# Patient Record
Sex: MALE | Race: BLACK OR AFRICAN AMERICAN | NOT HISPANIC OR LATINO | Employment: OTHER | URBAN - METROPOLITAN AREA
[De-identification: names, ages, dates, MRNs, and addresses within clinical notes are randomized per-mention and may not be internally consistent; named-entity substitution may affect disease eponyms.]

---

## 2020-01-19 ENCOUNTER — APPOINTMENT (OUTPATIENT)
Dept: GENERAL RADIOLOGY | Facility: CLINIC | Age: 45
End: 2020-01-19
Attending: EMERGENCY MEDICINE
Payer: MEDICAID

## 2020-01-19 ENCOUNTER — HOSPITAL ENCOUNTER (EMERGENCY)
Facility: CLINIC | Age: 45
Discharge: HOME | End: 2020-01-19
Attending: EMERGENCY MEDICINE | Admitting: EMERGENCY MEDICINE
Payer: MEDICAID

## 2020-01-19 ENCOUNTER — APPOINTMENT (OUTPATIENT)
Dept: CT IMAGING | Facility: CLINIC | Age: 45
End: 2020-01-19
Attending: EMERGENCY MEDICINE
Payer: MEDICAID

## 2020-01-19 VITALS
SYSTOLIC BLOOD PRESSURE: 142 MMHG | TEMPERATURE: 98.3 F | WEIGHT: 185 LBS | HEART RATE: 72 BPM | RESPIRATION RATE: 22 BRPM | OXYGEN SATURATION: 99 % | DIASTOLIC BLOOD PRESSURE: 97 MMHG

## 2020-01-19 DIAGNOSIS — J45.901 EXACERBATION OF ASTHMA, UNSPECIFIED ASTHMA SEVERITY, UNSPECIFIED WHETHER PERSISTENT: ICD-10-CM

## 2020-01-19 DIAGNOSIS — E27.9 ADRENAL NODULE (H): ICD-10-CM

## 2020-01-19 DIAGNOSIS — K70.10 ALCOHOLIC HEPATITIS WITHOUT ASCITES (H): ICD-10-CM

## 2020-01-19 DIAGNOSIS — F10.929 ALCOHOLIC INTOXICATION WITH COMPLICATION (H): ICD-10-CM

## 2020-01-19 DIAGNOSIS — R10.84 ABDOMINAL PAIN, GENERALIZED: ICD-10-CM

## 2020-01-19 LAB
ALBUMIN SERPL-MCNC: 3.6 G/DL (ref 3.4–5)
ALP SERPL-CCNC: 62 U/L (ref 40–150)
ALT SERPL W P-5'-P-CCNC: 153 U/L (ref 0–70)
ANION GAP SERPL CALCULATED.3IONS-SCNC: 12 MMOL/L (ref 3–14)
AST SERPL W P-5'-P-CCNC: 260 U/L (ref 0–45)
BASOPHILS # BLD AUTO: 0.1 10E9/L (ref 0–0.2)
BASOPHILS NFR BLD AUTO: 1.4 %
BILIRUB SERPL-MCNC: 0.6 MG/DL (ref 0.2–1.3)
BUN SERPL-MCNC: 11 MG/DL (ref 7–30)
CALCIUM SERPL-MCNC: 8.1 MG/DL (ref 8.5–10.1)
CHLORIDE SERPL-SCNC: 110 MMOL/L (ref 94–109)
CO2 SERPL-SCNC: 23 MMOL/L (ref 20–32)
CREAT SERPL-MCNC: 0.5 MG/DL (ref 0.66–1.25)
DIFFERENTIAL METHOD BLD: ABNORMAL
EOSINOPHIL # BLD AUTO: 0.2 10E9/L (ref 0–0.7)
EOSINOPHIL NFR BLD AUTO: 3.3 %
ERYTHROCYTE [DISTWIDTH] IN BLOOD BY AUTOMATED COUNT: 16.1 % (ref 10–15)
ETHANOL SERPL-MCNC: 0.21 G/DL
GFR SERPL CREATININE-BSD FRML MDRD: >90 ML/MIN/{1.73_M2}
GLUCOSE SERPL-MCNC: 116 MG/DL (ref 70–99)
HCT VFR BLD AUTO: 38.5 % (ref 40–53)
HGB BLD-MCNC: 13.1 G/DL (ref 13.3–17.7)
IMM GRANULOCYTES # BLD: 0 10E9/L (ref 0–0.4)
IMM GRANULOCYTES NFR BLD: 0.2 %
LIPASE SERPL-CCNC: 107 U/L (ref 73–393)
LYMPHOCYTES # BLD AUTO: 2.7 10E9/L (ref 0.8–5.3)
LYMPHOCYTES NFR BLD AUTO: 56.1 %
MCH RBC QN AUTO: 28.9 PG (ref 26.5–33)
MCHC RBC AUTO-ENTMCNC: 34 G/DL (ref 31.5–36.5)
MCV RBC AUTO: 85 FL (ref 78–100)
MONOCYTES # BLD AUTO: 0.5 10E9/L (ref 0–1.3)
MONOCYTES NFR BLD AUTO: 11.1 %
NEUTROPHILS # BLD AUTO: 1.4 10E9/L (ref 1.6–8.3)
NEUTROPHILS NFR BLD AUTO: 27.9 %
NRBC # BLD AUTO: 0 10*3/UL
NRBC BLD AUTO-RTO: 0 /100
PLATELET # BLD AUTO: 175 10E9/L (ref 150–450)
POTASSIUM SERPL-SCNC: 3.1 MMOL/L (ref 3.4–5.3)
PROT SERPL-MCNC: 7.3 G/DL (ref 6.8–8.8)
RBC # BLD AUTO: 4.54 10E12/L (ref 4.4–5.9)
SODIUM SERPL-SCNC: 145 MMOL/L (ref 133–144)
TROPONIN I SERPL-MCNC: <0.015 UG/L (ref 0–0.04)
WBC # BLD AUTO: 4.9 10E9/L (ref 4–11)

## 2020-01-19 PROCEDURE — 96361 HYDRATE IV INFUSION ADD-ON: CPT

## 2020-01-19 PROCEDURE — 85025 COMPLETE CBC W/AUTO DIFF WBC: CPT | Performed by: EMERGENCY MEDICINE

## 2020-01-19 PROCEDURE — 99285 EMERGENCY DEPT VISIT HI MDM: CPT | Mod: 25

## 2020-01-19 PROCEDURE — 80053 COMPREHEN METABOLIC PANEL: CPT | Performed by: EMERGENCY MEDICINE

## 2020-01-19 PROCEDURE — 25800030 ZZH RX IP 258 OP 636: Performed by: EMERGENCY MEDICINE

## 2020-01-19 PROCEDURE — 74177 CT ABD & PELVIS W/CONTRAST: CPT

## 2020-01-19 PROCEDURE — 25000128 H RX IP 250 OP 636: Performed by: EMERGENCY MEDICINE

## 2020-01-19 PROCEDURE — 96375 TX/PRO/DX INJ NEW DRUG ADDON: CPT

## 2020-01-19 PROCEDURE — 96376 TX/PRO/DX INJ SAME DRUG ADON: CPT

## 2020-01-19 PROCEDURE — 96374 THER/PROPH/DIAG INJ IV PUSH: CPT | Mod: 59

## 2020-01-19 PROCEDURE — 80320 DRUG SCREEN QUANTALCOHOLS: CPT | Performed by: EMERGENCY MEDICINE

## 2020-01-19 PROCEDURE — 71046 X-RAY EXAM CHEST 2 VIEWS: CPT

## 2020-01-19 PROCEDURE — 84484 ASSAY OF TROPONIN QUANT: CPT | Performed by: EMERGENCY MEDICINE

## 2020-01-19 PROCEDURE — 93005 ELECTROCARDIOGRAM TRACING: CPT

## 2020-01-19 PROCEDURE — 94640 AIRWAY INHALATION TREATMENT: CPT

## 2020-01-19 PROCEDURE — 25000125 ZZHC RX 250: Performed by: EMERGENCY MEDICINE

## 2020-01-19 PROCEDURE — 25000132 ZZH RX MED GY IP 250 OP 250 PS 637: Performed by: EMERGENCY MEDICINE

## 2020-01-19 PROCEDURE — 83690 ASSAY OF LIPASE: CPT | Performed by: EMERGENCY MEDICINE

## 2020-01-19 RX ORDER — OXYCODONE HYDROCHLORIDE 5 MG/1
5 TABLET ORAL ONCE
Status: COMPLETED | OUTPATIENT
Start: 2020-01-19 | End: 2020-01-19

## 2020-01-19 RX ORDER — ONDANSETRON 2 MG/ML
4 INJECTION INTRAMUSCULAR; INTRAVENOUS EVERY 30 MIN PRN
Status: DISCONTINUED | OUTPATIENT
Start: 2020-01-19 | End: 2020-01-19

## 2020-01-19 RX ORDER — PREDNISONE 20 MG/1
40 TABLET ORAL DAILY
Qty: 10 TABLET | Refills: 0 | Status: ON HOLD | OUTPATIENT
Start: 2020-01-19 | End: 2020-02-26

## 2020-01-19 RX ORDER — METHYLPREDNISOLONE SODIUM SUCCINATE 125 MG/2ML
125 INJECTION, POWDER, LYOPHILIZED, FOR SOLUTION INTRAMUSCULAR; INTRAVENOUS ONCE
Status: COMPLETED | OUTPATIENT
Start: 2020-01-19 | End: 2020-01-19

## 2020-01-19 RX ORDER — HYDROMORPHONE HYDROCHLORIDE 1 MG/ML
0.5 INJECTION, SOLUTION INTRAMUSCULAR; INTRAVENOUS; SUBCUTANEOUS
Status: DISCONTINUED | OUTPATIENT
Start: 2020-01-19 | End: 2020-01-19

## 2020-01-19 RX ORDER — IOPAMIDOL 755 MG/ML
500 INJECTION, SOLUTION INTRAVASCULAR ONCE
Status: COMPLETED | OUTPATIENT
Start: 2020-01-19 | End: 2020-01-19

## 2020-01-19 RX ORDER — SODIUM CHLORIDE 9 MG/ML
1000 INJECTION, SOLUTION INTRAVENOUS CONTINUOUS
Status: DISCONTINUED | OUTPATIENT
Start: 2020-01-19 | End: 2020-01-19 | Stop reason: HOSPADM

## 2020-01-19 RX ORDER — HYDROMORPHONE HYDROCHLORIDE 1 MG/ML
0.2 INJECTION, SOLUTION INTRAMUSCULAR; INTRAVENOUS; SUBCUTANEOUS
Status: DISCONTINUED | OUTPATIENT
Start: 2020-01-19 | End: 2020-01-19

## 2020-01-19 RX ORDER — ALBUTEROL SULFATE 0.83 MG/ML
2.5 SOLUTION RESPIRATORY (INHALATION) ONCE
Status: COMPLETED | OUTPATIENT
Start: 2020-01-19 | End: 2020-01-19

## 2020-01-19 RX ORDER — ALBUTEROL SULFATE 90 UG/1
2 AEROSOL, METERED RESPIRATORY (INHALATION) EVERY 4 HOURS PRN
Qty: 1 INHALER | Refills: 0 | Status: ON HOLD | OUTPATIENT
Start: 2020-01-19 | End: 2020-02-26

## 2020-01-19 RX ORDER — ALBUTEROL SULFATE 0.83 MG/ML
2.5 SOLUTION RESPIRATORY (INHALATION) EVERY 6 HOURS PRN
Qty: 75 ML | Refills: 0 | Status: ON HOLD | OUTPATIENT
Start: 2020-01-19 | End: 2020-02-26

## 2020-01-19 RX ORDER — GUAIFENESIN 200 MG/10ML
200 LIQUID ORAL EVERY 4 HOURS PRN
Qty: 118 ML | Refills: 0 | Status: ON HOLD | OUTPATIENT
Start: 2020-01-19 | End: 2020-02-26

## 2020-01-19 RX ORDER — FAMOTIDINE 10 MG
10 TABLET ORAL 2 TIMES DAILY
Qty: 14 TABLET | Refills: 0 | Status: ON HOLD | OUTPATIENT
Start: 2020-01-19 | End: 2020-02-26

## 2020-01-19 RX ADMIN — HYDROMORPHONE HYDROCHLORIDE 0.5 MG: 1 INJECTION, SOLUTION INTRAMUSCULAR; INTRAVENOUS; SUBCUTANEOUS at 06:11

## 2020-01-19 RX ADMIN — ALBUTEROL SULFATE 2.5 MG: 2.5 SOLUTION RESPIRATORY (INHALATION) at 07:31

## 2020-01-19 RX ADMIN — IOPAMIDOL 93 ML: 755 INJECTION, SOLUTION INTRAVENOUS at 08:31

## 2020-01-19 RX ADMIN — OXYCODONE HYDROCHLORIDE 5 MG: 5 TABLET ORAL at 11:18

## 2020-01-19 RX ADMIN — OXYCODONE HYDROCHLORIDE 5 MG: 5 TABLET ORAL at 09:06

## 2020-01-19 RX ADMIN — SODIUM CHLORIDE 63 ML: 9 INJECTION, SOLUTION INTRAVENOUS at 08:31

## 2020-01-19 RX ADMIN — ONDANSETRON HYDROCHLORIDE 4 MG: 2 INJECTION, SOLUTION INTRAMUSCULAR; INTRAVENOUS at 07:15

## 2020-01-19 RX ADMIN — SODIUM CHLORIDE 1000 ML: 9 INJECTION, SOLUTION INTRAVENOUS at 06:11

## 2020-01-19 RX ADMIN — HYDROMORPHONE HYDROCHLORIDE 0.2 MG: 1 INJECTION, SOLUTION INTRAMUSCULAR; INTRAVENOUS; SUBCUTANEOUS at 07:15

## 2020-01-19 RX ADMIN — SODIUM CHLORIDE 1000 ML: 9 INJECTION, SOLUTION INTRAVENOUS at 07:13

## 2020-01-19 RX ADMIN — METHYLPREDNISOLONE SODIUM SUCCINATE 125 MG: 125 INJECTION, POWDER, FOR SOLUTION INTRAMUSCULAR; INTRAVENOUS at 06:11

## 2020-01-19 ASSESSMENT — ENCOUNTER SYMPTOMS
ABDOMINAL PAIN: 1
SHORTNESS OF BREATH: 1
VOMITING: 1

## 2020-01-19 NOTE — ED PROVIDER NOTES
History     Chief Complaint:  Shortness of Breath    HPI   Jaiden Lyons is a 44 year old male with a history of pancreatitis and asthma who presents via EMS for evaluation of shortness of breath. He notes developing epigastric abdominal pain radiating to bilateral sides and emesis around 0430 this morning. The patient also reports sudden onset shortness of breath one our prior to arrival. He denies chest pain. He administered one inhaler and 2 duonebs at home and was then given another 2 duonebs and 4 mg of Zofran by EMS en route. He notes slight relief of his symptoms with these interventions. He notes his alcohol-induced pancreatitis flared up one year ago and his asthma flared up a few years ago. He endorses drinking 0.5 pints of alcohol per day and his last drink was late last night. He notes feels shaky when going through withdrawals.    Allergies:  Morphine     Medications:    Zantac     Past Medical History:    Diabetes mellitus   Hypertension   Asthma   Pancreatitis  ETOH abuse    Past Surgical History:    The patient does not have any pertinent past surgical history.     Family History:    No past pertinent family history.     Social History:  The patient was accompanied to the ED by a female .  Smoking Status: Current Every Day Smoker  Smokeless Tobacco: Never Used  Alcohol Use: Positive, 0.5 pint per day  Marital Status:  Single [1]     Review of Systems   Respiratory: Positive for shortness of breath.    Cardiovascular: Negative for chest pain.   Gastrointestinal: Positive for abdominal pain and vomiting.   All other systems reviewed and are negative.    Physical Exam     Patient Vitals for the past 24 hrs:   BP Temp Temp src Pulse Heart Rate Resp SpO2 Weight   01/19/20 0845 (!) 142/97 -- -- 72 -- -- 99 % --   01/19/20 0815 (!) 144/93 -- -- 73 -- -- 97 % --   01/19/20 0800 (!) 149/97 -- -- -- -- -- 95 % --   01/19/20 0557 (!) 144/99 98.3  F (36.8  C) Temporal 92 92 22 100 % 83.9 kg (185 lb)         Physical Exam    General: Patient is alert and interactive when I enter the room  Head:  The scalp, face, and head appear normal  Eyes:  Conjunctivae are normal  ENT:    The nose is normal    Pinnae are normal    External acoustic canals are normal  Neck:  Trachea midline  CV:  Pulses are normal, RRR.    Resp:  No respiratory distress, CTAB, mild expiratory wheezing, tachypnea present, no retractions  Abdomen:      Soft, epigastric tenderness, non-distended  Musc:  Normal muscular tone    No major joint effusions    No asymmetric leg swelling  Skin:  No rash or lesions noted  Neuro:  Speech is normal and fluent. Face is symmetric.     Moving all extremities well.   Psych: Awake. Alert.  Normal affect.  Appropriate interactions.    Emergency Department Course     ECG:  Time: 0724  Vent. Rate 72 bpm. OH interval 158. QRS duration 102. QT/QTc 494/540. P-R-T axis 60 -8 -10.  Normal sinus rhythm   T wave abnormality, consider anterolateral ischemia  Prolonged QT  Abnormal ECG  Read time: 0724      Imaging:  Radiographic findings were communicated with the patient who voiced understanding of the findings.    XR Chest 2 Views  IMPRESSION: Small right pleural effusion. No pneumothorax. No focal consolidation. Cardiac silhouette within normal limits. Minimally tortuous aorta. No acute osseous abnormality. Reading per radiology.    CT Abdomen Pelvis w Contrast  IMPRESSION:  1. No acute pathology in the abdomen or pelvis.  2. Significant hepatic steatosis.  3. 2.2 cm left adrenal nodule, indeterminate, could represent adrenal  adenoma, can be further evaluated with CT adrenal mass protocol. Reading per radiology.    Laboratory:  CBC: WBC: 4.9, HGB 13.1 (L), PLT: 175    CMP: Glucose 116 (H), Sodium 145 (H), Potassium 3.1 (L), Chloride 110 (H), Creatinine 0.50 (L), Calcium 8.1 (L),  (H),  (H), o/w WNL     0600 Troponin I: <0.015    Lipase: 107    0559 Alcohol level blood: 0.21  (H)      Interventions:  0611 Solu-Medrol 125 mg IV  0611 Dilaudid 0.5 mg  IV  0611 NS 1000 mL IV   0713 NS 1000 mL IV  0715 Dilaudid 0.2 mg IV  0715 Zofran 4 mg IV  0731 Albuterol 2.5 mg Nebulizer  0906 Oxycodone 5 mg PO  1118 Oxycodone 5 mg PO    Emergency Department Course:  Past medical records, nursing notes, and vitals reviewed.  0600: I performed an exam of the patient and obtained history, as documented above.     EKG obtained in the ED, see results above.       The patient was sent for a XR Chest and CT Abdomen Pelvis while in the emergency department, results above.      IV was inserted and blood was drawn for laboratory testing, results above.     Medication and IV fluids administered.     1125 I rechecked and updated the patient.     Findings and plan explained to the Patient. Patient discharged home with instructions regarding supportive care, medications, and reasons to return. The importance of close follow-up was reviewed. The patient was prescribed albuterol inhaler, albuterol nebulizer, Pepcid, Robitussin, and prednisone.     I personally reviewed the laboratory and imaging results with the Patient and answered all related questions prior to discharge.    Impression & Plan        Medical Decision Making:  Jaiden Lyons is a 43 yo M who presents with sob and abdominal pain.  She has a history and pancreatitis.  He has been continued to drink for quite some time.  He believes he has a recurrence of his pancreatitis as he has been vomiting and has upper abdominal pain.  He does have some mild epigastric tenderness but otherwise benign.  His lungs only revealed mild expiratory wheezing but he is tachypneic.  He is alreddy received 4 nebs by the time I did seen him.  He reports he is using his uncles nebulizer and inhaler.  X-ray revealed no pneumonia or pneumothorax.  Patient was given Solu-Medrol and pain medication for possible pancreatitis.  Blood work returned with alcoholic hepatitis but no signs  of pancreatitis.  With his severe pain we did do a CT abdomen pelvis.  This revealed no acute abnormality.  He was intoxicated 12.2.  Patient was given oxycodone for pain at this point.  At this point I do not feel like there is a reason to admit him as he likely is having gastritis secondary to his drinking as well as an asthma exacerbation.  His asthma has greatly improved and he is breathing normally without issue.  Patient will be sent home with Pepcid for his stomach as well as prednisone for his asthma exacerbation.  We did do albuterol inhaler and a neb machine was ordered.  Patient needs a follow-up with her primary.  Patient discharged.      Diagnosis:    ICD-10-CM    1. Alcoholic hepatitis without ascites K70.10    2. Alcoholic intoxication with complication (H) F10.929    3. Exacerbation of asthma, unspecified asthma severity, unspecified whether persistent J45.901    4. Adrenal nodule (H) E27.9    5. Abdominal pain, generalized R10.84        Disposition:  discharged to home    Discharge Medications:  Discharge Medication List as of 1/19/2020 10:22 AM      START taking these medications    Details   albuterol (PROAIR HFA) 108 (90 Base) MCG/ACT inhaler Inhale 2 puffs into the lungs every 4 hours as needed for shortness of breath / dyspnea, Disp-1 Inhaler, R-0, Local Print      albuterol (PROVENTIL) (2.5 MG/3ML) 0.083% neb solution Take 1 vial (2.5 mg) by nebulization every 6 hours as needed for shortness of breath / dyspnea or wheezing, Disp-75 mL, R-0, Local Print      famotidine (PEPCID) 10 MG tablet Take 1 tablet (10 mg) by mouth 2 times daily for 7 days, Disp-14 tablet, R-0, Local Print      guaiFENesin (ROBITUSSIN) 100 MG/5ML liquid Take 10 mLs (200 mg) by mouth every 4 hours as needed for cough, Disp-118 mL, R-0, Local Print      predniSONE (DELTASONE) 20 MG tablet Take 2 tablets (40 mg) by mouth daily for 5 days, Disp-10 tablet, R-0, Local Print           Jaclyn LORA, am serving as a scribe  on 1/19/2020 at 6:22 AM to personally document services performed by Kenzie To MD based on my observations and the provider's statements to me.      Jaclyn Smith  1/19/2020   [facility] EMERGENCY DEPARTMENT       Kenzie To MD  01/20/20 [id number]

## 2020-01-19 NOTE — ED TRIAGE NOTES
SOB and belly pain since 0430.  Administered inhaler x 1 and duo neb x 2 at home.  Given duoneb x 2 and 4mg of zofran by ems.  Hx/o asthma and pancreatitis.

## 2020-01-19 NOTE — ED AVS SNAPSHOT
[facility] Emergency Department  [address]  [facility] [address]  Phone:  [phone]  Fax:  [phone]                                    Jaiden Lyons   MRN: [id number]    Department:  [facility] Emergency Department   Date of Visit:  1/19/2020           After Visit Summary Signature Page    I have received my discharge instructions, and my questions have been answered. I have discussed any challenges I see with this plan with the nurse or doctor.    ..........................................................................................................................................  Patient/Patient Representative Signature      ..........................................................................................................................................  Patient Representative Print Name and Relationship to Patient    ..................................................               ................................................  Date                                   Time    ..........................................................................................................................................  Reviewed by Signature/Title    ...................................................              ..............................................  Date                                               Time          22EPIC Rev 08/18

## 2020-01-19 NOTE — ED NOTES
Patient discharged to home. Patient received follow-up in 2 days with PCP and medication instructions for Albuterol inhaler, Nebulizers, Pepcid, Prednisone, and Guaifenesin. Patient received discharge instructions and has no other questions at this time.

## 2020-01-20 LAB — INTERPRETATION ECG - MUSE: NORMAL

## 2020-02-25 ENCOUNTER — HOSPITAL ENCOUNTER (INPATIENT)
Facility: CLINIC | Age: 45
LOS: 6 days | Discharge: HOME | End: 2020-03-02
Attending: EMERGENCY MEDICINE | Admitting: INTERNAL MEDICINE
Payer: MEDICAID

## 2020-02-25 ENCOUNTER — APPOINTMENT (OUTPATIENT)
Dept: GENERAL RADIOLOGY | Facility: CLINIC | Age: 45
End: 2020-02-25
Attending: EMERGENCY MEDICINE
Payer: MEDICAID

## 2020-02-25 DIAGNOSIS — K70.10 ALCOHOLIC HEPATITIS WITHOUT ASCITES (H): ICD-10-CM

## 2020-02-25 DIAGNOSIS — K85.20 ALCOHOL-INDUCED ACUTE PANCREATITIS, UNSPECIFIED COMPLICATION STATUS: ICD-10-CM

## 2020-02-25 LAB
ALBUMIN SERPL-MCNC: 4.2 G/DL (ref 3.4–5)
ALP SERPL-CCNC: 103 U/L (ref 40–150)
ALT SERPL W P-5'-P-CCNC: 173 U/L (ref 0–70)
ANION GAP SERPL CALCULATED.3IONS-SCNC: 18 MMOL/L (ref 3–14)
AST SERPL W P-5'-P-CCNC: 414 U/L (ref 0–45)
BASOPHILS # BLD AUTO: 0.1 10E9/L (ref 0–0.2)
BASOPHILS NFR BLD AUTO: 1.4 %
BILIRUB SERPL-MCNC: 1.2 MG/DL (ref 0.2–1.3)
BUN SERPL-MCNC: 11 MG/DL (ref 7–30)
CALCIUM SERPL-MCNC: 8.9 MG/DL (ref 8.5–10.1)
CHLORIDE SERPL-SCNC: 96 MMOL/L (ref 94–109)
CO2 SERPL-SCNC: 20 MMOL/L (ref 20–32)
CREAT SERPL-MCNC: 0.59 MG/DL (ref 0.66–1.25)
DIFFERENTIAL METHOD BLD: NORMAL
EOSINOPHIL # BLD AUTO: 0 10E9/L (ref 0–0.7)
EOSINOPHIL NFR BLD AUTO: 0.4 %
ERYTHROCYTE [DISTWIDTH] IN BLOOD BY AUTOMATED COUNT: 15 % (ref 10–15)
ETHANOL SERPL-MCNC: 0.28 G/DL
GFR SERPL CREATININE-BSD FRML MDRD: >90 ML/MIN/{1.73_M2}
GLUCOSE SERPL-MCNC: 69 MG/DL (ref 70–99)
HCT VFR BLD AUTO: 42.7 % (ref 40–53)
HGB BLD-MCNC: 14.3 G/DL (ref 13.3–17.7)
IMM GRANULOCYTES # BLD: 0 10E9/L (ref 0–0.4)
IMM GRANULOCYTES NFR BLD: 0 %
LIPASE SERPL-CCNC: 1384 U/L (ref 73–393)
LYMPHOCYTES # BLD AUTO: 2.4 10E9/L (ref 0.8–5.3)
LYMPHOCYTES NFR BLD AUTO: 47.7 %
MCH RBC QN AUTO: 28.1 PG (ref 26.5–33)
MCHC RBC AUTO-ENTMCNC: 33.5 G/DL (ref 31.5–36.5)
MCV RBC AUTO: 84 FL (ref 78–100)
MONOCYTES # BLD AUTO: 0.4 10E9/L (ref 0–1.3)
MONOCYTES NFR BLD AUTO: 8.8 %
NEUTROPHILS # BLD AUTO: 2.1 10E9/L (ref 1.6–8.3)
NEUTROPHILS NFR BLD AUTO: 41.7 %
NRBC # BLD AUTO: 0 10*3/UL
NRBC BLD AUTO-RTO: 0 /100
PLATELET # BLD AUTO: 157 10E9/L (ref 150–450)
POTASSIUM SERPL-SCNC: 4.1 MMOL/L (ref 3.4–5.3)
PROT SERPL-MCNC: 8.7 G/DL (ref 6.8–8.8)
RBC # BLD AUTO: 5.09 10E12/L (ref 4.4–5.9)
SODIUM SERPL-SCNC: 134 MMOL/L (ref 133–144)
WBC # BLD AUTO: 5 10E9/L (ref 4–11)

## 2020-02-25 PROCEDURE — 25800030 ZZH RX IP 258 OP 636: Performed by: EMERGENCY MEDICINE

## 2020-02-25 PROCEDURE — 71046 X-RAY EXAM CHEST 2 VIEWS: CPT

## 2020-02-25 PROCEDURE — 80320 DRUG SCREEN QUANTALCOHOLS: CPT | Performed by: EMERGENCY MEDICINE

## 2020-02-25 PROCEDURE — 94640 AIRWAY INHALATION TREATMENT: CPT

## 2020-02-25 PROCEDURE — 96375 TX/PRO/DX INJ NEW DRUG ADDON: CPT

## 2020-02-25 PROCEDURE — 99285 EMERGENCY DEPT VISIT HI MDM: CPT | Mod: 25

## 2020-02-25 PROCEDURE — 25000125 ZZHC RX 250: Performed by: EMERGENCY MEDICINE

## 2020-02-25 PROCEDURE — 83690 ASSAY OF LIPASE: CPT | Performed by: EMERGENCY MEDICINE

## 2020-02-25 PROCEDURE — 96374 THER/PROPH/DIAG INJ IV PUSH: CPT

## 2020-02-25 PROCEDURE — 85025 COMPLETE CBC W/AUTO DIFF WBC: CPT | Performed by: EMERGENCY MEDICINE

## 2020-02-25 PROCEDURE — 96376 TX/PRO/DX INJ SAME DRUG ADON: CPT

## 2020-02-25 PROCEDURE — 25000128 H RX IP 250 OP 636: Performed by: EMERGENCY MEDICINE

## 2020-02-25 PROCEDURE — 12000000 ZZH R&B MED SURG/OB

## 2020-02-25 PROCEDURE — 80053 COMPREHEN METABOLIC PANEL: CPT | Performed by: EMERGENCY MEDICINE

## 2020-02-25 RX ORDER — HYDROMORPHONE HYDROCHLORIDE 1 MG/ML
0.2 INJECTION, SOLUTION INTRAMUSCULAR; INTRAVENOUS; SUBCUTANEOUS
Status: DISCONTINUED | OUTPATIENT
Start: 2020-02-25 | End: 2020-02-26

## 2020-02-25 RX ORDER — LORAZEPAM 2 MG/ML
1-2 INJECTION INTRAMUSCULAR EVERY 30 MIN PRN
Status: DISCONTINUED | OUTPATIENT
Start: 2020-02-25 | End: 2020-03-02 | Stop reason: HOSPADM

## 2020-02-25 RX ORDER — ONDANSETRON 2 MG/ML
4 INJECTION INTRAMUSCULAR; INTRAVENOUS EVERY 30 MIN PRN
Status: DISCONTINUED | OUTPATIENT
Start: 2020-02-25 | End: 2020-02-26

## 2020-02-25 RX ORDER — DEXTROSE MONOHYDRATE, SODIUM CHLORIDE, AND POTASSIUM CHLORIDE 50; 1.49; 4.5 G/1000ML; G/1000ML; G/1000ML
INJECTION, SOLUTION INTRAVENOUS CONTINUOUS
Status: DISCONTINUED | OUTPATIENT
Start: 2020-02-26 | End: 2020-02-27

## 2020-02-25 RX ORDER — HYDROMORPHONE HYDROCHLORIDE 1 MG/ML
0.5 INJECTION, SOLUTION INTRAMUSCULAR; INTRAVENOUS; SUBCUTANEOUS
Status: DISCONTINUED | OUTPATIENT
Start: 2020-02-25 | End: 2020-02-25

## 2020-02-25 RX ORDER — HYDROMORPHONE HYDROCHLORIDE 1 MG/ML
.5-1 INJECTION, SOLUTION INTRAMUSCULAR; INTRAVENOUS; SUBCUTANEOUS
Status: COMPLETED | OUTPATIENT
Start: 2020-02-25 | End: 2020-02-25

## 2020-02-25 RX ORDER — LIDOCAINE 40 MG/G
CREAM TOPICAL
Status: DISCONTINUED | OUTPATIENT
Start: 2020-02-25 | End: 2020-03-02 | Stop reason: HOSPADM

## 2020-02-25 RX ORDER — NALOXONE HYDROCHLORIDE 0.4 MG/ML
.1-.4 INJECTION, SOLUTION INTRAMUSCULAR; INTRAVENOUS; SUBCUTANEOUS
Status: DISCONTINUED | OUTPATIENT
Start: 2020-02-25 | End: 2020-02-26

## 2020-02-25 RX ORDER — NITROGLYCERIN 0.4 MG/1
0.4 TABLET SUBLINGUAL EVERY 5 MIN PRN
Status: DISCONTINUED | OUTPATIENT
Start: 2020-02-25 | End: 2020-03-02 | Stop reason: HOSPADM

## 2020-02-25 RX ORDER — PROCHLORPERAZINE 25 MG
25 SUPPOSITORY, RECTAL RECTAL EVERY 12 HOURS PRN
Status: DISCONTINUED | OUTPATIENT
Start: 2020-02-25 | End: 2020-03-02 | Stop reason: HOSPADM

## 2020-02-25 RX ORDER — LORAZEPAM 0.5 MG/1
1-2 TABLET ORAL EVERY 30 MIN PRN
Status: DISCONTINUED | OUTPATIENT
Start: 2020-02-25 | End: 2020-03-02 | Stop reason: HOSPADM

## 2020-02-25 RX ORDER — PROCHLORPERAZINE MALEATE 10 MG
10 TABLET ORAL EVERY 6 HOURS PRN
Status: DISCONTINUED | OUTPATIENT
Start: 2020-02-25 | End: 2020-03-02 | Stop reason: HOSPADM

## 2020-02-25 RX ORDER — ALBUTEROL SULFATE 0.83 MG/ML
2.5 SOLUTION RESPIRATORY (INHALATION)
Status: DISCONTINUED | OUTPATIENT
Start: 2020-02-25 | End: 2020-03-02 | Stop reason: HOSPADM

## 2020-02-25 RX ORDER — IPRATROPIUM BROMIDE AND ALBUTEROL SULFATE 2.5; .5 MG/3ML; MG/3ML
3 SOLUTION RESPIRATORY (INHALATION) ONCE
Status: COMPLETED | OUTPATIENT
Start: 2020-02-25 | End: 2020-02-25

## 2020-02-25 RX ORDER — SODIUM CHLORIDE 9 MG/ML
1000 INJECTION, SOLUTION INTRAVENOUS CONTINUOUS
Status: DISCONTINUED | OUTPATIENT
Start: 2020-02-25 | End: 2020-02-26

## 2020-02-25 RX ORDER — LIDOCAINE 40 MG/G
CREAM TOPICAL
Status: DISCONTINUED | OUTPATIENT
Start: 2020-02-25 | End: 2020-02-26

## 2020-02-25 RX ADMIN — SODIUM CHLORIDE 1000 ML: 9 INJECTION, SOLUTION INTRAVENOUS at 20:49

## 2020-02-25 RX ADMIN — ONDANSETRON 4 MG: 2 INJECTION INTRAMUSCULAR; INTRAVENOUS at 22:05

## 2020-02-25 RX ADMIN — HYDROMORPHONE HYDROCHLORIDE 0.5 MG: 1 INJECTION, SOLUTION INTRAMUSCULAR; INTRAVENOUS; SUBCUTANEOUS at 21:06

## 2020-02-25 RX ADMIN — IPRATROPIUM BROMIDE AND ALBUTEROL SULFATE 3 ML: .5; 3 SOLUTION RESPIRATORY (INHALATION) at 21:04

## 2020-02-25 RX ADMIN — HYDROMORPHONE HYDROCHLORIDE 0.5 MG: 1 INJECTION, SOLUTION INTRAMUSCULAR; INTRAVENOUS; SUBCUTANEOUS at 22:05

## 2020-02-25 RX ADMIN — FOLIC ACID: 5 INJECTION, SOLUTION INTRAMUSCULAR; INTRAVENOUS; SUBCUTANEOUS at 21:34

## 2020-02-25 RX ADMIN — ONDANSETRON 4 MG: 2 INJECTION INTRAMUSCULAR; INTRAVENOUS at 20:50

## 2020-02-25 RX ADMIN — HYDROMORPHONE HYDROCHLORIDE 0.5 MG: 1 INJECTION, SOLUTION INTRAMUSCULAR; INTRAVENOUS; SUBCUTANEOUS at 20:50

## 2020-02-25 ASSESSMENT — MIFFLIN-ST. JEOR: SCORE: 1624.02

## 2020-02-25 ASSESSMENT — ENCOUNTER SYMPTOMS
VOMITING: 1
ABDOMINAL PAIN: 1
SHORTNESS OF BREATH: 1
DIARRHEA: 0

## 2020-02-26 ENCOUNTER — APPOINTMENT (OUTPATIENT)
Dept: ULTRASOUND IMAGING | Facility: CLINIC | Age: 45
End: 2020-02-26
Attending: INTERNAL MEDICINE
Payer: MEDICAID

## 2020-02-26 PROBLEM — K85.90 PANCREATITIS: Status: ACTIVE | Noted: 2020-02-26

## 2020-02-26 LAB
ALBUMIN SERPL-MCNC: 3.4 G/DL (ref 3.4–5)
ALP SERPL-CCNC: 86 U/L (ref 40–150)
ALT SERPL W P-5'-P-CCNC: 145 U/L (ref 0–70)
ANION GAP SERPL CALCULATED.3IONS-SCNC: 13 MMOL/L (ref 3–14)
AST SERPL W P-5'-P-CCNC: 343 U/L (ref 0–45)
BILIRUB SERPL-MCNC: 1.1 MG/DL (ref 0.2–1.3)
BUN SERPL-MCNC: 8 MG/DL (ref 7–30)
CALCIUM SERPL-MCNC: 7.7 MG/DL (ref 8.5–10.1)
CHLORIDE SERPL-SCNC: 101 MMOL/L (ref 94–109)
CO2 SERPL-SCNC: 20 MMOL/L (ref 20–32)
CREAT SERPL-MCNC: 0.46 MG/DL (ref 0.66–1.25)
ERYTHROCYTE [DISTWIDTH] IN BLOOD BY AUTOMATED COUNT: 15.1 % (ref 10–15)
GFR SERPL CREATININE-BSD FRML MDRD: >90 ML/MIN/{1.73_M2}
GLUCOSE SERPL-MCNC: 124 MG/DL (ref 70–99)
HCT VFR BLD AUTO: 37.9 % (ref 40–53)
HGB BLD-MCNC: 12.7 G/DL (ref 13.3–17.7)
LACTATE BLD-SCNC: 1.2 MMOL/L (ref 0.7–2)
LIPASE SERPL-CCNC: 1402 U/L (ref 73–393)
MCH RBC QN AUTO: 28.7 PG (ref 26.5–33)
MCHC RBC AUTO-ENTMCNC: 33.5 G/DL (ref 31.5–36.5)
MCV RBC AUTO: 86 FL (ref 78–100)
PLATELET # BLD AUTO: 131 10E9/L (ref 150–450)
POTASSIUM SERPL-SCNC: 4.2 MMOL/L (ref 3.4–5.3)
PROT SERPL-MCNC: 7.3 G/DL (ref 6.8–8.8)
RBC # BLD AUTO: 4.43 10E12/L (ref 4.4–5.9)
SODIUM SERPL-SCNC: 134 MMOL/L (ref 133–144)
WBC # BLD AUTO: 3.7 10E9/L (ref 4–11)

## 2020-02-26 PROCEDURE — 25800030 ZZH RX IP 258 OP 636: Performed by: INTERNAL MEDICINE

## 2020-02-26 PROCEDURE — 40000007 ZZH STATISTIC ADULT CD FACE TO FACE-NO CHRG

## 2020-02-26 PROCEDURE — 25000128 H RX IP 250 OP 636: Performed by: INTERNAL MEDICINE

## 2020-02-26 PROCEDURE — 76705 ECHO EXAM OF ABDOMEN: CPT

## 2020-02-26 PROCEDURE — 25000125 ZZHC RX 250: Performed by: INTERNAL MEDICINE

## 2020-02-26 PROCEDURE — 99223 1ST HOSP IP/OBS HIGH 75: CPT | Mod: AI | Performed by: INTERNAL MEDICINE

## 2020-02-26 PROCEDURE — 36415 COLL VENOUS BLD VENIPUNCTURE: CPT | Performed by: INTERNAL MEDICINE

## 2020-02-26 PROCEDURE — 80053 COMPREHEN METABOLIC PANEL: CPT | Performed by: INTERNAL MEDICINE

## 2020-02-26 PROCEDURE — 94640 AIRWAY INHALATION TREATMENT: CPT | Mod: 76

## 2020-02-26 PROCEDURE — 83605 ASSAY OF LACTIC ACID: CPT | Performed by: INTERNAL MEDICINE

## 2020-02-26 PROCEDURE — 40000275 ZZH STATISTIC RCP TIME EA 10 MIN

## 2020-02-26 PROCEDURE — 12000000 ZZH R&B MED SURG/OB

## 2020-02-26 PROCEDURE — 85027 COMPLETE CBC AUTOMATED: CPT | Performed by: INTERNAL MEDICINE

## 2020-02-26 PROCEDURE — 99207 ZZC APP CREDIT; MD BILLING SHARED VISIT: CPT | Performed by: INTERNAL MEDICINE

## 2020-02-26 PROCEDURE — HZ2ZZZZ DETOXIFICATION SERVICES FOR SUBSTANCE ABUSE TREATMENT: ICD-10-PCS | Performed by: INTERNAL MEDICINE

## 2020-02-26 PROCEDURE — 83690 ASSAY OF LIPASE: CPT | Performed by: INTERNAL MEDICINE

## 2020-02-26 PROCEDURE — 94640 AIRWAY INHALATION TREATMENT: CPT

## 2020-02-26 RX ORDER — HALOPERIDOL 5 MG/ML
2 INJECTION INTRAMUSCULAR EVERY 6 HOURS PRN
Status: DISCONTINUED | OUTPATIENT
Start: 2020-02-26 | End: 2020-03-02 | Stop reason: HOSPADM

## 2020-02-26 RX ORDER — THIAMINE HYDROCHLORIDE 100 MG/ML
100 INJECTION, SOLUTION INTRAMUSCULAR; INTRAVENOUS ONCE
Status: COMPLETED | OUTPATIENT
Start: 2020-02-26 | End: 2020-02-26

## 2020-02-26 RX ORDER — HYDRALAZINE HYDROCHLORIDE 20 MG/ML
10 INJECTION INTRAMUSCULAR; INTRAVENOUS EVERY 4 HOURS PRN
Status: DISCONTINUED | OUTPATIENT
Start: 2020-02-26 | End: 2020-03-02 | Stop reason: HOSPADM

## 2020-02-26 RX ORDER — HYDROMORPHONE HYDROCHLORIDE 1 MG/ML
.3-.5 INJECTION, SOLUTION INTRAMUSCULAR; INTRAVENOUS; SUBCUTANEOUS
Status: DISCONTINUED | OUTPATIENT
Start: 2020-02-26 | End: 2020-02-28

## 2020-02-26 RX ORDER — FOLIC ACID 5 MG/ML
1 INJECTION, SOLUTION INTRAMUSCULAR; INTRAVENOUS; SUBCUTANEOUS DAILY
Status: DISCONTINUED | OUTPATIENT
Start: 2020-02-26 | End: 2020-02-26 | Stop reason: RX

## 2020-02-26 RX ORDER — THIAMINE HYDROCHLORIDE 100 MG/ML
100 INJECTION, SOLUTION INTRAMUSCULAR; INTRAVENOUS DAILY
Status: DISCONTINUED | OUTPATIENT
Start: 2020-02-26 | End: 2020-02-26

## 2020-02-26 RX ORDER — THIAMINE HYDROCHLORIDE 100 MG/ML
100 INJECTION, SOLUTION INTRAMUSCULAR; INTRAVENOUS DAILY
Status: DISCONTINUED | OUTPATIENT
Start: 2020-02-27 | End: 2020-02-29

## 2020-02-26 RX ORDER — METOPROLOL TARTRATE 1 MG/ML
2.5 INJECTION, SOLUTION INTRAVENOUS EVERY 6 HOURS
Status: DISCONTINUED | OUTPATIENT
Start: 2020-02-26 | End: 2020-02-27

## 2020-02-26 RX ORDER — NALOXONE HYDROCHLORIDE 0.4 MG/ML
.1-.4 INJECTION, SOLUTION INTRAMUSCULAR; INTRAVENOUS; SUBCUTANEOUS
Status: DISCONTINUED | OUTPATIENT
Start: 2020-02-26 | End: 2020-03-02 | Stop reason: HOSPADM

## 2020-02-26 RX ORDER — HYDROMORPHONE HYDROCHLORIDE 1 MG/ML
0.5 INJECTION, SOLUTION INTRAMUSCULAR; INTRAVENOUS; SUBCUTANEOUS
Status: DISCONTINUED | OUTPATIENT
Start: 2020-02-26 | End: 2020-02-26

## 2020-02-26 RX ADMIN — PROCHLORPERAZINE EDISYLATE 10 MG: 5 INJECTION INTRAMUSCULAR; INTRAVENOUS at 04:55

## 2020-02-26 RX ADMIN — METOPROLOL TARTRATE 2.5 MG: 5 INJECTION INTRAVENOUS at 19:45

## 2020-02-26 RX ADMIN — LORAZEPAM 1 MG: 2 INJECTION INTRAMUSCULAR; INTRAVENOUS at 12:42

## 2020-02-26 RX ADMIN — POTASSIUM CHLORIDE, DEXTROSE MONOHYDRATE AND SODIUM CHLORIDE: 150; 5; 450 INJECTION, SOLUTION INTRAVENOUS at 22:48

## 2020-02-26 RX ADMIN — LORAZEPAM 1 MG: 2 INJECTION INTRAMUSCULAR; INTRAVENOUS at 16:46

## 2020-02-26 RX ADMIN — Medication: at 13:29

## 2020-02-26 RX ADMIN — HYDROMORPHONE HYDROCHLORIDE 0.2 MG: 1 INJECTION, SOLUTION INTRAMUSCULAR; INTRAVENOUS; SUBCUTANEOUS at 03:20

## 2020-02-26 RX ADMIN — LORAZEPAM 2 MG: 2 INJECTION INTRAMUSCULAR; INTRAVENOUS at 18:59

## 2020-02-26 RX ADMIN — HYDROMORPHONE HYDROCHLORIDE 0.2 MG: 1 INJECTION, SOLUTION INTRAMUSCULAR; INTRAVENOUS; SUBCUTANEOUS at 07:18

## 2020-02-26 RX ADMIN — POTASSIUM CHLORIDE, DEXTROSE MONOHYDRATE AND SODIUM CHLORIDE: 150; 5; 450 INJECTION, SOLUTION INTRAVENOUS at 00:27

## 2020-02-26 RX ADMIN — ALBUTEROL SULFATE 2.5 MG: 2.5 SOLUTION RESPIRATORY (INHALATION) at 15:21

## 2020-02-26 RX ADMIN — ALBUTEROL SULFATE 2.5 MG: 2.5 SOLUTION RESPIRATORY (INHALATION) at 04:59

## 2020-02-26 RX ADMIN — ALBUTEROL SULFATE 2.5 MG: 2.5 SOLUTION RESPIRATORY (INHALATION) at 00:17

## 2020-02-26 RX ADMIN — HYDROMORPHONE HYDROCHLORIDE 0.2 MG: 1 INJECTION, SOLUTION INTRAMUSCULAR; INTRAVENOUS; SUBCUTANEOUS at 00:26

## 2020-02-26 RX ADMIN — FOLIC ACID: 5 INJECTION, SOLUTION INTRAMUSCULAR; INTRAVENOUS; SUBCUTANEOUS at 15:03

## 2020-02-26 RX ADMIN — HYDROMORPHONE HYDROCHLORIDE 0.5 MG: 1 INJECTION, SOLUTION INTRAMUSCULAR; INTRAVENOUS; SUBCUTANEOUS at 22:45

## 2020-02-26 RX ADMIN — LORAZEPAM 2 MG: 2 INJECTION INTRAMUSCULAR; INTRAVENOUS at 23:35

## 2020-02-26 RX ADMIN — HYDROMORPHONE HYDROCHLORIDE 0.2 MG: 1 INJECTION, SOLUTION INTRAMUSCULAR; INTRAVENOUS; SUBCUTANEOUS at 05:17

## 2020-02-26 RX ADMIN — POTASSIUM CHLORIDE, DEXTROSE MONOHYDRATE AND SODIUM CHLORIDE: 150; 5; 450 INJECTION, SOLUTION INTRAVENOUS at 12:38

## 2020-02-26 RX ADMIN — HYDROMORPHONE HYDROCHLORIDE 0.3 MG: 1 INJECTION, SOLUTION INTRAMUSCULAR; INTRAVENOUS; SUBCUTANEOUS at 20:30

## 2020-02-26 RX ADMIN — LORAZEPAM 1 MG: 2 INJECTION INTRAMUSCULAR; INTRAVENOUS at 07:28

## 2020-02-26 RX ADMIN — HYDROMORPHONE HYDROCHLORIDE 0.5 MG: 1 INJECTION, SOLUTION INTRAMUSCULAR; INTRAVENOUS; SUBCUTANEOUS at 08:54

## 2020-02-26 RX ADMIN — LORAZEPAM 1 MG: 2 INJECTION INTRAMUSCULAR; INTRAVENOUS at 10:05

## 2020-02-26 RX ADMIN — HYDROMORPHONE HYDROCHLORIDE 0.5 MG: 1 INJECTION, SOLUTION INTRAMUSCULAR; INTRAVENOUS; SUBCUTANEOUS at 10:58

## 2020-02-26 RX ADMIN — LORAZEPAM 1 MG: 2 INJECTION INTRAMUSCULAR; INTRAVENOUS at 01:22

## 2020-02-26 RX ADMIN — THIAMINE HYDROCHLORIDE 100 MG: 100 INJECTION, SOLUTION INTRAMUSCULAR; INTRAVENOUS at 03:20

## 2020-02-26 ASSESSMENT — ACTIVITIES OF DAILY LIVING (ADL)
ADLS_ACUITY_SCORE: 14
ADLS_ACUITY_SCORE: 19
ADLS_ACUITY_SCORE: 13
ADLS_ACUITY_SCORE: 14

## 2020-02-26 NOTE — CONSULTS
"2/26/20 chem dep consult completed.      Met with patient, he appeared sleeping when I arrived.  He did arouse to my voice and make eye contact, but appeared drowsy.  I introduced self and role and patient abruptly stated \"you can leave your card.\"  I asked him if I could ask a couple questions and share some information with him, which he was agreeable.  He did tell he has been through treatment before, he does confirm he has insurance but did not clarify if he is aware of Rule 25 funding.  I began to explain to him the process for [facility] funding for substance use treatment but he began nodding off, so I aroused him with a loud voice and advised him to contact me with any questions or needs regarding interest in substance use services or support.      After my meeting I spoke with RN and informed her of my brief interview with patient.  I will close consult at this time, once patient is more A&O and if is interested in meeting with chem dep they can reorder consult.  Otherwise at discharge providing him with [facility] and [facility] info for outpatient services would be appropriate.    Britney Watson [facility]  [phone]  "

## 2020-02-26 NOTE — PROGRESS NOTES
[facility]    Medicine Progress Note - Hospitalist Service       Date of Admission:  2/25/2020  Length of stay: 0 days    Assessment & Plan   Jaiden Lyons is a 45-year-old male with a history of alcoholic pancreatitis, alcohol abuse and dependence, asthma, tobacco use, who is admitted to the hospitalist service with alcoholic pancreatitis and alcohol dependence with risk for alcohol withdrawal.      Today:  - Patient still having significant abdominal pain requiring IV pain medication.  Not appropriate to advance diet today, continue IV fluids and as needed Dilaudid.    ADDENDUM  Notified this afternoon of worsening pain. Will start Dilaudid PCA.    Alcoholic pancreatitis  - Evidenced by epigastric abdominal pain lipase elevated greater than 1200, and history of significant alcohol use.  Has had a history of same in the past.  Had some elevations in transaminases, likely related to alcohol.  Right upper quadrant ultrasound does not show sign of stone.  No need for CT scan at this time.  - Continuous IV fluids, bowel rest, pain control.  - Advance diet when IV pain medication requirements go down.    Alcohol dependence, alcohol abuse, alcohol intoxication, and alcohol withdrawal  - Patient was intoxicated on admission with an ethanol level of 0.28.  He admits to drinking half pint to a pint of rum per day.  - Per chart review, does not appear as if he has had complicated withdrawals before.  - Place patient on CIWA scale with as needed Lorazepam.  - Supplement thiamine and folate.  - Chemical dependency consult has been placed.    Alcoholic hepatitis  - This is very mild with transaminases in the low 100s and total bilirubin of 1.1.  - Right upper quadrant ultrasound showed fatty liver.    Tobacco abuse  - Patient smokes half pack per day.  Cessation is encouraged.  Nicotine gum provided.    Asthma  - Does not appear to be in an acute exacerbation.  Albuterol available as needed.  It was clear on  "admission.    Diet: N.p.o.  DVT Prophylaxis: Low Risk/Ambulatory with no VTE prophylaxis indicated  Malnutrition: Not present  Arango Catheter: No  Code Status: Full Code     Disposition Plan   Expected discharge:   2-3 days back to home.     Jm De La Torre MD  Hospitalist Service  [facility]  ______________________________________________________________________    Interval History   Patient complaining of upper abdominal pain today.  States that 0.2 mg Dilaudid dose was not helping.  Also admits to having felt short of breath lately with some wheezing related to his asthma.  Admits to drinking half pint to a pint of gin per day.    Data reviewed today: I reviewed all medications, new labs and imaging results over the last 24 hours.     Physical Exam   BP (!) 148/94   Pulse 85   Temp 96.8  F (36  C) (Temporal)   Resp 16   Ht 1.791 m (5' 10.5\")   Wt 73.5 kg (162 lb)   SpO2 94%   BMI 22.92 kg/m    162 lbs 0 oz       General: Somewhat somnolent, not in acute distress, lying in the bed.    HEENT: No scleral icterus. Oropharynx moist.     Neck: Supple. Normal range of motion.     Pulmonary: Normal work of breathing. Clear to auscultation bilaterally.    Cardiovascular: Regular rate and rhythm without murmur or extra heart sounds.    Abdomen: Soft.  Epigastric tenderness to palpation with voluntary guarding.    Extremities: No peripheral edema. No clubbing or cyanosis.     Neurologic: Awake, alert, appropriate.    Skin: Warm and dry.    Psychiatric: Normal affect and mood.     Data    Recent Labs   Lab 02/26/20  0613 02/25/20  2044   WBC 3.7* 5.0   HGB 12.7* 14.3   MCV 86 84   * 157    134   POTASSIUM 4.2 4.1   CHLORIDE 101 96   CO2 20 20   BUN 8 11   CR 0.46* 0.59*   ANIONGAP 13 18*   BARRY 7.7* 8.9   * 69*   ALBUMIN 3.4 4.2   PROTTOTAL 7.3 8.7   BILITOTAL 1.1 1.2   ALKPHOS 86 103   * 173*   * 414*   LIPASE 1,402* 1,384*     Recent Results (from the past 24 " hour(s))   XR Chest 2 Views    Narrative    EXAM: XR CHEST 2 VW  LOCATION: [facility]  DATE/TIME: 2/25/2020 9:36 PM    INDICATION: Dyspnea  COMPARISON: 01/19/2020      Impression    IMPRESSION: Heart size and pulmonary vascularity normal. Small right pleural effusion is unchanged. Curvilinear scarring or atelectasis right lower lung field, also unchanged. Lungs otherwise clear.   US Abdomen Limited    Narrative    ULTRASOUND ABDOMEN LIMITED February 26, 2020 9:23 AM     HISTORY: Pancreatitis.    COMPARISON: None.    FINDINGS:    Gallbladder: Normal with no cholelithiasis, wall thickening or focal  tenderness.      Bile ducts: CHD is normal diameter. No intrahepatic biliary  dilatation.    Liver: Fatty liver.    Pancreas: Limited visualization, partially obscured by overlying bowel  gas. No specific abnormality can be identified.    Right kidney: Normal.     Aorta and IVC: Not specifically assessed.       Impression    IMPRESSION:    1. Fatty liver.  2. Limited assessment of the pancreas.  3. No gallstones. Negative sonographic Wade's sign.       Medications      Current Facility-Administered Medications   Medication Dose Route Frequency     sodium chloride (PF)  3 mL Intracatheter Q8H     IV Fluid with vitamins   Intravenous Q24H

## 2020-02-26 NOTE — CONSULTS
2/26/20 CD consult acknowledged. Per chart review, pt does not have active insurance listed. Pt will need Rule 25 funding through [facility] for CD services. The writer e-mailed social workers [facility] Rule 25 application to have patient fill out prior to consult if he is interested in seeking CD services. Expect consult will be completed by 2/29/20 or earlier. If patient is appropriate for discharge prior to being seen by chem dep pt can schedule Rule 25 assessment through [facility] directly.

## 2020-02-26 NOTE — ED TRIAGE NOTES
Pt in with C/O abdominal pain, nausea and vomiting. Pt reports hx of pancreatitis in the past. Pt reports abdominal pain for the past 3 days. Pt A&Ox4 ABCD's intact

## 2020-02-26 NOTE — H&P
[facility]    History and Physical - Hospitalist Service       Date of Admission:  2/25/2020    Assessment & Plan   Jaiden Lyons is a 45 year old male admitted on 2/25/2020. He has a past medical history significant for multiple previous episodes of pancreatitis, ongoing alcohol dependence, asthma, and ongoing tobacco use disorder.  He presented to emergency room with abdominal pain.  Found to have acute pancreatitis and alcohol intoxication.    1.  Acute pancreatitis.  N.p.o.  Continuous IV fluids.  Pain medications as needed.  Check abdominal ultrasound.    2.  Alcohol intoxication.  Continuous IV fluids.    3.  Alcohol dependence.  I did advise alcohol cessation.  Also placed chemical dependency consult.    4.  Expected alcohol withdrawals.  IV vitamins.  Lorazepam withdrawal protocol as needed.  Continuous IV fluids.    5.  Hyperglycemia.  Mild.  Start IV fluids with dextrose.  Give 1 dose of stat IV thiamine.    6.  Tobacco use disorder.  I did advise smoking cessation.  Have nicotine gum available if needed.    7.  Hepatitis.  Due to alcohol use.  Needs long-term alcohol cessation.    8.  Asthma.  No acute exacerbation.  Have albuterol available if needed.         Diet: NPO for Medical/Clinical Reasons Except for: No Exceptions    DVT Prophylaxis: Pneumatic Compression Devices  Arango Catheter: not present  Code Status: Full Code      Disposition Plan   Expected discharge: 2 - 3 days, recommended to prior living arrangement     Rusty Randloph, DO  [facility]    ______________________________________________________________________    Chief Complaint   Abdominal pain.    History is obtained from the patient    History of Present Illness   Jaiden Lyons is a 45 year old male who has a past medical history significant for multiple previous episodes of pancreatitis, ongoing alcohol dependence, asthma, and ongoing tobacco use disorder.  He has been having abdominal pain for the  past few days.  Estimates it started 3 days ago.  Has also been having nausea.  Has been vomiting multiple times a day.  Has been having difficulty keeping any food or fluid other than alcohol down.  Amazingly, he has been able to keep down quite a bit of alcohol every day.  Is drinking about 1/2 pint of of hard alcohol per day.  Is smoking half a pack of cigarettes a day.  Has had similar symptoms in the past when he has been told that he has pancreatitis.  He has not tried any pain medications other than alcohol outside of the hospital.  He does not feel that alcohol has been helping his symptoms.  He is interested in stopping drinking alcohol.  He has been trying to cut down on his alcohol use recently.  No other acute complaints.  He has gone through alcohol withdrawals previously when he tries to stop alcohol.    Review of Systems    The 10 point Review of Systems is negative other than noted in the HPI    Past Medical History    I have reviewed this patient's medical history and updated it with pertinent information if needed.       Past Surgical History   I have reviewed this patient's surgical history and updated it with pertinent information if needed.  History reviewed. No pertinent surgical history.    Social History   I have reviewed this patient's social history and updated it with pertinent information if needed.  Social History     Tobacco Use     Smoking status: Current Every Day Smoker   Substance Use Topics     Alcohol use: Yes     Comment: 1/2 to 1 pint a day     Drug use: Not Currently       Family History   I have reviewed this patient's family history and updated it with pertinent information if needed.       Prior to Admission Medications   Prior to Admission Medications   Prescriptions Last Dose Informant Patient Reported? Taking?   Respiratory Therapy Supplies (NEBULIZER/ADULT MASK) KIT kit   No No   Sig: Inhale 1 kit into the lungs every 6 hours as needed (wheezing, sob)   albuterol (PROAIR  HFA) 108 (90 Base) MCG/ACT inhaler   No No   Sig: Inhale 2 puffs into the lungs every 4 hours as needed for shortness of breath / dyspnea   albuterol (PROVENTIL) (2.5 MG/3ML) 0.083% neb solution   No No   Sig: Take 1 vial (2.5 mg) by nebulization every 6 hours as needed for shortness of breath / dyspnea or wheezing   famotidine (PEPCID) 10 MG tablet   No No   Sig: Take 1 tablet (10 mg) by mouth 2 times daily for 7 days   guaiFENesin (ROBITUSSIN) 100 MG/5ML liquid   No No   Sig: Take 10 mLs (200 mg) by mouth every 4 hours as needed for cough   predniSONE (DELTASONE) 20 MG tablet   No No   Sig: Take 2 tablets (40 mg) by mouth daily for 5 days      Facility-Administered Medications: None     Allergies   Allergies   Allergen Reactions     Insulin Swelling     Morphine Itching       Physical Exam   Vital Signs: Temp: 98.2  F (36.8  C) Temp src: Temporal BP: (!) 156/86 Pulse: 75   Resp: 18 SpO2: 93 % O2 Device: None (Room air)    Weight: 162 lbs 0 oz    Gen:  NAD, A&Ox3.  Eyes:  PERRL, sclera anicteric.  OP:  MMM, no lesions.  Neck:  Supple.  CV:  Regular, no murmurs.  Lung:  CTA b/l, normal effort.  Ab:  +BS, soft.  Skin:  Warm, dry to touch.  No rash.  Ext:  No pitting edema LE b/l.      Data   Data reviewed today: I reviewed all medications, new labs and imaging results over the last 24 hours.     Recent Labs   Lab 02/25/20  2044   WBC 5.0   HGB 14.3   MCV 84         POTASSIUM 4.1   CHLORIDE 96   CO2 20   BUN 11   CR 0.59*   ANIONGAP 18*   BARRY 8.9   GLC 69*   ALBUMIN 4.2   PROTTOTAL 8.7   BILITOTAL 1.2   ALKPHOS 103   *   *   LIPASE 1,384*

## 2020-02-26 NOTE — ED PROVIDER NOTES
History     Chief Complaint:  Abdominal Pain      HPI   Jaiden Lyons is a 45 year old male with a history of diabetes mellitus, hypertension, asthma, and alcoholic pancreatitis who presents with abdominal pain. The patient reported that he has had 2-3 days of twisting epigastric pain with radiation to his sides and shortness of breath. He has also been vomiting all day without blood present. He states that his last flare up of pancreatitis 9 months ago. He also states that he has been trying to cut back on smoking and drinking and drinks a half to a full pint a day and he has gone through withdrawals before. He denied any diarrhea.     Allergies:  Morphine      Medications:    Zantac      Past Medical History:    Diabetes mellitus   Hypertension   Asthma   Pancreatitis  ETOH abuse     Past Surgical History:    The patient does not have any pertinent past surgical history.      Family History:    No past pertinent family history.      Social History:  The patient was accompanied to the ED by his wife  Smoking Status: Current Every Day Smoker  Smokeless Tobacco: Never Used  Alcohol Use: Positive, 0.5 pint per day  Marital Status:  Single [1]     Review of Systems   Respiratory: Positive for shortness of breath.    Gastrointestinal: Positive for abdominal pain and vomiting. Negative for diarrhea.   All other systems reviewed and are negative.      Physical Exam     Patient Vitals for the past 24 hrs:   BP Temp Temp src Pulse Resp SpO2   02/25/20 2130 -- -- -- -- -- 97 %   02/25/20 2120 -- -- -- -- -- 93 %   02/25/20 2110 -- -- -- -- -- 93 %   02/25/20 2100 -- -- -- -- -- 95 %   02/25/20 2028 (!) 129/100 98.1  F (36.7  C) Temporal 105 16 99 %       Physical Exam  Constitutional: Well developed, appears uncomfortable, nontox appearance  Head: Atraumatic.   Mouth/Throat: Oropharynx is clear and moist.   Neck:  no stridor  Eyes: no scleral icterus  Cardiovascular: Borderline regular tachycardia, 2+ bilat radial  pulses  Pulmonary/Chest: nml resp effort, Clear BS bilat  Abdominal: ND, +BS, soft, epigastric tenderness, no rebound or guarding   : no CVA tenderness bilat  Ext: Warm, well perfused, no edema  Neurological: A&O, symmetric facies, moves ext x4  Skin: Skin is warm and dry.   Psychiatric: Behavior is normal. Thought content normal.   Nursing note and vitals reviewed.    Emergency Department Course   Imaging:  Radiographic findings were communicated with the patient who voiced understanding of the findings.    XR Chest 2 views:   Heart size and pulmonary vascularity normal. Small right pleural effusion is unchanged. Curvilinear scarring or atelectasis right lower lung field, also unchanged. Lungs otherwise clear, As per radiology.       Laboratory:  CBC: WBC: 5.0, HGB: 14.3, PLT: 157  CMP: Glucose 69 (L), Creatinine: 0.59 (L), Anion Gap: 18 (H), ALT: 173 (H), AST: 414 (H), o/w WNL   Lipase: 1384 (H)   ETOH: 0.28 (H)      Interventions:  2049 NS 1L IV   2050 Zofran 4 mg IV    Dilaudid 0.5 mg IV   2104 Duoneb 3 ml Nebulization   2134 NS 1L, Infuvite adult 10 ml, thiamine 100 mg, folic acid 1 mg IV  2205 Zofran 4 mg IV    Dilaudid 0.5mg IV   2208 GI Cocktail 30 ml PO    Emergency Department Course:  Nursing notes and vitals reviewed. (2050) I performed an exam of the patient as documented above.     IV inserted. Medicine administered as documented above. Blood drawn. This was sent to the lab for further testing, results above.    The patient was sent for a chest XR while in the emergency department, findings above.     (2210) I rechecked the patient and discussed the results of his workup thus far.     (2207)  I consulted with Dr. Randolph of the hospitalist services. They are in agreement to accept the patient for admission.    Findings and plan explained to the Patient who consents to admission. Discussed the patient with Dr. Randolph, who will admit the patient to a med/surg bed for further monitoring, evaluation,  and treatment.      Impression & Plan    Medical Decision Makin year old male presenting w/ abdominal pain, shortness of breath     DDx includes pancreatitis, alcoholic gastritis, hepatitis, abdominal pain NOS, ulcer, hollow viscus rupture although less likely given no peritoneal signs on exam.  Doubt ACS, dissection, PE given history of similar presentations and recent alcohol use.  No evidence of acute withdrawal at this time.  Labs significant for elevated lipase and transaminases, elevated etoh.  Imaging deferred given likely alcoholic gastritis and alcoholic hepatitis.  Interventions as noted above with mild to moderate improvement in symptoms.  Doubt necrotizing pancreatitis, pancreatic abscess, intra-abdominal abscess, acute liver failure.  Given the patient's ongoing symptoms, elevated lipase, I think would be appropriate to admit him to the hospitalist service for further evaluation and management.  Patient was advised to consider strategies for long-term sobriety including an addiction counselor.    Patient and wife counseled on all results, disposition and diagnosis.  They are understanding and agreeable to plan. Patient admitted in stable condition    Diagnosis:    ICD-10-CM    1. Alcohol-induced acute pancreatitis, unspecified complication status K85.20    2. Alcoholic hepatitis without ascites K70.10        Disposition:  Admitted to Dr. Randolph    Scribe Disclosure:  I, Yoko Cordero, am serving as a scribe on 2020 at 8:50 PM to personally document services performed by Wayne Francois MD based on my observations and the provider's statements to me.     Yoko Cordero  2020   [facility] EMERGENCY DEPARTMENT       Wayne Francois MD  [phone]

## 2020-02-26 NOTE — ED NOTES
[facility]  ED Nurse Handoff Report    Jaiden Lyons is a 45 year old male   ED Chief complaint: Abdominal Pain  . ED Diagnosis:   Final diagnoses:   Alcohol-induced acute pancreatitis, unspecified complication status   Alcoholic hepatitis without ascites     Allergies:   Allergies   Allergen Reactions     Insulin Swelling     Morphine Itching       Code Status: Full Code  Activity level - Baseline/Home:  Independent. Activity Level - Current:   Independent. Lift room needed: No. Bariatric: No   Needed: No   Isolation: No. Infection: Not Applicable.     Vital Signs:   Vitals:    20 2100 200 20   BP:       Pulse:       Resp:       Temp:       TempSrc:       SpO2: 95% 93% 93% 97%       Cardiac Rhythm:  ,      Pain level: 0-10 Pain Scale: 8  Patient confused: No. Patient Falls Risk: Yes.   Elimination Status: Has voided   Patient Report - Initial Complaint: Pt in with C/O abdominal pain, nausea and vomiting. Pt reports hx of pancreatitis in the past. Pt reports abdominal pain for the past 3 days. Pt A&Ox4 ABCD's intact. Focused Assessment:    20:59 MHCD General Appearance - ADL Assessment (WDL): WDL  Speech (WDL): WDL   Chemical Abuse/Addictions - Alcohol: Daily  Last Use:: 20   CIWA-Ar - Nausea and Vomitin  Tactile Disturbances: none  Tremor: no tremor  Auditory Disturbances: not present  Paroxysmal Sweats: barely perceptible sweating, palms moist  Visual Disturbances: not present  Anxiety: mildly anxious  Headache, Fullness in Head: none present  Agitation: normal activity  Orientation and Clouding of Sensorium: oriented and can do serial additions  CIWA-Ar Total: 4  KB     20:58 Gastrointestinal Gastrointestinal - GI WDL: -WDL except; all  Abdominal Appearance: flat; nondistended  All Quadrants Abdominal Palpation: tender  GI Signs/Symptoms: abdominal pain; nausea; vomiting  Gastrointestinal Comment: epigastric pain with profuse vomiting;  hx pancreatitis; last drink this AM; denies fevers/blood in vomit or stool           Tests Performed: labs, xray, . Abnormal Results:   Labs Ordered and Resulted from Time of ED Arrival Up to the Time of Departure from the ED   COMPREHENSIVE METABOLIC PANEL - Abnormal; Notable for the following components:       Result Value    Anion Gap 18 (*)     Glucose 69 (*)     Creatinine 0.59 (*)      (*)      (*)     All other components within normal limits   LIPASE - Abnormal; Notable for the following components:    Lipase 1,384 (*)     All other components within normal limits   ALCOHOL ETHYL - Abnormal; Notable for the following components:    Ethanol g/dL 0.28 (*)     All other components within normal limits   CBC WITH PLATELETS DIFFERENTIAL     XR Chest 2 Views   Final Result   IMPRESSION: Heart size and pulmonary vascularity normal. Small right pleural effusion is unchanged. Curvilinear scarring or atelectasis right lower lung field, also unchanged. Lungs otherwise clear.        .   Treatments provided: pain mx, nausea mx, vitamin infusion- pt will likely withdraw while in hospital- currently no need for benzos  Family Comments: wife at bedside  OBS brochure/video discussed/provided to patient:  N/A  ED Medications:   Medications   0.9% sodium chloride BOLUS (1,000 mLs Intravenous New Bag 2/25/20 2049)     Followed by   sodium chloride 0.9% infusion (has no administration in time range)   ondansetron (ZOFRAN) injection 4 mg (4 mg Intravenous Given 2/25/20 2205)   lidocaine (XYLOCAINE) 2 % 15 mL, alum & mag hydroxide-simethicone (MAALOX  ES) 15 mL GI Cocktail (30 mLs Oral Not Given 2/25/20 2208)   ipratropium - albuterol 0.5 mg/2.5 mg/3 mL (DUONEB) neb solution 3 mL (3 mLs Nebulization Given 2/25/20 2104)   sodium chloride 0.9 % 1,000 mL with Infuvite Adult 10 mL, thiamine 100 mg, folic acid 1 mg infusion ( Intravenous New Bag 2/25/20 2134)   HYDROmorphone (PF) (DILAUDID) injection 0.5-1 mg (0.5 mg  Intravenous Given 2/25/20 2204)     Drips infusing:  Yes  For the majority of the shift, the patient's behavior Green. Interventions performed were none.    Sepsis treatment initiated: No       ED Nurse Name/Phone Number: Jayda Mitchell RN,   10:08 PM    RECEIVING UNIT ED HANDOFF REVIEW    Above ED Nurse Handoff Report was reviewed: Yes  Reviewed by: Mary Carmen Wellington RN on February 25, 2020 at 10:41 PM

## 2020-02-26 NOTE — PROGRESS NOTES
Updated provided to wife Donna regarding patients condition.  Answered questions and encouraged to call back with any further questions or concerns

## 2020-02-26 NOTE — PLAN OF CARE
Pt up with 1 assist and gait belt at bedside. May need to be 2 assist when ambulating. Pain located in upper abdomen with partial relief from Dilaudid IV. LS clear, BS hypo, minimal flatus. LBM 2/25/20. Pain located in upper abdomen that radiates to lateral abdomen. CMS intact except for tremors. CIWA scores were 5, 8 - 1mg Ativan given, 4, and 3. Voiding - monitoring outputs (250cc this shift). Abdominal US to be performed today. Remote tele monitoring. Chem Dep consulted. Lipase 1384, , . Lipase and other labs to be rechecked today. Girlfriend at bedside.

## 2020-02-26 NOTE — PHARMACY-ADMISSION MEDICATION HISTORY
Admission medication history interview status for this patient is complete. See [facility] admission navigator for allergy information, prior to admission medications and immunization status.     Medication history interview source(s):Patient  Medication history resources (including written lists, pill bottles, clinic record):None  Primary pharmacy: n/a    Changes made to PTA medication list:  Added: none  Deleted: all - albuterol, famotidine, guaifenesin, prednisone -- from previous ED visit  Changed: none    Actions taken by pharmacist (provider contacted, etc):None     Additional medication history information:None    Medication reconciliation/reorder completed by provider prior to medication history?  No     Do you take OTC medications (eg tylenol, ibuprofen, fish oil, eye/ear drops, etc)? No     For patients on insulin therapy: No      Prior to Admission medications    Not on File

## 2020-02-26 NOTE — PROGRESS NOTES
Patient up SBA to bed  Peripheral IV infusing drips from ED  Pain in abdomen returning  VSS, on room air  Waiting for orders per hospitalist

## 2020-02-26 NOTE — PROVIDER NOTIFICATION
"Paged Hospitalist:    Pt blood pressure has been \"ramping\" up all day, last reading taken was 156/106 at complete rest.  Please advise.  Thanks  "

## 2020-02-27 LAB
ALBUMIN SERPL-MCNC: 3.7 G/DL (ref 3.4–5)
ALP SERPL-CCNC: 87 U/L (ref 40–150)
ALT SERPL W P-5'-P-CCNC: 118 U/L (ref 0–70)
ANION GAP SERPL CALCULATED.3IONS-SCNC: 11 MMOL/L (ref 3–14)
AST SERPL W P-5'-P-CCNC: 170 U/L (ref 0–45)
BILIRUB DIRECT SERPL-MCNC: 0.6 MG/DL (ref 0–0.2)
BILIRUB SERPL-MCNC: 1.6 MG/DL (ref 0.2–1.3)
BUN SERPL-MCNC: 6 MG/DL (ref 7–30)
CALCIUM SERPL-MCNC: 9.2 MG/DL (ref 8.5–10.1)
CHLORIDE SERPL-SCNC: 96 MMOL/L (ref 94–109)
CO2 SERPL-SCNC: 24 MMOL/L (ref 20–32)
CREAT SERPL-MCNC: 0.39 MG/DL (ref 0.66–1.25)
ERYTHROCYTE [DISTWIDTH] IN BLOOD BY AUTOMATED COUNT: 14.6 % (ref 10–15)
GFR SERPL CREATININE-BSD FRML MDRD: >90 ML/MIN/{1.73_M2}
GLUCOSE SERPL-MCNC: 212 MG/DL (ref 70–99)
HCT VFR BLD AUTO: 44.5 % (ref 40–53)
HGB BLD-MCNC: 15.1 G/DL (ref 13.3–17.7)
MCH RBC QN AUTO: 28.3 PG (ref 26.5–33)
MCHC RBC AUTO-ENTMCNC: 33.9 G/DL (ref 31.5–36.5)
MCV RBC AUTO: 83 FL (ref 78–100)
PLATELET # BLD AUTO: 132 10E9/L (ref 150–450)
POTASSIUM SERPL-SCNC: 4.2 MMOL/L (ref 3.4–5.3)
PROT SERPL-MCNC: 8.1 G/DL (ref 6.8–8.8)
RBC # BLD AUTO: 5.34 10E12/L (ref 4.4–5.9)
SODIUM SERPL-SCNC: 131 MMOL/L (ref 133–144)
WBC # BLD AUTO: 5.5 10E9/L (ref 4–11)

## 2020-02-27 PROCEDURE — 80076 HEPATIC FUNCTION PANEL: CPT | Performed by: INTERNAL MEDICINE

## 2020-02-27 PROCEDURE — 25800030 ZZH RX IP 258 OP 636: Performed by: INTERNAL MEDICINE

## 2020-02-27 PROCEDURE — 40000275 ZZH STATISTIC RCP TIME EA 10 MIN

## 2020-02-27 PROCEDURE — 94640 AIRWAY INHALATION TREATMENT: CPT

## 2020-02-27 PROCEDURE — 12000000 ZZH R&B MED SURG/OB

## 2020-02-27 PROCEDURE — 25000125 ZZHC RX 250: Performed by: INTERNAL MEDICINE

## 2020-02-27 PROCEDURE — 25000128 H RX IP 250 OP 636: Performed by: INTERNAL MEDICINE

## 2020-02-27 PROCEDURE — 36415 COLL VENOUS BLD VENIPUNCTURE: CPT | Performed by: INTERNAL MEDICINE

## 2020-02-27 PROCEDURE — 99233 SBSQ HOSP IP/OBS HIGH 50: CPT | Performed by: INTERNAL MEDICINE

## 2020-02-27 PROCEDURE — 85027 COMPLETE CBC AUTOMATED: CPT | Performed by: INTERNAL MEDICINE

## 2020-02-27 PROCEDURE — 80048 BASIC METABOLIC PNL TOTAL CA: CPT | Performed by: INTERNAL MEDICINE

## 2020-02-27 RX ORDER — BISACODYL 10 MG
10 SUPPOSITORY, RECTAL RECTAL
Status: DISCONTINUED | OUTPATIENT
Start: 2020-02-27 | End: 2020-03-02 | Stop reason: HOSPADM

## 2020-02-27 RX ORDER — AMOXICILLIN 250 MG
1-2 CAPSULE ORAL
Status: COMPLETED | OUTPATIENT
Start: 2020-02-27 | End: 2020-02-28

## 2020-02-27 RX ORDER — SODIUM CHLORIDE, SODIUM LACTATE, POTASSIUM CHLORIDE, CALCIUM CHLORIDE 600; 310; 30; 20 MG/100ML; MG/100ML; MG/100ML; MG/100ML
INJECTION, SOLUTION INTRAVENOUS CONTINUOUS
Status: DISCONTINUED | OUTPATIENT
Start: 2020-02-27 | End: 2020-03-01

## 2020-02-27 RX ADMIN — HYDROMORPHONE HYDROCHLORIDE 0.5 MG: 1 INJECTION, SOLUTION INTRAMUSCULAR; INTRAVENOUS; SUBCUTANEOUS at 17:14

## 2020-02-27 RX ADMIN — HYDROMORPHONE HYDROCHLORIDE 0.5 MG: 1 INJECTION, SOLUTION INTRAMUSCULAR; INTRAVENOUS; SUBCUTANEOUS at 22:04

## 2020-02-27 RX ADMIN — HYDROMORPHONE HYDROCHLORIDE 0.5 MG: 1 INJECTION, SOLUTION INTRAMUSCULAR; INTRAVENOUS; SUBCUTANEOUS at 19:23

## 2020-02-27 RX ADMIN — METOPROLOL TARTRATE 2.5 MG: 5 INJECTION INTRAVENOUS at 09:24

## 2020-02-27 RX ADMIN — POTASSIUM CHLORIDE, DEXTROSE MONOHYDRATE AND SODIUM CHLORIDE: 150; 5; 450 INJECTION, SOLUTION INTRAVENOUS at 08:35

## 2020-02-27 RX ADMIN — HYDROMORPHONE HYDROCHLORIDE 0.5 MG: 1 INJECTION, SOLUTION INTRAMUSCULAR; INTRAVENOUS; SUBCUTANEOUS at 13:44

## 2020-02-27 RX ADMIN — HYDROMORPHONE HYDROCHLORIDE 0.5 MG: 1 INJECTION, SOLUTION INTRAMUSCULAR; INTRAVENOUS; SUBCUTANEOUS at 06:50

## 2020-02-27 RX ADMIN — HYDROMORPHONE HYDROCHLORIDE 0.5 MG: 1 INJECTION, SOLUTION INTRAMUSCULAR; INTRAVENOUS; SUBCUTANEOUS at 09:25

## 2020-02-27 RX ADMIN — ALBUTEROL SULFATE 2.5 MG: 2.5 SOLUTION RESPIRATORY (INHALATION) at 01:57

## 2020-02-27 RX ADMIN — FOLIC ACID: 5 INJECTION, SOLUTION INTRAMUSCULAR; INTRAVENOUS; SUBCUTANEOUS at 11:20

## 2020-02-27 RX ADMIN — THIAMINE HYDROCHLORIDE 100 MG: 100 INJECTION, SOLUTION INTRAMUSCULAR; INTRAVENOUS at 09:25

## 2020-02-27 RX ADMIN — SODIUM CHLORIDE, POTASSIUM CHLORIDE, SODIUM LACTATE AND CALCIUM CHLORIDE: 600; 310; 30; 20 INJECTION, SOLUTION INTRAVENOUS at 13:55

## 2020-02-27 RX ADMIN — METOPROLOL TARTRATE 2.5 MG: 5 INJECTION INTRAVENOUS at 13:45

## 2020-02-27 RX ADMIN — METOPROLOL TARTRATE 2.5 MG: 5 INJECTION INTRAVENOUS at 01:40

## 2020-02-27 RX ADMIN — HYDROMORPHONE HYDROCHLORIDE 0.5 MG: 1 INJECTION, SOLUTION INTRAMUSCULAR; INTRAVENOUS; SUBCUTANEOUS at 03:32

## 2020-02-27 ASSESSMENT — ACTIVITIES OF DAILY LIVING (ADL)
ADLS_ACUITY_SCORE: 12
ADLS_ACUITY_SCORE: 13
ADLS_ACUITY_SCORE: 13
ADLS_ACUITY_SCORE: 14
ADLS_ACUITY_SCORE: 13
ADLS_ACUITY_SCORE: 14

## 2020-02-27 NOTE — PROGRESS NOTES
Previous nursing note and addendum reviewed. I received a page at 7:20. I definitely did not receive a page prior to that. I saw patient within 5 minutes of receiving the page. He is somnolent but arouseable. He is not answering questions. No significant pain on exam. Chart reviewed. It seems that Jaiden was admitted with acute alcohol related pancreatitis and acute alcohol withdrawal. He was put on a PCA (Dilaudid) because of abdominal pain. He is now quite somnolent and is not be able to manage PCA appropriately. He has elevated blood pressure and has had some hallucinations.     I stopped PCA and ordered Dilaudid 0.3-0.5 mg every 2 hours as needed. I scheduled Metoprolol 2.5 mg IV every 6 hours with hold parameters for low BP or HR. I ordered prn Hydralazine for hypertension and prn Haldol for agitations. He has not received much ativan due to somnolence. I suspect that his level of alertness will improve off of the Dilaudid PCA. He likely needs more Ativan once more alert.

## 2020-02-27 NOTE — PLAN OF CARE
Orientated today but forgetful needing ques and reminders on safety. Mumbles words, soft voice- slurred speech. Able to answer easy questions. Resting between cares- sedated at times. Up in chair with 1 assist. Unsteady gait. Generalized weakness. Bed alarm on. Ambulated to bathroom. Voided. Pain 5-6 to abd- better today than yesterday, CIWA 0. Tachycardia. On remote tele. IV metoprolol. No edema. Iv fluids. No nausea. NPO. Good bowel sounds. Abd soft- non distended. No BM. Following plan of care. Bed alarm on for safety. Using call light at times, impulsive at times.

## 2020-02-27 NOTE — PROGRESS NOTES
[facility]    Medicine Progress Note - Hospitalist Service       Date of Admission:  2/25/2020  Length of stay: 2 days    Assessment & Plan   Jaiden Lyons is a 45-year-old male with a history of alcoholic pancreatitis, alcohol abuse and dependence, asthma, tobacco use, who is admitted to the hospitalist service with alcoholic pancreatitis and alcohol withdrawal.      Today:  - Developed some somnolence with Dilaudid PCA, switched back to PRN dosing  - 8 mg Ativan last 24h  - He is hesitant to eat/drink today, reasonable to advance diet tomorrow.   - Glucose is elevated probably from D5, switch to LR    Alcoholic pancreatitis  - Evidenced by epigastric abdominal pain lipase elevated greater than 1200, and history of significant alcohol use.  Has had a history of same in the past.  Had some elevations in transaminases, likely related to alcohol.  Right upper quadrant ultrasound does not show sign of stone.  No need for CT scan at this time.  - Continuous IV fluids, bowel rest, pain control.  - Advance diet when IV pain medication requirements go down.    Alcohol dependence, alcohol abuse, alcohol intoxication, and alcohol withdrawal  - Patient was intoxicated on admission with an ethanol level of 0.28.  He admits to drinking half pint to a pint of rum per day.  - Per chart review, does not appear as if he has had complicated withdrawals before.  - Place patient on CIWA scale with as needed Lorazepam.  - Supplement thiamine and folate.  - Chemical dependency consult has been placed.  - Continues to have ativan requirements    Alcoholic hepatitis  - This is very mild with transaminases in the low 100s and total bilirubin of 1.1.  - Right upper quadrant ultrasound showed fatty liver.    Tobacco abuse  - Patient smokes half pack per day.  Cessation is encouraged.  Nicotine gum provided.    Asthma  - Does not appear to be in an acute exacerbation.  Albuterol available as needed.  It was clear on  "admission.    Diet: N.p.o.  DVT Prophylaxis: Low Risk/Ambulatory with no VTE prophylaxis indicated  Malnutrition: Not present  Arango Catheter: No  Code Status: Full Code     Disposition Plan   Expected discharge:   2-3 days back to home.     Jm De La Torre MD  Hospitalist Service  [facility]  ______________________________________________________________________    Interval History   Overnight, patient was noted to be more somnolent and not appropriate to manage his PCA.  He was having elevated blood pressures and hallucinations.  PCA was stopped and switched to as needed Dilaudid.    This morning, the patient states his pain is improved.  He is still somewhat somnolent.    Data reviewed today: I reviewed all medications, new labs and imaging results over the last 24 hours.     Physical Exam   BP (!) 129/98   Pulse 112   Temp 97  F (36.1  C)   Resp 16   Ht 1.791 m (5' 10.5\")   Wt 73.5 kg (162 lb)   SpO2 96%   BMI 22.92 kg/m    162 lbs 0 oz       General: Somewhat somnolent, not in acute distress, sitting up in bed.     HEENT: No scleral icterus. Oropharynx moist.     Neck: Supple. Normal range of motion.     Pulmonary: Normal work of breathing. Clear to auscultation bilaterally.    Cardiovascular: Regular rate and rhythm without murmur or extra heart sounds.    Abdomen: Soft.  Epigastric tenderness to palpation is improved.     Extremities: No peripheral edema. No clubbing or cyanosis.     Neurologic: Awake, alert, appropriate.    Skin: Warm and dry.    Psychiatric: Normal affect and mood.     Data    Recent Labs   Lab 02/27/20  0627 02/26/20  0613 02/25/20  2044   WBC 5.5 3.7* 5.0   HGB 15.1 12.7* 14.3   MCV 83 86 84   * 131* 157   * 134 134   POTASSIUM 4.2 4.2 4.1   CHLORIDE 96 101 96   CO2 24 20 20   BUN 6* 8 11   CR 0.39* 0.46* 0.59*   ANIONGAP 11 13 18*   BARRY 9.2 7.7* 8.9   * 124* 69*   ALBUMIN 3.7 3.4 4.2   PROTTOTAL 8.1 7.3 8.7   BILITOTAL 1.6* 1.1 1.2 "   ALKPHOS 87 86 103   * 145* 173*   * 343* 414*   LIPASE  --  1,402* 1,384*     No results found for this or any previous visit (from the past 24 hour(s)).    Medications      Current Facility-Administered Medications   Medication Dose Route Frequency     sodium chloride (PF)  3 mL Intracatheter Q8H     IV BOLUS builder WITH LARGE additive list   Intravenous Daily     thiamine  100 mg Intravenous Daily

## 2020-02-27 NOTE — PROGRESS NOTES
Paged Hospitalist    COuld you please come assess patient, here with pancreatitis and ETOH withdrawal.  Now hallucinating, garbled speech, hypertensive, tachy, impulsive and unsteady.  Thanks    1920: No response from hospitalist, repaged again

## 2020-02-27 NOTE — CONSULTS
Care Transition Initial Assessment - RN        Met with: Patient.  DATA   Active Problems:    Pancreatitis       Cognitive Status: awake and alert.  Primary Care Clinic Name: (Needs to establish care in [place])     Contact information and PCP information verified: Yes  Lives With: alone                     Insurance concerns: Underinsured/limits on insurance and Insurance questions referred to Financial Services  ASSESSMENT  Patient currently receives the following services:  Unknown        Identified issues/concerns regarding health management: RN CTS following as no PCP or clinic listed in patient chart. Patient states had been receiving care in [place], [place]. Patient states is now living in [place].   Patient understanding of need to establish care in [place] and agreeable to scheduling appointment. Appointment scheduled and updated to AVS.   PLAN  Financial costs for the patient were not discussed .  Patient given options and choices for discharge Yes .  Patient/family is agreeable to the plan?  Yes: agreeable to scheduling appointment.   Patient anticipates discharging to Home .        Patient anticipates needs for home equipment: No  Transportation/person available to transport on day of discharge  is TBD.   Plan/Disposition: Home   Appointments: Appointment scheduled and updated to AVS.       Office Visit with Yamilet Abarca NP   Thursday Mar 5, 2020 11:00 AM    [facility]     [phone]        Care  (CTS) will continue to follow as needed.    Justine Cloud MA/RN Case Manager  Inpatient Care Coordination  [facility]   [phone]

## 2020-02-27 NOTE — PLAN OF CARE
Pt speech slurred, illogical @ times.  BP's elevated - improved w/BP meds.   Reports abdominal pain, dilaudid given.  CIWA: 6/20, ativan given.  Tele: ST w/inv T's.  LS dim, denies SOB. On O2 2 lpm.  BS: Hypo, +ve flatus.   Adequate UOP, hesitancy noted, urine orange w/odor.   Up w/1 assist, gb. Unsteady, impulsive, alarms on.  Strict NPO.  IVF infusing.   Discharge: TBD.

## 2020-02-27 NOTE — PROGRESS NOTES
Patient's demographics show no insurance. Financial Counselor's office [id number]) is following up with patient.    Justine Cloud MA/RN Case Manager  Inpatient Care Coordination  [facility]   [phone]

## 2020-02-27 NOTE — PLAN OF CARE
A/O x4, lethargic but arouses to voice easily.  VS: hypertensive (MD aware, awaiting orders), all other VSS.   Remote tele monitoring.  Upper abdominal pain managed with PCA pump. Strict NPO.  Up with SBA standing at bedside to void, unsteady on feet.  Impulsive and alarms on for safety.  CIWA scores 4-12, PRN IV ativan 1mg given x 4 this shift.  Voiding.  Will continue to monitor.

## 2020-02-28 LAB
ANION GAP SERPL CALCULATED.3IONS-SCNC: 8 MMOL/L (ref 3–14)
BUN SERPL-MCNC: 13 MG/DL (ref 7–30)
CALCIUM SERPL-MCNC: 9 MG/DL (ref 8.5–10.1)
CHLORIDE SERPL-SCNC: 100 MMOL/L (ref 94–109)
CO2 SERPL-SCNC: 25 MMOL/L (ref 20–32)
CREAT SERPL-MCNC: 0.52 MG/DL (ref 0.66–1.25)
GFR SERPL CREATININE-BSD FRML MDRD: >90 ML/MIN/{1.73_M2}
GLUCOSE SERPL-MCNC: 114 MG/DL (ref 70–99)
POTASSIUM SERPL-SCNC: 4.1 MMOL/L (ref 3.4–5.3)
SODIUM SERPL-SCNC: 133 MMOL/L (ref 133–144)

## 2020-02-28 PROCEDURE — 94640 AIRWAY INHALATION TREATMENT: CPT | Mod: 76

## 2020-02-28 PROCEDURE — 94640 AIRWAY INHALATION TREATMENT: CPT

## 2020-02-28 PROCEDURE — 25800030 ZZH RX IP 258 OP 636: Performed by: INTERNAL MEDICINE

## 2020-02-28 PROCEDURE — 25000128 H RX IP 250 OP 636: Performed by: INTERNAL MEDICINE

## 2020-02-28 PROCEDURE — 36415 COLL VENOUS BLD VENIPUNCTURE: CPT | Performed by: INTERNAL MEDICINE

## 2020-02-28 PROCEDURE — 25000125 ZZHC RX 250: Performed by: INTERNAL MEDICINE

## 2020-02-28 PROCEDURE — 25000132 ZZH RX MED GY IP 250 OP 250 PS 637: Performed by: INTERNAL MEDICINE

## 2020-02-28 PROCEDURE — 12000000 ZZH R&B MED SURG/OB

## 2020-02-28 PROCEDURE — 40000275 ZZH STATISTIC RCP TIME EA 10 MIN

## 2020-02-28 PROCEDURE — 80048 BASIC METABOLIC PNL TOTAL CA: CPT | Performed by: INTERNAL MEDICINE

## 2020-02-28 PROCEDURE — 99233 SBSQ HOSP IP/OBS HIGH 50: CPT | Performed by: INTERNAL MEDICINE

## 2020-02-28 RX ORDER — SENNOSIDES 8.6 MG
1-2 TABLET ORAL 2 TIMES DAILY PRN
Status: DISCONTINUED | OUTPATIENT
Start: 2020-02-28 | End: 2020-03-02 | Stop reason: HOSPADM

## 2020-02-28 RX ORDER — HYDROMORPHONE HYDROCHLORIDE 1 MG/ML
.3-.5 INJECTION, SOLUTION INTRAMUSCULAR; INTRAVENOUS; SUBCUTANEOUS
Status: DISCONTINUED | OUTPATIENT
Start: 2020-02-28 | End: 2020-03-01

## 2020-02-28 RX ORDER — MAGNESIUM CARB/ALUMINUM HYDROX 105-160MG
296 TABLET,CHEWABLE ORAL DAILY PRN
Status: DISCONTINUED | OUTPATIENT
Start: 2020-02-28 | End: 2020-03-02 | Stop reason: HOSPADM

## 2020-02-28 RX ORDER — ONDANSETRON 4 MG/1
4 TABLET, ORALLY DISINTEGRATING ORAL EVERY 6 HOURS PRN
Status: DISCONTINUED | OUTPATIENT
Start: 2020-02-28 | End: 2020-03-02 | Stop reason: HOSPADM

## 2020-02-28 RX ORDER — OXYCODONE HYDROCHLORIDE 5 MG/1
5 TABLET ORAL EVERY 4 HOURS PRN
Status: DISCONTINUED | OUTPATIENT
Start: 2020-02-28 | End: 2020-03-02 | Stop reason: HOSPADM

## 2020-02-28 RX ORDER — ONDANSETRON 2 MG/ML
4 INJECTION INTRAMUSCULAR; INTRAVENOUS EVERY 6 HOURS PRN
Status: DISCONTINUED | OUTPATIENT
Start: 2020-02-28 | End: 2020-03-02 | Stop reason: HOSPADM

## 2020-02-28 RX ORDER — PANTOPRAZOLE SODIUM 40 MG/1
40 TABLET, DELAYED RELEASE ORAL
Status: DISCONTINUED | OUTPATIENT
Start: 2020-02-28 | End: 2020-03-02 | Stop reason: HOSPADM

## 2020-02-28 RX ADMIN — SENNOSIDES AND DOCUSATE SODIUM 1 TABLET: 8.6; 5 TABLET ORAL at 06:51

## 2020-02-28 RX ADMIN — HYDROMORPHONE HYDROCHLORIDE 0.5 MG: 1 INJECTION, SOLUTION INTRAMUSCULAR; INTRAVENOUS; SUBCUTANEOUS at 09:09

## 2020-02-28 RX ADMIN — HYDROMORPHONE HYDROCHLORIDE 0.5 MG: 1 INJECTION, SOLUTION INTRAMUSCULAR; INTRAVENOUS; SUBCUTANEOUS at 16:34

## 2020-02-28 RX ADMIN — ALBUTEROL SULFATE 2.5 MG: 2.5 SOLUTION RESPIRATORY (INHALATION) at 09:39

## 2020-02-28 RX ADMIN — HYDROMORPHONE HYDROCHLORIDE 0.5 MG: 1 INJECTION, SOLUTION INTRAMUSCULAR; INTRAVENOUS; SUBCUTANEOUS at 04:57

## 2020-02-28 RX ADMIN — SODIUM CHLORIDE, POTASSIUM CHLORIDE, SODIUM LACTATE AND CALCIUM CHLORIDE: 600; 310; 30; 20 INJECTION, SOLUTION INTRAVENOUS at 09:09

## 2020-02-28 RX ADMIN — HYDROMORPHONE HYDROCHLORIDE 0.5 MG: 1 INJECTION, SOLUTION INTRAMUSCULAR; INTRAVENOUS; SUBCUTANEOUS at 02:52

## 2020-02-28 RX ADMIN — ALBUTEROL SULFATE 2.5 MG: 2.5 SOLUTION RESPIRATORY (INHALATION) at 23:30

## 2020-02-28 RX ADMIN — HYDROMORPHONE HYDROCHLORIDE 0.5 MG: 1 INJECTION, SOLUTION INTRAMUSCULAR; INTRAVENOUS; SUBCUTANEOUS at 06:57

## 2020-02-28 RX ADMIN — THIAMINE HYDROCHLORIDE 100 MG: 100 INJECTION, SOLUTION INTRAMUSCULAR; INTRAVENOUS at 09:09

## 2020-02-28 RX ADMIN — HYDROMORPHONE HYDROCHLORIDE 0.5 MG: 1 INJECTION, SOLUTION INTRAMUSCULAR; INTRAVENOUS; SUBCUTANEOUS at 22:31

## 2020-02-28 RX ADMIN — ONDANSETRON HYDROCHLORIDE 4 MG: 2 INJECTION, SOLUTION INTRAMUSCULAR; INTRAVENOUS at 10:29

## 2020-02-28 RX ADMIN — OXYCODONE HYDROCHLORIDE 5 MG: 5 TABLET ORAL at 13:20

## 2020-02-28 RX ADMIN — HYDROMORPHONE HYDROCHLORIDE 0.5 MG: 1 INJECTION, SOLUTION INTRAMUSCULAR; INTRAVENOUS; SUBCUTANEOUS at 00:18

## 2020-02-28 RX ADMIN — PANTOPRAZOLE SODIUM 40 MG: 40 TABLET, DELAYED RELEASE ORAL at 10:30

## 2020-02-28 RX ADMIN — ALBUTEROL SULFATE 2.5 MG: 2.5 SOLUTION RESPIRATORY (INHALATION) at 00:31

## 2020-02-28 RX ADMIN — SODIUM CHLORIDE, POTASSIUM CHLORIDE, SODIUM LACTATE AND CALCIUM CHLORIDE: 600; 310; 30; 20 INJECTION, SOLUTION INTRAVENOUS at 00:21

## 2020-02-28 RX ADMIN — OXYCODONE HYDROCHLORIDE 5 MG: 5 TABLET ORAL at 20:50

## 2020-02-28 RX ADMIN — HYDROMORPHONE HYDROCHLORIDE 0.5 MG: 1 INJECTION, SOLUTION INTRAMUSCULAR; INTRAVENOUS; SUBCUTANEOUS at 11:28

## 2020-02-28 RX ADMIN — FOLIC ACID: 5 INJECTION, SOLUTION INTRAMUSCULAR; INTRAVENOUS; SUBCUTANEOUS at 10:53

## 2020-02-28 ASSESSMENT — ACTIVITIES OF DAILY LIVING (ADL)
ADLS_ACUITY_SCORE: 12

## 2020-02-28 NOTE — PLAN OF CARE
Pt AOx4. Mumbles with soft voice. CIWA 0 and 0. Tachycardiac 104. Other VSS. On remote tele. Lungs clear. Assist 1 with gait belt. Gait unsteady. Ambulated down the bender. IV infusing. NPO. Abd non-distended. BS active. Passing flatus. Voiding in small amounts. No BM. Pain around abdomen. IV Dilaudid q2h. Wife is in the room. Following plan of care. Possible discharge on 2/29. Will continue to monitor.

## 2020-02-28 NOTE — PROVIDER NOTIFICATION
Page to admit hospitalist  patient would like stool softner ordered. He feels full and like he needs to have a bowel movement. Thank you

## 2020-02-28 NOTE — PROGRESS NOTES
[facility]    Medicine Progress Note - Hospitalist Service       Date of Admission:  2/25/2020  Length of stay: 3 days    Assessment & Plan   Jaiden Lyons is a 45-year-old male with a history of alcoholic pancreatitis, alcohol abuse and dependence, asthma, tobacco use, who is admitted to the hospitalist service with alcoholic pancreatitis and alcohol withdrawal.      Today:  - Overall doing better. Having some vomiting but abdominal pain is better. Try antiemetics and advance diet as tolerated.  - Decreasing ativan needs    Alcoholic pancreatitis  - Evidenced by epigastric abdominal pain lipase elevated greater than 1200, and history of significant alcohol use.  Has had a history of same in the past.  Had some elevations in transaminases, likely related to alcohol.  Right upper quadrant ultrasound does not show sign of stone.  No need for CT scan at this time.  - Continuous IV fluids, bowel rest, pain control.  - Advancing diet as tolerated    Alcohol dependence, alcohol abuse, alcohol intoxication, and alcohol withdrawal  - Patient was intoxicated on admission with an ethanol level of 0.28.  He admits to drinking half pint to a pint of rum per day.  - Per chart review, does not appear as if he has had complicated withdrawals before.  - Place patient on CIWA scale with as needed Lorazepam.  - Supplement thiamine and folate.  - Chemical dependency consult has been placed.  - As of 2/28 he has been 24h without any ativan needs    Alcoholic hepatitis  - This is very mild with transaminases in the low 100s and total bilirubin of 1.1.  - Right upper quadrant ultrasound showed fatty liver.    Tobacco abuse  - Patient smokes half pack per day.  Cessation is encouraged.  Nicotine gum provided.    Asthma  - Does not appear to be in an acute exacerbation.  Albuterol available as needed.  It was clear on admission.    Diet: Advance as tolerated  DVT Prophylaxis: Low Risk/Ambulatory with no VTE prophylaxis  "indicated  Malnutrition: Not present  Arango Catheter: No  Code Status: Full Code     Disposition Plan   Expected discharge:   Anticipate 1-2 more days in the hospital.     Jm De La Torre MD  Hospitalist Service  [facility]  ______________________________________________________________________    Interval History   No issues overnight.    Patient is doing better overall today. States he tried a sip of water and vomited, but that abdominal pain is improving. Not triggering for Ativan.     Data reviewed today: I reviewed all medications, new labs and imaging results over the last 24 hours.     Physical Exam   /83 (BP Location: Right arm)   Pulse 99   Temp 97.7  F (36.5  C) (Temporal)   Resp 16   Ht 1.791 m (5' 10.5\")   Wt 73.5 kg (162 lb)   SpO2 96%   BMI 22.92 kg/m    162 lbs 0 oz       General: Standing in the room, independent in activity. Looks better.     HEENT: No scleral icterus. Oropharynx moist.     Neck: Supple. Normal range of motion.     Pulmonary: Normal work of breathing. Clear to auscultation bilaterally.    Cardiovascular: Regular rate and rhythm without murmur or extra heart sounds.    Abdomen: Soft.  Epigastric tenderness to palpation is much improved.     Extremities: No peripheral edema. No clubbing or cyanosis.     Neurologic: Awake, alert, appropriate.    Skin: Warm and dry.    Psychiatric: Normal affect and mood.     Data    Recent Labs   Lab 02/28/20  0614 02/27/20  0627 02/26/20  0613 02/25/20  2044   WBC  --  5.5 3.7* 5.0   HGB  --  15.1 12.7* 14.3   MCV  --  83 86 84   PLT  --  132* 131* 157    131* 134 134   POTASSIUM 4.1 4.2 4.2 4.1   CHLORIDE 100 96 101 96   CO2 25 24 20 20   BUN 13 6* 8 11   CR 0.52* 0.39* 0.46* 0.59*   ANIONGAP 8 11 13 18*   BARRY 9.0 9.2 7.7* 8.9   * 212* 124* 69*   ALBUMIN  --  3.7 3.4 4.2   PROTTOTAL  --  8.1 7.3 8.7   BILITOTAL  --  1.6* 1.1 1.2   ALKPHOS  --  87 86 103   ALT  --  118* 145* 173*   AST  --  170* 343* 414* "   LIPASE  --   --  1,402* 1,384*     No results found for this or any previous visit (from the past 24 hour(s)).    Medications      Current Facility-Administered Medications   Medication Dose Route Frequency     pantoprazole  40 mg Oral QAM AC     sodium chloride (PF)  3 mL Intracatheter Q8H     IV BOLUS builder WITH LARGE additive list   Intravenous Daily     thiamine  100 mg Intravenous Daily

## 2020-02-29 PROCEDURE — 25000125 ZZHC RX 250: Performed by: INTERNAL MEDICINE

## 2020-02-29 PROCEDURE — 99232 SBSQ HOSP IP/OBS MODERATE 35: CPT | Performed by: INTERNAL MEDICINE

## 2020-02-29 PROCEDURE — 25000132 ZZH RX MED GY IP 250 OP 250 PS 637: Performed by: INTERNAL MEDICINE

## 2020-02-29 PROCEDURE — 40000275 ZZH STATISTIC RCP TIME EA 10 MIN

## 2020-02-29 PROCEDURE — 94640 AIRWAY INHALATION TREATMENT: CPT | Mod: 76

## 2020-02-29 PROCEDURE — 25000128 H RX IP 250 OP 636: Performed by: INTERNAL MEDICINE

## 2020-02-29 PROCEDURE — 12000000 ZZH R&B MED SURG/OB

## 2020-02-29 PROCEDURE — 25800030 ZZH RX IP 258 OP 636: Performed by: INTERNAL MEDICINE

## 2020-02-29 PROCEDURE — 94640 AIRWAY INHALATION TREATMENT: CPT

## 2020-02-29 RX ORDER — FOLIC ACID 1 MG/1
1 TABLET ORAL DAILY
Status: DISCONTINUED | OUTPATIENT
Start: 2020-03-01 | End: 2020-03-02 | Stop reason: HOSPADM

## 2020-02-29 RX ORDER — LANOLIN ALCOHOL/MO/W.PET/CERES
100 CREAM (GRAM) TOPICAL DAILY
Status: DISCONTINUED | OUTPATIENT
Start: 2020-03-01 | End: 2020-03-02 | Stop reason: HOSPADM

## 2020-02-29 RX ADMIN — OXYCODONE HYDROCHLORIDE 5 MG: 5 TABLET ORAL at 08:38

## 2020-02-29 RX ADMIN — HYDROMORPHONE HYDROCHLORIDE 0.5 MG: 1 INJECTION, SOLUTION INTRAMUSCULAR; INTRAVENOUS; SUBCUTANEOUS at 02:39

## 2020-02-29 RX ADMIN — OXYCODONE HYDROCHLORIDE 5 MG: 5 TABLET ORAL at 15:27

## 2020-02-29 RX ADMIN — HYDROMORPHONE HYDROCHLORIDE 0.5 MG: 1 INJECTION, SOLUTION INTRAMUSCULAR; INTRAVENOUS; SUBCUTANEOUS at 16:32

## 2020-02-29 RX ADMIN — HYDROMORPHONE HYDROCHLORIDE 0.5 MG: 1 INJECTION, SOLUTION INTRAMUSCULAR; INTRAVENOUS; SUBCUTANEOUS at 21:05

## 2020-02-29 RX ADMIN — FOLIC ACID: 5 INJECTION, SOLUTION INTRAMUSCULAR; INTRAVENOUS; SUBCUTANEOUS at 11:31

## 2020-02-29 RX ADMIN — PANTOPRAZOLE SODIUM 40 MG: 40 TABLET, DELAYED RELEASE ORAL at 07:00

## 2020-02-29 RX ADMIN — HYDROMORPHONE HYDROCHLORIDE 0.5 MG: 1 INJECTION, SOLUTION INTRAMUSCULAR; INTRAVENOUS; SUBCUTANEOUS at 06:59

## 2020-02-29 RX ADMIN — ALBUTEROL SULFATE 2.5 MG: 2.5 SOLUTION RESPIRATORY (INHALATION) at 23:13

## 2020-02-29 RX ADMIN — THIAMINE HYDROCHLORIDE 100 MG: 100 INJECTION, SOLUTION INTRAMUSCULAR; INTRAVENOUS at 08:38

## 2020-02-29 RX ADMIN — SODIUM CHLORIDE, POTASSIUM CHLORIDE, SODIUM LACTATE AND CALCIUM CHLORIDE: 600; 310; 30; 20 INJECTION, SOLUTION INTRAVENOUS at 09:49

## 2020-02-29 RX ADMIN — ALBUTEROL SULFATE 2.5 MG: 2.5 SOLUTION RESPIRATORY (INHALATION) at 17:48

## 2020-02-29 RX ADMIN — HYDROMORPHONE HYDROCHLORIDE 0.5 MG: 1 INJECTION, SOLUTION INTRAMUSCULAR; INTRAVENOUS; SUBCUTANEOUS at 11:38

## 2020-02-29 ASSESSMENT — ACTIVITIES OF DAILY LIVING (ADL)
ADLS_ACUITY_SCORE: 12

## 2020-02-29 NOTE — PLAN OF CARE
Alert and oriented, speech mumbled. CIWA scores of 0. Advanced to clear liquid diet. 1 episode of emesis this AM, improved after IV Zofran and Protonix. Up independently in room. Voiding in adequate amounts. Pain managed with prn IV Dilaudid and prn Oxycodone. Will continue to monitor.

## 2020-02-29 NOTE — PROGRESS NOTES
[facility]  Hospitalist Progress Note  Len Robb MD, MD 02/29/2020  (Text Page)  Reason for Stay (Diagnosis): Abdominal pain secondary to alcoholic pancreatitis         Assessment and Plan:      Summary of Stay: Jaiden Lyons is a 45-year-old male with a history of alcoholic pancreatitis, alcohol abuse and dependence, asthma, tobacco use, who is admitted to the hospitalist service with alcoholic pancreatitis and alcohol withdrawal.       Today:  -He is trying to tolerate clear liquids but earlier had some issues with abdominal discomfort    Alcoholic pancreatitis  - Evidenced by epigastric abdominal pain lipase elevated greater than 1200, and history of significant alcohol use.  Has had a history of same in the past.  Had some elevations in transaminases, likely related to alcohol.  Right upper quadrant ultrasound does not show sign of stone.  No need for CT scan at this time.  -I had a long discussion with our patient regarding importance of complete EtOH cessation in preventing and hopefully for him to recover with this recurrent bouts of EtOH pancreatitis    Alcohol dependence, alcohol abuse, alcohol intoxication, and alcohol withdrawal  - Patient was intoxicated on admission with an ethanol level of 0.28.  He admits to drinking half pint to a pint of rum per day.  - Per chart review, does not appear as if he has had complicated withdrawals before.  - Place patient on CIWA scale with as needed Lorazepam.  - Supplement thiamine and folate.  - Chemical dependency consult has been placed.  - As of 2/28 he has been 24h without any ativan needs     Alcoholic hepatitis  - This is very mild with transaminases in the low 100s and total bilirubin of 1.1.  - Right upper quadrant ultrasound showed fatty liver.     Tobacco abuse  - Patient smokes half pack per day.  Cessation is encouraged.  Nicotine gum provided.     Asthma  - Does not appear to be in an acute exacerbation.  Albuterol available as  "needed.  It was clear on admission.     Diet: Advance as tolerated  DVT Prophylaxis: Low Risk/Ambulatory with no VTE prophylaxis indicated  Malnutrition: Not present  Arango Catheter: No  Code Status: Full Code      Disposition Plan   Expected discharge:   Plans for hospital discharge once he demonstrates tolerance to advancing diet             Interval History (Subjective):      Assume care today.  Seen and examined.  Chart reviewed.  Case discussed with nursing service was gracious enough to present at bedside during exam.  On clear liquids but still having intermittent abdominal cramping and discomfort.  No actual vomiting.  Remained afebrile.  Not requiring any oxygen supplementation.  Denies any diarrhea, bleeding tendencies.  Voiding freely.                Physical Exam:      Last Vital Signs:  /77 (BP Location: Right arm)   Pulse 96   Temp 99.3  F (37.4  C) (Temporal)   Resp 18   Ht 1.791 m (5' 10.5\")   Wt 73.5 kg (162 lb)   SpO2 97%   BMI 22.92 kg/m      I/O last 3 completed shifts:  In: 1740 [P.O.:250; I.V.:1490]  Out: 50 [Emesis/NG output:50]  Wt Readings from Last 1 Encounters:   02/25/20 73.5 kg (162 lb)     Vitals:    02/25/20 2259   Weight: 73.5 kg (162 lb)       Constitutional: Awake, alert, cooperative, no apparent distress   Respiratory: Clear to auscultation bilaterally, no crackles or wheezing   Cardiovascular: Regular rate and rhythm, normal S1 and S2, and no murmur noted   Abdomen:  Soft, no guarding, positive bowel sounds   Skin: No rashes, no cyanosis, dry to touch   Neuro: Alert and oriented x3, no weakness, spontaneous and coherent speech   Extremities: No edema, normal range of motion   Other(s): Euthymic mood, not agitated    Numerous skin tattoos   All other systems: Negative          Medications:      All current medications were reviewed with changes reflected in problem list.         Data:      All new lab and imaging data was reviewed.   Labs:  No results for input(s): " CULT in the last 168 hours.  Recent Labs   Lab 02/27/20  0627 02/26/20  0613 02/25/20  2044   WBC 5.5 3.7* 5.0   HGB 15.1 12.7* 14.3   HCT 44.5 37.9* 42.7   MCV 83 86 84   * 131* 157     Recent Labs   Lab 02/28/20  0614 02/27/20  0627 02/26/20  0613 02/25/20  2044    131* 134 134   POTASSIUM 4.1 4.2 4.2 4.1   CHLORIDE 100 96 101 96   CO2 25 24 20 20   ANIONGAP 8 11 13 18*   * 212* 124* 69*   BUN 13 6* 8 11   CR 0.52* 0.39* 0.46* 0.59*   GFRESTIMATED >90 >90 >90 >90   GFRESTBLACK >90 >90 >90 >90   BARRY 9.0 9.2 7.7* 8.9   PROTTOTAL  --  8.1 7.3 8.7   ALBUMIN  --  3.7 3.4 4.2   BILITOTAL  --  1.6* 1.1 1.2   ALKPHOS  --  87 86 103   AST  --  170* 343* 414*   ALT  --  118* 145* 173*     No results for input(s): SED, CRP in the last 168 hours.  Recent Labs   Lab 02/28/20  0614 02/27/20  0627 02/26/20  0613 02/25/20  2044   * 212* 124* 69*     No results for input(s): INR in the last 168 hours.  No results for input(s): TROPONIN, TROPI, TROPR in the last 168 hours.    Invalid input(s): TROP, TROPONINIES  No results for input(s): COLOR, APPEARANCE, URINEGLC, URINEBILI, URINEKETONE, SG, UBLD, URINEPH, PROTEIN, UROBILINOGEN, NITRITE, LEUKEST, RBCU, WBCU in the last 168 hours.   Imaging:   Results for orders placed or performed during the hospital encounter of 02/25/20   XR Chest 2 Views    Narrative    EXAM: XR CHEST 2 VW  LOCATION: [facility]  DATE/TIME: 2/25/2020 9:36 PM    INDICATION: Dyspnea  COMPARISON: 01/19/2020      Impression    IMPRESSION: Heart size and pulmonary vascularity normal. Small right pleural effusion is unchanged. Curvilinear scarring or atelectasis right lower lung field, also unchanged. Lungs otherwise clear.   US Abdomen Limited    Narrative    ULTRASOUND ABDOMEN LIMITED February 26, 2020 9:23 AM     HISTORY: Pancreatitis.    COMPARISON: None.    FINDINGS:    Gallbladder: Normal with no cholelithiasis, wall thickening or focal  tenderness.      Bile ducts: CHD  is normal diameter. No intrahepatic biliary  dilatation.    Liver: Fatty liver.    Pancreas: Limited visualization, partially obscured by overlying bowel  gas. No specific abnormality can be identified.    Right kidney: Normal.     Aorta and IVC: Not specifically assessed.       Impression    IMPRESSION:    1. Fatty liver.  2. Limited assessment of the pancreas.  3. No gallstones. Negative sonographic Wade's sign.    ARELY MICHAEL MD

## 2020-02-29 NOTE — PLAN OF CARE
Alert and oriented. CIWA scores of 0. Tolerating low fat diet. Up independently in room and hallway. Voiding in adequate amounts. Pain managed with prn Oxycodone and IV Dilaudid for breakthrough pain. Plans d/c to home.

## 2020-03-01 PROCEDURE — 25000132 ZZH RX MED GY IP 250 OP 250 PS 637: Performed by: INTERNAL MEDICINE

## 2020-03-01 PROCEDURE — 94640 AIRWAY INHALATION TREATMENT: CPT | Mod: 76

## 2020-03-01 PROCEDURE — 25000125 ZZHC RX 250: Performed by: INTERNAL MEDICINE

## 2020-03-01 PROCEDURE — 25800030 ZZH RX IP 258 OP 636: Performed by: INTERNAL MEDICINE

## 2020-03-01 PROCEDURE — 25000128 H RX IP 250 OP 636: Performed by: INTERNAL MEDICINE

## 2020-03-01 PROCEDURE — 94640 AIRWAY INHALATION TREATMENT: CPT

## 2020-03-01 PROCEDURE — 99233 SBSQ HOSP IP/OBS HIGH 50: CPT | Performed by: INTERNAL MEDICINE

## 2020-03-01 PROCEDURE — 40000275 ZZH STATISTIC RCP TIME EA 10 MIN

## 2020-03-01 PROCEDURE — 12000000 ZZH R&B MED SURG/OB

## 2020-03-01 RX ORDER — NICOTINE 21 MG/24HR
1 PATCH, TRANSDERMAL 24 HOURS TRANSDERMAL DAILY
Status: DISCONTINUED | OUTPATIENT
Start: 2020-03-01 | End: 2020-03-02 | Stop reason: HOSPADM

## 2020-03-01 RX ORDER — LORAZEPAM 2 MG/ML
1 INJECTION INTRAMUSCULAR ONCE
Status: COMPLETED | OUTPATIENT
Start: 2020-03-01 | End: 2020-03-01

## 2020-03-01 RX ORDER — LANOLIN ALCOHOL/MO/W.PET/CERES
100 CREAM (GRAM) TOPICAL DAILY
Qty: 30 TABLET | Refills: 0 | Status: ON HOLD | OUTPATIENT
Start: 2020-03-02 | End: 2021-02-21

## 2020-03-01 RX ORDER — DEXTROSE MONOHYDRATE, SODIUM CHLORIDE, AND POTASSIUM CHLORIDE 50; 1.49; 4.5 G/1000ML; G/1000ML; G/1000ML
INJECTION, SOLUTION INTRAVENOUS CONTINUOUS
Status: DISCONTINUED | OUTPATIENT
Start: 2020-03-01 | End: 2020-03-02 | Stop reason: HOSPADM

## 2020-03-01 RX ORDER — LORAZEPAM 2 MG/ML
2 INJECTION INTRAMUSCULAR
Status: DISCONTINUED | OUTPATIENT
Start: 2020-03-01 | End: 2020-03-02 | Stop reason: HOSPADM

## 2020-03-01 RX ORDER — FOLIC ACID 1 MG/1
1 TABLET ORAL DAILY
Qty: 30 TABLET | Refills: 0 | Status: ON HOLD | OUTPATIENT
Start: 2020-03-02 | End: 2021-02-21

## 2020-03-01 RX ORDER — CALCIUM CARBONATE 500 MG/1
1000 TABLET, CHEWABLE ORAL EVERY 4 HOURS PRN
Status: DISCONTINUED | OUTPATIENT
Start: 2020-03-01 | End: 2020-03-02 | Stop reason: HOSPADM

## 2020-03-01 RX ORDER — OXYCODONE HYDROCHLORIDE 5 MG/1
5 TABLET ORAL EVERY 6 HOURS PRN
Qty: 6 TABLET | Refills: 0 | Status: ON HOLD | OUTPATIENT
Start: 2020-03-01 | End: 2021-02-21

## 2020-03-01 RX ADMIN — POTASSIUM CHLORIDE, DEXTROSE MONOHYDRATE AND SODIUM CHLORIDE: 150; 5; 450 INJECTION, SOLUTION INTRAVENOUS at 12:20

## 2020-03-01 RX ADMIN — NICOTINE 1 PATCH: 14 PATCH, EXTENDED RELEASE TRANSDERMAL at 12:23

## 2020-03-01 RX ADMIN — LORAZEPAM 1 MG: 2 INJECTION INTRAMUSCULAR; INTRAVENOUS at 10:55

## 2020-03-01 RX ADMIN — FOLIC ACID 1 MG: 1 TABLET ORAL at 07:35

## 2020-03-01 RX ADMIN — HYDROMORPHONE HYDROCHLORIDE 0.5 MG: 1 INJECTION, SOLUTION INTRAMUSCULAR; INTRAVENOUS; SUBCUTANEOUS at 02:33

## 2020-03-01 RX ADMIN — OXYCODONE HYDROCHLORIDE 5 MG: 5 TABLET ORAL at 00:17

## 2020-03-01 RX ADMIN — ALBUTEROL SULFATE 2.5 MG: 2.5 SOLUTION RESPIRATORY (INHALATION) at 17:51

## 2020-03-01 RX ADMIN — PANTOPRAZOLE SODIUM 40 MG: 40 TABLET, DELAYED RELEASE ORAL at 07:32

## 2020-03-01 RX ADMIN — CALCIUM CARBONATE (ANTACID) CHEW TAB 500 MG 1000 MG: 500 CHEW TAB at 19:29

## 2020-03-01 RX ADMIN — OXYCODONE HYDROCHLORIDE 5 MG: 5 TABLET ORAL at 16:32

## 2020-03-01 RX ADMIN — OXYCODONE HYDROCHLORIDE 5 MG: 5 TABLET ORAL at 07:32

## 2020-03-01 RX ADMIN — THIAMINE HCL TAB 100 MG 100 MG: 100 TAB at 07:32

## 2020-03-01 RX ADMIN — ALBUTEROL SULFATE 2.5 MG: 2.5 SOLUTION RESPIRATORY (INHALATION) at 10:51

## 2020-03-01 RX ADMIN — ALBUTEROL SULFATE 2.5 MG: 2.5 SOLUTION RESPIRATORY (INHALATION) at 07:22

## 2020-03-01 RX ADMIN — OXYCODONE HYDROCHLORIDE 5 MG: 5 TABLET ORAL at 21:55

## 2020-03-01 ASSESSMENT — ACTIVITIES OF DAILY LIVING (ADL)
ADLS_ACUITY_SCORE: 12

## 2020-03-01 NOTE — PLAN OF CARE
Alert and oriented. Mumbles. CIWA scores of 0, 6, and 5. One-time IV Ativan given per MD order. Seizure pads initiated. Initially confused and anxious after RRT, alert and oriented in evening. Tolerating low fat diet. Transfers with SBA to bathroom. Voiding in adequate amounts. Pain managed with prn Oxycodone. Plans d/c to home when medically stable.

## 2020-03-01 NOTE — PROGRESS NOTES
[facility]  Hospitalist Progress Note  Len Robb MD, MD 03/01/2020  (Text Page)  Reason for Stay (Diagnosis): Abdominal pain secondary to alcoholic pancreatitis         Assessment and Plan:      Summary of Stay: Jaiden Lyons is a 45-year-old male with a history of alcoholic pancreatitis, alcohol abuse and dependence, asthma, tobacco use, who is admitted to the hospitalist service with alcoholic pancreatitis and alcohol withdrawal.       Today:  -He is trying to tolerate clear liquids but earlier had some issues with abdominal discomfort    Alcoholic pancreatitis  - Evidenced by epigastric abdominal pain lipase elevated greater than 1200, and history of significant alcohol use.  Has had a history of same in the past.  Had some elevations in transaminases, likely related to alcohol.  Right upper quadrant ultrasound does not show sign of stone.  No need for CT scan at this time.  -I had a long discussion with our patient regarding importance of complete EtOH cessation in preventing and hopefully for him to recover with this recurrent bouts of EtOH pancreatitis  -Improving.  May still low-fat diet.  Minimize narcotics.      Alcohol dependence, alcohol abuse, alcohol intoxication, and alcohol withdrawal  - Patient was intoxicated on admission with an ethanol level of 0.28.  He admits to drinking half pint to a pint of rum per day.  - Per chart review, does not appear as if he has had complicated withdrawals before.  - Place patient on CIWA scale with as needed Lorazepam.  - Supplement thiamine and folate.  - Chemical dependency consult has been placed.  - As of 2/28 he has been 24h without any ativan needs  -He has been improving in the past 2 days but apparently had a seizure-like activity while he was in the hospital lobby witnessed by patient's family.  -Likely alcohol withdrawal seizure-like related.  Given with lorazepam.  Seizure precautions.  Continue to monitor.  -No reported traumatic  "injury or fall events.    Alcoholic hepatitis  - This is very mild with transaminases in the low 100s and total bilirubin of 1.1.  - Right upper quadrant ultrasound showed fatty liver.     Tobacco abuse  - Patient smokes half pack per day.  Cessation is encouraged.  Nicotine gum provided.     Asthma  - Does not appear to be in an acute exacerbation.  Albuterol available as needed.  It was clear on admission.     Diet: Advance as tolerated  DVT Prophylaxis: Low Risk/Ambulatory with no VTE prophylaxis indicated  Malnutrition: Not present  Arango Catheter: No  Code Status: Full Code      Disposition Plan   Expected discharge:   Plans for hospital discharge once he demonstrates tolerance to advancing diet             Interval History (Subjective):      Continuing care today.  Seen and examined.  Chart reviewed.  Case discussed with nursing service was gracious enough to present at bedside during exam.  Patient's significant other present at bedside as well.  Earlier patient has no ongoing complaints, requesting for hospital discharge as he is demonstrating tolerance to oral diet with tolerable abdominal discomfort and pain.  His hospital discharge was being contemplated and planned but unfortunately a significant event occurred earlier today after an RRT was initiated while he was in the hospital lobby.  Patient's fiancé stated that apparently had a seizure-like activity with tonic-clonic seizures and periods of confusion.  No urinary or fecal incontinence.  He was brought back to his room where he was alert, oriented but somewhat mildly confused.  He is still requesting for hospital discharge but his plan discharge was already canceled due to this apparent seizure-like activity.                  Physical Exam:      Last Vital Signs:  BP (!) 117/91 (BP Location: Left arm)   Pulse 120   Temp 98  F (36.7  C) (Temporal)   Resp 18   Ht 1.791 m (5' 10.5\")   Wt 73.5 kg (162 lb)   SpO2 96%   BMI 22.92 kg/m      I/O last " 3 completed shifts:  In: 4456 [P.O.:960; I.V.:3496]  Out: -   Wt Readings from Last 1 Encounters:   02/25/20 73.5 kg (162 lb)     Vitals:    02/25/20 2259   Weight: 73.5 kg (162 lb)       Constitutional: Awake, alert, cooperative, no apparent distress   Respiratory: Clear to auscultation bilaterally, no crackles or wheezing   Cardiovascular: Regular rate and rhythm, normal S1 and S2, and no murmur noted   Abdomen:  Soft, no guarding, positive bowel sounds   Skin: No rashes, no cyanosis, dry to touch   Neuro: Alert and oriented x3, no weakness, spontaneous and coherent speech   Extremities: No edema, normal range of motion   Other(s): Euthymic mood, not agitated    Numerous skin tattoos   All other systems: Negative          Medications:      All current medications were reviewed with changes reflected in problem list.         Data:      All new lab and imaging data was reviewed.   Labs:  No results for input(s): CULT in the last 168 hours.  Recent Labs   Lab 02/27/20  0627 02/26/20  0613 02/25/20  2044   WBC 5.5 3.7* 5.0   HGB 15.1 12.7* 14.3   HCT 44.5 37.9* 42.7   MCV 83 86 84   * 131* 157     Recent Labs   Lab 02/28/20  0614 02/27/20  0627 02/26/20  0613 02/25/20  2044    131* 134 134   POTASSIUM 4.1 4.2 4.2 4.1   CHLORIDE 100 96 101 96   CO2 25 24 20 20   ANIONGAP 8 11 13 18*   * 212* 124* 69*   BUN 13 6* 8 11   CR 0.52* 0.39* 0.46* 0.59*   GFRESTIMATED >90 >90 >90 >90   GFRESTBLACK >90 >90 >90 >90   BARRY 9.0 9.2 7.7* 8.9   PROTTOTAL  --  8.1 7.3 8.7   ALBUMIN  --  3.7 3.4 4.2   BILITOTAL  --  1.6* 1.1 1.2   ALKPHOS  --  87 86 103   AST  --  170* 343* 414*   ALT  --  118* 145* 173*     No results for input(s): SED, CRP in the last 168 hours.  Recent Labs   Lab 02/28/20  0614 02/27/20  0627 02/26/20  0613 02/25/20  2044   * 212* 124* 69*     No results for input(s): INR in the last 168 hours.  No results for input(s): TROPONIN, TROPI, TROPR in the last 168 hours.    Invalid input(s):  TROP, TROPONINIES  No results for input(s): COLOR, APPEARANCE, URINEGLC, URINEBILI, URINEKETONE, SG, UBLD, URINEPH, PROTEIN, UROBILINOGEN, NITRITE, LEUKEST, RBCU, WBCU in the last 168 hours.   Imaging:   Results for orders placed or performed during the hospital encounter of 02/25/20   XR Chest 2 Views    Narrative    EXAM: XR CHEST 2 VW  LOCATION: [facility]  DATE/TIME: 2/25/2020 9:36 PM    INDICATION: Dyspnea  COMPARISON: 01/19/2020      Impression    IMPRESSION: Heart size and pulmonary vascularity normal. Small right pleural effusion is unchanged. Curvilinear scarring or atelectasis right lower lung field, also unchanged. Lungs otherwise clear.   US Abdomen Limited    Narrative    ULTRASOUND ABDOMEN LIMITED February 26, 2020 9:23 AM     HISTORY: Pancreatitis.    COMPARISON: None.    FINDINGS:    Gallbladder: Normal with no cholelithiasis, wall thickening or focal  tenderness.      Bile ducts: CHD is normal diameter. No intrahepatic biliary  dilatation.    Liver: Fatty liver.    Pancreas: Limited visualization, partially obscured by overlying bowel  gas. No specific abnormality can be identified.    Right kidney: Normal.     Aorta and IVC: Not specifically assessed.       Impression    IMPRESSION:    1. Fatty liver.  2. Limited assessment of the pancreas.  3. No gallstones. Negative sonographic Wade's sign.    ARELY MICHAEL MD

## 2020-03-01 NOTE — PLAN OF CARE
A&Ox4, VSS: stable. CIWA score 0.Up independently in the room. Sleeping well between cares. Pain managed with PRN Oxycodone and IV Dilaudid. Voiding good amounts. Family at bedside. Nursing continue to monitor.

## 2020-03-01 NOTE — PROGRESS NOTES
Pt requested to go outside with kerri in wheelchair. RN heard page for RRT in front lobby and went to assess. Per pt gulshanance, pt was returning from going outside in wheelchair and had seizure witnessed by kerri. Pt denied taking anything outside, per fiance pt had a couple cigarettes and they had a stressful conversation. Pt confused and anxious after event. MD assessed patient in room, VS taken. Prn Albuterol given for SOB. MD ordered IV Ativan x1. CIWA scale in use. Will continue to monitor closely.

## 2020-03-02 VITALS
BODY MASS INDEX: 22.68 KG/M2 | SYSTOLIC BLOOD PRESSURE: 115 MMHG | DIASTOLIC BLOOD PRESSURE: 90 MMHG | RESPIRATION RATE: 16 BRPM | TEMPERATURE: 97 F | WEIGHT: 162 LBS | HEIGHT: 71 IN | OXYGEN SATURATION: 98 % | HEART RATE: 94 BPM

## 2020-03-02 PROCEDURE — 99239 HOSP IP/OBS DSCHRG MGMT >30: CPT | Performed by: INTERNAL MEDICINE

## 2020-03-02 PROCEDURE — 25000132 ZZH RX MED GY IP 250 OP 250 PS 637: Performed by: INTERNAL MEDICINE

## 2020-03-02 PROCEDURE — 25800030 ZZH RX IP 258 OP 636: Performed by: INTERNAL MEDICINE

## 2020-03-02 RX ADMIN — OXYCODONE HYDROCHLORIDE 5 MG: 5 TABLET ORAL at 04:21

## 2020-03-02 RX ADMIN — LORAZEPAM 2 MG: 0.5 TABLET ORAL at 06:23

## 2020-03-02 RX ADMIN — OXYCODONE HYDROCHLORIDE 5 MG: 5 TABLET ORAL at 14:29

## 2020-03-02 RX ADMIN — Medication 5 MG: at 01:53

## 2020-03-02 RX ADMIN — POTASSIUM CHLORIDE, DEXTROSE MONOHYDRATE AND SODIUM CHLORIDE: 150; 5; 450 INJECTION, SOLUTION INTRAVENOUS at 01:10

## 2020-03-02 RX ADMIN — NICOTINE 1 PATCH: 14 PATCH, EXTENDED RELEASE TRANSDERMAL at 08:07

## 2020-03-02 ASSESSMENT — ACTIVITIES OF DAILY LIVING (ADL)
ADLS_ACUITY_SCORE: 12

## 2020-03-02 NOTE — PROGRESS NOTES
Cross cover    Called this patient having trouble sleeping and requesting melatonin  Melatonin 5 mg sublingual nightly PRN ordered

## 2020-03-02 NOTE — PLAN OF CARE
A&OX4, VSS: stable. Up with SBAx1 to bathroom. C/O difficult falling sleep, PRN melatonin given. Pain managed with PRN Oxycodone. CIWA score: 3-16, Ativan given per protocol.Voiding adequately. Nursing continue to monitor.

## 2020-03-02 NOTE — PROVIDER NOTIFICATION
0126 paged hospitalist   Pt. cannot sleep, requesting to have sleeping pill, like Melatonin, please advice.

## 2020-03-02 NOTE — DISCHARGE SUMMARY
"[facility]  Discharge Summary  Name: Jaiden Lyons    MRN: [id number]  YOB: 1975    Age: 45 year old  Date of Discharge:  3/2/2020  Date of Admission: 2/25/2020  Primary Care Provider: [facility]  Discharge Physician:  Len Robb MD  Discharging Service:  Hospitalist      Discharge Diagnosis:  Etoh pancreatitis  Etoh abuse  etoh dependence  etoh withdrawals complicated with apparent one time seizure like activity  Etoh Hepatitis  Tobacco abuse       Other Diagnosis:  History reviewed. No pertinent past medical history.       Discharge Disposition:  Discharged to home     Allergies:  Allergies   Allergen Reactions     Insulin Swelling     Reaction Date: Around 4766-3038  Face swelling  Inpatient when reaction occurred, unsure which type of insulin  Required some Benadryl to help with the swelling     Morphine Itching        Discharge Medications:   Current Discharge Medication List      START taking these medications    Details   folic acid (FOLVITE) 1 MG tablet Take 1 tablet (1 mg) by mouth daily  Qty: 30 tablet, Refills: 0    Associated Diagnoses: Alcohol-induced acute pancreatitis, unspecified complication status      oxyCODONE (ROXICODONE) 5 MG tablet Take 1 tablet (5 mg) by mouth every 6 hours as needed for moderate to severe pain (first line before IV dilaudid)  Qty: 6 tablet, Refills: 0    Associated Diagnoses: Alcohol-induced acute pancreatitis, unspecified complication status      vitamin B1 (THIAMINE) 100 MG tablet Take 1 tablet (100 mg) by mouth daily  Qty: 30 tablet, Refills: 0    Associated Diagnoses: Alcohol-induced acute pancreatitis, unspecified complication status              Condition on Discharge:  Discharge condition: Stable   Discharge vitals: Blood pressure 122/85, pulse 92, temperature 98.2  F (36.8  C), temperature source Temporal, resp. rate 16, height 1.791 m (5' 10.5\"), weight 73.5 kg (162 lb), SpO2 97 %.   Code status on discharge: Full " Code     History of Present Illness:  See detailed admission note for full details.        Significant Physical Exam Findings Day of Discharge:  HEENT; Atraumatic, normocephalic, pinkish conjuctiva, pupils bilateral reactive   Skin: warm and moist, no rashes, numerous skin tattoos  Lungs: equal chest expansion, clear to auscultation, no wheezes, no stridor, no crackles,   Heart: normal rate, normal rhythm, no rubs or gallops.   Abdomen: normal bowel sounds, no tenderness, no peritoneal signs, no guarding  Extremities: no deformities, no edema   Neuro; follow commands, alert and oriented x3, spontaneous speech, coherent, moves all extremities spontaneously  Psych; no hallucination, euthymic mood, not agitated        Procedures other than Imaging:  none     Imaging:  Results for orders placed or performed during the hospital encounter of 02/25/20   XR Chest 2 Views    Narrative    EXAM: XR CHEST 2 VW  LOCATION: [facility]  DATE/TIME: 2/25/2020 9:36 PM    INDICATION: Dyspnea  COMPARISON: 01/19/2020      Impression    IMPRESSION: Heart size and pulmonary vascularity normal. Small right pleural effusion is unchanged. Curvilinear scarring or atelectasis right lower lung field, also unchanged. Lungs otherwise clear.   US Abdomen Limited    Narrative    ULTRASOUND ABDOMEN LIMITED February 26, 2020 9:23 AM     HISTORY: Pancreatitis.    COMPARISON: None.    FINDINGS:    Gallbladder: Normal with no cholelithiasis, wall thickening or focal  tenderness.      Bile ducts: CHD is normal diameter. No intrahepatic biliary  dilatation.    Liver: Fatty liver.    Pancreas: Limited visualization, partially obscured by overlying bowel  gas. No specific abnormality can be identified.    Right kidney: Normal.     Aorta and IVC: Not specifically assessed.       Impression    IMPRESSION:    1. Fatty liver.  2. Limited assessment of the pancreas.  3. No gallstones. Negative sonographic Wade's sign.    ARELY MICHAEL MD         Consultations:  No consultations were requested during this admission.     Recent Lab Results:  Recent Labs   Lab 02/27/20  0627 02/26/20  0613 02/25/20 2044   WBC 5.5 3.7* 5.0   HGB 15.1 12.7* 14.3   HCT 44.5 37.9* 42.7   MCV 83 86 84   * 131* 157     No results for input(s): CULT in the last 168 hours.  Recent Labs   Lab 02/28/20  0614 02/27/20 0627 02/26/20 0613 02/25/20 2044    131* 134 134   POTASSIUM 4.1 4.2 4.2 4.1   CHLORIDE 100 96 101 96   CO2 25 24 20 20   ANIONGAP 8 11 13 18*   * 212* 124* 69*   BUN 13 6* 8 11   CR 0.52* 0.39* 0.46* 0.59*   GFRESTIMATED >90 >90 >90 >90   GFRESTBLACK >90 >90 >90 >90   BARRY 9.0 9.2 7.7* 8.9   PROTTOTAL  --  8.1 7.3 8.7   ALBUMIN  --  3.7 3.4 4.2   BILITOTAL  --  1.6* 1.1 1.2   ALKPHOS  --  87 86 103   AST  --  170* 343* 414*   ALT  --  118* 145* 173*     Recent Labs   Lab 02/28/20  0614 02/27/20 0627 02/26/20 0613 02/25/20 2044   * 212* 124* 69*     No results for input(s): LACT in the last 168 hours.  No results for input(s): TROPONIN, TROPI, TROPR in the last 168 hours.    Invalid input(s): TROP, TROPONINIES  No results for input(s): COLOR, APPEARANCE, URINEGLC, URINEBILI, URINEKETONE, SG, UBLD, URINEPH, PROTEIN, UROBILINOGEN, NITRITE, LEUKEST, RBCU, WBCU in the last 168 hours.       Pending Results:    Unresulted Labs Ordered in the Past 30 Days of this Admission     No orders found from 1/26/2020 to 2/26/2020.           Discharge Instructions and Follow-Up:   Discharge diet: Orders Placed This Encounter      Low Fat Diet      Diet     Discharge activity: Activity as tolerated   Discharge follow-up: 1-2 weeks with PCP   Outpatient therapy: None    Other instructions: None      Hospital Course:  continuing car today. Stable hemodynamics  No recurrence of apparent seizure like activity.  Afebrile.  Tolerating oral diet.  Requesting for home discharge.  Offered chemical dependency or social work service input for etoh cessation program  but declined.  Henrietta aware and updated earlier at bedside regarding plan of care, importance of complete etoh cessation.  He was given with only 6 tablets of 5mg of oral oxycodone with extensive discussion regarding risks and side effects of narcotics. He is requesting given that he can get anxious with pain related to his pancreatitis. I explained to him that his pain should be improving in the next subsequent days.  The main treatment is complete avoidance of Etoh.  No antiepilieptic medications provided upon discharge.    See my prior progress notes for other details as listed below:  Alcoholic pancreatitis  - Evidenced by epigastric abdominal pain lipase elevated greater than 1200, and history of significant alcohol use.  Has had a history of same in the past.  Had some elevations in transaminases, likely related to alcohol.  Right upper quadrant ultrasound does not show sign of stone.  No need for CT scan at this time.  -I had a long discussion with our patient regarding importance of complete EtOH cessation in preventing and hopefully for him to recover with this recurrent bouts of EtOH pancreatitis  -Improving.  May still low-fat diet.  Minimize narcotics.       Alcohol dependence, alcohol abuse, alcohol intoxication, and alcohol withdrawal  - Patient was intoxicated on admission with an ethanol level of 0.28.  He admits to drinking half pint to a pint of rum per day.  - Per chart review, does not appear as if he has had complicated withdrawals before.  - Place patient on CIWA scale with as needed Lorazepam.  - Supplement thiamine and folate.  - Chemical dependency consult has been placed.  - As of 2/28 he has been 24h without any ativan needs  -He has been improving in the past 2 days but apparently had a seizure-like activity while he was in the hospital lobby witnessed by patient's family.  -Likely alcohol withdrawal seizure-like related.  Given with lorazepam.  Seizure precautions.  Continue to  monitor.  -No reported traumatic injury or fall events.     Alcoholic hepatitis  - This is very mild with transaminases in the low 100s and total bilirubin of 1.1.  - Right upper quadrant ultrasound showed fatty liver.     Tobacco abuse  - Patient smokes half pack per day.  Cessation is encouraged.  Nicotine gum provided.     Asthma  - Does not appear to be in an acute exacerbation.  Albuterol available as needed.  It was clear on admission.     Diet: Advance as tolerated  DVT Prophylaxis: Low Risk/Ambulatory with no VTE prophylaxis indicated  Malnutrition: Not present  Arango Catheter: No  Code Status: Full Code           Total time spent in face to face contact with the patient and coordinating discharge was:  > 30 Minutes.

## 2020-03-03 NOTE — PROGRESS NOTES
Pt discharged home with girlfriend transporting and assisting at  Home. Pt declined to wait for family for discharge instructions. belongings returned, medications provided and questions answered. Pt and family had no questions or  Concerns noted.

## 2021-02-20 ENCOUNTER — APPOINTMENT (OUTPATIENT)
Dept: CT IMAGING | Facility: CLINIC | Age: 46
End: 2021-02-20
Attending: PHYSICIAN ASSISTANT
Payer: COMMERCIAL

## 2021-02-20 ENCOUNTER — HOSPITAL ENCOUNTER (INPATIENT)
Facility: CLINIC | Age: 46
LOS: 2 days | Discharge: HOME | End: 2021-02-23
Attending: PHYSICIAN ASSISTANT | Admitting: INTERNAL MEDICINE
Payer: COMMERCIAL

## 2021-02-20 DIAGNOSIS — J45.909 UNCOMPLICATED ASTHMA, UNSPECIFIED ASTHMA SEVERITY, UNSPECIFIED WHETHER PERSISTENT: Primary | ICD-10-CM

## 2021-02-20 DIAGNOSIS — R10.13 ABDOMINAL PAIN, EPIGASTRIC: ICD-10-CM

## 2021-02-20 DIAGNOSIS — J45.909 UNCOMPLICATED ASTHMA, UNSPECIFIED ASTHMA SEVERITY, UNSPECIFIED WHETHER PERSISTENT: ICD-10-CM

## 2021-02-20 DIAGNOSIS — E87.20 LACTIC ACIDOSIS: ICD-10-CM

## 2021-02-20 DIAGNOSIS — F10.220 ALCOHOL DEPENDENCE WITH UNCOMPLICATED INTOXICATION (H): ICD-10-CM

## 2021-02-20 DIAGNOSIS — R11.2 INTRACTABLE VOMITING WITH NAUSEA, UNSPECIFIED VOMITING TYPE: ICD-10-CM

## 2021-02-20 LAB
ALBUMIN SERPL-MCNC: 4.1 G/DL (ref 3.4–5)
ALP SERPL-CCNC: 87 U/L (ref 40–150)
ALT SERPL W P-5'-P-CCNC: 111 U/L (ref 0–70)
ANION GAP SERPL CALCULATED.3IONS-SCNC: 17 MMOL/L (ref 3–14)
AST SERPL W P-5'-P-CCNC: 224 U/L (ref 0–45)
AST SERPL W P-5'-P-CCNC: ABNORMAL U/L (ref 0–45)
BASOPHILS # BLD AUTO: 0.1 10E9/L (ref 0–0.2)
BASOPHILS NFR BLD AUTO: 1.9 %
BILIRUB SERPL-MCNC: 1 MG/DL (ref 0.2–1.3)
BUN SERPL-MCNC: 16 MG/DL (ref 7–30)
CALCIUM SERPL-MCNC: 8.6 MG/DL (ref 8.5–10.1)
CHLORIDE SERPL-SCNC: 96 MMOL/L (ref 94–109)
CO2 SERPL-SCNC: 22 MMOL/L (ref 20–32)
CREAT SERPL-MCNC: 0.66 MG/DL (ref 0.66–1.25)
DIFFERENTIAL METHOD BLD: ABNORMAL
EOSINOPHIL # BLD AUTO: 0.2 10E9/L (ref 0–0.7)
EOSINOPHIL NFR BLD AUTO: 4 %
ERYTHROCYTE [DISTWIDTH] IN BLOOD BY AUTOMATED COUNT: 15.4 % (ref 10–15)
GFR SERPL CREATININE-BSD FRML MDRD: >90 ML/MIN/{1.73_M2}
GLUCOSE SERPL-MCNC: 88 MG/DL (ref 70–99)
HCT VFR BLD AUTO: 46.3 % (ref 40–53)
HGB BLD-MCNC: 15.4 G/DL (ref 13.3–17.7)
IMM GRANULOCYTES # BLD: 0 10E9/L (ref 0–0.4)
IMM GRANULOCYTES NFR BLD: 0 %
LABORATORY COMMENT REPORT: NORMAL
LACTATE BLD-SCNC: 4.1 MMOL/L (ref 0.7–2)
LACTATE BLD-SCNC: 5.3 MMOL/L (ref 0.7–2)
LIPASE SERPL-CCNC: 43 U/L (ref 73–393)
LYMPHOCYTES # BLD AUTO: 1.4 10E9/L (ref 0.8–5.3)
LYMPHOCYTES NFR BLD AUTO: 32.4 %
MCH RBC QN AUTO: 28.4 PG (ref 26.5–33)
MCHC RBC AUTO-ENTMCNC: 33.3 G/DL (ref 31.5–36.5)
MCV RBC AUTO: 85 FL (ref 78–100)
MONOCYTES # BLD AUTO: 0.4 10E9/L (ref 0–1.3)
MONOCYTES NFR BLD AUTO: 9.4 %
NEUTROPHILS # BLD AUTO: 2.2 10E9/L (ref 1.6–8.3)
NEUTROPHILS NFR BLD AUTO: 52.3 %
NRBC # BLD AUTO: 0 10*3/UL
NRBC BLD AUTO-RTO: 0 /100
PLATELET # BLD AUTO: 229 10E9/L (ref 150–450)
POTASSIUM SERPL-SCNC: 4.9 MMOL/L (ref 3.4–5.3)
PROT SERPL-MCNC: 8.8 G/DL (ref 6.8–8.8)
RBC # BLD AUTO: 5.42 10E12/L (ref 4.4–5.9)
SARS-COV-2 RNA RESP QL NAA+PROBE: NEGATIVE
SODIUM SERPL-SCNC: 135 MMOL/L (ref 133–144)
SPECIMEN SOURCE: NORMAL
TROPONIN I SERPL-MCNC: <0.015 UG/L (ref 0–0.04)
WBC # BLD AUTO: 4.3 10E9/L (ref 4–11)

## 2021-02-20 PROCEDURE — 94640 AIRWAY INHALATION TREATMENT: CPT

## 2021-02-20 PROCEDURE — 83690 ASSAY OF LIPASE: CPT | Performed by: PHYSICIAN ASSISTANT

## 2021-02-20 PROCEDURE — 93005 ELECTROCARDIOGRAM TRACING: CPT

## 2021-02-20 PROCEDURE — 96375 TX/PRO/DX INJ NEW DRUG ADDON: CPT

## 2021-02-20 PROCEDURE — 250N000009 HC RX 250: Performed by: PHYSICIAN ASSISTANT

## 2021-02-20 PROCEDURE — 85025 COMPLETE CBC W/AUTO DIFF WBC: CPT | Performed by: PHYSICIAN ASSISTANT

## 2021-02-20 PROCEDURE — 82077 ASSAY SPEC XCP UR&BREATH IA: CPT | Performed by: PHYSICIAN ASSISTANT

## 2021-02-20 PROCEDURE — 74177 CT ABD & PELVIS W/CONTRAST: CPT

## 2021-02-20 PROCEDURE — 87635 SARS-COV-2 COVID-19 AMP PRB: CPT | Performed by: PHYSICIAN ASSISTANT

## 2021-02-20 PROCEDURE — 258N000003 HC RX IP 258 OP 636: Performed by: PHYSICIAN ASSISTANT

## 2021-02-20 PROCEDURE — C9803 HOPD COVID-19 SPEC COLLECT: HCPCS

## 2021-02-20 PROCEDURE — 250N000013 HC RX MED GY IP 250 OP 250 PS 637: Performed by: PHYSICIAN ASSISTANT

## 2021-02-20 PROCEDURE — 84484 ASSAY OF TROPONIN QUANT: CPT | Performed by: PHYSICIAN ASSISTANT

## 2021-02-20 PROCEDURE — 80053 COMPREHEN METABOLIC PANEL: CPT | Performed by: PHYSICIAN ASSISTANT

## 2021-02-20 PROCEDURE — 96376 TX/PRO/DX INJ SAME DRUG ADON: CPT

## 2021-02-20 PROCEDURE — 250N000011 HC RX IP 250 OP 636: Performed by: PHYSICIAN ASSISTANT

## 2021-02-20 PROCEDURE — 99285 EMERGENCY DEPT VISIT HI MDM: CPT | Mod: 25

## 2021-02-20 PROCEDURE — 84450 TRANSFERASE (AST) (SGOT): CPT | Performed by: PHYSICIAN ASSISTANT

## 2021-02-20 PROCEDURE — 83605 ASSAY OF LACTIC ACID: CPT | Performed by: PHYSICIAN ASSISTANT

## 2021-02-20 PROCEDURE — 96374 THER/PROPH/DIAG INJ IV PUSH: CPT | Mod: 59

## 2021-02-20 RX ORDER — HYDROMORPHONE HYDROCHLORIDE 1 MG/ML
0.5 INJECTION, SOLUTION INTRAMUSCULAR; INTRAVENOUS; SUBCUTANEOUS
Status: COMPLETED | OUTPATIENT
Start: 2021-02-20 | End: 2021-02-21

## 2021-02-20 RX ORDER — ONDANSETRON 2 MG/ML
4 INJECTION INTRAMUSCULAR; INTRAVENOUS EVERY 30 MIN PRN
Status: DISCONTINUED | OUTPATIENT
Start: 2021-02-20 | End: 2021-02-21

## 2021-02-20 RX ORDER — IOPAMIDOL 755 MG/ML
500 INJECTION, SOLUTION INTRAVASCULAR ONCE
Status: COMPLETED | OUTPATIENT
Start: 2021-02-20 | End: 2021-02-20

## 2021-02-20 RX ORDER — ALBUTEROL SULFATE 90 UG/1
2 AEROSOL, METERED RESPIRATORY (INHALATION) ONCE
Status: COMPLETED | OUTPATIENT
Start: 2021-02-20 | End: 2021-02-20

## 2021-02-20 RX ORDER — SUCRALFATE 1 G/1
1 TABLET ORAL ONCE
Status: COMPLETED | OUTPATIENT
Start: 2021-02-20 | End: 2021-02-20

## 2021-02-20 RX ORDER — SODIUM CHLORIDE 9 MG/ML
INJECTION, SOLUTION INTRAVENOUS CONTINUOUS
Status: DISCONTINUED | OUTPATIENT
Start: 2021-02-20 | End: 2021-02-21

## 2021-02-20 RX ORDER — HYDROMORPHONE HYDROCHLORIDE 1 MG/ML
0.5 INJECTION, SOLUTION INTRAMUSCULAR; INTRAVENOUS; SUBCUTANEOUS
Status: COMPLETED | OUTPATIENT
Start: 2021-02-20 | End: 2021-02-20

## 2021-02-20 RX ADMIN — SODIUM CHLORIDE 1000 ML: 9 INJECTION, SOLUTION INTRAVENOUS at 22:45

## 2021-02-20 RX ADMIN — HYDROMORPHONE HYDROCHLORIDE 0.5 MG: 1 INJECTION, SOLUTION INTRAMUSCULAR; INTRAVENOUS; SUBCUTANEOUS at 22:15

## 2021-02-20 RX ADMIN — ONDANSETRON 4 MG: 2 INJECTION INTRAMUSCULAR; INTRAVENOUS at 21:45

## 2021-02-20 RX ADMIN — ONDANSETRON 4 MG: 2 INJECTION INTRAMUSCULAR; INTRAVENOUS at 23:46

## 2021-02-20 RX ADMIN — ALBUTEROL SULFATE 2 PUFF: 90 AEROSOL, METERED RESPIRATORY (INHALATION) at 22:16

## 2021-02-20 RX ADMIN — HYDROMORPHONE HYDROCHLORIDE 0.5 MG: 1 INJECTION, SOLUTION INTRAMUSCULAR; INTRAVENOUS; SUBCUTANEOUS at 21:45

## 2021-02-20 RX ADMIN — IOPAMIDOL 81 ML: 755 INJECTION, SOLUTION INTRAVENOUS at 22:25

## 2021-02-20 RX ADMIN — SUCRALFATE 1 G: 1 TABLET ORAL at 23:44

## 2021-02-20 RX ADMIN — SODIUM CHLORIDE 60 ML: 9 INJECTION, SOLUTION INTRAVENOUS at 22:26

## 2021-02-20 RX ADMIN — HYDROMORPHONE HYDROCHLORIDE 0.5 MG: 1 INJECTION, SOLUTION INTRAMUSCULAR; INTRAVENOUS; SUBCUTANEOUS at 23:02

## 2021-02-20 ASSESSMENT — ENCOUNTER SYMPTOMS
FEVER: 0
APPETITE CHANGE: 1
SHORTNESS OF BREATH: 0
VOMITING: 1
BLOOD IN STOOL: 0
DIARRHEA: 0
CHILLS: 0

## 2021-02-21 ENCOUNTER — APPOINTMENT (OUTPATIENT)
Dept: GENERAL RADIOLOGY | Facility: CLINIC | Age: 46
End: 2021-02-21
Attending: INTERNAL MEDICINE
Payer: COMMERCIAL

## 2021-02-21 PROBLEM — R11.2 INTRACTABLE VOMITING WITH NAUSEA, UNSPECIFIED VOMITING TYPE: Status: ACTIVE | Noted: 2021-02-21

## 2021-02-21 PROBLEM — F10.220 ALCOHOL DEPENDENCE WITH UNCOMPLICATED INTOXICATION (H): Status: ACTIVE | Noted: 2021-02-21

## 2021-02-21 PROBLEM — R10.13 ABDOMINAL PAIN, EPIGASTRIC: Status: ACTIVE | Noted: 2021-02-21

## 2021-02-21 PROBLEM — E87.20 LACTIC ACIDOSIS: Status: ACTIVE | Noted: 2021-02-21

## 2021-02-21 LAB
ABO + RH BLD: NORMAL
ABO + RH BLD: NORMAL
ANION GAP SERPL CALCULATED.3IONS-SCNC: 14 MMOL/L (ref 3–14)
BLD GP AB SCN SERPL QL: NORMAL
BLOOD BANK CMNT PATIENT-IMP: NORMAL
BUN SERPL-MCNC: 14 MG/DL (ref 7–30)
CALCIUM SERPL-MCNC: 7.8 MG/DL (ref 8.5–10.1)
CHLORIDE SERPL-SCNC: 102 MMOL/L (ref 94–109)
CO2 SERPL-SCNC: 22 MMOL/L (ref 20–32)
CREAT SERPL-MCNC: 0.6 MG/DL (ref 0.66–1.25)
ETHANOL SERPL-MCNC: 0.25 G/DL
GFR SERPL CREATININE-BSD FRML MDRD: >90 ML/MIN/{1.73_M2}
GLUCOSE SERPL-MCNC: 94 MG/DL (ref 70–99)
HGB BLD-MCNC: 12.9 G/DL (ref 13.3–17.7)
HGB BLD-MCNC: 13.2 G/DL (ref 13.3–17.7)
LACTATE BLD-SCNC: 3.8 MMOL/L (ref 0.7–2)
MAGNESIUM SERPL-MCNC: 1.7 MG/DL (ref 1.6–2.3)
MAGNESIUM SERPL-MCNC: 1.7 MG/DL (ref 1.6–2.3)
PHOSPHATE SERPL-MCNC: 3.3 MG/DL (ref 2.5–4.5)
PHOSPHATE SERPL-MCNC: 4 MG/DL (ref 2.5–4.5)
POTASSIUM SERPL-SCNC: 4.7 MMOL/L (ref 3.4–5.3)
SODIUM SERPL-SCNC: 138 MMOL/L (ref 133–144)
SPECIMEN EXP DATE BLD: NORMAL

## 2021-02-21 PROCEDURE — 36415 COLL VENOUS BLD VENIPUNCTURE: CPT | Performed by: INTERNAL MEDICINE

## 2021-02-21 PROCEDURE — 250N000013 HC RX MED GY IP 250 OP 250 PS 637: Performed by: INTERNAL MEDICINE

## 2021-02-21 PROCEDURE — C9113 INJ PANTOPRAZOLE SODIUM, VIA: HCPCS | Performed by: PHYSICIAN ASSISTANT

## 2021-02-21 PROCEDURE — 86850 RBC ANTIBODY SCREEN: CPT | Performed by: INTERNAL MEDICINE

## 2021-02-21 PROCEDURE — 94640 AIRWAY INHALATION TREATMENT: CPT

## 2021-02-21 PROCEDURE — 84100 ASSAY OF PHOSPHORUS: CPT | Performed by: INTERNAL MEDICINE

## 2021-02-21 PROCEDURE — 86901 BLOOD TYPING SEROLOGIC RH(D): CPT | Performed by: INTERNAL MEDICINE

## 2021-02-21 PROCEDURE — 999N000157 HC STATISTIC RCP TIME EA 10 MIN

## 2021-02-21 PROCEDURE — C9113 INJ PANTOPRAZOLE SODIUM, VIA: HCPCS | Performed by: INTERNAL MEDICINE

## 2021-02-21 PROCEDURE — 71045 X-RAY EXAM CHEST 1 VIEW: CPT

## 2021-02-21 PROCEDURE — 83605 ASSAY OF LACTIC ACID: CPT | Performed by: INTERNAL MEDICINE

## 2021-02-21 PROCEDURE — 258N000003 HC RX IP 258 OP 636: Performed by: INTERNAL MEDICINE

## 2021-02-21 PROCEDURE — 99220 PR INITIAL OBSERVATION CARE,LEVEL III: CPT | Performed by: INTERNAL MEDICINE

## 2021-02-21 PROCEDURE — 83735 ASSAY OF MAGNESIUM: CPT | Performed by: INTERNAL MEDICINE

## 2021-02-21 PROCEDURE — 120N000004 HC R&B MS OVERFLOW

## 2021-02-21 PROCEDURE — 250N000009 HC RX 250: Performed by: INTERNAL MEDICINE

## 2021-02-21 PROCEDURE — 96376 TX/PRO/DX INJ SAME DRUG ADON: CPT

## 2021-02-21 PROCEDURE — 96361 HYDRATE IV INFUSION ADD-ON: CPT

## 2021-02-21 PROCEDURE — 86900 BLOOD TYPING SEROLOGIC ABO: CPT | Performed by: INTERNAL MEDICINE

## 2021-02-21 PROCEDURE — 250N000011 HC RX IP 250 OP 636: Performed by: INTERNAL MEDICINE

## 2021-02-21 PROCEDURE — 80048 BASIC METABOLIC PNL TOTAL CA: CPT | Performed by: INTERNAL MEDICINE

## 2021-02-21 PROCEDURE — 250N000011 HC RX IP 250 OP 636: Performed by: PHYSICIAN ASSISTANT

## 2021-02-21 PROCEDURE — G0378 HOSPITAL OBSERVATION PER HR: HCPCS

## 2021-02-21 PROCEDURE — 85018 HEMOGLOBIN: CPT | Performed by: INTERNAL MEDICINE

## 2021-02-21 PROCEDURE — 94640 AIRWAY INHALATION TREATMENT: CPT | Mod: 76

## 2021-02-21 PROCEDURE — HZ2ZZZZ DETOXIFICATION SERVICES FOR SUBSTANCE ABUSE TREATMENT: ICD-10-PCS | Performed by: INTERNAL MEDICINE

## 2021-02-21 PROCEDURE — 96375 TX/PRO/DX INJ NEW DRUG ADDON: CPT

## 2021-02-21 RX ORDER — GABAPENTIN 100 MG/1
100 CAPSULE ORAL EVERY 8 HOURS
Status: DISCONTINUED | OUTPATIENT
Start: 2021-02-28 | End: 2021-02-23 | Stop reason: HOSPADM

## 2021-02-21 RX ORDER — SODIUM CHLORIDE, SODIUM LACTATE, POTASSIUM CHLORIDE, CALCIUM CHLORIDE 600; 310; 30; 20 MG/100ML; MG/100ML; MG/100ML; MG/100ML
INJECTION, SOLUTION INTRAVENOUS CONTINUOUS
Status: DISCONTINUED | OUTPATIENT
Start: 2021-02-21 | End: 2021-02-23

## 2021-02-21 RX ORDER — ALBUTEROL SULFATE 90 UG/1
2 AEROSOL, METERED RESPIRATORY (INHALATION) EVERY 6 HOURS
Status: ON HOLD | COMMUNITY
End: 2021-02-21

## 2021-02-21 RX ORDER — NALOXONE HYDROCHLORIDE 0.4 MG/ML
0.4 INJECTION, SOLUTION INTRAMUSCULAR; INTRAVENOUS; SUBCUTANEOUS
Status: DISCONTINUED | OUTPATIENT
Start: 2021-02-21 | End: 2021-02-23 | Stop reason: HOSPADM

## 2021-02-21 RX ORDER — LANOLIN ALCOHOL/MO/W.PET/CERES
100 CREAM (GRAM) TOPICAL DAILY
Status: DISCONTINUED | OUTPATIENT
Start: 2021-02-28 | End: 2021-02-21

## 2021-02-21 RX ORDER — FOLIC ACID 1 MG/1
1 TABLET ORAL ONCE
Status: DISCONTINUED | OUTPATIENT
Start: 2021-02-21 | End: 2021-02-21

## 2021-02-21 RX ORDER — HYDROXYZINE HYDROCHLORIDE 25 MG/1
25-50 TABLET, FILM COATED ORAL EVERY 6 HOURS PRN
Status: DISCONTINUED | OUTPATIENT
Start: 2021-02-21 | End: 2021-02-23 | Stop reason: HOSPADM

## 2021-02-21 RX ORDER — LISINOPRIL 10 MG/1
10 TABLET ORAL DAILY
Status: ON HOLD | COMMUNITY
End: 2021-02-21

## 2021-02-21 RX ORDER — HYDROMORPHONE HYDROCHLORIDE 1 MG/ML
0.2 INJECTION, SOLUTION INTRAMUSCULAR; INTRAVENOUS; SUBCUTANEOUS
Status: DISCONTINUED | OUTPATIENT
Start: 2021-02-21 | End: 2021-02-22

## 2021-02-21 RX ORDER — LANOLIN ALCOHOL/MO/W.PET/CERES
100 CREAM (GRAM) TOPICAL ONCE
Status: DISCONTINUED | OUTPATIENT
Start: 2021-02-21 | End: 2021-02-21

## 2021-02-21 RX ORDER — MULTIPLE VITAMINS W/ MINERALS TAB 9MG-400MCG
1 TAB ORAL ONCE
Status: DISCONTINUED | OUTPATIENT
Start: 2021-02-21 | End: 2021-02-21

## 2021-02-21 RX ORDER — LANOLIN ALCOHOL/MO/W.PET/CERES
200 CREAM (GRAM) TOPICAL 3 TIMES DAILY
Status: COMPLETED | OUTPATIENT
Start: 2021-02-21 | End: 2021-02-22

## 2021-02-21 RX ORDER — BISACODYL 10 MG
10 SUPPOSITORY, RECTAL RECTAL DAILY PRN
Status: DISCONTINUED | OUTPATIENT
Start: 2021-02-21 | End: 2021-02-23 | Stop reason: HOSPADM

## 2021-02-21 RX ORDER — ACETAMINOPHEN 325 MG/1
650 TABLET ORAL EVERY 6 HOURS PRN
Status: DISCONTINUED | OUTPATIENT
Start: 2021-02-21 | End: 2021-02-23 | Stop reason: HOSPADM

## 2021-02-21 RX ORDER — OLANZAPINE 5 MG/1
5-10 TABLET, ORALLY DISINTEGRATING ORAL EVERY 6 HOURS PRN
Status: DISCONTINUED | OUTPATIENT
Start: 2021-02-21 | End: 2021-02-21

## 2021-02-21 RX ORDER — GLIPIZIDE 5 MG/1
5 TABLET ORAL
Status: ON HOLD | COMMUNITY
End: 2022-07-07

## 2021-02-21 RX ORDER — NALOXONE HYDROCHLORIDE 0.4 MG/ML
0.2 INJECTION, SOLUTION INTRAMUSCULAR; INTRAVENOUS; SUBCUTANEOUS
Status: DISCONTINUED | OUTPATIENT
Start: 2021-02-21 | End: 2021-02-23 | Stop reason: HOSPADM

## 2021-02-21 RX ORDER — ONDANSETRON 2 MG/ML
4 INJECTION INTRAMUSCULAR; INTRAVENOUS EVERY 6 HOURS PRN
Status: DISCONTINUED | OUTPATIENT
Start: 2021-02-21 | End: 2021-02-23 | Stop reason: HOSPADM

## 2021-02-21 RX ORDER — GABAPENTIN 300 MG/1
600 CAPSULE ORAL EVERY 8 HOURS
Status: DISCONTINUED | OUTPATIENT
Start: 2021-02-24 | End: 2021-02-23 | Stop reason: HOSPADM

## 2021-02-21 RX ORDER — MULTIPLE VITAMINS W/ MINERALS TAB 9MG-400MCG
1 TAB ORAL DAILY
Status: DISCONTINUED | OUTPATIENT
Start: 2021-02-21 | End: 2021-02-23 | Stop reason: HOSPADM

## 2021-02-21 RX ORDER — DIPHENHYDRAMINE HYDROCHLORIDE 50 MG/ML
25 INJECTION INTRAMUSCULAR; INTRAVENOUS EVERY 6 HOURS PRN
Status: DISCONTINUED | OUTPATIENT
Start: 2021-02-21 | End: 2021-02-21

## 2021-02-21 RX ORDER — LORAZEPAM 2 MG/ML
1-2 INJECTION INTRAMUSCULAR EVERY 30 MIN PRN
Status: DISCONTINUED | OUTPATIENT
Start: 2021-02-21 | End: 2021-02-23 | Stop reason: HOSPADM

## 2021-02-21 RX ORDER — LORAZEPAM 2 MG/ML
1-2 INJECTION INTRAMUSCULAR EVERY 30 MIN PRN
Status: DISCONTINUED | OUTPATIENT
Start: 2021-02-21 | End: 2021-02-21

## 2021-02-21 RX ORDER — FOLIC ACID 1 MG/1
1 TABLET ORAL DAILY
Status: DISCONTINUED | OUTPATIENT
Start: 2021-02-21 | End: 2021-02-21

## 2021-02-21 RX ORDER — AMOXICILLIN 250 MG
2 CAPSULE ORAL 2 TIMES DAILY PRN
Status: DISCONTINUED | OUTPATIENT
Start: 2021-02-21 | End: 2021-02-23 | Stop reason: HOSPADM

## 2021-02-21 RX ORDER — LOSARTAN POTASSIUM 50 MG/1
50 TABLET ORAL DAILY
Status: ON HOLD | COMMUNITY
End: 2022-07-09

## 2021-02-21 RX ORDER — AMOXICILLIN 250 MG
1 CAPSULE ORAL 2 TIMES DAILY PRN
Status: DISCONTINUED | OUTPATIENT
Start: 2021-02-21 | End: 2021-02-23 | Stop reason: HOSPADM

## 2021-02-21 RX ORDER — HALOPERIDOL 5 MG/ML
2.5-5 INJECTION INTRAMUSCULAR EVERY 6 HOURS PRN
Status: DISCONTINUED | OUTPATIENT
Start: 2021-02-21 | End: 2021-02-21

## 2021-02-21 RX ORDER — LANOLIN ALCOHOL/MO/W.PET/CERES
100 CREAM (GRAM) TOPICAL DAILY
Status: DISCONTINUED | OUTPATIENT
Start: 2021-02-28 | End: 2021-02-23 | Stop reason: HOSPADM

## 2021-02-21 RX ORDER — GLIPIZIDE 5 MG/1
5 TABLET, FILM COATED, EXTENDED RELEASE ORAL DAILY
Status: ON HOLD | COMMUNITY
End: 2021-02-21

## 2021-02-21 RX ORDER — ONDANSETRON 4 MG/1
4 TABLET, ORALLY DISINTEGRATING ORAL EVERY 6 HOURS PRN
Status: DISCONTINUED | OUTPATIENT
Start: 2021-02-21 | End: 2021-02-23 | Stop reason: HOSPADM

## 2021-02-21 RX ORDER — PROCHLORPERAZINE MALEATE 5 MG
10 TABLET ORAL EVERY 6 HOURS PRN
Status: DISCONTINUED | OUTPATIENT
Start: 2021-02-21 | End: 2021-02-23 | Stop reason: HOSPADM

## 2021-02-21 RX ORDER — LANOLIN ALCOHOL/MO/W.PET/CERES
100 CREAM (GRAM) TOPICAL 3 TIMES DAILY
Status: DISCONTINUED | OUTPATIENT
Start: 2021-02-23 | End: 2021-02-21

## 2021-02-21 RX ORDER — FOLIC ACID 1 MG/1
1 TABLET ORAL DAILY
Status: DISCONTINUED | OUTPATIENT
Start: 2021-02-21 | End: 2021-02-23 | Stop reason: HOSPADM

## 2021-02-21 RX ORDER — LORAZEPAM 1 MG/1
1-2 TABLET ORAL EVERY 30 MIN PRN
Status: DISCONTINUED | OUTPATIENT
Start: 2021-02-21 | End: 2021-02-21

## 2021-02-21 RX ORDER — LORAZEPAM 1 MG/1
1-2 TABLET ORAL EVERY 30 MIN PRN
Status: DISCONTINUED | OUTPATIENT
Start: 2021-02-21 | End: 2021-02-23 | Stop reason: HOSPADM

## 2021-02-21 RX ORDER — LANOLIN ALCOHOL/MO/W.PET/CERES
100 CREAM (GRAM) TOPICAL 3 TIMES DAILY
Status: DISCONTINUED | OUTPATIENT
Start: 2021-02-23 | End: 2021-02-23 | Stop reason: HOSPADM

## 2021-02-21 RX ORDER — IBUPROFEN 800 MG/1
800 TABLET, FILM COATED ORAL EVERY 8 HOURS PRN
Status: ON HOLD | COMMUNITY
End: 2021-02-23

## 2021-02-21 RX ORDER — DIPHENHYDRAMINE HCL 25 MG
25 CAPSULE ORAL EVERY 6 HOURS PRN
Status: DISCONTINUED | OUTPATIENT
Start: 2021-02-21 | End: 2021-02-21

## 2021-02-21 RX ORDER — GABAPENTIN 600 MG/1
1200 TABLET ORAL ONCE
Status: COMPLETED | OUTPATIENT
Start: 2021-02-21 | End: 2021-02-21

## 2021-02-21 RX ORDER — PROCHLORPERAZINE 25 MG
25 SUPPOSITORY, RECTAL RECTAL EVERY 12 HOURS PRN
Status: DISCONTINUED | OUTPATIENT
Start: 2021-02-21 | End: 2021-02-23 | Stop reason: HOSPADM

## 2021-02-21 RX ORDER — OXYCODONE HYDROCHLORIDE 5 MG/1
5-10 TABLET ORAL
Status: DISCONTINUED | OUTPATIENT
Start: 2021-02-21 | End: 2021-02-21

## 2021-02-21 RX ORDER — MULTIPLE VITAMINS W/ MINERALS TAB 9MG-400MCG
1 TAB ORAL DAILY
Status: DISCONTINUED | OUTPATIENT
Start: 2021-02-21 | End: 2021-02-21

## 2021-02-21 RX ORDER — FLUMAZENIL 0.1 MG/ML
0.2 INJECTION, SOLUTION INTRAVENOUS
Status: DISCONTINUED | OUTPATIENT
Start: 2021-02-21 | End: 2021-02-23 | Stop reason: HOSPADM

## 2021-02-21 RX ORDER — OLANZAPINE 5 MG/1
5-10 TABLET, ORALLY DISINTEGRATING ORAL EVERY 6 HOURS PRN
Status: DISCONTINUED | OUTPATIENT
Start: 2021-02-21 | End: 2021-02-23 | Stop reason: HOSPADM

## 2021-02-21 RX ORDER — FLUMAZENIL 0.1 MG/ML
0.2 INJECTION, SOLUTION INTRAVENOUS
Status: DISCONTINUED | OUTPATIENT
Start: 2021-02-21 | End: 2021-02-21

## 2021-02-21 RX ORDER — HYDROMORPHONE HYDROCHLORIDE 1 MG/ML
0.3 INJECTION, SOLUTION INTRAMUSCULAR; INTRAVENOUS; SUBCUTANEOUS
Status: DISCONTINUED | OUTPATIENT
Start: 2021-02-21 | End: 2021-02-21

## 2021-02-21 RX ORDER — LANOLIN ALCOHOL/MO/W.PET/CERES
200 CREAM (GRAM) TOPICAL 3 TIMES DAILY
Status: DISCONTINUED | OUTPATIENT
Start: 2021-02-21 | End: 2021-02-21

## 2021-02-21 RX ORDER — METHYLPREDNISOLONE SODIUM SUCCINATE 40 MG/ML
40 INJECTION, POWDER, LYOPHILIZED, FOR SOLUTION INTRAMUSCULAR; INTRAVENOUS EVERY 8 HOURS
Status: DISCONTINUED | OUTPATIENT
Start: 2021-02-21 | End: 2021-02-22

## 2021-02-21 RX ORDER — GABAPENTIN 300 MG/1
300 CAPSULE ORAL EVERY 8 HOURS
Status: DISCONTINUED | OUTPATIENT
Start: 2021-02-26 | End: 2021-02-23 | Stop reason: HOSPADM

## 2021-02-21 RX ORDER — IPRATROPIUM BROMIDE AND ALBUTEROL SULFATE 2.5; .5 MG/3ML; MG/3ML
3 SOLUTION RESPIRATORY (INHALATION)
Status: DISCONTINUED | OUTPATIENT
Start: 2021-02-21 | End: 2021-02-22

## 2021-02-21 RX ORDER — CLONIDINE HYDROCHLORIDE 0.1 MG/1
0.1 TABLET ORAL EVERY 8 HOURS
Status: DISCONTINUED | OUTPATIENT
Start: 2021-02-21 | End: 2021-02-23 | Stop reason: HOSPADM

## 2021-02-21 RX ORDER — HALOPERIDOL 5 MG/ML
2.5-5 INJECTION INTRAMUSCULAR EVERY 6 HOURS PRN
Status: DISCONTINUED | OUTPATIENT
Start: 2021-02-21 | End: 2021-02-23 | Stop reason: HOSPADM

## 2021-02-21 RX ORDER — ALBUTEROL SULFATE 0.83 MG/ML
2.5 SOLUTION RESPIRATORY (INHALATION)
Status: DISCONTINUED | OUTPATIENT
Start: 2021-02-21 | End: 2021-02-23 | Stop reason: HOSPADM

## 2021-02-21 RX ORDER — GABAPENTIN 300 MG/1
900 CAPSULE ORAL EVERY 8 HOURS
Status: DISCONTINUED | OUTPATIENT
Start: 2021-02-21 | End: 2021-02-23 | Stop reason: HOSPADM

## 2021-02-21 RX ADMIN — HYDROMORPHONE HYDROCHLORIDE 0.2 MG: 1 INJECTION, SOLUTION INTRAMUSCULAR; INTRAVENOUS; SUBCUTANEOUS at 22:19

## 2021-02-21 RX ADMIN — HYDROMORPHONE HYDROCHLORIDE 0.2 MG: 1 INJECTION, SOLUTION INTRAMUSCULAR; INTRAVENOUS; SUBCUTANEOUS at 19:59

## 2021-02-21 RX ADMIN — IPRATROPIUM BROMIDE AND ALBUTEROL SULFATE 3 ML: .5; 3 SOLUTION RESPIRATORY (INHALATION) at 19:23

## 2021-02-21 RX ADMIN — SODIUM CHLORIDE, POTASSIUM CHLORIDE, SODIUM LACTATE AND CALCIUM CHLORIDE: 600; 310; 30; 20 INJECTION, SOLUTION INTRAVENOUS at 11:34

## 2021-02-21 RX ADMIN — IPRATROPIUM BROMIDE AND ALBUTEROL SULFATE 3 ML: .5; 3 SOLUTION RESPIRATORY (INHALATION) at 15:13

## 2021-02-21 RX ADMIN — GABAPENTIN 900 MG: 300 CAPSULE ORAL at 16:49

## 2021-02-21 RX ADMIN — SODIUM CHLORIDE, POTASSIUM CHLORIDE, SODIUM LACTATE AND CALCIUM CHLORIDE: 600; 310; 30; 20 INJECTION, SOLUTION INTRAVENOUS at 20:01

## 2021-02-21 RX ADMIN — HYDROMORPHONE HYDROCHLORIDE 0.3 MG: 1 INJECTION, SOLUTION INTRAMUSCULAR; INTRAVENOUS; SUBCUTANEOUS at 01:58

## 2021-02-21 RX ADMIN — THIAMINE HCL TAB 100 MG 200 MG: 100 TAB at 10:14

## 2021-02-21 RX ADMIN — HYDROMORPHONE HYDROCHLORIDE 0.2 MG: 1 INJECTION, SOLUTION INTRAMUSCULAR; INTRAVENOUS; SUBCUTANEOUS at 15:41

## 2021-02-21 RX ADMIN — METHYLPREDNISOLONE SODIUM SUCCINATE 40 MG: 40 INJECTION, POWDER, FOR SOLUTION INTRAMUSCULAR; INTRAVENOUS at 11:29

## 2021-02-21 RX ADMIN — Medication 5 MG: at 19:10

## 2021-02-21 RX ADMIN — ONDANSETRON 4 MG: 2 INJECTION INTRAMUSCULAR; INTRAVENOUS at 09:53

## 2021-02-21 RX ADMIN — MULTIPLE VITAMINS W/ MINERALS TAB 1 TABLET: TAB at 10:15

## 2021-02-21 RX ADMIN — OXYCODONE HYDROCHLORIDE 5 MG: 5 TABLET ORAL at 06:16

## 2021-02-21 RX ADMIN — HYDROMORPHONE HYDROCHLORIDE 0.2 MG: 1 INJECTION, SOLUTION INTRAMUSCULAR; INTRAVENOUS; SUBCUTANEOUS at 08:52

## 2021-02-21 RX ADMIN — SODIUM CHLORIDE 1000 ML: 9 INJECTION, SOLUTION INTRAVENOUS at 09:52

## 2021-02-21 RX ADMIN — CLONIDINE HYDROCHLORIDE 0.1 MG: 0.1 TABLET ORAL at 16:49

## 2021-02-21 RX ADMIN — LORAZEPAM 1 MG: 2 INJECTION INTRAMUSCULAR; INTRAVENOUS at 10:09

## 2021-02-21 RX ADMIN — HYDROMORPHONE HYDROCHLORIDE 0.3 MG: 1 INJECTION, SOLUTION INTRAMUSCULAR; INTRAVENOUS; SUBCUTANEOUS at 06:34

## 2021-02-21 RX ADMIN — IPRATROPIUM BROMIDE AND ALBUTEROL SULFATE 3 ML: .5; 3 SOLUTION RESPIRATORY (INHALATION) at 10:21

## 2021-02-21 RX ADMIN — PANTOPRAZOLE SODIUM 40 MG: 40 INJECTION, POWDER, FOR SOLUTION INTRAVENOUS at 09:57

## 2021-02-21 RX ADMIN — CLONIDINE HYDROCHLORIDE 0.1 MG: 0.1 TABLET ORAL at 10:14

## 2021-02-21 RX ADMIN — THIAMINE HCL TAB 100 MG 200 MG: 100 TAB at 19:09

## 2021-02-21 RX ADMIN — HYDROMORPHONE HYDROCHLORIDE 0.2 MG: 1 INJECTION, SOLUTION INTRAMUSCULAR; INTRAVENOUS; SUBCUTANEOUS at 17:48

## 2021-02-21 RX ADMIN — ONDANSETRON 4 MG: 2 INJECTION INTRAMUSCULAR; INTRAVENOUS at 15:46

## 2021-02-21 RX ADMIN — FOLIC ACID 1 MG: 1 TABLET ORAL at 10:14

## 2021-02-21 RX ADMIN — PANTOPRAZOLE SODIUM 40 MG: 40 INJECTION, POWDER, FOR SOLUTION INTRAVENOUS at 00:57

## 2021-02-21 RX ADMIN — THIAMINE HCL TAB 100 MG 200 MG: 100 TAB at 13:46

## 2021-02-21 RX ADMIN — METHYLPREDNISOLONE SODIUM SUCCINATE 40 MG: 40 INJECTION, POWDER, FOR SOLUTION INTRAMUSCULAR; INTRAVENOUS at 19:10

## 2021-02-21 RX ADMIN — SODIUM CHLORIDE, POTASSIUM CHLORIDE, SODIUM LACTATE AND CALCIUM CHLORIDE: 600; 310; 30; 20 INJECTION, SOLUTION INTRAVENOUS at 01:59

## 2021-02-21 RX ADMIN — GABAPENTIN 1200 MG: 600 TABLET, FILM COATED ORAL at 10:14

## 2021-02-21 RX ADMIN — HYDROMORPHONE HYDROCHLORIDE 0.5 MG: 1 INJECTION, SOLUTION INTRAMUSCULAR; INTRAVENOUS; SUBCUTANEOUS at 00:01

## 2021-02-21 RX ADMIN — HYDROMORPHONE HYDROCHLORIDE 0.2 MG: 1 INJECTION, SOLUTION INTRAMUSCULAR; INTRAVENOUS; SUBCUTANEOUS at 11:34

## 2021-02-21 RX ADMIN — PANTOPRAZOLE SODIUM 40 MG: 40 INJECTION, POWDER, FOR SOLUTION INTRAVENOUS at 19:10

## 2021-02-21 RX ADMIN — HYDROMORPHONE HYDROCHLORIDE 0.2 MG: 1 INJECTION, SOLUTION INTRAMUSCULAR; INTRAVENOUS; SUBCUTANEOUS at 13:44

## 2021-02-21 ASSESSMENT — MIFFLIN-ST. JEOR: SCORE: 1655.3

## 2021-02-21 NOTE — ED NOTES
[facility]  ED Nurse Handoff Report    Jaiden Lyons is a 46 year old male   ED Chief complaint: Abdominal Pain  . ED Diagnosis:   Final diagnoses:   None     Allergies:   Allergies   Allergen Reactions     Fentanyl      Insulin Swelling     Reaction Date: Around 8208-9304  Face swelling  Inpatient when reaction occurred, unsure which type of insulin  Required some Benadryl to help with the swelling     Morphine Itching       Code Status: Full Code  Activity level - Baseline/Home:  Independent. Activity Level - Current:   Independent. Lift room needed: No. Bariatric: No   Needed: No   Isolation: No. Infection: Not Applicable.     Vital Signs:   Vitals:    02/20/21 2340 02/20/21 2345 02/20/21 2350 02/21/21 0000   BP:  (!) 158/97 (!) 158/97 (!) 146/99   Pulse: 96 108 96 114   Resp: 16 11 14 24   Temp:       SpO2: 91% 94% 94% 94%       Cardiac Rhythm:  ,      Pain level:    Patient confused: No. Patient Falls Risk: Yes.   Elimination Status: Has not needed to void while in ED   Patient Report - Initial Complaint: ABD pain, vomiting. Focused Assessment: Abdominal pain with vomiting   Tests Performed: Labs, CT  . Abnormal Results:   Labs Ordered and Resulted from Time of ED Arrival Up to the Time of Departure from the ED   CBC WITH PLATELETS DIFFERENTIAL - Abnormal; Notable for the following components:       Result Value    RDW 15.4 (*)     All other components within normal limits   COMPREHENSIVE METABOLIC PANEL - Abnormal; Notable for the following components:    Anion Gap 17 (*)      (*)     All other components within normal limits   LIPASE - Abnormal; Notable for the following components:    Lipase 43 (*)     All other components within normal limits   LACTIC ACID WHOLE BLOOD - Abnormal; Notable for the following components:    Lactic Acid 5.3 (*)     All other components within normal limits   AST - Abnormal; Notable for the following components:     (*)     All other  components within normal limits   LACTIC ACID WHOLE BLOOD - Abnormal; Notable for the following components:    Lactic Acid 4.1 (*)     All other components within normal limits   SARS-COV-2 (COVID-19) VIRUS RT-PCR   TROPONIN I   CARDIAC CONTINUOUS MONITORING     CT Abdomen Pelvis w Contrast   Final Result   IMPRESSION:    1. No cause of acute pain identified in the abdomen or pelvis. Normal appendix.   2. A few pancreatic parenchymal calcifications, suggestive of chronic pancreatitis.       .   Treatments provided: See MAR  Family Comments: Not present  OBS brochure/video discussed/provided to patient:  yes  ED Medications:   Medications   0.9% sodium chloride BOLUS (1,000 mLs Intravenous New Bag 2/20/21 2245)     Followed by   sodium chloride 0.9% infusion (has no administration in time range)   ondansetron (ZOFRAN) injection 4 mg (4 mg Intravenous Given 2/20/21 2346)   HYDROmorphone (PF) (DILAUDID) injection 0.5 mg (0.5 mg Intravenous Given 2/20/21 2302)   albuterol (PROAIR HFA/PROVENTIL HFA/VENTOLIN HFA) 108 (90 Base) MCG/ACT inhaler 2 puff (2 puffs Inhalation Given 2/20/21 2216)   iopamidol (ISOVUE-370) solution 500 mL (81 mLs Intravenous Given 2/20/21 2225)   Sodium Chloride 0.9 % Bag 500mL for CT Scan Flush Use (60 mLs Intravenous Given 2/20/21 2226)   sucralfate (CARAFATE) tablet 1 g (1 g Oral Given 2/20/21 2344)   HYDROmorphone (PF) (DILAUDID) injection 0.5 mg (0.5 mg Intravenous Given 2/21/21 0001)     Drips infusing:  No  For the majority of the shift, the patient's behavior Green. Interventions performed were NA.    Sepsis treatment initiated: No     Patient tested for COVID 19 prior to admission: YES    ED Nurse Name/Phone Number: Jaclyn Rivera RN,   12:09 AM    RECEIVING UNIT ED HANDOFF REVIEW    Above ED Nurse Handoff Report was reviewed: Yes  Reviewed by: Margaux Wilhelm RN on February 21, 2021 at 1:05 AM

## 2021-02-21 NOTE — ED NOTES
[facility]  ED Nurse Handoff Report    Jaiden Lyons is a 46 year old male   ED Chief complaint: Abdominal Pain  . ED Diagnosis:   Final diagnoses:   None     Allergies:   Allergies   Allergen Reactions     Fentanyl      Insulin Swelling     Reaction Date: Around 4131-1743  Face swelling  Inpatient when reaction occurred, unsure which type of insulin  Required some Benadryl to help with the swelling     Morphine Itching       Code Status: Full Code  Activity level - Baseline/Home:  Independent. Activity Level - Current:   Stand by Assist. Lift room needed: No. Bariatric: No   Needed: No   Isolation: No. Infection: Not Applicable.     Vital Signs:   Vitals:    02/20/21 2115 02/20/21 2130 02/20/21 2145 02/20/21 2200   BP: (!) 144/102 (!) 139/100 (!) 133/100 136/85   Pulse: 111 105 101 96   Resp:    25   Temp:       SpO2: 96% 94% 94% 91%       Cardiac Rhythm:  ,      Pain level:    Patient confused: No. Patient Falls Risk: Yes.   Elimination Status: Pt has not needed to void while in ED   Patient Report - Initial Complaint: Abd pain and vomiting. Focused Assessment: Abdominal pain-diffuse and radiating to flanks. Blood in emesis this morning with history of pancreatitis.     Tests Performed: labs,CT. Abnormal Results:   Labs Ordered and Resulted from Time of ED Arrival Up to the Time of Departure from the ED   CBC WITH PLATELETS DIFFERENTIAL - Abnormal; Notable for the following components:       Result Value    RDW 15.4 (*)     All other components within normal limits   COMPREHENSIVE METABOLIC PANEL - Abnormal; Notable for the following components:    Anion Gap 17 (*)      (*)     All other components within normal limits   LIPASE - Abnormal; Notable for the following components:    Lipase 43 (*)     All other components within normal limits   LACTIC ACID WHOLE BLOOD - Abnormal; Notable for the following components:    Lactic Acid 5.3 (*)     All other components within normal  limits   AST - Abnormal; Notable for the following components:     (*)     All other components within normal limits   SARS-COV-2 (COVID-19) VIRUS RT-PCR   TROPONIN I   CARDIAC CONTINUOUS MONITORING   .   Treatments provided: See MAR  Family Comments: Henrietta present at intake  OBS brochure/video discussed/provided to patient:  N/A  ED Medications:   Medications   0.9% sodium chloride BOLUS (has no administration in time range)     Followed by   sodium chloride 0.9% infusion (has no administration in time range)   ondansetron (ZOFRAN) injection 4 mg (4 mg Intravenous Given 2/20/21 2145)   HYDROmorphone (PF) (DILAUDID) injection 0.5 mg (0.5 mg Intravenous Given 2/20/21 2215)   albuterol (PROAIR HFA/PROVENTIL HFA/VENTOLIN HFA) 108 (90 Base) MCG/ACT inhaler 2 puff (2 puffs Inhalation Given 2/20/21 2216)   iopamidol (ISOVUE-370) solution 500 mL (81 mLs Intravenous Given 2/20/21 2225)   Sodium Chloride 0.9 % Bag 500mL for CT Scan Flush Use (60 mLs Intravenous Given 2/20/21 2226)     Drips infusing:  No  For the majority of the shift, the patient's behavior Green. Interventions performed were NA.    Sepsis treatment initiated: No     Patient tested for COVID 19 prior to admission: YES    ED Nurse Name/Phone Number: Jaclyn Rivera RN,   10:27 PM

## 2021-02-21 NOTE — PLAN OF CARE
"PRIMARY DIAGNOSIS: Aloholic Gastritis    OUTPATIENT/OBSERVATION GOALS TO BE MET BEFORE DISCHARGE  1. Orthostatic performed: No    2. Tolerating PO fluid and/or antibiotics (if applicable): No    3. Nausea/Vomiting/Diarrhea symptoms improved: Yes    4. Pain status: Improved but still requiring IV narcotics.    5. Return to near baseline physical activity: No    Discharge Planner Nurse   Safe discharge environment identified: Yes  Barriers to discharge: Yes       Entered by: Gm Mabry 02/21/2021     Pt alert and oriented x4. He has pain in his abdomen. Given iv dilaudid this am. NPO. CIWA score was 8. Given 1 mg ativan. Started NS bolus. Denies nausea this am. Also given neb this am. Still on 2L O2. Sats are 95%. Will cont supportive cares.  BP (!) 142/81 (BP Location: Left arm)   Pulse 83   Temp 97.2  F (36.2  C) (Oral)   Resp 16   Ht 1.791 m (5' 10.5\")   Wt 77.1 kg (170 lb)   SpO2 97%   BMI 24.05 kg/m      Please review provider order for any additional goals.   Nurse to notify provider when observation goals have been met and patient is ready for discharge.  "

## 2021-02-21 NOTE — ED PROVIDER NOTES
History   Chief Complaint:  Abdominal Pain     The history is provided by the patient and a significant other.      Jaiden Lyons is a 46 year old male with history of pancreatitis, hypertension, asthma, type II diabetes, alcoholic hepatitis and a withdrawal seizure who presents with abdominal pain. He reports having constant upper abdominal pain for the last month that radiates to his sides and back with associated vomiting. Today, his pain became significantly worse and he started having episodes of scant hematochezia, prompting him to be evaluated. He rates his pain as a 10 on the pain scale with minimal relief from ibuprofen and has not eaten anything for two days because he is unable to keep any food down. His last alcoholic drink was this morning and reports drinking a pint a day on average. He has a history of alcohol withdrawal and experienced an associated grand mal seizure one year ago. Denies new shortness of breath, fever, chills, diarrhea, chest pain, and black or bloody stool.     Review of Systems   Constitutional: Positive for appetite change. Negative for chills and fever.   Respiratory: Negative for shortness of breath.    Cardiovascular: Negative for chest pain.   Gastrointestinal: Positive for vomiting (Hemoptysis). Negative for blood in stool and diarrhea.   All other systems reviewed and are negative.    Allergies:  Fentanyl  Insulin  Morphine    Medications:  Folvite   Thiamine   Cozaar  Glucotrol   Pro-Air; Ventolin; Proventil    Past Medical History:    Pancreatitis  Hypertension  Asthma  Type II diabetes  Alcoholic hepatitis     Past Surgical History:    Right arm tendon/muscle repair  Face reconstruction  Right hip surgery    Family History:    Colon cancer  Asthma     Social History:  Presents with fiance. History of alcohol abuse.     Physical Exam     Patient Vitals for the past 24 hrs:   BP Temp Pulse Resp SpO2   02/21/21 0030 126/88 -- 102 12 92 %   02/21/21 0020 (!) 149/101 --  102 12 92 %   02/21/21 0010 -- -- 98 12 93 %   02/21/21 0000 (!) 146/99 -- 114 24 94 %   02/20/21 2350 (!) 158/97 -- 96 14 94 %   02/20/21 2345 (!) 158/97 -- 108 11 94 %   02/20/21 2340 -- -- 96 16 91 %   02/20/21 2330 130/89 -- 99 14 98 %   02/20/21 2315 130/78 -- 102 14 92 %   02/20/21 2300 119/77 -- 105 18 93 %   02/20/21 2245 135/88 -- 99 20 95 %   02/20/21 2215 137/89 -- 98 23 92 %   02/20/21 2200 136/85 -- 96 25 91 %   02/20/21 2145 (!) 133/100 -- 101 -- 94 %   02/20/21 2130 (!) 139/100 -- 105 -- 94 %   02/20/21 2115 (!) 144/102 -- 111 -- 96 %   02/20/21 2052 (!) 129/109 98  F (36.7  C) 114 18 96 %       Physical Exam  Constitutional: Pleasant. Cooperative.   Eyes: Pupils equally round and reactive  HENT: Head is normal in appearance. Dry mucous membranes. No tongue fasciculations.  Cardiovascular: Tachycardic. Regular rhythm and without murmurs.  Respiratory: Normal respiratory effort, lungs are clear bilaterally.  GI: Diffuse TTP, worse upper abdomen. Soft, non-distended. No guarding, rebound, or rigidity.  Musculoskeletal: No asymmetry of the lower extremities, no tenderness to palpation.   Skin: Normal, without rash.  Neurologic: Cranial nerves grossly intact, normal cognition, no focal deficits. Alert and oriented x 3. No tremulousness.  Psychiatric: Normal affect.  Nursing notes and vital signs reviewed.    Emergency Department Course   ECG  ECG taken at 2155, ECG read at 2155 by physician.   Normal sinus rhythm  T wave abnormality, consider anterolateral ischemia  Prolonged QT  Abnormal ECG   Rate 100 bpm. SC interval 152 ms. QRS duration 94 ms. QT/QTc 378/487 ms. P-R-T axes 60 -24 72.     Imaging:  CT Abdomen Pelvis w Contrast  1. No cause of acute pain identified in the abdomen or pelvis. Normal appendix.  2. A few pancreatic parenchymal calcifications, suggestive of chronic pancreatitis.   Reading per radiology    Laboratory:  Labs Ordered and Resulted from Time of ED Arrival Up to the Time of  Departure from the ED   CBC WITH PLATELETS DIFFERENTIAL - Abnormal; Notable for the following components:       Result Value    RDW 15.4 (*)     All other components within normal limits   COMPREHENSIVE METABOLIC PANEL - Abnormal; Notable for the following components:    Anion Gap 17 (*)      (*)     All other components within normal limits   LIPASE - Abnormal; Notable for the following components:    Lipase 43 (*)     All other components within normal limits   LACTIC ACID WHOLE BLOOD - Abnormal; Notable for the following components:    Lactic Acid 5.3 (*)     All other components within normal limits   AST - Abnormal; Notable for the following components:     (*)     All other components within normal limits   LACTIC ACID WHOLE BLOOD - Abnormal; Notable for the following components:    Lactic Acid 4.1 (*)     All other components within normal limits   SARS-COV-2 (COVID-19) VIRUS RT-PCR   TROPONIN I   ALCOHOL ETHYL   CARDIAC CONTINUOUS MONITORING     Emergency Department Course:    Reviewed:  I reviewed nursing notes, vitals, past medical history and care everywhere    Assessments:  2109 I obtained history and examined the patient as noted above.   2306 I rechecked the patient and explained findings.    Interventions:  Medications   0.9% sodium chloride BOLUS (1,000 mLs Intravenous New Bag 2/20/21 2245)     Followed by   sodium chloride 0.9% infusion (has no administration in time range)   ondansetron (ZOFRAN) injection 4 mg (4 mg Intravenous Given 2/20/21 2346)   famotidine (PEPCID) injection 20 mg (has no administration in time range)   pantoprazole (PROTONIX) IV push injection 40 mg (has no administration in time range)   HYDROmorphone (PF) (DILAUDID) injection 0.5 mg (0.5 mg Intravenous Given 2/20/21 2302)   albuterol (PROAIR HFA/PROVENTIL HFA/VENTOLIN HFA) 108 (90 Base) MCG/ACT inhaler 2 puff (2 puffs Inhalation Given 2/20/21 2216)   iopamidol (ISOVUE-370) solution 500 mL (81 mLs Intravenous  Given 2/20/21 2225)   Sodium Chloride 0.9 % Bag 500mL for CT Scan Flush Use (60 mLs Intravenous Given 2/20/21 2226)   sucralfate (CARAFATE) tablet 1 g (1 g Oral Given 2/20/21 2344)   HYDROmorphone (PF) (DILAUDID) injection 0.5 mg (0.5 mg Intravenous Given 2/21/21 0001)       Disposition:  The patient was admitted to the hospital under the care of Dr. Ding.     Impression & Plan   CMS Diagnoses: The Lactic acid level is elevated due to volume depletion, at this time there is no sign of severe sepsis or septic shock.    Medical Decision Making:  Jaiden Lyons is a 46 year old male who presents to the ED for evaluation of abdominal pain.  This is been ongoing for some time, however became markedly worse today and patient had a few episodes of scant hematochezia, prompting his presentation to the ED. Last drink was earlier today. Denies withdrawal symptoms at this time. See HPI as above for additional details. Vitals and physical exam as above. DDx was broad and included gastritis, pancreatitis, PUD, gallbladder pathology, atypical ACS, among others. Work up as above notable for lactic acidosis at 5.3, elevated LFTs. Lipase WNL. CT without acute abnormalities. Suspect alcohol-induced gastritis vs chronic pancreatitis as etiology of patient's symptoms at this time. No evidence for complication from pancreatitis at this time, such as abscess, necrotizing pancreatitis. Pain remains despite multiple doses of narcotic pain medications here in the ED. PO challenge attempted, however patient has recurrent emesis despite antiemetic. In setting of persistent pain with associated recurrent emesis, felt admission was indicated. Attempted PO carafate, however patient was unable to keep this down. Pepcid and Protonix IV provided. Patient in agreement for admission.  Lactate rechecked after 1L, improved to 4.1. Discussed case with hospitalist, who agreed to accept care of the patient. Of note, patient asthmatic and requested  albuterol as per his normal evening dosing, this was provided. No tongue fasiculations or tremulousness to suggest for overt alcohol withdrawal presently, however there may be some early evolving withdrawal causing his nausea/emesis as well. All questions. Patient admitted in stable condition.    Covid-19  Jaiden Lyons was evaluated during a global COVID-19 pandemic, which necessitated consideration that the patient might be at risk for infection with the SARS-CoV-2 virus that causes COVID-19. Applicable protocols for evaluation were followed during the patient's care. COVID-19 was considered as part of the patient's evaluation. The plan for testing is:  a test was obtained during this visit.    Diagnosis:    ICD-10-CM    1. Abdominal pain, epigastric  R10.13    2. Lactic acidosis  E87.2      Scribe Disclosure:  I, Tahmina Taylor, am serving as a scribe at 8:58 PM on 2/20/2021 to document services personally performed by Luis Alberto Armenta PA-C based on my observations and the provider's statements to me.     This record was created at least in part using electronic voice recognition software, so please excuse any typographical errors.         Luis Alberto Armenta PA-C  02/21/21 0046

## 2021-02-21 NOTE — ED NOTES
DATE:  2/20/2021   TIME OF RECEIPT FROM LAB:  4994  LAB TEST:  Lactic  LAB VALUE:  4.1  RESULTS GIVEN WITH READ-BACK TO (PROVIDER): ANA ROSA Armenta  TIME LAB VALUE REPORTED TO PROVIDER:   2784

## 2021-02-21 NOTE — ED PROVIDER NOTES
Emergency Department Attending Supervision Note  2/20/2021  11:15 PM      I evaluated this patient in conjunction with Luis Alberto Armenta PA-C       Briefly, the patient presented with one month of upper abdominal pain radiating into his back with vomiting. Patient began having episodes of hemoptysis today, prompting presentation. Patient has a history of pancreatitis, DM type 2, HTN, and alcoholic hepatitis.    On my exam, the patient is now resting comfortably. Mild epigastric tenderness. No overt signs of alcohol withdrawal.    Results:  ECG  ECG taken at 2155, ECG read at 2155 by physician.   Normal sinus rhythm  T wave abnormality, consider anterolateral ischemia  Prolonged QT  Abnormal ECG   Rate 100 bpm. CO interval 152 ms. QRS duration 94 ms. QT/QTc 378/487 ms. P-R-T axes 60 -24 72.      Imaging:  CT Abdomen Pelvis w Contrast  1. No cause of acute pain identified in the abdomen or pelvis. Normal appendix.  2. A few pancreatic parenchymal calcifications, suggestive of chronic pancreatitis.   Reading per radiology     Laboratory:  CBC: WBC: 4.3, HGB: 15.4, PLT: 229  BMP: Anion Gap 17(H), (H) o/w WNL (Creatinine: 0.66)  Lipase: 43(L)  Lactic acid (Resulted 2131): 5.3(HH)  Troponin (Collected 2118): <0.015  AST: 224(H)     Asymptomatic SARS-CoV2 COVID-19 (Coronavirus) by PCR: Negative    Overall impression is alcoholic gastritis with vomiting, possible upper GI bleed, and alcohol intoxication.  Reassured by negative CT findings.  Chronic pancreatitis considered but lipase negative.  Considered ACS but EKG and troponin are negative after longstanding and atypical symptoms, essentially ruling out AMI.  Will admit due to failure to tolerate p.o.'s and for continued elevated lactic, which is improving.  CIWA protocol will be required.      Diagnosis    ICD-10-CM   1. Abdominal pain, epigastric  R10.13   2. Lactic acidosis  E87.2   3. Intractable vomiting with nausea, unspecified vomiting type  R11.2   4. Alcohol  dependence with uncomplicated intoxication (H)  F10.220         Fabián Moscoso  2/20/2021   EMERGENCY DEPARTMENT  Scribe Disclosure:  I, Fabián Moscoso, am serving as a scribe at 11:13 PM on 2/20/2021 to document services personally performed by Fantasma Renteria MD based on my observations and the provider's statements to me.            Fantasma Renteria MD  02/21/21 0408

## 2021-02-21 NOTE — ED TRIAGE NOTES
Patient reports hx pancreatitis, abd pain for the past month. Worse tonight, c/o vomiting, decreased appetite.

## 2021-02-21 NOTE — ED NOTES
DATE:  2/20/2021   TIME OF RECEIPT FROM LAB:  2131  LAB TEST:  Lactic  LAB VALUE:  5.3  RESULTS GIVEN WITH READ-BACK TO (PROVIDER):  ANA ROSA Armenta  TIME LAB VALUE REPORTED TO PROVIDER:   2132

## 2021-02-21 NOTE — PLAN OF CARE
ROOM # 226    Living Situation (if not independent, order SW consult): Home independently (rents a room)  Facility name: N/A  : Wife Donna    Activity level at baseline: Independent  Activity level on admit: Independent      Patient registered to observation; given Patient Bill of Rights; given the opportunity to ask questions about observation status and their plan of care.  Patient has been oriented to the observation room, bathroom and call light is in place.    Discussed discharge goals and expectations with patient/family.

## 2021-02-21 NOTE — PLAN OF CARE
"PRIMARY DIAGNOSIS: Aloholic Gastritis    OUTPATIENT/OBSERVATION GOALS TO BE MET BEFORE DISCHARGE  1. Orthostatic performed: No    2. Tolerating PO fluid and/or antibiotics (if applicable): No    3. Nausea/Vomiting/Diarrhea symptoms improved: Yes    4. Pain status: Improved but still requiring IV narcotics.    5. Return to near baseline physical activity: No    Discharge Planner Nurse   Safe discharge environment identified: Yes  Barriers to discharge: Yes       Entered by: Gm Mabry 02/21/2021     Pt alert and oriented x4. He has pain in his abdomen. Given iv dilaudid 2 hrs. NPO except ice chips. CIWA scores, last score was 3. Given 1 mg ativan today. LR @ 125. Denies nausea this am. Scheduled nebs. Still on 2L O2. Sats are 95%. Will cont supportive cares.  /83 (BP Location: Left arm)   Pulse 71   Temp 96.6  F (35.9  C) (Oral)   Resp 18   Ht 1.791 m (5' 10.5\")   Wt 77.1 kg (170 lb)   SpO2 91%   BMI 24.05 kg/m      Please review provider order for any additional goals.   Nurse to notify provider when observation goals have been met and patient is ready for discharge.  "

## 2021-02-21 NOTE — PROGRESS NOTES
Patient seen and examined.  Chart reviewed.    Please see admission history and physical by Dr. Ding from earlier today for details.    Add gastroenterology consult.    Has developed acute alcohol withdrawals.  Start scheduled gabapentin and clonidine.    Has also developed acute hypoxic respiratory failure.  Chest x-ray obtained.    Likely COPD exacerbation.  Start scheduled duo nebs.  IV methylprednisolone 40 mg every 8 hours.  Albuterol nebs more often if needed.    Supplemental oxygen as needed.

## 2021-02-21 NOTE — PLAN OF CARE
PRIMARY DIAGNOSIS: ABDOMINAL PAIN /  SUSPECTED ALCOHOLIC GASTRITIS    OUTPATIENT/OBSERVATION GOALS TO BE MET BEFORE DISCHARGE  1. Orthostatic performed: No    2. Tolerating PO fluid and/or antibiotics (if applicable): No - NPO    3. Nausea/Vomiting/Diarrhea symptoms improved: No,  nauseous. Not able to handle PO intake    4. Pain status: Improved but still requiring IV narcotics.    5. Return to near baseline physical activity: Yes     Vitals are Temp: 97.6  F (36.4  C) Temp src: Oral BP: (!) 150/98 Pulse: 90   Resp: 12 SpO2: 94 %. On 2 LPM O2 via NC    Patient is Alert and Oriented x4. They are SBA with no assistive devices .Pt is a NPO and Ice chips diet. They are complaining of 7-9/10 pain in their abdomen and back. Dilaudid and Oxycodone given for pain for a short period of time.  Medicatons decreased pain. Patient has Lactated Ringer's running at 125 mL per hour. Patient has had emesis x 2 this shift. Started shift on room air but then needed to be put on oxygen due to decrease in sats. Plan is to continue CIWAs, monitor Hgb, and consider GI consult. May also benefit from chem dep consult.      Discharge Planner Nurse   Safe discharge environment identified: Yes  Barriers to discharge: Yes       Entered by: Margaux Wilhelm 02/21/2021 6:50 AM     Please review provider order for any additional goals.   Nurse to notify provider when observation goals have been met and patient is ready for discharge.

## 2021-02-21 NOTE — PHARMACY
Admission medication history interview status for this patient is complete. See [facility] admission navigator for allergy information, prior to admission medications and immunization status.     Medication history interview done via telephone during Covid-19 pandemic, indicate source(s): Patient  Medication history resources (including written lists, pill bottles, clinic record):[facility]  Pharmacy: [facility]    Changes made to PTA medication list:  Added: albuterol, ibuprofen, lisinopril, glipizide  Deleted: thiamine, folic acid  Changed: none    Actions taken by pharmacist (provider contacted, etc):None     Additional medication history information:None    Medication reconciliation/reorder completed by provider prior to medication history?  N     Prior to Admission medications    Medication Sig Last Dose Taking? Auth Provider   ALBUTEROL SULFATE HFA IN INHALE 1 TO 2 PUFFS BY MOUTH EVERY 4 HOURS AS NEEDED 2/21/2021 at am Yes Unknown, Entered By History   glipiZIDE (GLUCOTROL XL) 5 MG 24 hr tablet Take 5 mg by mouth daily Before meals Past Week Yes Unknown, Entered By History   ibuprofen (ADVIL/MOTRIN) 800 MG tablet Take 800 mg by mouth every 8 hours as needed 2/19/2021 Yes Unknown, Entered By History   lisinopril (ZESTRIL) 10 MG tablet Take 10 mg by mouth daily Past Week Yes Unknown, Entered By History   oxyCODONE (ROXICODONE) 5 MG tablet Take 1 tablet (5 mg) by mouth every 6 hours as needed for moderate to severe pain (first line before IV dilaudid) 2/21/2021 at am Yes Len Robb MD

## 2021-02-21 NOTE — H&P
[facility]  History and Physical   Hospitalist Service    Samy Ding MD    Jaiden Lyons MRN# [id number]   YOB: 1975 Age: 46 year old      Date of Admission:  2/20/2021           Assessment and Plan:   Jaiden Lyons is a 46-year-old male with history of alcohol abuse and dependence, alcoholic hepatitis, alcohol withdrawal with seizure, and alcohol related pancreatitis.  He also has history of hypertension, asthma, and diet-controlled diabetes.  He presented to the emergency department last night for evaluation of upper abdominal pain.  He has had upper abdominal pain for the last month.  Pain radiates to his back and sides.  It got worse yesterday. Ibuprofen did not relieve the pain.  He had some episodes of vomiting with dark vomitus.  He has been able to drink alcohol but has not been able to keep food down.  His last drink was yesterday morning.  He says he drinks a pint of gin daily and maybe 2 or 3 beers.  Emergency department evaluation showed tachycardia but overall stable vital signs.  Laboratory evaluation showed lactic acid of 5.3 with improvement to 4.1 after IV fluid hydration.  Blood alcohol level was 0.25.  ALT was 111 and AST was 224. Lipase was normal at 43.  Basic metabolic panel and CBC were unremarkable.  Troponin was undetectable.  Testing for COVID-19 was negative.  CT of abdomen and pelvis was obtained and showed no acute process. Jaiden required IV Dilaudid for pain control.  He was unable to eat or drink.  He was deemed to be significant risk of alcohol withdrawal.  He was given IV Pepcid and Protonix for suspected alcohol induced gastritis.  I was asked to admit him to the hospital for further care.    Problem list:    1.  Suspected alcoholic gastritis.  Jaiden presented with upper abdominal pain and vomiting with dark vomitus. Laboratory evaluation and imaging showed no clear cause.  I suspect alcohol induced gastritis and possibly  esophagitis.  He has been taking some ibuprofen so peptic ulcer disease is also possible.  He is currently unable to eat.  Admit to observation.  Keep n.p.o. for now and hydrate with IV fluids.  Continue IV Protonix.  I have ordered Tylenol, oxycodone, and IV Dilaudid for use for pain as needed.  I have ordered Compazine and Zofran for nausea as needed.  Repeat labs in the morning including basic metabolic panel, hemoglobin, and lactic acid. Reassess in the morning and if clinically improving advance diet as tolerated.  If not improving, consider GI consult for consideration of upper endoscopy.    2.  Possible upper GI bleed.  Patient reported dark vomitus.  Consider Eunice-Simmons tear.  Consider peptic ulcer disease.  Hemoglobin was normal at 15.4.  Check hemoglobin in the morning and tomorrow evening.  Type and screen with a.m. labs.  IV Protonix as discussed above.  If signs of significant GI bleed consider GI consultation.    3.  Alcohol dependence with acute intoxication.  Jaiden is at risk of acute alcohol withdrawal.  I have ordered the alcohol withdrawal protocol.    4.  Lactic acidosis.  This is likely due to gastritis and dehydration.  Improving with IV fluid.  Repeat lactic acid in the morning.    5.  Diet-controlled type 2 diabetes.  Blood sugar was normal in the emergency department.  Of note, Jaiden reports an allergy to insulin.    6.  Elevated liver function test consistent with alcohol abuse.    7.  Hypertension.  Jaiden does not appear to be on medication for this.    8.  Asthma, without acute exacerbation.  Jaiden does not appear to be on medication for this.    Full code  Ambulate for DVT prophylaxis.  Add pneumoboots if hospitalization becomes prolonged  Disposition: Admit to observation status.  Change to inpatient if alcohol withdrawal develops or abdominal pain and vomiting persistent further GI work-up is needed.           Code Status:   Full Code         Primary Care Physician:    [facility] [phone]         Chief Complaint:   Abdominal pain    History is obtained from Jaiden, ED provider (Luis Alberto Armenta PA-C), and the medical record.         History of Present Illness:   Jaiden Lyons is a 46-year-old male with history of alcohol abuse and dependence, alcoholic hepatitis, alcohol withdrawal with seizure, and alcohol related pancreatitis.  He also has history of hypertension, asthma, and diet-controlled diabetes.  He presented to the emergency department last night for evaluation of upper abdominal pain.  He has had upper abdominal pain for the last month.  Pain radiates to his back and sides.  It got worse yesterday. Ibuprofen did not relieve the pain.  He had some episodes of vomiting with dark vomitus.  He has been able to drink alcohol but has not been able to keep food down.  His last drink was yesterday morning.  He says he drinks a pint of gin daily and maybe 2 or 3 beers.  Emergency department evaluation showed tachycardia but overall stable vital signs.  Laboratory evaluation showed lactic acid of 5.3 with improvement to 4.1 after IV fluid hydration.  Blood alcohol level was 0.25.  ALT was 111 and AST was 224. Lipase was normal at 43.  Basic metabolic panel and CBC were unremarkable.  Troponin was undetectable.  Testing for COVID-19 was negative.  CT of abdomen and pelvis was obtained and showed no acute process. Jaiden required IV Dilaudid for pain control.  He was unable to eat or drink.  He was deemed to be significant risk of alcohol withdrawal.  He was given IV Pepcid and Protonix for suspected alcohol induced gastritis.  I was asked to admit him to the hospital for further care.           Past Medical History:     Patient Active Problem List   Diagnosis     Pancreatitis     Abdominal pain, epigastric     Lactic acidosis   Alcohol abuse  Alcohol dependence  Alcohol withdrawal with seizure  Alcoholic hepatitis  Alcoholic  pancreatitis  Hypertension  Asthma  Type 2 diabetes (evidently diet controlled)          Past Surgical History:   Right arm tendon/muscle repair  Face reconstruction  Right hip surgery         Home Medications:     Prior to Admission medications    Medication Sig Last Dose Taking? Auth Provider   folic acid (FOLVITE) 1 MG tablet Take 1 tablet (1 mg) by mouth daily   Len Robb MD   oxyCODONE (ROXICODONE) 5 MG tablet Take 1 tablet (5 mg) by mouth every 6 hours as needed for moderate to severe pain (first line before IV dilaudid)   Len Robb MD   vitamin B1 (THIAMINE) 100 MG tablet Take 1 tablet (100 mg) by mouth daily   Len Robb MD            Allergies:     Allergies   Allergen Reactions     Fentanyl      Insulin Swelling     Reaction Date: Around 3705-8262  Face swelling  Inpatient when reaction occurred, unsure which type of insulin  Required some Benadryl to help with the swelling     Morphine Itching            Social History:     Smokes 1/2 pack of cigarettes per day  Abuses alcohol; 1 pint of gin and 3-4 beers daily; h/o withdrawal and seizure          Family History:   Colon cancer  Asthma           Review of Systems:   The 10 point Review of Systems is negative other than as noted in the HPI.           Physical Exam:   Blood pressure 126/88, pulse 102, temperature 98  F (36.7  C), resp. rate 16, SpO2 90 %.  0 lbs 0 oz      GENERAL: Pleasant and cooperative. Mild acute distress.  EYES: Pupils equal and round. No scleral erythema or icterus.  ENT: External ears are normal without deformity. Posterior oropharynx is without erythem, swelling, or exudate.  NECK: Supple. No masses or swelling. No tenderness. Thyroid is normal without mass or tenderness.  CHEST: Clear to auscultation. Normal breath sounds. No retractions.   CV: Regular rate and rhythm. No JVD. Pulses normal.  ABDOMEN: Bowel sounds present. Moderate mid upper abdominal tenderness. No rebound tenderness or  guarding. No masses or hernia.  EXTREMETIES: No clubbing, cyanosis, or ischemia.  SKIN: Warm and dry to touch. No wounds or rashes.  NEUROLOGIC: Strength and sensation are normal. Deep tendon reflexes are normal. Cranial nerves are normal.             Data:   All new lab and imaging data was reviewed.     Results for orders placed or performed during the hospital encounter of 02/20/21 (from the past 24 hour(s))   CBC with platelets differential   Result Value Ref Range    WBC 4.3 4.0 - 11.0 10e9/L    RBC Count 5.42 4.4 - 5.9 10e12/L    Hemoglobin 15.4 13.3 - 17.7 g/dL    Hematocrit 46.3 40.0 - 53.0 %    MCV 85 78 - 100 fl    MCH 28.4 26.5 - 33.0 pg    MCHC 33.3 31.5 - 36.5 g/dL    RDW 15.4 (H) 10.0 - 15.0 %    Platelet Count 229 150 - 450 10e9/L    Diff Method Automated Method     % Neutrophils 52.3 %    % Lymphocytes 32.4 %    % Monocytes 9.4 %    % Eosinophils 4.0 %    % Basophils 1.9 %    % Immature Granulocytes 0.0 %    Nucleated RBCs 0 0 /100    Absolute Neutrophil 2.2 1.6 - 8.3 10e9/L    Absolute Lymphocytes 1.4 0.8 - 5.3 10e9/L    Absolute Monocytes 0.4 0.0 - 1.3 10e9/L    Absolute Eosinophils 0.2 0.0 - 0.7 10e9/L    Absolute Basophils 0.1 0.0 - 0.2 10e9/L    Abs Immature Granulocytes 0.0 0 - 0.4 10e9/L    Absolute Nucleated RBC 0.0    Comprehensive metabolic panel   Result Value Ref Range    Sodium 135 133 - 144 mmol/L    Potassium 4.9 3.4 - 5.3 mmol/L    Chloride 96 94 - 109 mmol/L    Carbon Dioxide 22 20 - 32 mmol/L    Anion Gap 17 (H) 3 - 14 mmol/L    Glucose 88 70 - 99 mg/dL    Urea Nitrogen 16 7 - 30 mg/dL    Creatinine 0.66 0.66 - 1.25 mg/dL    GFR Estimate >90 >60 mL/min/[1.73_m2]    GFR Estimate If Black >90 >60 mL/min/[1.73_m2]    Calcium 8.6 8.5 - 10.1 mg/dL    Bilirubin Total 1.0 0.2 - 1.3 mg/dL    Albumin 4.1 3.4 - 5.0 g/dL    Protein Total 8.8 6.8 - 8.8 g/dL    Alkaline Phosphatase 87 40 - 150 U/L     (H) 0 - 70 U/L    AST Canceled, Test credited 0 - 45 U/L   Lipase   Result Value Ref  Range    Lipase 43 (L) 73 - 393 U/L   Lactic acid whole blood   Result Value Ref Range    Lactic Acid 5.3 (HH) 0.7 - 2.0 mmol/L   Troponin I   Result Value Ref Range    Troponin I ES <0.015 0.000 - 0.045 ug/L   Asymptomatic SARS-CoV-2 COVID-19 Virus (Coronavirus) by PCR    Specimen: Nasopharyngeal   Result Value Ref Range    SARS-CoV-2 Virus Specimen Source Nasopharyngeal     SARS-CoV-2 PCR Result NEGATIVE     SARS-CoV-2 PCR Comment (Note)    EKG 12 lead   Result Value Ref Range    Interpretation ECG Click View Image link to view waveform and result    AST   Result Value Ref Range     (H) 0 - 45 U/L   Alcohol ethyl   Result Value Ref Range    Ethanol g/dL 0.25 (H) <0.01 g/dL   CT Abdomen Pelvis w Contrast    Narrative    EXAM: CT ABDOMEN AND PELVIS WITH CONTRAST  LOCATION: [facility]  DATE/TIME: 2/20/2021 10:15 PM    INDICATION: Epigastric pain.  COMPARISON: 01/19/2020.    TECHNIQUE: CT scan of the abdomen and pelvis was performed following injection of IV contrast. Multiplanar reformats were obtained. Dose reduction techniques were used.  CONTRAST: 81 mL Isovue-370.    FINDINGS:    LOWER CHEST: Unremarkable.    HEPATOBILIARY: Moderate to marked diffuse fatty infiltration of the liver. Nonspecific 1.8 cm curvilinear calcification in the sarath hepatis region, unchanged. This could possibly relate to a densely calcified hepatic artery aneurysm.    SPLEEN: Unremarkable.    PANCREAS: A few pancreatic parenchymal calcifications are again present. No peripancreatic inflammatory changes.    ADRENAL GLANDS: Indeterminate 2.0 x 1.7 cm left adrenal nodule, unchanged.    KIDNEYS/BLADDER: No suspicious renal lesions.    BOWEL: Normal appendix.    LYMPH NODES: Unremarkable.    VASCULATURE: Atherosclerotic calcification in the abdominal aorta.    MUSCULOSKELETAL: Surgical hardware is present in the right femoral head and neck.      Impression    IMPRESSION:   1. No cause of acute pain identified in the  abdomen or pelvis. Normal appendix.  2. A few pancreatic parenchymal calcifications, suggestive of chronic pancreatitis.    Lactic acid whole blood   Result Value Ref Range    Lactic Acid 4.1 (HH) 0.7 - 2.0 mmol/L

## 2021-02-21 NOTE — PLAN OF CARE
"Oxycodone 5 mg given at 0616. Patient later had small 75 mL emesis with small white pill that appeared to be the oxycodone at 0630. Patient requesting that his \"pain be taken care of\". Vital signs WNL. Patient alert and respiratory status WNL. IV Hydromorphone 0.3 mg given.  "

## 2021-02-21 NOTE — PLAN OF CARE
Patient O2 saturation dropping to 85% on room air. Patient easily arousable. Started on 2L O2. Continuous pulse ox also initiated. Patient is now 93-94% on 2 LPM. Provider notified.

## 2021-02-21 NOTE — CONSULTS
[facility]  Gastroenterology Consultation         Gerson Mulligan MD    Patient Name: Jaiden Lyons MRN# [id number]   YOB: 1975 Age: 46 year old      Date of Admission:  2/20/2021  Today's Date: 02/21/2021         Assessment and Plan:     Impression:  1.  46-year-old gentleman with chronic pancreatitis as well as chronic alcoholism admitted with acute alcohol intoxication and episode of coffee-ground emesis.  No evidence for significant GI bleeding.  No melena, no camille hematemesis, no rectal bleeding.  Abdominal pain likely alcoholic gastritis, peptic ulcer disease or reflux with esophagitis cannot be ruled out.  Nonsteroidal gastropathy or ulceration cannot be ruled out given nonsteroidal use.  Eunice-Simmons tear seems less likely but cannot be ruled out given chronic alcoholism.  Component of pain from chronic pancreatitis may also be playing a role.  No evidence for acute pancreatitis.  2.  Chronic alcohol dependence with acute intoxication.  At high risk for alcohol withdrawal.  He is on alcohol withdrawal protocol.  3.  Stable medical problems otherwise under the care of admitting team.    Recommendations:  -Continue PPI therapy as current  -N.p.o. after midnight  -We will arrange for EGD tomorrow.  Given the patient's alcoholism and significant alcohol consumption as well as probable withdrawal will need MAC IV sedation which is available in the operating room here at [facility].  My colleague Dr. Landry Rodriguez will perform the procedure  -Diet as tolerated  -Social service consult for community alcohol cessation programs/resources.  -Further recommendations pending above and course  -At this juncture would not work him up any further for chronic pancreatitis given the minimal changes on CT scan and no evidence for acute pancreatitis.  -Case and findings discussed with Dr. Randolph.  -Thank you for allowing us to participate in this patient's health  care.  Please call any further questions.            Primary Care Physician:     Shayy Paris Savage [phone]            Requesting Physician:        Dr. Randolph         Chief Complaint:     Coffee-ground emesis         History of Present Illness:     Jaiden Lyons is a 46 year old male who is admitted with abdominal pain, alcohol withdrawal and report of coffee-ground emesis.  He has a past medical history of heavy alcohol use, 1 L of alcohol, gin, per day plus beer, alcohol withdrawal seizures, alcohol related pancreatitis now with some chronic pancreatitis changes but no acute changes.  He also has a history of hypertension, asthma and diet-controlled diabetes.    He presents to the emergency room last night for evaluation of upper GI pain.  He reports an epigastric pain over the last month that radiates to the back and sides.  It got worse yesterday.  Ibuprofen did not relieve his pain.  He had episode of vomiting with some what appears to be coffee grounds.  There is been no camille hematemesis melena or rectal bleeding.  Despite this abdominal pain he has been able to drink alcohol but states he cannot keep food down.  He drinks 1 L of gin per day and may be 2 or 3 beers as well.  Admitting lactic acid 5.3 with improvement after hydration.  Admitting COOPER was 0.25 consistent with acute intoxication.  ALT was 111 AST was 224 consistent with chronic liver disease but no evidence for cirrhosis.  Lipase was normal at 43.  CT scan shows some subtle changes consistent with chronic pancreatitis but no acute changes of the pancreatitis.  He is currently on IV pantoprazole for suspected alcoholic gastritis.           Past Medical History:     No past medical history on file.          Past Surgical History:     No past surgical history on file.         Home Medications:     Prior to Admission medications    Medication Sig Last Dose Taking? Auth Provider   ALBUTEROL SULFATE HFA IN INHALE 1 TO 2 PUFFS BY MOUTH  EVERY 4 HOURS AS NEEDED 2/21/2021 at am Yes Unknown, Entered By History   glipiZIDE (GLUCOTROL) 5 MG tablet Take 5 mg by mouth daily before breakfast Past Week at Unknown time Yes Unknown, Entered By History   ibuprofen (ADVIL/MOTRIN) 800 MG tablet Take 800 mg by mouth every 8 hours as needed 2/19/2021 Yes Unknown, Entered By History   losartan (COZAAR) 50 MG tablet Take 50 mg by mouth daily Past Week at Unknown time Yes Unknown, Entered By History            Current Medications:           cloNIDine  0.1 mg Oral Q8H     folic acid  1 mg Oral Daily     [START ON 2/28/2021] gabapentin  100 mg Oral Q8H     [START ON 2/26/2021] gabapentin  300 mg Oral Q8H     [START ON 2/24/2021] gabapentin  600 mg Oral Q8H     gabapentin  900 mg Oral Q8H     ipratropium - albuterol 0.5 mg/2.5 mg/3 mL  3 mL Nebulization 4x daily     methylPREDNISolone  40 mg Intravenous Q8H     multivitamin w/minerals  1 tablet Oral Daily     pantoprazole (PROTONIX) IV  40 mg Intravenous BID     thiamine  200 mg Oral TID    Followed by     [START ON 2/23/2021] thiamine  100 mg Oral TID    Followed by     [START ON 2/28/2021] thiamine  100 mg Oral Daily     acetaminophen, albuterol, bisacodyl, flumazenil, OLANZapine zydis **OR** haloperidol lactate, HYDROmorphone, hydrOXYzine, LORazepam **OR** LORazepam, magnesium hydroxide, melatonin, naloxone **OR** naloxone **OR** naloxone **OR** naloxone, ondansetron **OR** ondansetron, prochlorperazine **OR** prochlorperazine **OR** prochlorperazine, senna-docusate **OR** senna-docusate         Allergies:     Allergies   Allergen Reactions     Fentanyl      Insulin Swelling     Reaction Date: Around 0177-4092  Face swelling  Inpatient when reaction occurred, unsure which type of insulin  Required some Benadryl to help with the swelling     Lisinopril      Face swelling, cough     Morphine Itching            Social History:     Jaiden Lyons  reports that he has been smoking. He does not have any smokeless tobacco  history on file. He reports current alcohol use. He reports previous drug use.          Family History:     No family history on file.          Review of Systems:     Comprehensive GI review of systems is completed and is negative except as above.             Physical Exam:     Vital Signs with Ranges  Temp:  [96.6  F (35.9  C)-98.4  F (36.9  C)] 96.6  F (35.9  C)  Pulse:  [] 71  Resp:  [11-25] 18  BP: (119-158)/() 134/83  SpO2:  [85 %-98 %] 91 %  I/O's Last 24 hours  I/O last 3 completed shifts:  In: -   Out: 825 [Urine:700; Emesis/NG output:125]    Constitutional:  Alert and no distress.   HEENT: PERRL, EOMI, sclera nonicteric, conjunctiva pink and moist.  NECK: Supple, nontender. No lymphadenopathy, JVD or bruits noted.  PULMONARY: Clear to auscultation bilaterally.  CARDIOVASCULAR: S1, S2, no S3, no S4, no murmur, regular rate and rhythm  ABDOMEN: Soft, tender over the upper abdomen to minimal palpation, nondistended, no tympany, no organomegaly, no fullness, guarding or rebound are noted.   NEURO: Alert and oriented to place, time and person. Neurological exam grossly nonfocal, CN II through XII are grossly intact. Detailed neurological exam is not performed.  EXT: No cyanosis, clubbing or edema.         Data:   All new lab and imaging data was reviewed.  .  Recent Labs   Lab Test 02/21/21 0613 02/20/21 2118 02/27/20 0627 02/26/20 0613   WBC  --  4.3 5.5 3.7*   HGB 13.2* 15.4 15.1 12.7*   MCV  --  85 83 86   PLT  --  229 132* 131*     Recent Labs   Lab Test 02/21/21 0613 02/20/21 2118 02/28/20 0614    135 133   POTASSIUM 4.7 4.9 4.1   CHLORIDE 102 96 100   CO2 22 22 25   BUN 14 16 13   CR 0.60* 0.66 0.52*   ANIONGAP 14 17* 8     Recent Labs   Lab Test 02/20/21 2208 02/20/21 2118 02/27/20 0627 02/26/20  0613 02/25/20 2044   ALBUMIN  --  4.1 3.7 3.4 4.2   BILITOTAL  --  1.0 1.6* 1.1 1.2   ALT  --  111* 118* 145* 173*   * Canceled, Test credited 170* 343* 414*   ALKPHOS  --  87  87 86 103   LIPASE  --  43*  --  1,402* 1,384*            Gerson Mulligan MD, FACG  [facility]  [phone]

## 2021-02-21 NOTE — PHARMACY-ADMISSION MEDICATION HISTORY
See pharmacy note from 2/21/20 for initial medication reconciliation information.  Changes made when attested:  Changed glipizide XL to glipizide, removed old oxycodone from 3/20, changed lisinopril to losartan and added lisinopril to allergy list b/c pt reported facial swelling to lisinopril(MD note through [facility] in 10/20 had reported that lisinopril to losartan was changed d/t cough).

## 2021-02-22 LAB
ALBUMIN SERPL-MCNC: 3.3 G/DL (ref 3.4–5)
ALP SERPL-CCNC: 67 U/L (ref 40–150)
ALT SERPL W P-5'-P-CCNC: 103 U/L (ref 0–70)
ANION GAP SERPL CALCULATED.3IONS-SCNC: 12 MMOL/L (ref 3–14)
AST SERPL W P-5'-P-CCNC: 136 U/L (ref 0–45)
BILIRUB SERPL-MCNC: 1.4 MG/DL (ref 0.2–1.3)
BUN SERPL-MCNC: 11 MG/DL (ref 7–30)
CALCIUM SERPL-MCNC: 8 MG/DL (ref 8.5–10.1)
CHLORIDE SERPL-SCNC: 98 MMOL/L (ref 94–109)
CO2 SERPL-SCNC: 22 MMOL/L (ref 20–32)
CREAT SERPL-MCNC: 0.54 MG/DL (ref 0.66–1.25)
ERYTHROCYTE [DISTWIDTH] IN BLOOD BY AUTOMATED COUNT: 14.8 % (ref 10–15)
GFR SERPL CREATININE-BSD FRML MDRD: >90 ML/MIN/{1.73_M2}
GLUCOSE BLDC GLUCOMTR-MCNC: 201 MG/DL (ref 70–99)
GLUCOSE BLDC GLUCOMTR-MCNC: 302 MG/DL (ref 70–99)
GLUCOSE BLDC GLUCOMTR-MCNC: 411 MG/DL (ref 70–99)
GLUCOSE SERPL-MCNC: 191 MG/DL (ref 70–99)
HBA1C MFR BLD: 9.1 % (ref 0–5.6)
HCT VFR BLD AUTO: 38.3 % (ref 40–53)
HGB BLD-MCNC: 12.6 G/DL (ref 13.3–17.7)
INR PPP: 1.07 (ref 0.86–1.14)
INTERPRETATION ECG - MUSE: NORMAL
MAGNESIUM SERPL-MCNC: 2 MG/DL (ref 1.6–2.3)
MCH RBC QN AUTO: 28.7 PG (ref 26.5–33)
MCHC RBC AUTO-ENTMCNC: 32.9 G/DL (ref 31.5–36.5)
MCV RBC AUTO: 87 FL (ref 78–100)
PHOSPHATE SERPL-MCNC: 3.1 MG/DL (ref 2.5–4.5)
PLATELET # BLD AUTO: 150 10E9/L (ref 150–450)
POTASSIUM SERPL-SCNC: 4.7 MMOL/L (ref 3.4–5.3)
PROT SERPL-MCNC: 6.8 G/DL (ref 6.8–8.8)
RBC # BLD AUTO: 4.39 10E12/L (ref 4.4–5.9)
SODIUM SERPL-SCNC: 132 MMOL/L (ref 133–144)
UPPER GI ENDOSCOPY: NORMAL
WBC # BLD AUTO: 2.9 10E9/L (ref 4–11)

## 2021-02-22 PROCEDURE — 250N000009 HC RX 250: Performed by: INTERNAL MEDICINE

## 2021-02-22 PROCEDURE — 83735 ASSAY OF MAGNESIUM: CPT | Performed by: INTERNAL MEDICINE

## 2021-02-22 PROCEDURE — 999N001017 HC STATISTIC GLUCOSE BY METER IP

## 2021-02-22 PROCEDURE — 250N000011 HC RX IP 250 OP 636: Performed by: INTERNAL MEDICINE

## 2021-02-22 PROCEDURE — 250N000013 HC RX MED GY IP 250 OP 250 PS 637: Performed by: INTERNAL MEDICINE

## 2021-02-22 PROCEDURE — 94640 AIRWAY INHALATION TREATMENT: CPT | Mod: 76

## 2021-02-22 PROCEDURE — 85610 PROTHROMBIN TIME: CPT | Performed by: INTERNAL MEDICINE

## 2021-02-22 PROCEDURE — 83036 HEMOGLOBIN GLYCOSYLATED A1C: CPT | Performed by: INTERNAL MEDICINE

## 2021-02-22 PROCEDURE — 80053 COMPREHEN METABOLIC PANEL: CPT | Performed by: INTERNAL MEDICINE

## 2021-02-22 PROCEDURE — 0DJ08ZZ INSPECTION OF UPPER INTESTINAL TRACT, VIA NATURAL OR ARTIFICIAL OPENING ENDOSCOPIC: ICD-10-PCS | Performed by: INTERNAL MEDICINE

## 2021-02-22 PROCEDURE — 99233 SBSQ HOSP IP/OBS HIGH 50: CPT | Performed by: INTERNAL MEDICINE

## 2021-02-22 PROCEDURE — C9113 INJ PANTOPRAZOLE SODIUM, VIA: HCPCS | Performed by: INTERNAL MEDICINE

## 2021-02-22 PROCEDURE — 999N000156 HC STATISTIC RCP CONSULT EA 30 MIN

## 2021-02-22 PROCEDURE — 84100 ASSAY OF PHOSPHORUS: CPT | Performed by: INTERNAL MEDICINE

## 2021-02-22 PROCEDURE — 999N000157 HC STATISTIC RCP TIME EA 10 MIN

## 2021-02-22 PROCEDURE — 250N000013 HC RX MED GY IP 250 OP 250 PS 637: Performed by: PHYSICIAN ASSISTANT

## 2021-02-22 PROCEDURE — 36415 COLL VENOUS BLD VENIPUNCTURE: CPT | Performed by: INTERNAL MEDICINE

## 2021-02-22 PROCEDURE — 258N000003 HC RX IP 258 OP 636: Performed by: INTERNAL MEDICINE

## 2021-02-22 PROCEDURE — 85027 COMPLETE CBC AUTOMATED: CPT | Performed by: INTERNAL MEDICINE

## 2021-02-22 PROCEDURE — 94640 AIRWAY INHALATION TREATMENT: CPT

## 2021-02-22 PROCEDURE — 120N000004 HC R&B MS OVERFLOW

## 2021-02-22 PROCEDURE — 43235 EGD DIAGNOSTIC BRUSH WASH: CPT | Performed by: INTERNAL MEDICINE

## 2021-02-22 PROCEDURE — G0500 MOD SEDAT ENDO SERVICE >5YRS: HCPCS | Performed by: INTERNAL MEDICINE

## 2021-02-22 RX ORDER — PANTOPRAZOLE SODIUM 40 MG/1
40 TABLET, DELAYED RELEASE ORAL
Status: DISCONTINUED | OUTPATIENT
Start: 2021-02-22 | End: 2021-02-23 | Stop reason: HOSPADM

## 2021-02-22 RX ORDER — DEXTROSE MONOHYDRATE 25 G/50ML
25-50 INJECTION, SOLUTION INTRAVENOUS
Status: DISCONTINUED | OUTPATIENT
Start: 2021-02-22 | End: 2021-02-23 | Stop reason: HOSPADM

## 2021-02-22 RX ORDER — MEPERIDINE HYDROCHLORIDE 25 MG/ML
50 INJECTION INTRAMUSCULAR; INTRAVENOUS; SUBCUTANEOUS EVERY 5 MIN PRN
Status: DISCONTINUED | OUTPATIENT
Start: 2021-02-22 | End: 2021-02-23 | Stop reason: HOSPADM

## 2021-02-22 RX ORDER — NALOXONE HYDROCHLORIDE 0.4 MG/ML
0.2 INJECTION, SOLUTION INTRAMUSCULAR; INTRAVENOUS; SUBCUTANEOUS
Status: DISCONTINUED | OUTPATIENT
Start: 2021-02-22 | End: 2021-02-22

## 2021-02-22 RX ORDER — NALOXONE HYDROCHLORIDE 0.4 MG/ML
0.4 INJECTION, SOLUTION INTRAMUSCULAR; INTRAVENOUS; SUBCUTANEOUS
Status: ACTIVE | OUTPATIENT
Start: 2021-02-22 | End: 2021-02-23

## 2021-02-22 RX ORDER — PREDNISONE 20 MG/1
40 TABLET ORAL DAILY
Status: DISCONTINUED | OUTPATIENT
Start: 2021-02-23 | End: 2021-02-23 | Stop reason: HOSPADM

## 2021-02-22 RX ORDER — GLIPIZIDE 5 MG/1
5 TABLET ORAL
Status: DISCONTINUED | OUTPATIENT
Start: 2021-02-23 | End: 2021-02-23 | Stop reason: HOSPADM

## 2021-02-22 RX ORDER — FLUMAZENIL 0.1 MG/ML
0.2 INJECTION, SOLUTION INTRAVENOUS
Status: DISCONTINUED | OUTPATIENT
Start: 2021-02-22 | End: 2021-02-22

## 2021-02-22 RX ORDER — NALOXONE HYDROCHLORIDE 0.4 MG/ML
0.4 INJECTION, SOLUTION INTRAMUSCULAR; INTRAVENOUS; SUBCUTANEOUS
Status: DISCONTINUED | OUTPATIENT
Start: 2021-02-22 | End: 2021-02-22

## 2021-02-22 RX ORDER — DIPHENHYDRAMINE HYDROCHLORIDE 50 MG/ML
25-50 INJECTION INTRAMUSCULAR; INTRAVENOUS ONCE
Status: COMPLETED | OUTPATIENT
Start: 2021-02-22 | End: 2021-02-22

## 2021-02-22 RX ORDER — NICOTINE POLACRILEX 4 MG
15-30 LOZENGE BUCCAL
Status: DISCONTINUED | OUTPATIENT
Start: 2021-02-22 | End: 2021-02-23 | Stop reason: HOSPADM

## 2021-02-22 RX ORDER — LIDOCAINE 40 MG/G
CREAM TOPICAL
Status: DISCONTINUED | OUTPATIENT
Start: 2021-02-22 | End: 2021-02-22 | Stop reason: HOSPADM

## 2021-02-22 RX ORDER — LOSARTAN POTASSIUM 25 MG/1
25 TABLET ORAL DAILY
Status: DISCONTINUED | OUTPATIENT
Start: 2021-02-22 | End: 2021-02-23 | Stop reason: HOSPADM

## 2021-02-22 RX ORDER — FLUMAZENIL 0.1 MG/ML
0.2 INJECTION, SOLUTION INTRAVENOUS
Status: ACTIVE | OUTPATIENT
Start: 2021-02-22 | End: 2021-02-22

## 2021-02-22 RX ORDER — OXYCODONE HYDROCHLORIDE 5 MG/1
5 TABLET ORAL EVERY 4 HOURS PRN
Status: DISCONTINUED | OUTPATIENT
Start: 2021-02-22 | End: 2021-02-23 | Stop reason: HOSPADM

## 2021-02-22 RX ORDER — NALOXONE HYDROCHLORIDE 0.4 MG/ML
0.2 INJECTION, SOLUTION INTRAMUSCULAR; INTRAVENOUS; SUBCUTANEOUS
Status: ACTIVE | OUTPATIENT
Start: 2021-02-22 | End: 2021-02-23

## 2021-02-22 RX ORDER — IPRATROPIUM BROMIDE AND ALBUTEROL SULFATE 2.5; .5 MG/3ML; MG/3ML
3 SOLUTION RESPIRATORY (INHALATION) EVERY 4 HOURS PRN
Status: DISCONTINUED | OUTPATIENT
Start: 2021-02-22 | End: 2021-02-23 | Stop reason: HOSPADM

## 2021-02-22 RX ORDER — SUCRALFATE 1 G/1
1 TABLET ORAL
Status: DISCONTINUED | OUTPATIENT
Start: 2021-02-22 | End: 2021-02-23 | Stop reason: HOSPADM

## 2021-02-22 RX ADMIN — GABAPENTIN 900 MG: 300 CAPSULE ORAL at 00:50

## 2021-02-22 RX ADMIN — Medication 5 MG: at 21:05

## 2021-02-22 RX ADMIN — OXYCODONE HYDROCHLORIDE 5 MG: 5 TABLET ORAL at 12:58

## 2021-02-22 RX ADMIN — IPRATROPIUM BROMIDE AND ALBUTEROL SULFATE 3 ML: .5; 3 SOLUTION RESPIRATORY (INHALATION) at 15:08

## 2021-02-22 RX ADMIN — FOLIC ACID 1 MG: 1 TABLET ORAL at 10:22

## 2021-02-22 RX ADMIN — LOSARTAN POTASSIUM 25 MG: 25 TABLET, FILM COATED ORAL at 12:58

## 2021-02-22 RX ADMIN — HYDROMORPHONE HYDROCHLORIDE 0.2 MG: 1 INJECTION, SOLUTION INTRAMUSCULAR; INTRAVENOUS; SUBCUTANEOUS at 07:37

## 2021-02-22 RX ADMIN — THIAMINE HCL TAB 100 MG 200 MG: 100 TAB at 10:22

## 2021-02-22 RX ADMIN — MEPERIDINE HYDROCHLORIDE 50 MG: 25 INJECTION, SOLUTION INTRAMUSCULAR; INTRAVENOUS; SUBCUTANEOUS at 08:16

## 2021-02-22 RX ADMIN — METHYLPREDNISOLONE SODIUM SUCCINATE 40 MG: 40 INJECTION, POWDER, FOR SOLUTION INTRAMUSCULAR; INTRAVENOUS at 03:58

## 2021-02-22 RX ADMIN — SUCRALFATE 1 G: 1 TABLET ORAL at 21:05

## 2021-02-22 RX ADMIN — PANTOPRAZOLE SODIUM 40 MG: 40 INJECTION, POWDER, FOR SOLUTION INTRAVENOUS at 10:15

## 2021-02-22 RX ADMIN — OXYCODONE HYDROCHLORIDE 5 MG: 5 TABLET ORAL at 16:54

## 2021-02-22 RX ADMIN — MEPERIDINE HYDROCHLORIDE 50 MG: 25 INJECTION, SOLUTION INTRAMUSCULAR; INTRAVENOUS; SUBCUTANEOUS at 08:14

## 2021-02-22 RX ADMIN — SUCRALFATE 1 G: 1 TABLET ORAL at 18:53

## 2021-02-22 RX ADMIN — OXYCODONE HYDROCHLORIDE 5 MG: 5 TABLET ORAL at 21:05

## 2021-02-22 RX ADMIN — HYDROMORPHONE HYDROCHLORIDE 0.2 MG: 1 INJECTION, SOLUTION INTRAMUSCULAR; INTRAVENOUS; SUBCUTANEOUS at 05:34

## 2021-02-22 RX ADMIN — METHYLPREDNISOLONE SODIUM SUCCINATE 40 MG: 40 INJECTION, POWDER, FOR SOLUTION INTRAMUSCULAR; INTRAVENOUS at 10:25

## 2021-02-22 RX ADMIN — Medication 2.5 MG: at 18:54

## 2021-02-22 RX ADMIN — GABAPENTIN 900 MG: 300 CAPSULE ORAL at 10:40

## 2021-02-22 RX ADMIN — SODIUM CHLORIDE, POTASSIUM CHLORIDE, SODIUM LACTATE AND CALCIUM CHLORIDE: 600; 310; 30; 20 INJECTION, SOLUTION INTRAVENOUS at 21:05

## 2021-02-22 RX ADMIN — CLONIDINE HYDROCHLORIDE 0.1 MG: 0.1 TABLET ORAL at 16:29

## 2021-02-22 RX ADMIN — GABAPENTIN 900 MG: 300 CAPSULE ORAL at 16:29

## 2021-02-22 RX ADMIN — THIAMINE HCL TAB 100 MG 200 MG: 100 TAB at 21:05

## 2021-02-22 RX ADMIN — DOCUSATE SODIUM 50 MG AND SENNOSIDES 8.6 MG 2 TABLET: 8.6; 5 TABLET, FILM COATED ORAL at 14:13

## 2021-02-22 RX ADMIN — CLONIDINE HYDROCHLORIDE 0.1 MG: 0.1 TABLET ORAL at 00:50

## 2021-02-22 RX ADMIN — IPRATROPIUM BROMIDE AND ALBUTEROL SULFATE 3 ML: .5; 3 SOLUTION RESPIRATORY (INHALATION) at 10:46

## 2021-02-22 RX ADMIN — THIAMINE HCL TAB 100 MG 200 MG: 100 TAB at 14:10

## 2021-02-22 RX ADMIN — HYDROMORPHONE HYDROCHLORIDE 0.2 MG: 1 INJECTION, SOLUTION INTRAMUSCULAR; INTRAVENOUS; SUBCUTANEOUS at 00:50

## 2021-02-22 RX ADMIN — HYDROXYZINE HYDROCHLORIDE 50 MG: 25 TABLET, FILM COATED ORAL at 16:33

## 2021-02-22 RX ADMIN — PANTOPRAZOLE SODIUM 40 MG: 40 TABLET, DELAYED RELEASE ORAL at 18:54

## 2021-02-22 RX ADMIN — MULTIPLE VITAMINS W/ MINERALS TAB 1 TABLET: TAB at 10:22

## 2021-02-22 ASSESSMENT — ACTIVITIES OF DAILY LIVING (ADL): DEPENDENT_IADLS:: INDEPENDENT;TRANSPORTATION

## 2021-02-22 ASSESSMENT — MIFFLIN-ST. JEOR: SCORE: 1647.36
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
## 2021-02-22 NOTE — PLAN OF CARE
"Patient alert and oriented, up independently in room. C/o upper abdominal/epigastric pain, rating pain 7-8/10. Patient offered PRN tylenol and oxycodone before IV dilaudid pain medication, and patient stated \"I don't like that stuff, it doesn't work fast enough.\" Patient educated on PRN pain medication plan of care. Has been NPO since midnight, no nausea/emesis overnight. CIWA scores have been 1 and 1. Plan for EDG today. Patient currently agreeable to plan of care.    "

## 2021-02-22 NOTE — PROGRESS NOTES
Notified by nursing that glucose ws 411. Glucose was taken after he started eating and he received steroids for COPD exacerbation. He has a listed allergy to insulin. DM managed at home on 5 mg glipizide daily, 2.5 mg order here and he did not receive a dose this morning. Will increase glipizide to 2.5 mg BID and morning rounder to reassess.     -glucose still >302, will increase glipizide to 5 mg BID starting tomorrow AM. Pt is still on clear liquid diet.    Zenia Ennis PA-C    PRE-OP EVALUATION    Patient Name: Mariah Mott    Pre-op Diagnosis: Acute appendicitis [K35.80]    Procedure(s):  LAPAROSCOPIC APPENDECTOMY POSS. OPEN    Surgeon(s) and Role:     * Heather Mustafa MD - Primary    Pre-op vitals reviewed.   Temp: 100.4 MCH 29.9 06/21/2020    MCHC 33.9 06/21/2020    RDW 13.4 06/21/2020     06/21/2020     Lab Results   Component Value Date     (L) 06/21/2020    K 3.71 06/21/2020    CL 97 (L) 06/21/2020    CO2 22.9 06/21/2020    BUN 5.0 (L) 06/21/2020    CRE

## 2021-02-22 NOTE — PROGRESS NOTES
[facility]    Medicine Progress Note - Hospitalist Service       Date of Admission:  2/20/2021  Assessment & Plan       Jaiden Lyons is a 46-year-old male with history of alcohol abuse and dependence, alcoholic hepatitis, alcohol withdrawal with seizure, alcohol related pancreatitis, hypertension, asthma, and diabetes mellitus type 2.  He presented to emergency room with abdominal pain.  Found to have alcohol intoxication.  Suspected alcoholic gastritis.    1.  Likely alcoholic gastritis.  Appreciate gastroenterology input.  Upper GI endoscopy performed 2/22.  No obvious lesions noted.   -Clear liquid diet.  -Change pantoprazole from IV to oral 40 mg twice a day.  -Needs long-term alcohol cessation.    2.  Alcohol intoxication.  Has been on continuous IV fluids.  Now resolved.    3.  Acute alcohol withdrawals.    -Continue scheduled clonidine and gabapentin.    -Lorazepam if needed.  -Oral vitamins.    4.  Alcohol dependence.  With ongoing abuse.  Needs long-term alcohol cessation.    -Social work consult to provide community resources.    5.  Mild acute Asthma/COPD exacerbation.  -Change IV methylprednisolone to oral prednisone 40 mg a day.  -Continue duo nebs.  -Albuterol nebs more often if needed.    6.  Tobacco use disorder.  Needs long-term cessation.  -Nicotine gum if needed.    7.  Diabetes mellitus type 2.  Restart glipizide 2.5 mg a day.    8.  Hypertension.  Restart losartan at 25 mg a day.       Diet: Clear Liquid Diet    DVT Prophylaxis: Pneumatic Compression Devices  Arango Catheter: not present  Code Status: Full Code           Disposition Plan   Expected discharge: Tomorrow, recommended to prior living arrangement     Rusty Randolph,   Hospitalist Service  [facility]  Contact information available via [facility] Paging/Directory    ______________________________________________________________________    Interval History   Fairly lethargic this  morning after procedure.  Still having some abdominal pain and nausea.  Short of breath at times.  Denies chest pain, fevers, chills, or diarrhea.    Data reviewed today: I reviewed all medications, new labs and imaging results over the last 24 hours.    Physical Exam   Vital Signs: Temp: 97.5  F (36.4  C) Temp src: Oral BP: (!) 132/91 Pulse: 73   Resp: 16 SpO2: 96 % O2 Device: None (Room air) Oxygen Delivery: 2 LPM  Weight: 170 lbs 0 oz  Gen: Lethargic, will awaken easily, seems to answer questions appropriately.  Eyes:  PERRL, sclera anicteric.  OP:  MMM, no lesions.  Neck:  Supple.  CV:  Regular, no murmurs.  Lung:  Mild wheeze b/l, normal effort.  Ab:  +BS, soft.  Skin:  Warm, dry to touch.  No rash.  Ext:  No pitting edema LE b/l.      Data   Recent Labs   Lab 02/22/21  0531 02/21/21  1825 02/21/21  0613 02/20/21  2208 02/20/21  2118   WBC 2.9*  --   --   --  4.3   HGB 12.6* 12.9* 13.2*  --  15.4   MCV 87  --   --   --  85     --   --   --  229   INR 1.07  --   --   --   --    *  --  138  --  135   POTASSIUM 4.7  --  4.7  --  4.9   CHLORIDE 98  --  102  --  96   CO2 22  --  22  --  22   BUN 11  --  14  --  16   CR 0.54*  --  0.60*  --  0.66   ANIONGAP 12  --  14  --  17*   BARRY 8.0*  --  7.8*  --  8.6   *  --  94  --  88   ALBUMIN 3.3*  --   --   --  4.1   PROTTOTAL 6.8  --   --   --  8.8   BILITOTAL 1.4*  --   --   --  1.0   ALKPHOS 67  --   --   --  87   *  --   --   --  111*   *  --   --  224* Canceled, Test credited   LIPASE  --   --   --   --  43*   TROPI  --   --   --   --  <0.015

## 2021-02-22 NOTE — PROGRESS NOTES
"[facility] RCAT     Date: 2/22/2021  Admission Dx: abdominal pain  Pulmonary History asthma  Home Nebulizer/MDI Use: albuterol MDI Q4 prn  Home Oxygen: none  Acuity Level (RCAT flow sheet): 4  Aerosol Therapy initiated: duoneb Q4 prn, albuterol prn      Pulmonary Hygiene initiated: deep breath and coughing technique      Volume Expansion initiated: IS      Current Oxygen Requirements: RA   Current SpO2: 97%    Re-evaluation date: indications of bronchodilators     Patient Education:      See \"RT Assessments\" flow sheet for patient assessment scoring and Acuity Level Details.             "

## 2021-02-22 NOTE — UTILIZATION REVIEW
Admission Status; Secondary Review Determination    Under the authority of the Utilization Management Committee, the utilization review process indicated a secondary review on the above patient. The review outcome is based on review of the medical records, discussions with staff, and applying clinical experience noted on the date of the review.    (x) Inpatient Status Appropriate - This patient's medical care is consistent with medical management for inpatient care and reasonable inpatient medical practice.    RATIONALE FOR DETERMINATION: 46-year-old male with history of pancreatitis, hypertension, asthma, type 2 diabetes, alcoholic hepatitis as well as withdrawal seizures presented to the hospital with significant abdominal pain that radiated to both sides and back associated with nausea and vomiting.  His last alcohol beverage was the morning of admission as he drinks a pint a day on average.  With the severity of patient's alcoholism and marked lactic acidosis patient admitted to the hospital on 2/20/2021.  Concern for alcoholic gastritis with possible GI bleed due to hematemesis.  Patient progressed to alcohol withdrawal complicated by a COPD exacerbation requiring high-dose IV steroids, nebulizers and oxygen as needed.  The severity of patient's acute alcohol intoxication/gastritis with hypoxemic COPD exacerbation required a more prolonged hospital evaluation and management appropriate for inpatient care.    At the time of admission with the information available to the attending physician more than 2 nights Hospital complex care was anticipated, based on patient risk of adverse outcome if treated as outpatient and complex care required. Inpatient admission is appropriate based on the Medicare guidelines.    This document was produced using voice recognition software    The information on this document is developed by the utilization review team in order for the business office to ensure compliance. This  only denotes the appropriateness of proper admission status and does not reflect the quality of care rendered.    The definitions of Inpatient Status and Observation Status used in making the determination above are those provided in the CMS Coverage Manual, Chapter 1 and Chapter 6, section 70.4.    Sincerely,    Merlin Boswell MD  Utilization Review  Physician Advisor  [facility].

## 2021-02-22 NOTE — OR NURSING
Patient to endoscopy for EGD.  Received 8mg Versed, 100mg Demerol and 50mg Benadryl for procedure.  VSS, BP HTN.  Tolerated procedure well.  No biopsies taken.  Report given to JORDON Franklin on Obs.

## 2021-02-22 NOTE — PROGRESS NOTES
"Vitals: /82 (BP Location: Right arm)   Pulse 75   Temp 98.9  F (37.2  C) (Oral)   Resp 28   Ht 1.791 m (5' 10.5\")   Wt 77.1 kg (170 lb)   SpO2 94%   BMI 24.05 kg/m       [id number]     Neuro: alert and oriented x4. Scored 1 for CIWA scale on this shift. Seizure precaution maintained.     Cardiac: wdl. Tachycardic at times.   Lungs: diminished LS. On scheduled Neb. Wean off from O2. Sats 94% in RA. On continues pulse-ox. Infrequent nonproductive good cough.     GI: constant epigastric pain. Pain exacerbated after drinking apple juice earlier after advanced to clear liquid diet. IV Dilaudid in use. Tolerated popsicle and po meds with water. Denies emesis.      : voiding without difficulty. Uses urinal.  Pain: IV Dilaudid 0.2mg has been given almost Q2 hrs for pain.    IV: LR @125mL/hr  Labs/Imaging: negative chest XR. Hgb 12.9. Ca 7.8. Lactic acid 3.8.   Diet: clear liquid. NPO after midnight.   Activity: stand by assist.  Consult: GI   Plan: continue CIWA assessment. IVF. NPO after midnight for possible EGD tomorrow. Pain and nausea management. GI following.      Will continue to monitor.     "

## 2021-02-23 VITALS
WEIGHT: 170 LBS | HEIGHT: 70 IN | HEART RATE: 83 BPM | BODY MASS INDEX: 24.34 KG/M2 | DIASTOLIC BLOOD PRESSURE: 90 MMHG | TEMPERATURE: 98 F | SYSTOLIC BLOOD PRESSURE: 124 MMHG | RESPIRATION RATE: 16 BRPM | OXYGEN SATURATION: 98 %

## 2021-02-23 LAB
GLUCOSE BLDC GLUCOMTR-MCNC: 212 MG/DL (ref 70–99)
MAGNESIUM SERPL-MCNC: 1.9 MG/DL (ref 1.6–2.3)
PHOSPHATE SERPL-MCNC: 2.4 MG/DL (ref 2.5–4.5)
POTASSIUM SERPL-SCNC: 3.3 MMOL/L (ref 3.4–5.3)

## 2021-02-23 PROCEDURE — 84100 ASSAY OF PHOSPHORUS: CPT | Performed by: INTERNAL MEDICINE

## 2021-02-23 PROCEDURE — 250N000012 HC RX MED GY IP 250 OP 636 PS 637: Performed by: INTERNAL MEDICINE

## 2021-02-23 PROCEDURE — 250N000013 HC RX MED GY IP 250 OP 250 PS 637: Performed by: INTERNAL MEDICINE

## 2021-02-23 PROCEDURE — 999N001017 HC STATISTIC GLUCOSE BY METER IP

## 2021-02-23 PROCEDURE — 250N000013 HC RX MED GY IP 250 OP 250 PS 637: Performed by: HOSPITALIST

## 2021-02-23 PROCEDURE — 84132 ASSAY OF SERUM POTASSIUM: CPT | Performed by: INTERNAL MEDICINE

## 2021-02-23 PROCEDURE — 999N000157 HC STATISTIC RCP TIME EA 10 MIN

## 2021-02-23 PROCEDURE — 99239 HOSP IP/OBS DSCHRG MGMT >30: CPT | Performed by: HOSPITALIST

## 2021-02-23 PROCEDURE — 250N000013 HC RX MED GY IP 250 OP 250 PS 637: Performed by: PHYSICIAN ASSISTANT

## 2021-02-23 PROCEDURE — 94640 AIRWAY INHALATION TREATMENT: CPT

## 2021-02-23 PROCEDURE — 250N000009 HC RX 250: Performed by: INTERNAL MEDICINE

## 2021-02-23 PROCEDURE — 36415 COLL VENOUS BLD VENIPUNCTURE: CPT | Performed by: INTERNAL MEDICINE

## 2021-02-23 PROCEDURE — 83735 ASSAY OF MAGNESIUM: CPT | Performed by: INTERNAL MEDICINE

## 2021-02-23 RX ORDER — PANTOPRAZOLE SODIUM 40 MG/1
40 TABLET, DELAYED RELEASE ORAL
Qty: 60 TABLET | Refills: 3 | Status: SHIPPED | OUTPATIENT
Start: 2021-02-23 | End: 2021-12-28

## 2021-02-23 RX ORDER — ALBUTEROL SULFATE 90 UG/1
2 AEROSOL, METERED RESPIRATORY (INHALATION) EVERY 4 HOURS PRN
Qty: 1 INHALER | Refills: 1 | Status: SHIPPED | OUTPATIENT
Start: 2021-02-23 | End: 2021-10-19

## 2021-02-23 RX ORDER — PREDNISONE 20 MG/1
20 TABLET ORAL DAILY
Qty: 3 TABLET | Refills: 0 | Status: SHIPPED | OUTPATIENT
Start: 2021-02-24 | End: 2021-02-27

## 2021-02-23 RX ORDER — ACETAMINOPHEN 325 MG/1
650 TABLET ORAL EVERY 6 HOURS PRN
COMMUNITY
Start: 2021-02-23 | End: 2021-07-02

## 2021-02-23 RX ORDER — OXYCODONE HYDROCHLORIDE 5 MG/1
5 TABLET ORAL EVERY 6 HOURS PRN
Qty: 20 TABLET | Refills: 0 | Status: SHIPPED | OUTPATIENT
Start: 2021-02-23 | End: 2021-07-02

## 2021-02-23 RX ORDER — POTASSIUM CHLORIDE 1500 MG/1
40 TABLET, EXTENDED RELEASE ORAL ONCE
Status: COMPLETED | OUTPATIENT
Start: 2021-02-23 | End: 2021-02-23

## 2021-02-23 RX ORDER — ALBUTEROL SULFATE 90 UG/1
2 AEROSOL, METERED RESPIRATORY (INHALATION) EVERY 4 HOURS PRN
Status: DISCONTINUED | OUTPATIENT
Start: 2021-02-23 | End: 2021-02-23 | Stop reason: HOSPADM

## 2021-02-23 RX ADMIN — PANTOPRAZOLE SODIUM 40 MG: 40 TABLET, DELAYED RELEASE ORAL at 07:53

## 2021-02-23 RX ADMIN — FOLIC ACID 1 MG: 1 TABLET ORAL at 07:52

## 2021-02-23 RX ADMIN — SUCRALFATE 1 G: 1 TABLET ORAL at 07:52

## 2021-02-23 RX ADMIN — GLIPIZIDE 5 MG: 5 TABLET ORAL at 07:52

## 2021-02-23 RX ADMIN — GABAPENTIN 900 MG: 300 CAPSULE ORAL at 10:24

## 2021-02-23 RX ADMIN — IPRATROPIUM BROMIDE AND ALBUTEROL SULFATE 3 ML: .5; 3 SOLUTION RESPIRATORY (INHALATION) at 08:18

## 2021-02-23 RX ADMIN — OXYCODONE HYDROCHLORIDE 5 MG: 5 TABLET ORAL at 01:11

## 2021-02-23 RX ADMIN — CLONIDINE HYDROCHLORIDE 0.1 MG: 0.1 TABLET ORAL at 10:23

## 2021-02-23 RX ADMIN — OXYCODONE HYDROCHLORIDE 5 MG: 5 TABLET ORAL at 06:04

## 2021-02-23 RX ADMIN — MULTIPLE VITAMINS W/ MINERALS TAB 1 TABLET: TAB at 07:53

## 2021-02-23 RX ADMIN — GABAPENTIN 900 MG: 300 CAPSULE ORAL at 00:11

## 2021-02-23 RX ADMIN — POTASSIUM & SODIUM PHOSPHATES POWDER PACK 280-160-250 MG 1 PACKET: 280-160-250 PACK at 08:02

## 2021-02-23 RX ADMIN — LOSARTAN POTASSIUM 25 MG: 25 TABLET, FILM COATED ORAL at 07:52

## 2021-02-23 RX ADMIN — PREDNISONE 40 MG: 20 TABLET ORAL at 07:53

## 2021-02-23 RX ADMIN — POTASSIUM CHLORIDE 40 MEQ: 1500 TABLET, EXTENDED RELEASE ORAL at 07:56

## 2021-02-23 RX ADMIN — HYDROXYZINE HYDROCHLORIDE 50 MG: 25 TABLET, FILM COATED ORAL at 07:56

## 2021-02-23 RX ADMIN — SUCRALFATE 1 G: 1 TABLET ORAL at 10:38

## 2021-02-23 RX ADMIN — THIAMINE HCL TAB 100 MG 100 MG: 100 TAB at 07:53

## 2021-02-23 RX ADMIN — CLONIDINE HYDROCHLORIDE 0.1 MG: 0.1 TABLET ORAL at 00:11

## 2021-02-23 RX ADMIN — OXYCODONE HYDROCHLORIDE 5 MG: 5 TABLET ORAL at 10:38

## 2021-02-23 NOTE — PROGRESS NOTES
Patient's After Visit Summary was reviewed with patient  Patient verbalized understanding of After Visit Summary, recommended follow up and was given an opportunity to ask questions.   Discharge medications sent home with patient/family: YES   Discharged with significant other

## 2021-02-23 NOTE — PLAN OF CARE
"Vitals:/76 (BP Location: Right arm)   Pulse 76   Temp 97.3  F (36.3  C) (Oral)   Resp 18   Ht 1.778 m (5' 10\")   Wt 77.1 kg (170 lb)   SpO2 99%   BMI 24.39 kg/m    Labs:Creat 0.54, Na 132, glucose 411 - see ANA ROSA Knutson note. Pt instructed to avoid clear liquids containing sugar. Water, diet pop, and broth provided.   Cardiac:WDL  Resp:Lungs diminished, PRN duonebs by resp  Neuro:WDL, CIWAs \"0\"  GI:2 Senna tabs given, pt reports no BM in 3 days  :WDL  Skin:WDL  Activity:Up Ind  Diet:Clear liquid  Pain:Pt continues to rate pain 5-8/10. Atarax, oxycodone, protoix, and carafate given.   Plan:Pain control, advance diet as tolerated, diet not advanced tonight as pt still rating pain high and asking for more narcotic medication.     "

## 2021-02-23 NOTE — DISCHARGE SUMMARY
[facility]  Hospitalist Discharge Summary      Date of Admission:  2/20/2021  Date of Discharge:  2/23/2021  Discharging Provider: Santy Oliva MD      Discharge Diagnoses   Abdominal pain, alcoholic gastritis, asthma exacerbation, alcohol withdrawal    Follow-ups Needed After Discharge   Follow-up Appointments     Follow-up and recommended labs and tests       Follow up with primary care provider, Basim Salinas, within 7 days for   hospital follow- up.  No follow up labs or test are needed.             Unresulted Labs Ordered in the Past 30 Days of this Admission     No orders found from 1/21/2021 to 2/21/2021.      These results will be followed up by NA    Discharge Disposition   Discharged to home  Condition at discharge: Stable      Hospital Course   Jaiden Lyons is a 46-year-old male with history of alcohol abuse and dependence, alcoholic hepatitis, alcohol withdrawal with seizure, and alcohol related pancreatitis.  He also has history of hypertension, asthma, and diet-controlled diabetes.  He presented to the emergency department last night for evaluation of upper abdominal pain.  He has had upper abdominal pain for the last month.  Pain radiates to his back and sides.  It got worse yesterday. Ibuprofen did not relieve the pain.  He had some episodes of vomiting with dark vomitus.  He has been able to drink alcohol but has not been able to keep food down.  His last drink was yesterday morning.  He says he drinks a pint of gin daily and maybe 2 or 3 beers.  Emergency department evaluation showed tachycardia but overall stable vital signs.  Laboratory evaluation showed lactic acid of 5.3 with improvement to 4.1 after IV fluid hydration.  Blood alcohol level was 0.25.  ALT was 111 and AST was 224. Lipase was normal at 43.  Basic metabolic panel and CBC were unremarkable.  Troponin was undetectable.  Testing for COVID-19 was negative.  CT of abdomen and pelvis was obtained and  showed no acute process. Jaiden required IV Dilaudid for pain control.  He was unable to eat or drink.  He was deemed to be significant risk of alcohol withdrawal.  He was given IV Pepcid and Protonix for suspected alcohol induced gastritis.  I was asked to admit him to the hospital for further care.    Patient was admitted to the hospital.  It was felt that he was going through some alcohol withdrawal and he was started on the CIWA protocol and appropriate medications.  It was also felt that he had an asthma exacerbation and he was started on steroids.  EGD was done which was unrevealing.  Patient's pain has improved.  Today he is doing well.  He has minimal abdominal pain.  He is not short of breath.  He has tolerated clears and is ready for some regular food.  Vitals are stable.  His exam is benign.  He will discharge home.  I will send him home with 3 more days of prednisone and some oral oxy for his pain.  He has been started on a PPI.       Consultations This Hospital Stay   GASTROENTEROLOGY IP CONSULT  SOCIAL WORK IP CONSULT  CARE MANAGEMENT / SOCIAL WORK IP CONSULT    Code Status   Full Code    Time Spent on this Encounter   I, Santy Oliva MD, personally saw the patient today and spent greater than 30 minutes discharging this patient.       Santy Oliva MD  [facility] OBSERVATION DEPT  [address] [place] [address]  Phone: [phone]  ______________________________________________________________________    Physical Exam   Vital Signs: Temp: 98.1  F (36.7  C) Temp src: Oral BP: 112/74 Pulse: 65   Resp: 16 SpO2: 97 % O2 Device: None (Room air) Oxygen Delivery: 2 LPM  Weight: 170 lbs 0 oz  Constitutional: awake, alert, cooperative, no apparent distress, and appears stated age  Eyes: Lids and lashes normal, pupils equal, round and reactive to light, extra ocular muscles intact, sclera clear, conjunctiva normal  ENT: Normocephalic, without obvious abnormality,  atraumatic, sinuses nontender on palpation, external ears without lesions, oral pharynx with moist mucous membranes, tonsils without erythema or exudates, gums normal and good dentition.  Respiratory: No increased work of breathing, good air exchange, clear to auscultation bilaterally, no crackles or wheezing  Cardiovascular: Normal apical impulse, regular rate and rhythm, normal S1 and S2, no S3 or S4, and no murmur noted  GI: No scars, normal bowel sounds, soft, non-distended, non-tender, no masses palpated, no hepatosplenomegally  Skin: no bruising or bleeding  Musculoskeletal: no lower extremity pitting edema present       Primary Care Physician   Basim Salinas    Discharge Orders      Reason for your hospital stay    Alcoholic gastritis. Alcohol withdrawal. Asthma exacerbation     Follow-up and recommended labs and tests     Follow up with primary care provider, Basim Salinas, within 7 days for hospital follow- up.  No follow up labs or test are needed.     Activity    Your activity upon discharge: activity as tolerated     Diet    Follow this diet upon discharge: Orders Placed This Encounter      Diabetic diet       Significant Results and Procedures   Most Recent 3 CBC's:  Recent Labs   Lab Test 02/22/21  0531 02/21/21  1825 02/21/21 0613 02/20/21 2118 02/27/20 0627   WBC 2.9*  --   --  4.3 5.5   HGB 12.6* 12.9* 13.2* 15.4 15.1   MCV 87  --   --  85 83     --   --  229 132*     Most Recent 3 BMP's:  Recent Labs   Lab Test 02/23/21  0617 02/22/21  0531 02/21/21 0613 02/20/21 2118   NA  --  132* 138 135   POTASSIUM 3.3* 4.7 4.7 4.9   CHLORIDE  --  98 102 96   CO2  --  22 22 22   BUN  --  11 14 16   CR  --  0.54* 0.60* 0.66   ANIONGAP  --  12 14 17*   BARRY  --  8.0* 7.8* 8.6   GLC  --  191* 94 88     Most Recent 2 LFT's:  Recent Labs   Lab Test 02/22/21  0531 02/20/21 2208 02/20/21 2118   * 224* Canceled, Test credited   *  --  111*   ALKPHOS 67  --  87   BILITOTAL 1.4*  --  1.0   ,    Results for orders placed or performed during the hospital encounter of 02/20/21   CT Abdomen Pelvis w Contrast    Narrative    EXAM: CT ABDOMEN AND PELVIS WITH CONTRAST  LOCATION: [facility]  DATE/TIME: 2/20/2021 10:15 PM    INDICATION: Epigastric pain.  COMPARISON: 01/19/2020.    TECHNIQUE: CT scan of the abdomen and pelvis was performed following injection of IV contrast. Multiplanar reformats were obtained. Dose reduction techniques were used.  CONTRAST: 81 mL Isovue-370.    FINDINGS:    LOWER CHEST: Unremarkable.    HEPATOBILIARY: Moderate to marked diffuse fatty infiltration of the liver. Nonspecific 1.8 cm curvilinear calcification in the sarath hepatis region, unchanged. This could possibly relate to a densely calcified hepatic artery aneurysm.    SPLEEN: Unremarkable.    PANCREAS: A few pancreatic parenchymal calcifications are again present. No peripancreatic inflammatory changes.    ADRENAL GLANDS: Indeterminate 2.0 x 1.7 cm left adrenal nodule, unchanged.    KIDNEYS/BLADDER: No suspicious renal lesions.    BOWEL: Normal appendix.    LYMPH NODES: Unremarkable.    VASCULATURE: Atherosclerotic calcification in the abdominal aorta.    MUSCULOSKELETAL: Surgical hardware is present in the right femoral head and neck.      Impression    IMPRESSION:   1. No cause of acute pain identified in the abdomen or pelvis. Normal appendix.  2. A few pancreatic parenchymal calcifications, suggestive of chronic pancreatitis.    XR Chest Port 1 View    Narrative    XR CHEST PORT 1 VW 2/21/2021 9:01 AM    HISTORY: hypoxia    COMPARISON: 2/25/2020      Impression    IMPRESSION: Stable linear scarring in the right lung base. No focal  infiltrate, pleural effusion or pneumothorax. Normal heart size.    ZAHRA RETANA MD       Discharge Medications   Current Discharge Medication List      START taking these medications    Details   acetaminophen (TYLENOL) 325 MG tablet Take 2 tablets (650 mg) by mouth every 6 hours  as needed for mild pain  Qty:      Associated Diagnoses: Abdominal pain, epigastric      oxyCODONE (ROXICODONE) 5 MG tablet Take 1 tablet (5 mg) by mouth every 6 hours as needed for moderate to severe pain  Qty: 20 tablet, Refills: 0    Associated Diagnoses: Abdominal pain, epigastric      pantoprazole (PROTONIX) 40 MG EC tablet Take 1 tablet (40 mg) by mouth 2 times daily (before meals)  Qty: 60 tablet, Refills: 3    Associated Diagnoses: Abdominal pain, epigastric      predniSONE (DELTASONE) 20 MG tablet Take 1 tablet (20 mg) by mouth daily for 3 days  Qty: 3 tablet, Refills: 0    Associated Diagnoses: Uncomplicated asthma, unspecified asthma severity, unspecified whether persistent         CONTINUE these medications which have NOT CHANGED    Details   ALBUTEROL SULFATE HFA IN INHALE 1 TO 2 PUFFS BY MOUTH EVERY 4 HOURS AS NEEDED      glipiZIDE (GLUCOTROL) 5 MG tablet Take 5 mg by mouth daily before breakfast      losartan (COZAAR) 50 MG tablet Take 50 mg by mouth daily         STOP taking these medications       ibuprofen (ADVIL/MOTRIN) 800 MG tablet Comments:   Reason for Stopping:             Allergies   Allergies   Allergen Reactions     Fentanyl      Insulin Swelling     Reaction Date: Around 5790-3727  Face swelling  Inpatient when reaction occurred, unsure which type of insulin  Required some Benadryl to help with the swelling     Lisinopril      Face swelling, cough     Morphine Itching

## 2021-02-23 NOTE — PLAN OF CARE
"Vitals: /78 (BP Location: Right arm)   Pulse 67   Temp 98  F (36.7  C) (Oral)   Resp 18   Ht 1.778 m (5' 10\")   Wt 77.1 kg (170 lb)   SpO2 99%   BMI 24.39 kg/m      Neuro: WNL, CIWA 0  Cardiac: WNL  Lungs: Lungs diminished   GI: BM 2/22 x2  : WNL  Skin: WNL  Activity: Independent  Diet: Clears  Pain: PO oxycodone, has requested ped medication regularly throughout the night  IV: Peripheral IV LR 75 mL/hr  Labs/tests: , PA increased glipizide dose to 5 mg for the morning  Plan: Pain control, CIWA assessments, and advance diet as tolerated. Will provide supportive cares      "

## 2021-02-23 NOTE — PLAN OF CARE
"Vitals:/74 (BP Location: Right arm)   Pulse 65   Temp 98.1  F (36.7  C) (Oral)   Resp 16   Ht 1.778 m (5' 10\")   Wt 77.1 kg (170 lb)   SpO2 97%   BMI 24.39 kg/m    Labs:Glucose 212  Cardiac:WDL  Resp:Lungs diminished, requested neb due to \"a little\" SOB, breathing overall improved  Neuro:WDL  GI:WDL, LBM 2/22/2021  :WDL  Skin:WDL  Activity:WDL, up Ind  Diet:Clear, advanced to Mod Carb by provider  Pain:Tolerable with current pain regimen   Plan:Discharge, follow up with PCP    "

## 2021-02-24 ENCOUNTER — HOSPITAL ENCOUNTER (EMERGENCY)
Facility: CLINIC | Age: 46
Discharge: HOME | End: 2021-02-24
Attending: EMERGENCY MEDICINE | Admitting: EMERGENCY MEDICINE
Payer: COMMERCIAL

## 2021-02-24 ENCOUNTER — APPOINTMENT (OUTPATIENT)
Dept: CT IMAGING | Facility: CLINIC | Age: 46
End: 2021-02-24
Attending: EMERGENCY MEDICINE
Payer: COMMERCIAL

## 2021-02-24 VITALS
DIASTOLIC BLOOD PRESSURE: 93 MMHG | SYSTOLIC BLOOD PRESSURE: 134 MMHG | OXYGEN SATURATION: 100 % | TEMPERATURE: 98.7 F | RESPIRATION RATE: 11 BRPM | HEART RATE: 80 BPM

## 2021-02-24 DIAGNOSIS — R93.89 ABNORMAL CT OF THE CHEST: ICD-10-CM

## 2021-02-24 DIAGNOSIS — R73.9 HYPERGLYCEMIA: ICD-10-CM

## 2021-02-24 DIAGNOSIS — R07.89 CHEST WALL PAIN: ICD-10-CM

## 2021-02-24 LAB
ALBUMIN SERPL-MCNC: 3.8 G/DL (ref 3.4–5)
ALP SERPL-CCNC: 84 U/L (ref 40–150)
ALT SERPL W P-5'-P-CCNC: 241 U/L (ref 0–70)
ANION GAP SERPL CALCULATED.3IONS-SCNC: 7 MMOL/L (ref 3–14)
AST SERPL W P-5'-P-CCNC: 188 U/L (ref 0–45)
BASOPHILS # BLD AUTO: 0 10E9/L (ref 0–0.2)
BASOPHILS NFR BLD AUTO: 0 %
BILIRUB SERPL-MCNC: 0.9 MG/DL (ref 0.2–1.3)
BUN SERPL-MCNC: 12 MG/DL (ref 7–30)
CALCIUM SERPL-MCNC: 9.5 MG/DL (ref 8.5–10.1)
CHLORIDE SERPL-SCNC: 97 MMOL/L (ref 94–109)
CO2 SERPL-SCNC: 30 MMOL/L (ref 20–32)
CREAT SERPL-MCNC: 0.64 MG/DL (ref 0.66–1.25)
D DIMER PPP FEU-MCNC: 1.2 UG/ML FEU (ref 0–0.5)
DIFFERENTIAL METHOD BLD: NORMAL
EOSINOPHIL # BLD AUTO: 0 10E9/L (ref 0–0.7)
EOSINOPHIL NFR BLD AUTO: 0 %
ERYTHROCYTE [DISTWIDTH] IN BLOOD BY AUTOMATED COUNT: 14.3 % (ref 10–15)
ETHANOL SERPL-MCNC: <0.01 G/DL
FLUAV RNA RESP QL NAA+PROBE: NEGATIVE
FLUBV RNA RESP QL NAA+PROBE: NEGATIVE
GFR SERPL CREATININE-BSD FRML MDRD: >90 ML/MIN/{1.73_M2}
GLUCOSE BLDC GLUCOMTR-MCNC: 263 MG/DL (ref 70–99)
GLUCOSE SERPL-MCNC: 330 MG/DL (ref 70–99)
HCT VFR BLD AUTO: 44.8 % (ref 40–53)
HGB BLD-MCNC: 14.6 G/DL (ref 13.3–17.7)
LABORATORY COMMENT REPORT: NORMAL
LIPASE SERPL-CCNC: 174 U/L (ref 73–393)
LYMPHOCYTES # BLD AUTO: 2 10E9/L (ref 0.8–5.3)
LYMPHOCYTES NFR BLD AUTO: 32 %
MCH RBC QN AUTO: 28.6 PG (ref 26.5–33)
MCHC RBC AUTO-ENTMCNC: 32.6 G/DL (ref 31.5–36.5)
MCV RBC AUTO: 88 FL (ref 78–100)
MONOCYTES # BLD AUTO: 0.4 10E9/L (ref 0–1.3)
MONOCYTES NFR BLD AUTO: 7 %
NEUTROPHILS # BLD AUTO: 3.7 10E9/L (ref 1.6–8.3)
NEUTROPHILS NFR BLD AUTO: 61 %
PLATELET # BLD AUTO: 157 10E9/L (ref 150–450)
PLATELET # BLD EST: NORMAL 10*3/UL
POTASSIUM SERPL-SCNC: 4 MMOL/L (ref 3.4–5.3)
PROT SERPL-MCNC: 8.2 G/DL (ref 6.8–8.8)
RBC # BLD AUTO: 5.1 10E12/L (ref 4.4–5.9)
RBC MORPH BLD: NORMAL
RSV RNA SPEC QL NAA+PROBE: NORMAL
SARS-COV-2 RNA RESP QL NAA+PROBE: NEGATIVE
SODIUM SERPL-SCNC: 134 MMOL/L (ref 133–144)
SPECIMEN SOURCE: NORMAL
TROPONIN I SERPL-MCNC: <0.015 UG/L (ref 0–0.04)
WBC # BLD AUTO: 6.1 10E9/L (ref 4–11)

## 2021-02-24 PROCEDURE — 999N001017 HC STATISTIC GLUCOSE BY METER IP

## 2021-02-24 PROCEDURE — 99285 EMERGENCY DEPT VISIT HI MDM: CPT | Mod: 25

## 2021-02-24 PROCEDURE — 83690 ASSAY OF LIPASE: CPT | Performed by: EMERGENCY MEDICINE

## 2021-02-24 PROCEDURE — 250N000011 HC RX IP 250 OP 636: Performed by: EMERGENCY MEDICINE

## 2021-02-24 PROCEDURE — 82077 ASSAY SPEC XCP UR&BREATH IA: CPT | Performed by: EMERGENCY MEDICINE

## 2021-02-24 PROCEDURE — C9803 HOPD COVID-19 SPEC COLLECT: HCPCS

## 2021-02-24 PROCEDURE — 87636 SARSCOV2 & INF A&B AMP PRB: CPT | Performed by: EMERGENCY MEDICINE

## 2021-02-24 PROCEDURE — 71275 CT ANGIOGRAPHY CHEST: CPT

## 2021-02-24 PROCEDURE — 96361 HYDRATE IV INFUSION ADD-ON: CPT

## 2021-02-24 PROCEDURE — 85025 COMPLETE CBC W/AUTO DIFF WBC: CPT | Performed by: EMERGENCY MEDICINE

## 2021-02-24 PROCEDURE — 80053 COMPREHEN METABOLIC PANEL: CPT | Performed by: EMERGENCY MEDICINE

## 2021-02-24 PROCEDURE — 96374 THER/PROPH/DIAG INJ IV PUSH: CPT | Mod: 59

## 2021-02-24 PROCEDURE — 250N000009 HC RX 250: Performed by: EMERGENCY MEDICINE

## 2021-02-24 PROCEDURE — 84484 ASSAY OF TROPONIN QUANT: CPT | Performed by: EMERGENCY MEDICINE

## 2021-02-24 PROCEDURE — 250N000013 HC RX MED GY IP 250 OP 250 PS 637: Performed by: EMERGENCY MEDICINE

## 2021-02-24 PROCEDURE — 258N000003 HC RX IP 258 OP 636: Performed by: EMERGENCY MEDICINE

## 2021-02-24 PROCEDURE — 85379 FIBRIN DEGRADATION QUANT: CPT | Performed by: EMERGENCY MEDICINE

## 2021-02-24 PROCEDURE — 93005 ELECTROCARDIOGRAM TRACING: CPT

## 2021-02-24 RX ORDER — IOPAMIDOL 755 MG/ML
500 INJECTION, SOLUTION INTRAVASCULAR ONCE
Status: COMPLETED | OUTPATIENT
Start: 2021-02-24 | End: 2021-02-24

## 2021-02-24 RX ORDER — ALBUTEROL SULFATE 90 UG/1
2 AEROSOL, METERED RESPIRATORY (INHALATION) ONCE
Status: COMPLETED | OUTPATIENT
Start: 2021-02-24 | End: 2021-02-24

## 2021-02-24 RX ORDER — OXYCODONE HYDROCHLORIDE 5 MG/1
5 TABLET ORAL ONCE
Status: COMPLETED | OUTPATIENT
Start: 2021-02-24 | End: 2021-02-24

## 2021-02-24 RX ADMIN — ALBUTEROL SULFATE 2 PUFF: 90 AEROSOL, METERED RESPIRATORY (INHALATION) at 16:23

## 2021-02-24 RX ADMIN — OXYCODONE HYDROCHLORIDE 5 MG: 5 TABLET ORAL at 16:34

## 2021-02-24 RX ADMIN — FAMOTIDINE 20 MG: 10 INJECTION, SOLUTION INTRAVENOUS at 16:34

## 2021-02-24 RX ADMIN — SODIUM CHLORIDE 1000 ML: 9 INJECTION, SOLUTION INTRAVENOUS at 17:30

## 2021-02-24 RX ADMIN — IOPAMIDOL 60 ML: 755 INJECTION, SOLUTION INTRAVENOUS at 17:29

## 2021-02-24 ASSESSMENT — ENCOUNTER SYMPTOMS
WEAKNESS: 1
BACK PAIN: 1
NAUSEA: 0
FATIGUE: 1
BLOOD IN STOOL: 0
DIARRHEA: 0
COUGH: 1
VOMITING: 0
FEVER: 0

## 2021-02-24 NOTE — ED PROVIDER NOTES
History   Chief Complaint:  Back Pain     The history is provided by the patient.      Jaiden Lyons is a 46 year old male with a history of alcohol abuse and dependence, hypertension, asthma and diabetes who presents for cough, chest pain and back pain. The patient was admitted on 02/20/21 for alcohol withdrawal, alcoholic gastritis and an asthma exacerbation. He was discharged yesterday with oxycodone, protonix and predisone. He presents today for global weakness/fatigue since being discharged and increasing left back pain that is worse with a deep breath. He has a dry, non-productive cough with associated chest pain. He denies fever, nausea, vomiting, diarrhea or black/bloody stools. He states that he regularly smokes tobacco. He denies alcohol or marijuana use since being discharged from the hospital. He denies other drug use. He denies history of COVID-19.     ECG 02/20/21 @ 2155  Sinus rhythm  T wave abnormality, consider anterolateral ischemia   Prolonged QT  Abnormal ECG  Rate 100 bpm. AZ interval 152 ms. QRS duration 94 ms. QT/QTc 378/487 ms. P-R-T axes 60 -24 72.      Upper GI Endoscopy 2/202021  Impression:            - Normal esophagus.                             - Z-line regular, 42 cm from the incisors.                             - Large J-shaped stomach.                             - Normal examined duodenum.                             - No specimens collected.   Recommendation:        - [facility]                             management of suspected EtOH gastritis vs                             exacerbation of chronic pancreatitis.     Review of Systems   Constitutional: Positive for fatigue. Negative for fever.   Respiratory: Positive for cough.    Cardiovascular: Positive for chest pain.   Gastrointestinal: Negative for blood in stool, diarrhea, nausea and vomiting.   Musculoskeletal: Positive for back pain.   Neurological: Positive for weakness.   All other  systems reviewed and are negative.      Allergies:  Fentanyl  Insulin  Lisinopril  Morphine    Medications:  Albuterol inhaler   Glipizide  Cozaar  Oxycodone  Protonix  Prednisone    Past Medical History:    Lactic acidosis 02/21/2021  Alcohol dependence   Pancreatitis   Asthma   Hypertension  Diabetes mellitus, type 2   Gastritis     Past Surgical History:    EGD   Hip surgery, right  Facial reconstruction   Right arm tendon/muscle repair    Family History:    Colon cancer - father   Asthma - mother     Social History:  The patient arrived to the ED Accompanied by family via private car.  PCP: Basim Salinas  Tobacco Use: Current Every Day Smoker   Alcohol Use: Yes, none since discharge  Drug Use: Marijuana occasionally, no other drug use.    Physical Exam     Patient Vitals for the past 24 hrs:   BP Temp Temp src Pulse Resp SpO2   02/24/21 1830 (!) 134/93 -- -- 80 11 100 %   02/24/21 1815 (!) 129/97 -- -- 74 16 100 %   02/24/21 1800 (!) 139/99 -- -- 71 15 99 %   02/24/21 1715 (!) 132/97 -- -- 75 15 --   02/24/21 1630 (!) 127/96 -- -- 84 -- --   02/24/21 1615 -- -- -- 98 -- 100 %   02/24/21 1604 (!) 129/99 98.7  F (37.1  C) Oral 106 18 100 %       Physical Exam  Nursing note and vitals reviewed.  Constitutional: Well nourished.   Eyes: Conjunctiva normal.  Pupils are equal, round, and reactive to light.   ENT: Nose normal. Mucous membranes pink and moist.    Neck: Normal range of motion.  CVS: Sinus tachycardia.  Normal heart sounds.  No murmur.  Pulmonary: Lungs with scant expiratory wheezing  GI: Abdomen soft. Nontender, nondistended. No rigidity or guarding.  No CVA tenderness  MSK: No calf tenderness or swelling.  No c/t/l bony tenderness. No tremor  Neuro: Alert. Follows simple commands.  Skin: Skin is warm and dry. No rash noted.   Psychiatric: Normal affect.     Emergency Department Course     ECG:  ECG taken at 1628, ECG read at 1628  Normal sinus rhythm  Nonspecific T wave abnormality   Abnormal ECG  When  compared to prior ECG from 02/20/21: no significant change was found.  Rate 83 bpm. MD interval 144 ms. QRS duration 98 ms. QT/QTc 376/441 ms. P-R-T axes 56 -7 -54.    Imaging:  CT Chest Pulmonary Embolism with Contrast  1.  No pulmonary embolism or other acute abnormality in the chest.   2.  Left ventricle appears mildly enlarged. Consider follow-up echocardiogram if not recently obtained.   3.  Mild emphysema.    Reading per radiology.    Echo Stress Echocardiogram   Ordered for outpatient.     Laboratory:  CBC: WBC: 6.1, HGB: 14.6, PLT: 157    CMP: Glucose 330 (H), ALT: 241 (H), AST: 188 (H), Creatinine: 0.64 (L) o/w WNL  Glucose by meter: 263 (H)    Troponin (1617): <0.015    D-dimer: 1.2 (H)    Lipase: 174    Alcohol ethyl: <0.01    Symptomatic Influenza A/B antigen & COVID-19 PCR: negative    Emergency Department Course:    Reviewed:  I reviewed the patient's nursing notes, vitals and past medical history.     Assessments:  1613 I performed an exam of the patient in room ED as documented above.  1825 I updated the patient on results and discussed plan of care. Patient is feeling improved and is comfortable with discharge.    Interventions:  1623 Albuterol inhaler, 2 puff, inhalation   1634 Oxycodone, 5 mg, Oral   1634 Pepcid, 20 mg, IV   1730 NS, 1 L, IV    Disposition:  The patient was discharged to home.     Impression & Plan   HEART Score:    Predicts Major Adverse Cardiac Events within 6 weeks:    History:   Highly suspicious:  2   Moderately suspicious: 1   Slightly/Non-suspicious: 0  ECG:   Significant ST depression 2   Nonspecific repolarization 1   Normal    0  Age:    >=65    2   >45-<65   1   <= 45    0  Risk Factors [DM, Current or recent smoker, HTN, HLP, FMH CAD, Obesity]   >=3 or Hx. CAD  2   1-2    1   None    0  Troponin:   >= 3x normal   2   >1 to <3x normal  1   Normal    0    This Patient's Score:   2    Interpretation:    0-3:    2.5% risk of MACE (may consider D/C)   4-6:    20.3%  (Observation)   7-10:    72.7% (Admit, consider early invasive strategy)        Medical Decision Making:  Jaiden Lyons is a 46 year old male who presents to the emergency department today for evaluation of myriad of complaints predominately back pain, chest pain, cough and fatigue.  Patient nontoxic on arrival, in no significant distress. EKG without STEMI though nonspecific T wave inversions similar to prior EKGs, delta troponin negative, clinically lower suspicion for ACS.  D-dimer elevated though fortunately no PE, pneumonia, fluid overload, PTX on CT today. Incidental possible LV dilation noted. He tested negative for COVID 19.  Labs reviewed.  Mild hyperglycemia likely secondary to outpatient steroids.  This improved after gentle IVF.  He had wheezing on arrival and reported symptom improvement after albuterol. He is not septic appearing and ambulates without hypoxia. No evidence of acute alcohol withdrawal on exam.  Patient requesting discharge home at this time.  Possible presentation 2/2 to asthma exacerbation, recommended continuation of albuterol as well as prednisone at home.  Counseled on smoking and marijuana cessation.  Additionally will order outpatient stress echo for patient give concern for possible LVH on CT. Plan for close PCP f/u; return precautions given.     Covid-19  Jaiden Lyons was evaluated during a global COVID-19 pandemic, which necessitated consideration that the patient might be at risk for infection with the SARS-CoV-2 virus that causes COVID-19.   Applicable protocols for evaluation were followed during the patient's care.   COVID-19 was considered as part of the patient's evaluation. The plan for testing is:  a test was obtained during this visit.    Diagnosis:    ICD-10-CM    1. Abnormal CT of the chest  R93.89 Echo Stress Echocardiogram     Glucose by meter     Glucose by meter    concern for possible LVH   2. Hyperglycemia  R73.9    3. Chest wall pain  R07.89      Scribe  Disclosure:  I, Marisol Adams, am serving as a scribe at 4:13 PM on 2/24/2021 to document services personally performed by Irene Marquez DO based on my observations and the provider's statements to me     This note was completed in part using Dragon voice recognition software. Although reviewed after completion, some word and grammatical errors may occur.      Irene Marquez DO  02/24/21 1956

## 2021-02-24 NOTE — ED TRIAGE NOTES
A&O x4.  ABC's intact.      Pt arrives with c/o pain in upper back with breathing was discharged from hospital wants to make sure he does not have pneumonia.  Patient has loose cough and had chest pain yesterday.

## 2021-02-25 LAB — INTERPRETATION ECG - MUSE: NORMAL

## 2021-02-25 NOTE — DISCHARGE INSTRUCTIONS
CONTINUE TAKING YOUR PREDNISONE AND ALBUTEROL; I DO SUSPECT YOU STILL ARE EXPERIENCING A MILD ASTHMA EXACERBATION.  KNOW THIS MAY ELEVATE YOUR BLOOD SUGARS.  I HAVE ORDERED AN OUTPATIENT ECHO TO LOOK AT THE FUNCTIONING OF YOUR HEART.  RETURN FOR WORSENING SHORTNESS OF BREATH, CHEST PAIN OR SHOULD SYMPTOMS WORSEN.    Discharge Instructions  Chest Pain    You have been seen today for chest pain or discomfort.  At this time, your provider has found no signs that your chest pain is due to a serious or life-threatening condition, (or you have declined more testing and/or admission to the hospital). However, sometimes there is a serious problem that does not show up right away. Your evaluation today may not be complete and you may need further testing and evaluation.     Generally, every Emergency Department visit should have a follow-up clinic visit with either a primary or a specialty clinic/provider. Please follow-up as instructed by your emergency provider today.  Return to the Emergency Department if:  Your chest pain changes, gets worse, starts to happen more often, or comes with less activity.  You are newly short of breath.  You get very weak or tired.  You pass out or faint.  You have any new symptoms, like fever, cough, numb legs, or you cough up blood.  You have anything else that worries you.    Until you follow-up with your regular provider, please do the following:  Take one aspirin daily unless you have an allergy or are told not to by your provider.  If a stress test appointment has been made, go to the appointment.  If you have questions, contact your regular provider.  Follow-up with your regular provider/clinic as directed; this is very important.    If you were given a prescription for medicine here today, be sure to read all of the information (including the package insert) that comes with your prescription.  This will include important information about the medicine, its side effects, and any  warnings that you need to know about.  The pharmacist who fills the prescription can provide more information and answer questions you may have about the medicine.  If you have questions or concerns that the pharmacist cannot address, please call or return to the Emergency Department.       Remember that you can always come back to the Emergency Department if you are not able to see your regular provider in the amount of time listed above, if you get any new symptoms, or if there is anything that worries you.

## 2021-06-01 ENCOUNTER — HOSPITAL ENCOUNTER (EMERGENCY)
Facility: CLINIC | Age: 46
Discharge: HOME | End: 2021-06-01
Attending: EMERGENCY MEDICINE | Admitting: EMERGENCY MEDICINE
Payer: COMMERCIAL

## 2021-06-01 ENCOUNTER — APPOINTMENT (OUTPATIENT)
Dept: CT IMAGING | Facility: CLINIC | Age: 46
End: 2021-06-01
Attending: EMERGENCY MEDICINE
Payer: COMMERCIAL

## 2021-06-01 VITALS
TEMPERATURE: 98.3 F | HEART RATE: 76 BPM | RESPIRATION RATE: 20 BRPM | DIASTOLIC BLOOD PRESSURE: 82 MMHG | SYSTOLIC BLOOD PRESSURE: 131 MMHG | OXYGEN SATURATION: 98 %

## 2021-06-01 DIAGNOSIS — K86.0 ALCOHOL-INDUCED CHRONIC PANCREATITIS (H): ICD-10-CM

## 2021-06-01 DIAGNOSIS — K29.20 ALCOHOLIC GASTRITIS, PRESENCE OF BLEEDING UNSPECIFIED, UNSPECIFIED CHRONICITY: ICD-10-CM

## 2021-06-01 DIAGNOSIS — E27.9 ADRENAL NODULE (H): ICD-10-CM

## 2021-06-01 LAB
ALBUMIN SERPL-MCNC: 3.7 G/DL (ref 3.4–5)
ALP SERPL-CCNC: 91 U/L (ref 40–150)
ALT SERPL W P-5'-P-CCNC: 76 U/L (ref 0–70)
ANION GAP SERPL CALCULATED.3IONS-SCNC: 7 MMOL/L (ref 3–14)
AST SERPL W P-5'-P-CCNC: 123 U/L (ref 0–45)
BASOPHILS # BLD AUTO: 0.1 10E9/L (ref 0–0.2)
BASOPHILS NFR BLD AUTO: 1.7 %
BILIRUB SERPL-MCNC: 0.4 MG/DL (ref 0.2–1.3)
BUN SERPL-MCNC: 10 MG/DL (ref 7–30)
CALCIUM SERPL-MCNC: 9.4 MG/DL (ref 8.5–10.1)
CHLORIDE SERPL-SCNC: 106 MMOL/L (ref 94–109)
CO2 SERPL-SCNC: 27 MMOL/L (ref 20–32)
CREAT SERPL-MCNC: 0.57 MG/DL (ref 0.66–1.25)
DIFFERENTIAL METHOD BLD: ABNORMAL
EOSINOPHIL # BLD AUTO: 0.1 10E9/L (ref 0–0.7)
EOSINOPHIL NFR BLD AUTO: 3.5 %
ERYTHROCYTE [DISTWIDTH] IN BLOOD BY AUTOMATED COUNT: 15.9 % (ref 10–15)
ETHANOL SERPL-MCNC: 0.27 G/DL
GFR SERPL CREATININE-BSD FRML MDRD: >90 ML/MIN/{1.73_M2}
GLUCOSE SERPL-MCNC: 154 MG/DL (ref 70–99)
HCT VFR BLD AUTO: 40.6 % (ref 40–53)
HGB BLD-MCNC: 13.5 G/DL (ref 13.3–17.7)
IMM GRANULOCYTES # BLD: 0 10E9/L (ref 0–0.4)
IMM GRANULOCYTES NFR BLD: 0.2 %
LABORATORY COMMENT REPORT: NORMAL
LIPASE SERPL-CCNC: 91 U/L (ref 73–393)
LYMPHOCYTES # BLD AUTO: 1.8 10E9/L (ref 0.8–5.3)
LYMPHOCYTES NFR BLD AUTO: 45.5 %
MCH RBC QN AUTO: 28 PG (ref 26.5–33)
MCHC RBC AUTO-ENTMCNC: 33.3 G/DL (ref 31.5–36.5)
MCV RBC AUTO: 84 FL (ref 78–100)
MONOCYTES # BLD AUTO: 0.4 10E9/L (ref 0–1.3)
MONOCYTES NFR BLD AUTO: 9.5 %
NEUTROPHILS # BLD AUTO: 1.6 10E9/L (ref 1.6–8.3)
NEUTROPHILS NFR BLD AUTO: 39.6 %
NRBC # BLD AUTO: 0 10*3/UL
NRBC BLD AUTO-RTO: 0 /100
PLATELET # BLD AUTO: 193 10E9/L (ref 150–450)
POTASSIUM SERPL-SCNC: 3.9 MMOL/L (ref 3.4–5.3)
PROT SERPL-MCNC: 8 G/DL (ref 6.8–8.8)
RBC # BLD AUTO: 4.82 10E12/L (ref 4.4–5.9)
SARS-COV-2 RNA RESP QL NAA+PROBE: NEGATIVE
SODIUM SERPL-SCNC: 140 MMOL/L (ref 133–144)
SPECIMEN SOURCE: NORMAL
WBC # BLD AUTO: 4 10E9/L (ref 4–11)

## 2021-06-01 PROCEDURE — 87635 SARS-COV-2 COVID-19 AMP PRB: CPT | Performed by: EMERGENCY MEDICINE

## 2021-06-01 PROCEDURE — 83690 ASSAY OF LIPASE: CPT | Performed by: EMERGENCY MEDICINE

## 2021-06-01 PROCEDURE — 258N000003 HC RX IP 258 OP 636: Performed by: EMERGENCY MEDICINE

## 2021-06-01 PROCEDURE — 250N000009 HC RX 250: Performed by: EMERGENCY MEDICINE

## 2021-06-01 PROCEDURE — 99285 EMERGENCY DEPT VISIT HI MDM: CPT | Mod: 25

## 2021-06-01 PROCEDURE — 250N000011 HC RX IP 250 OP 636: Performed by: EMERGENCY MEDICINE

## 2021-06-01 PROCEDURE — 96375 TX/PRO/DX INJ NEW DRUG ADDON: CPT

## 2021-06-01 PROCEDURE — 74177 CT ABD & PELVIS W/CONTRAST: CPT

## 2021-06-01 PROCEDURE — 96376 TX/PRO/DX INJ SAME DRUG ADON: CPT

## 2021-06-01 PROCEDURE — 96361 HYDRATE IV INFUSION ADD-ON: CPT

## 2021-06-01 PROCEDURE — 82077 ASSAY SPEC XCP UR&BREATH IA: CPT | Performed by: EMERGENCY MEDICINE

## 2021-06-01 PROCEDURE — 80053 COMPREHEN METABOLIC PANEL: CPT | Performed by: EMERGENCY MEDICINE

## 2021-06-01 PROCEDURE — 96374 THER/PROPH/DIAG INJ IV PUSH: CPT | Mod: 59

## 2021-06-01 PROCEDURE — 85025 COMPLETE CBC W/AUTO DIFF WBC: CPT | Performed by: EMERGENCY MEDICINE

## 2021-06-01 PROCEDURE — C9803 HOPD COVID-19 SPEC COLLECT: HCPCS

## 2021-06-01 RX ORDER — SODIUM CHLORIDE 9 MG/ML
INJECTION, SOLUTION INTRAVENOUS CONTINUOUS
Status: DISCONTINUED | OUTPATIENT
Start: 2021-06-01 | End: 2021-06-01 | Stop reason: HOSPADM

## 2021-06-01 RX ORDER — IOPAMIDOL 755 MG/ML
500 INJECTION, SOLUTION INTRAVASCULAR ONCE
Status: COMPLETED | OUTPATIENT
Start: 2021-06-01 | End: 2021-06-01

## 2021-06-01 RX ORDER — ONDANSETRON 4 MG/1
4 TABLET, ORALLY DISINTEGRATING ORAL EVERY 8 HOURS PRN
Qty: 10 TABLET | Refills: 0 | Status: SHIPPED | OUTPATIENT
Start: 2021-06-01 | End: 2021-06-04

## 2021-06-01 RX ORDER — OXYCODONE AND ACETAMINOPHEN 5; 325 MG/1; MG/1
1-2 TABLET ORAL EVERY 6 HOURS PRN
Qty: 12 TABLET | Refills: 0 | Status: SHIPPED | OUTPATIENT
Start: 2021-06-01 | End: 2021-07-02

## 2021-06-01 RX ORDER — SUCRALFATE 1 G/1
1 TABLET ORAL 4 TIMES DAILY
Qty: 56 TABLET | Refills: 0 | Status: SHIPPED | OUTPATIENT
Start: 2021-06-01 | End: 2021-06-15

## 2021-06-01 RX ORDER — FAMOTIDINE 20 MG/1
20 TABLET, FILM COATED ORAL 2 TIMES DAILY
Qty: 28 TABLET | Refills: 0 | Status: SHIPPED | OUTPATIENT
Start: 2021-06-01 | End: 2021-06-15

## 2021-06-01 RX ORDER — HYDROMORPHONE HYDROCHLORIDE 1 MG/ML
0.5 INJECTION, SOLUTION INTRAMUSCULAR; INTRAVENOUS; SUBCUTANEOUS
Status: COMPLETED | OUTPATIENT
Start: 2021-06-01 | End: 2021-06-01

## 2021-06-01 RX ORDER — ONDANSETRON 2 MG/ML
4 INJECTION INTRAMUSCULAR; INTRAVENOUS EVERY 30 MIN PRN
Status: DISCONTINUED | OUTPATIENT
Start: 2021-06-01 | End: 2021-06-01 | Stop reason: HOSPADM

## 2021-06-01 RX ADMIN — SODIUM CHLORIDE 61 ML: 9 INJECTION, SOLUTION INTRAVENOUS at 02:14

## 2021-06-01 RX ADMIN — ONDANSETRON 4 MG: 2 INJECTION INTRAMUSCULAR; INTRAVENOUS at 02:00

## 2021-06-01 RX ADMIN — HYDROMORPHONE HYDROCHLORIDE 0.5 MG: 1 INJECTION, SOLUTION INTRAMUSCULAR; INTRAVENOUS; SUBCUTANEOUS at 01:22

## 2021-06-01 RX ADMIN — HYDROMORPHONE HYDROCHLORIDE 0.5 MG: 1 INJECTION, SOLUTION INTRAMUSCULAR; INTRAVENOUS; SUBCUTANEOUS at 02:00

## 2021-06-01 RX ADMIN — FAMOTIDINE 20 MG: 10 INJECTION, SOLUTION INTRAVENOUS at 02:00

## 2021-06-01 RX ADMIN — SODIUM CHLORIDE 1000 ML: 9 INJECTION, SOLUTION INTRAVENOUS at 02:00

## 2021-06-01 RX ADMIN — ONDANSETRON 4 MG: 2 INJECTION INTRAMUSCULAR; INTRAVENOUS at 01:08

## 2021-06-01 RX ADMIN — HYDROMORPHONE HYDROCHLORIDE 0.5 MG: 1 INJECTION, SOLUTION INTRAMUSCULAR; INTRAVENOUS; SUBCUTANEOUS at 02:50

## 2021-06-01 RX ADMIN — IOPAMIDOL 85 ML: 755 INJECTION, SOLUTION INTRAVENOUS at 02:14

## 2021-06-01 ASSESSMENT — ENCOUNTER SYMPTOMS
VOMITING: 1
NAUSEA: 1
DYSURIA: 0
ABDOMINAL PAIN: 1
FEVER: 0

## 2021-06-01 NOTE — ED PROVIDER NOTES
History   Chief Complaint:  Abdominal Pain     HPI   Jaiden Lyons is a 46 year old male with history of alcohol induced pancreatitis, hypertension, and Type II Diabetes who presents with abdominal pain. Patient reports of upper abdominal pain which began more than a week ago and continues here. He states his symptoms are similar to his past pancreatitis flare-ups. He complains of nausea and vomiting. He denies fever or dysuria. He has been drinking chronically for years and his last drink was yesterday morning at 0500.     Review of Systems   Constitutional: Negative for fever.   Gastrointestinal: Positive for abdominal pain, nausea and vomiting.   Genitourinary: Negative for dysuria.   All other systems reviewed and are negative.    Allergies:  Fentanyl  Insulin  Lisinopril  Morphine    Medications:  Albuterol   Glipizide   Losartan   Protonix   Jardiance    Past Medical History:    Alcohol Dependence   Pancreatitis   Hypertension   Type II Diabetes    Past Surgical History:    EGD  Hip Surgery     Social History:  Arrives with his spouse.     Physical Exam     Patient Vitals for the past 24 hrs:   BP Temp Temp src Pulse Resp SpO2   06/01/21 0310 -- -- -- -- -- 98 %   06/01/21 0300 131/82 -- -- 76 -- 91 %   06/01/21 0250 -- -- -- -- -- 91 %   06/01/21 0230 -- -- -- 78 -- 93 %   06/01/21 0200 (!) 124/109 -- -- 90 -- 96 %   06/01/21 0145 -- -- -- -- -- 95 %   06/01/21 0130 (!) 137/94 -- -- 80 -- 93 %   06/01/21 0115 -- -- -- -- -- 99 %   06/01/21 0100 123/87 -- -- 84 -- 96 %   06/01/21 0016 (!) 146/105 98.3  F (36.8  C) Oral 96 20 96 %       Physical Exam  VS: Reviewed per above  HENT: Mucous membranes moist  EYES: sclera anicteric  CV: Rate as noted,  regular rhythm.   RESP: Effort normal. Breath sounds are normal bilaterally.  GI: epigastric tenderness without rebound/guarding, not distended.  NEURO: Alert, moving all extremities  MSK: No deformity of the extremities  SKIN: Warm and dry    Emergency  Department Course     Imaging:  CT Abdomen Pelvis w Contrast  1.  No bowel obstruction, colitis, diverticulitis, or appendicitis.  2.  Mild atherosclerotic vascular disease. 1.7 cm indeterminate left adrenal nodule, if not already performed, recommend further characterization abdominal MRI on a nonemergent basis.  3.  Coarse pancreatic calcifications, correlate for chronic pancreatitis.  Reading per radiology    Laboratory:   CBC: WBC 4.0, HGB 13.5,   CMP: Glucose 154 (H), Creatinine 0.57 (L), ALT 76 (H),  (H), o/w WNL    Lipase: 91  Alcohol Ethyl: 0.27 (H)    Asymptomatic COVID19 Virus PCR by nasopharyngeal swab: Negative    Emergency Department Course:    Reviewed:  I reviewed nursing notes, vitals, past medical history and care everywhere    Assessments:  0130 I obtained history and examined the patient as noted above.   0304 I rechecked the patient and explained findings.     Interventions:  0108 Zofran 4 mg IV  0122 Dilaudid 0.5 mg IV   0200 0.9% NaCl bolus 1,000 mL IV  0200 Pepcid 20 mg IV   0200 Dilaudid 0.5 mg IV  0200 Zofran 4 mg IV     Disposition:  The patient was discharged to home.     Impression & Plan     Medical Decision Making:  Patient presents to the ER for evaluation of progressive upper abdominal pain and nausea and vomiting.  On arrival vital signs are reassuring.  On exam he has moderate epigastric tenderness without peritoneal signs.  Labs are unremarkable including no lipase elevation or hepatitis/obstructive LFT pattern.  He does have evidence of alcohol intoxication of 0.27 but is clinically coherent.  CT of the abdomen shows coarse pancreatic calcifications that could suggest chronic pancreatitis but no other acute process aside from incidental adrenal nodule.  After antiemetics and pain medication and IV fluids, patient tolerated p.o.  He is agreeable to trial of outpatient management of his symptoms, which I suspect is underlying alcoholic gastritis versus chronic  pancreatitis.  He was given antacids, sucralfate, Zofran, Percocet.  Discussed slowly advancing his diet as tolerated.  Return precautions discussed prior to discharge.    Covid-19  Jaiden Lyons was evaluated during a global COVID-19 pandemic, which necessitated consideration that the patient might be at risk for infection with the SARS-CoV-2 virus that causes COVID-19.   Applicable protocols for evaluation were followed during the patient's care. COVID-19 was considered as part of the patient's evaluation. The plan for testing is: a test was obtained during this visit which returned negative.    Diagnosis:    ICD-10-CM    1. Alcoholic gastritis, presence of bleeding unspecified, unspecified chronicity  K29.20    2. Alcohol-induced chronic pancreatitis (H)  K86.0    3. Adrenal nodule (H)  E27.8        Discharge Medications:  Discharge Medication List as of 6/1/2021  3:18 AM      START taking these medications    Details   famotidine (PEPCID) 20 MG tablet Take 1 tablet (20 mg) by mouth 2 times daily for 14 days, Disp-28 tablet, R-0, Local Print      ondansetron (ZOFRAN ODT) 4 MG ODT tab Take 1 tablet (4 mg) by mouth every 8 hours as needed for nausea or vomiting, Disp-10 tablet, R-0, Local Print      oxyCODONE-acetaminophen (PERCOCET) 5-325 MG tablet Take 1-2 tablets by mouth every 6 hours as needed for severe pain, Disp-12 tablet, R-0, Local Print      sucralfate (CARAFATE) 1 GM tablet Take 1 tablet (1 g) by mouth 4 times daily for 14 days, Disp-56 tablet, R-0, Local Print             Scribe Disclosure:  I, Keenan Morgan, am serving as a scribe at 12:46 AM on 6/1/2021 to document services personally performed by Fernie Colbert MD based on my observations and the provider's statements to me.              Fernie Colbert MD  06/01/21 0614

## 2021-06-01 NOTE — DISCHARGE INSTRUCTIONS
Discharge Instructions  Abdominal Pain    Abdominal pain (belly pain) can be caused by many things. Your evaluation today does not show the exact cause for your pain. Your provider today has decided that it is unlikely your pain is due to a life threatening problem, or a problem requiring surgery or hospital admission. Sometimes those problems cannot be found right away, so it is very important that you follow up as directed.  Sometimes only the changes which occur over time allow the cause of your pain to be found.    Generally, every Emergency Department visit should have a follow-up clinic visit with either a primary or a specialty clinic/provider. Please follow-up as instructed by your emergency provider today. With abdominal pain, we often recommend very close follow-up, such as the following day.    ADULTS:  Return to the Emergency Department right away if:    You get an oral temperature above 102oF or as directed by your provider.  You have blood in your stools. This may be bright red or appear as black, tarry stools.    You keep vomiting (throwing up) or cannot drink liquids.  You see blood when you vomit.   You cannot have a bowel movement or you cannot pass gas.  Your stomach gets bloated or bigger.  Your skin or the whites of your eyes look yellow.  You faint.  You have bloody, frequent or painful urination (peeing).  You have new symptoms or anything that worries you.    CHILDREN:  Return to the Emergency Department right away if your child has any of the above-listed symptoms or the following:    Pushes your hand away or screams/cries when his/her belly is touched.  You notice your child is very fussy or weak.  Your child is very tired and is too tired to eat or drink.  Your child is dehydrated.  Signs of dehydration can be:  Significant change in the amount of wet diapers/urine.  Your infant or child starts to have dry mouth and lips, or no saliva (spit) or tears.    PREGNANT WOMEN:  Return to the  Emergency Department right away if you have any of the above-listed symptoms or the following:    You have bleeding, leaking fluid or passing tissue from the vagina.  You have worse pain or cramping, or pain in your shoulder or back.  You have vomiting that will not stop.  You have a temperature of 100oF or more.  Your baby is not moving as much as usual.  You faint.  You get a bad headache with or without eye problems and abdominal pain.  You have a seizure.  You have unusual discharge from your vagina and abdominal pain.    Abdominal pain is pretty common during pregnancy.  Your pain may or may not be related to your pregnancy. You should follow-up closely with your OB provider so they can evaluate you and your baby.  Until you follow-up with your regular provider, do the following:     Avoid sex and do not put anything in your vagina.  Drink clear fluids.  Only take medications approved by your provider.    MORE INFORMATION:    Appendicitis:  A possible cause of abdominal pain in any person who still has their appendix is acute appendicitis. Appendicitis is often hard to diagnose.  Testing does not always rule out early appendicitis or other causes of abdominal pain. Close follow-up with your provider and re-evaluations may be needed to figure out the reason for your abdominal pain.    Follow-up:  It is very important that you make an appointment with your clinic and go to the appointment.  If you do not follow-up with your primary provider, it may result in missing an important development which could result in permanent injury or disability and/or lasting pain.  If there is any problem keeping your appointment, call your provider or return to the Emergency Department.    Medications:  Take your medications as directed by your provider today.  Before using over-the-counter medications, ask your provider and make sure to take the medications as directed.  If you have any questions about medications, ask your  "provider.    Diet:  Resume your normal diet as much as possible, but do not eat fried, fatty or spicy foods while you have pain.  Do not drink alcohol or have caffeine.  Do not smoke tobacco.    Probiotics: If you have been given an antibiotic, you may want to also take a probiotic pill or eat yogurt with live cultures. Probiotics have \"good bacteria\" to help your intestines stay healthy. Studies have shown that probiotics help prevent diarrhea (loose stools) and other intestine problems (including C. diff infection) when you take antibiotics. You can buy these without a prescription in the pharmacy section of the store.     If you were given a prescription for medicine here today, be sure to read all of the information (including the package insert) that comes with your prescription.  This will include important information about the medicine, its side effects, and any warnings that you need to know about.  The pharmacist who fills the prescription can provide more information and answer questions you may have about the medicine.  If you have questions or concerns that the pharmacist cannot address, please call or return to the Emergency Department.       Remember that you can always come back to the Emergency Department if you are not able to see your regular provider in the amount of time listed above, if you get any new symptoms, or if there is anything that worries you.    Discharge Instructions  Vomiting    You have been seen today for vomiting (throwing up). This is usually caused by a virus, but some bacteria, parasites, medicines or other medical conditions can cause similar symptoms. At this time your provider does not find that your vomiting is a sign of anything dangerous or life-threatening. However, sometimes the signs of serious illness do not show up right away. If you have new or worse symptoms, you may need to be seen again in the Emergency Department or by your primary provider. Remember that " serious problems like appendicitis can start as vomiting.    Generally, every Emergency Department visit should have a follow-up clinic visit with either a primary or a specialty clinic/provider. Please follow-up as instructed by your emergency provider today.    Return to the Emergency Department if:  You keep vomiting and you are not able to keep liquids down.   You feel you are getting dehydrated, such as being very thirsty, not urinating (peeing) at least every 8-12 hours, or feeling faint or lightheaded.   You develop a new fever, or your fever continues for more than 2 days.   You have abdominal (belly pain) that seems worse than cramps, is in one spot, or is getting worse over time. Appendicitis usually causes pain in the right lower abdomen (to the right and below your belly button) so watch for pain in this location.  You have blood in your vomit or stools.   You feel very weak.  You are not starting to improve within 24 hours of your visit here.     What can I do to help myself?  The most important thing to do is to drink clear liquids. If you have been vomiting a lot, it is best to have only small, frequent sips of liquids. Drinking too much at once may cause more vomiting. If you are vomiting often, you must replace minerals, sodium and potassium lost with your illness. Pedialyte  is the best available rehydration liquid but some find that it doesn t taste good so sports drinks are an alterative. You can also drink clear liquids such as water, weak tea, apple juice, and 7-Up . Avoid acid liquids (orange), caffeine (coffee) or alcohol. Do not drink milk until you no longer have diarrhea (loose stools).   After liquids are staying down, you may start eating mild foods. Soda crackers, toast, plain noodles, gelatin, applesauce and bananas are good first choices. Avoid foods that have acid, are spicy, fatty or have a lot of fiber (such as meats, coarse grains, vegetables). You may start eating these foods  again in about 3 days when you are better.   Sometimes treatment includes prescription medicine to prevent nausea (sick to your stomach) and vomiting. If your provider prescribes these for you, take them as directed.   Do not take ibuprofen, naproxen, or other nonsteroidal anti-inflammatory (NSAID) medicines without checking with your healthcare provider.     If you were given a prescription for medicine here today, be sure to read all of the information (including the package insert) that comes with your prescription.  This will include important information about the medicine, its side effects, and any warnings that you need to know about.  The pharmacist who fills the prescription can provide more information and answer questions you may have about the medicine.  If you have questions or concerns that the pharmacist cannot address, please call or return to the Emergency Department.     Remember that you can always come back to the Emergency Department if you are not able to see your regular provider in the amount of time listed above, if you get any new symptoms, or if there is anything that worries you.

## 2021-06-01 NOTE — ED TRIAGE NOTES
Pt states abdominal pain with vomiting for over 3 days. Pt states feels similar to previous episodes of pancreatitis. ABCs intact GCS 15

## 2021-07-02 ENCOUNTER — APPOINTMENT (OUTPATIENT)
Dept: ULTRASOUND IMAGING | Facility: CLINIC | Age: 46
End: 2021-07-02
Attending: EMERGENCY MEDICINE
Payer: COMMERCIAL

## 2021-07-02 ENCOUNTER — HOSPITAL ENCOUNTER (EMERGENCY)
Facility: CLINIC | Age: 46
Discharge: HOME | End: 2021-07-02
Attending: EMERGENCY MEDICINE | Admitting: EMERGENCY MEDICINE
Payer: COMMERCIAL

## 2021-07-02 ENCOUNTER — APPOINTMENT (OUTPATIENT)
Dept: CT IMAGING | Facility: CLINIC | Age: 46
End: 2021-07-02
Attending: EMERGENCY MEDICINE
Payer: COMMERCIAL

## 2021-07-02 VITALS
WEIGHT: 165.34 LBS | TEMPERATURE: 98 F | OXYGEN SATURATION: 95 % | SYSTOLIC BLOOD PRESSURE: 138 MMHG | HEART RATE: 74 BPM | RESPIRATION RATE: 20 BRPM | BODY MASS INDEX: 23.72 KG/M2 | DIASTOLIC BLOOD PRESSURE: 102 MMHG

## 2021-07-02 DIAGNOSIS — F10.929 ALCOHOLIC INTOXICATION WITH COMPLICATION (H): ICD-10-CM

## 2021-07-02 DIAGNOSIS — K29.20 ACUTE ALCOHOLIC GASTRITIS WITHOUT HEMORRHAGE: ICD-10-CM

## 2021-07-02 DIAGNOSIS — R10.13 ABDOMINAL PAIN, EPIGASTRIC: ICD-10-CM

## 2021-07-02 LAB
ALBUMIN SERPL-MCNC: 3.9 G/DL (ref 3.4–5)
ALP SERPL-CCNC: 92 U/L (ref 40–150)
ALT SERPL W P-5'-P-CCNC: 386 U/L (ref 0–70)
ANION GAP SERPL CALCULATED.3IONS-SCNC: 7 MMOL/L (ref 3–14)
AST SERPL W P-5'-P-CCNC: 337 U/L (ref 0–45)
BASOPHILS # BLD AUTO: 0.1 10E9/L (ref 0–0.2)
BASOPHILS NFR BLD AUTO: 1.5 %
BILIRUB SERPL-MCNC: 0.4 MG/DL (ref 0.2–1.3)
BUN SERPL-MCNC: 12 MG/DL (ref 7–30)
CALCIUM SERPL-MCNC: 8.8 MG/DL (ref 8.5–10.1)
CHLORIDE SERPL-SCNC: 106 MMOL/L (ref 94–109)
CO2 SERPL-SCNC: 27 MMOL/L (ref 20–32)
CREAT SERPL-MCNC: 0.55 MG/DL (ref 0.66–1.25)
DIFFERENTIAL METHOD BLD: ABNORMAL
EOSINOPHIL # BLD AUTO: 0.2 10E9/L (ref 0–0.7)
EOSINOPHIL NFR BLD AUTO: 5.3 %
ERYTHROCYTE [DISTWIDTH] IN BLOOD BY AUTOMATED COUNT: 16.9 % (ref 10–15)
ETHANOL SERPL-MCNC: 0.23 G/DL
GFR SERPL CREATININE-BSD FRML MDRD: >90 ML/MIN/{1.73_M2}
GLUCOSE SERPL-MCNC: 149 MG/DL (ref 70–99)
HCT VFR BLD AUTO: 38 % (ref 40–53)
HGB BLD-MCNC: 13.1 G/DL (ref 13.3–17.7)
IMM GRANULOCYTES # BLD: 0 10E9/L (ref 0–0.4)
IMM GRANULOCYTES NFR BLD: 0.3 %
LIPASE SERPL-CCNC: 171 U/L (ref 73–393)
LYMPHOCYTES # BLD AUTO: 1.8 10E9/L (ref 0.8–5.3)
LYMPHOCYTES NFR BLD AUTO: 54.6 %
MCH RBC QN AUTO: 28.7 PG (ref 26.5–33)
MCHC RBC AUTO-ENTMCNC: 34.5 G/DL (ref 31.5–36.5)
MCV RBC AUTO: 83 FL (ref 78–100)
MONOCYTES # BLD AUTO: 0.4 10E9/L (ref 0–1.3)
MONOCYTES NFR BLD AUTO: 13.1 %
NEUTROPHILS # BLD AUTO: 0.9 10E9/L (ref 1.6–8.3)
NEUTROPHILS NFR BLD AUTO: 25.2 %
NRBC # BLD AUTO: 0 10*3/UL
NRBC BLD AUTO-RTO: 0 /100
PLATELET # BLD AUTO: 170 10E9/L (ref 150–450)
POTASSIUM SERPL-SCNC: 3.4 MMOL/L (ref 3.4–5.3)
PROT SERPL-MCNC: 7.9 G/DL (ref 6.8–8.8)
RBC # BLD AUTO: 4.57 10E12/L (ref 4.4–5.9)
SODIUM SERPL-SCNC: 140 MMOL/L (ref 133–144)
WBC # BLD AUTO: 3.4 10E9/L (ref 4–11)

## 2021-07-02 PROCEDURE — 96376 TX/PRO/DX INJ SAME DRUG ADON: CPT

## 2021-07-02 PROCEDURE — 250N000011 HC RX IP 250 OP 636: Performed by: EMERGENCY MEDICINE

## 2021-07-02 PROCEDURE — 82077 ASSAY SPEC XCP UR&BREATH IA: CPT | Performed by: EMERGENCY MEDICINE

## 2021-07-02 PROCEDURE — 258N000003 HC RX IP 258 OP 636: Performed by: EMERGENCY MEDICINE

## 2021-07-02 PROCEDURE — 80053 COMPREHEN METABOLIC PANEL: CPT | Performed by: EMERGENCY MEDICINE

## 2021-07-02 PROCEDURE — 96375 TX/PRO/DX INJ NEW DRUG ADDON: CPT

## 2021-07-02 PROCEDURE — 83690 ASSAY OF LIPASE: CPT | Performed by: EMERGENCY MEDICINE

## 2021-07-02 PROCEDURE — 96374 THER/PROPH/DIAG INJ IV PUSH: CPT | Mod: 59

## 2021-07-02 PROCEDURE — 250N000009 HC RX 250: Performed by: EMERGENCY MEDICINE

## 2021-07-02 PROCEDURE — 85025 COMPLETE CBC W/AUTO DIFF WBC: CPT | Performed by: EMERGENCY MEDICINE

## 2021-07-02 PROCEDURE — 74177 CT ABD & PELVIS W/CONTRAST: CPT

## 2021-07-02 PROCEDURE — 99285 EMERGENCY DEPT VISIT HI MDM: CPT | Mod: 25

## 2021-07-02 PROCEDURE — 96361 HYDRATE IV INFUSION ADD-ON: CPT

## 2021-07-02 PROCEDURE — 76705 ECHO EXAM OF ABDOMEN: CPT

## 2021-07-02 RX ORDER — ALBUTEROL SULFATE 90 UG/1
2 AEROSOL, METERED RESPIRATORY (INHALATION) EVERY 6 HOURS PRN
Qty: 18 G | Refills: 0 | Status: SHIPPED | OUTPATIENT
Start: 2021-07-02 | End: 2022-07-07

## 2021-07-02 RX ORDER — HYDROCODONE BITARTRATE AND ACETAMINOPHEN 5; 325 MG/1; MG/1
1 TABLET ORAL EVERY 4 HOURS PRN
Qty: 10 TABLET | Refills: 0 | Status: SHIPPED | OUTPATIENT
Start: 2021-07-02 | End: 2021-07-05

## 2021-07-02 RX ORDER — ONDANSETRON 2 MG/ML
4 INJECTION INTRAMUSCULAR; INTRAVENOUS
Status: COMPLETED | OUTPATIENT
Start: 2021-07-02 | End: 2021-07-02

## 2021-07-02 RX ORDER — SUCRALFATE ORAL 1 G/10ML
1 SUSPENSION ORAL 4 TIMES DAILY PRN
Qty: 420 ML | Refills: 0 | Status: SHIPPED | OUTPATIENT
Start: 2021-07-02 | End: 2021-11-21

## 2021-07-02 RX ORDER — IOPAMIDOL 755 MG/ML
500 INJECTION, SOLUTION INTRAVASCULAR ONCE
Status: COMPLETED | OUTPATIENT
Start: 2021-07-02 | End: 2021-07-02

## 2021-07-02 RX ORDER — ONDANSETRON 4 MG/1
4 TABLET, ORALLY DISINTEGRATING ORAL EVERY 6 HOURS PRN
Qty: 10 TABLET | Refills: 0 | Status: SHIPPED | OUTPATIENT
Start: 2021-07-02 | End: 2021-07-05

## 2021-07-02 RX ORDER — ONDANSETRON 2 MG/ML
4 INJECTION INTRAMUSCULAR; INTRAVENOUS EVERY 30 MIN PRN
Status: DISCONTINUED | OUTPATIENT
Start: 2021-07-02 | End: 2021-07-02 | Stop reason: HOSPADM

## 2021-07-02 RX ORDER — PANTOPRAZOLE SODIUM 40 MG/1
40 TABLET, DELAYED RELEASE ORAL DAILY
Qty: 30 TABLET | Refills: 0 | Status: SHIPPED | OUTPATIENT
Start: 2021-07-02 | End: 2021-08-01

## 2021-07-02 RX ORDER — OXYCODONE HYDROCHLORIDE 5 MG/1
10 TABLET ORAL ONCE
Status: DISCONTINUED | OUTPATIENT
Start: 2021-07-02 | End: 2021-07-02 | Stop reason: HOSPADM

## 2021-07-02 RX ORDER — HYDROMORPHONE HYDROCHLORIDE 1 MG/ML
0.5 INJECTION, SOLUTION INTRAMUSCULAR; INTRAVENOUS; SUBCUTANEOUS
Status: DISCONTINUED | OUTPATIENT
Start: 2021-07-02 | End: 2021-07-02 | Stop reason: HOSPADM

## 2021-07-02 RX ADMIN — ONDANSETRON 4 MG: 2 INJECTION INTRAMUSCULAR; INTRAVENOUS at 04:04

## 2021-07-02 RX ADMIN — HYDROMORPHONE HYDROCHLORIDE 0.5 MG: 1 INJECTION, SOLUTION INTRAMUSCULAR; INTRAVENOUS; SUBCUTANEOUS at 05:57

## 2021-07-02 RX ADMIN — ONDANSETRON 4 MG: 2 INJECTION INTRAMUSCULAR; INTRAVENOUS at 05:57

## 2021-07-02 RX ADMIN — SODIUM CHLORIDE 1000 ML: 9 INJECTION, SOLUTION INTRAVENOUS at 04:02

## 2021-07-02 RX ADMIN — SODIUM CHLORIDE 65 ML: 9 INJECTION, SOLUTION INTRAVENOUS at 05:28

## 2021-07-02 RX ADMIN — HYDROMORPHONE HYDROCHLORIDE 0.5 MG: 1 INJECTION, SOLUTION INTRAMUSCULAR; INTRAVENOUS; SUBCUTANEOUS at 04:04

## 2021-07-02 RX ADMIN — IOPAMIDOL 83 ML: 755 INJECTION, SOLUTION INTRAVENOUS at 05:28
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
## 2021-07-02 NOTE — ED NOTES
Pt here for chronic abdominal pain , pancreatitis . Pt has been vomiting . Describes pain as all over stomach and threw to back

## 2021-10-19 ENCOUNTER — APPOINTMENT (OUTPATIENT)
Dept: CT IMAGING | Facility: CLINIC | Age: 46
End: 2021-10-19
Attending: EMERGENCY MEDICINE
Payer: COMMERCIAL

## 2021-10-19 ENCOUNTER — APPOINTMENT (OUTPATIENT)
Dept: ULTRASOUND IMAGING | Facility: CLINIC | Age: 46
End: 2021-10-19
Attending: INTERNAL MEDICINE
Payer: COMMERCIAL

## 2021-10-19 ENCOUNTER — HOSPITAL ENCOUNTER (INPATIENT)
Facility: CLINIC | Age: 46
LOS: 4 days | Discharge: HOME | End: 2021-10-23
Attending: EMERGENCY MEDICINE | Admitting: INTERNAL MEDICINE
Payer: COMMERCIAL

## 2021-10-19 DIAGNOSIS — F10.939 ALCOHOL WITHDRAWAL SYNDROME WITH COMPLICATION (H): ICD-10-CM

## 2021-10-19 DIAGNOSIS — F10.139 ALCOHOL ABUSE WITH WITHDRAWAL (H): Primary | ICD-10-CM

## 2021-10-19 DIAGNOSIS — K86.1 CHRONIC PANCREATITIS, UNSPECIFIED PANCREATITIS TYPE (H): ICD-10-CM

## 2021-10-19 DIAGNOSIS — F41.9 ANXIETY: ICD-10-CM

## 2021-10-19 LAB
ALBUMIN SERPL-MCNC: 4.3 G/DL (ref 3.4–5)
ALP SERPL-CCNC: 103 U/L (ref 40–150)
ALT SERPL W P-5'-P-CCNC: 575 U/L (ref 0–70)
ANION GAP SERPL CALCULATED.3IONS-SCNC: 13 MMOL/L (ref 3–14)
APAP SERPL-MCNC: <2 MG/L (ref 10–30)
APTT PPP: 25 SECONDS (ref 22–38)
AST SERPL W P-5'-P-CCNC: 1413 U/L (ref 0–45)
ATRIAL RATE - MUSE: 108 BPM
BASOPHILS # BLD AUTO: 0.1 10E3/UL (ref 0–0.2)
BASOPHILS NFR BLD AUTO: 1 %
BILIRUB SERPL-MCNC: 1 MG/DL (ref 0.2–1.3)
BUN SERPL-MCNC: 11 MG/DL (ref 7–30)
CALCIUM SERPL-MCNC: 9 MG/DL (ref 8.5–10.1)
CHLORIDE BLD-SCNC: 99 MMOL/L (ref 94–109)
CO2 SERPL-SCNC: 28 MMOL/L (ref 20–32)
CREAT SERPL-MCNC: 0.57 MG/DL (ref 0.66–1.25)
DIASTOLIC BLOOD PRESSURE - MUSE: NORMAL MMHG
EOSINOPHIL # BLD AUTO: 0.3 10E3/UL (ref 0–0.7)
EOSINOPHIL NFR BLD AUTO: 6 %
ERYTHROCYTE [DISTWIDTH] IN BLOOD BY AUTOMATED COUNT: 15.1 % (ref 10–15)
ETHANOL SERPL-MCNC: 0.02 G/DL
GFR SERPL CREATININE-BSD FRML MDRD: >90 ML/MIN/1.73M2
GGT SERPL-CCNC: 729 U/L (ref 0–75)
GLUCOSE BLD-MCNC: 147 MG/DL (ref 70–99)
GLUCOSE BLDC GLUCOMTR-MCNC: 183 MG/DL (ref 70–99)
HBA1C MFR BLD: 6.6 % (ref 0–5.6)
HCT VFR BLD AUTO: 43.7 % (ref 40–53)
HGB BLD-MCNC: 15.1 G/DL (ref 13.3–17.7)
HOLD SPECIMEN: NORMAL
IMM GRANULOCYTES # BLD: 0 10E3/UL
IMM GRANULOCYTES NFR BLD: 0 %
INR PPP: 1 (ref 0.85–1.15)
INTERPRETATION ECG - MUSE: NORMAL
LACTATE SERPL-SCNC: 3.4 MMOL/L (ref 0.7–2)
LIPASE SERPL-CCNC: 248 U/L (ref 73–393)
LYMPHOCYTES # BLD AUTO: 1.1 10E3/UL (ref 0.8–5.3)
LYMPHOCYTES NFR BLD AUTO: 25 %
MAGNESIUM SERPL-MCNC: 1.5 MG/DL (ref 1.6–2.3)
MAGNESIUM SERPL-MCNC: 1.6 MG/DL (ref 1.6–2.3)
MCH RBC QN AUTO: 29.6 PG (ref 26.5–33)
MCHC RBC AUTO-ENTMCNC: 34.6 G/DL (ref 31.5–36.5)
MCV RBC AUTO: 86 FL (ref 78–100)
MONOCYTES # BLD AUTO: 0.5 10E3/UL (ref 0–1.3)
MONOCYTES NFR BLD AUTO: 11 %
NEUTROPHILS # BLD AUTO: 2.4 10E3/UL (ref 1.6–8.3)
NEUTROPHILS NFR BLD AUTO: 57 %
NRBC # BLD AUTO: 0 10E3/UL
NRBC BLD AUTO-RTO: 0 /100
P AXIS - MUSE: 76 DEGREES
PHOSPHATE SERPL-MCNC: 3.4 MG/DL (ref 2.5–4.5)
PLATELET # BLD AUTO: 146 10E3/UL (ref 150–450)
POTASSIUM BLD-SCNC: 3.9 MMOL/L (ref 3.4–5.3)
PR INTERVAL - MUSE: 154 MS
PROT SERPL-MCNC: 9.2 G/DL (ref 6.8–8.8)
QRS DURATION - MUSE: 92 MS
QT - MUSE: 352 MS
QTC - MUSE: 471 MS
R AXIS - MUSE: -32 DEGREES
RBC # BLD AUTO: 5.1 10E6/UL (ref 4.4–5.9)
SARS-COV-2 RNA RESP QL NAA+PROBE: NEGATIVE
SODIUM SERPL-SCNC: 140 MMOL/L (ref 133–144)
SYSTOLIC BLOOD PRESSURE - MUSE: NORMAL MMHG
T AXIS - MUSE: 76 DEGREES
TROPONIN I SERPL-MCNC: <0.015 UG/L (ref 0–0.04)
VENTRICULAR RATE- MUSE: 108 BPM
WBC # BLD AUTO: 4.2 10E3/UL (ref 4–11)

## 2021-10-19 PROCEDURE — 96365 THER/PROPH/DIAG IV INF INIT: CPT | Mod: 59

## 2021-10-19 PROCEDURE — 258N000003 HC RX IP 258 OP 636: Performed by: INTERNAL MEDICINE

## 2021-10-19 PROCEDURE — 82040 ASSAY OF SERUM ALBUMIN: CPT | Performed by: EMERGENCY MEDICINE

## 2021-10-19 PROCEDURE — 250N000013 HC RX MED GY IP 250 OP 250 PS 637: Performed by: PHYSICIAN ASSISTANT

## 2021-10-19 PROCEDURE — 96361 HYDRATE IV INFUSION ADD-ON: CPT

## 2021-10-19 PROCEDURE — 84484 ASSAY OF TROPONIN QUANT: CPT | Performed by: EMERGENCY MEDICINE

## 2021-10-19 PROCEDURE — 36415 COLL VENOUS BLD VENIPUNCTURE: CPT | Performed by: INTERNAL MEDICINE

## 2021-10-19 PROCEDURE — 99285 EMERGENCY DEPT VISIT HI MDM: CPT | Mod: 25

## 2021-10-19 PROCEDURE — 74177 CT ABD & PELVIS W/CONTRAST: CPT

## 2021-10-19 PROCEDURE — 93005 ELECTROCARDIOGRAM TRACING: CPT

## 2021-10-19 PROCEDURE — 82977 ASSAY OF GGT: CPT | Performed by: INTERNAL MEDICINE

## 2021-10-19 PROCEDURE — 86709 HEPATITIS A IGM ANTIBODY: CPT | Performed by: INTERNAL MEDICINE

## 2021-10-19 PROCEDURE — 83735 ASSAY OF MAGNESIUM: CPT | Performed by: PHYSICIAN ASSISTANT

## 2021-10-19 PROCEDURE — 96375 TX/PRO/DX INJ NEW DRUG ADDON: CPT

## 2021-10-19 PROCEDURE — C9803 HOPD COVID-19 SPEC COLLECT: HCPCS

## 2021-10-19 PROCEDURE — 96376 TX/PRO/DX INJ SAME DRUG ADON: CPT

## 2021-10-19 PROCEDURE — 86705 HEP B CORE ANTIBODY IGM: CPT | Performed by: INTERNAL MEDICINE

## 2021-10-19 PROCEDURE — 258N000003 HC RX IP 258 OP 636: Performed by: EMERGENCY MEDICINE

## 2021-10-19 PROCEDURE — 250N000011 HC RX IP 250 OP 636: Performed by: PHYSICIAN ASSISTANT

## 2021-10-19 PROCEDURE — 250N000011 HC RX IP 250 OP 636: Performed by: INTERNAL MEDICINE

## 2021-10-19 PROCEDURE — 250N000011 HC RX IP 250 OP 636: Performed by: EMERGENCY MEDICINE

## 2021-10-19 PROCEDURE — 80143 DRUG ASSAY ACETAMINOPHEN: CPT | Performed by: INTERNAL MEDICINE

## 2021-10-19 PROCEDURE — 84100 ASSAY OF PHOSPHORUS: CPT | Performed by: PHYSICIAN ASSISTANT

## 2021-10-19 PROCEDURE — 83036 HEMOGLOBIN GLYCOSYLATED A1C: CPT | Performed by: PHYSICIAN ASSISTANT

## 2021-10-19 PROCEDURE — 86706 HEP B SURFACE ANTIBODY: CPT | Performed by: INTERNAL MEDICINE

## 2021-10-19 PROCEDURE — 83735 ASSAY OF MAGNESIUM: CPT | Performed by: EMERGENCY MEDICINE

## 2021-10-19 PROCEDURE — 85025 COMPLETE CBC W/AUTO DIFF WBC: CPT | Performed by: EMERGENCY MEDICINE

## 2021-10-19 PROCEDURE — 99223 1ST HOSP IP/OBS HIGH 75: CPT | Mod: AI | Performed by: INTERNAL MEDICINE

## 2021-10-19 PROCEDURE — 83690 ASSAY OF LIPASE: CPT | Performed by: EMERGENCY MEDICINE

## 2021-10-19 PROCEDURE — 93976 VASCULAR STUDY: CPT

## 2021-10-19 PROCEDURE — HZ2ZZZZ DETOXIFICATION SERVICES FOR SUBSTANCE ABUSE TREATMENT: ICD-10-PCS | Performed by: INTERNAL MEDICINE

## 2021-10-19 PROCEDURE — 87635 SARS-COV-2 COVID-19 AMP PRB: CPT | Performed by: EMERGENCY MEDICINE

## 2021-10-19 PROCEDURE — 250N000009 HC RX 250: Performed by: INTERNAL MEDICINE

## 2021-10-19 PROCEDURE — 85730 THROMBOPLASTIN TIME PARTIAL: CPT | Performed by: INTERNAL MEDICINE

## 2021-10-19 PROCEDURE — 99207 PR APP CREDIT; MD BILLING SHARED VISIT: CPT | Performed by: PHYSICIAN ASSISTANT

## 2021-10-19 PROCEDURE — 85610 PROTHROMBIN TIME: CPT | Performed by: INTERNAL MEDICINE

## 2021-10-19 PROCEDURE — 82077 ASSAY SPEC XCP UR&BREATH IA: CPT | Performed by: PHYSICIAN ASSISTANT

## 2021-10-19 PROCEDURE — 250N000009 HC RX 250: Performed by: EMERGENCY MEDICINE

## 2021-10-19 PROCEDURE — 36415 COLL VENOUS BLD VENIPUNCTURE: CPT | Performed by: EMERGENCY MEDICINE

## 2021-10-19 PROCEDURE — 87902 NFCT AGT GNTYP ALYS HEP C: CPT | Performed by: INTERNAL MEDICINE

## 2021-10-19 PROCEDURE — 120N000001 HC R&B MED SURG/OB

## 2021-10-19 PROCEDURE — C9113 INJ PANTOPRAZOLE SODIUM, VIA: HCPCS | Performed by: INTERNAL MEDICINE

## 2021-10-19 PROCEDURE — 83605 ASSAY OF LACTIC ACID: CPT | Performed by: EMERGENCY MEDICINE

## 2021-10-19 RX ORDER — ONDANSETRON 2 MG/ML
4 INJECTION INTRAMUSCULAR; INTRAVENOUS ONCE
Status: COMPLETED | OUTPATIENT
Start: 2021-10-19 | End: 2021-10-19

## 2021-10-19 RX ORDER — NICOTINE POLACRILEX 4 MG
15-30 LOZENGE BUCCAL
Status: DISCONTINUED | OUTPATIENT
Start: 2021-10-19 | End: 2021-10-23 | Stop reason: HOSPADM

## 2021-10-19 RX ORDER — FLUMAZENIL 0.1 MG/ML
0.2 INJECTION, SOLUTION INTRAVENOUS
Status: DISCONTINUED | OUTPATIENT
Start: 2021-10-19 | End: 2021-10-23 | Stop reason: HOSPADM

## 2021-10-19 RX ORDER — LIDOCAINE 40 MG/G
CREAM TOPICAL
Status: DISCONTINUED | OUTPATIENT
Start: 2021-10-19 | End: 2021-10-23 | Stop reason: HOSPADM

## 2021-10-19 RX ORDER — CLONIDINE HYDROCHLORIDE 0.1 MG/1
0.1 TABLET ORAL EVERY 8 HOURS
Status: DISCONTINUED | OUTPATIENT
Start: 2021-10-19 | End: 2021-10-23 | Stop reason: HOSPADM

## 2021-10-19 RX ORDER — SUCRALFATE 1 G/1
1 TABLET ORAL
Status: DISCONTINUED | OUTPATIENT
Start: 2021-10-19 | End: 2021-10-20

## 2021-10-19 RX ORDER — HALOPERIDOL 5 MG/ML
2.5-5 INJECTION INTRAMUSCULAR EVERY 6 HOURS PRN
Status: DISCONTINUED | OUTPATIENT
Start: 2021-10-19 | End: 2021-10-22

## 2021-10-19 RX ORDER — GABAPENTIN 100 MG/1
100 CAPSULE ORAL EVERY 8 HOURS
Status: DISCONTINUED | OUTPATIENT
Start: 2021-10-19 | End: 2021-10-23 | Stop reason: HOSPADM

## 2021-10-19 RX ORDER — NALOXONE HYDROCHLORIDE 0.4 MG/ML
0.2 INJECTION, SOLUTION INTRAMUSCULAR; INTRAVENOUS; SUBCUTANEOUS
Status: DISCONTINUED | OUTPATIENT
Start: 2021-10-19 | End: 2021-10-23 | Stop reason: HOSPADM

## 2021-10-19 RX ORDER — AMOXICILLIN 250 MG
2 CAPSULE ORAL 2 TIMES DAILY
Status: DISCONTINUED | OUTPATIENT
Start: 2021-10-19 | End: 2021-10-23 | Stop reason: HOSPADM

## 2021-10-19 RX ORDER — FOLIC ACID 1 MG/1
1 TABLET ORAL DAILY
Status: DISCONTINUED | OUTPATIENT
Start: 2021-10-20 | End: 2021-10-23 | Stop reason: HOSPADM

## 2021-10-19 RX ORDER — NALOXONE HYDROCHLORIDE 0.4 MG/ML
0.4 INJECTION, SOLUTION INTRAMUSCULAR; INTRAVENOUS; SUBCUTANEOUS
Status: DISCONTINUED | OUTPATIENT
Start: 2021-10-19 | End: 2021-10-23 | Stop reason: HOSPADM

## 2021-10-19 RX ORDER — ONDANSETRON 2 MG/ML
4 INJECTION INTRAMUSCULAR; INTRAVENOUS EVERY 6 HOURS PRN
Status: DISCONTINUED | OUTPATIENT
Start: 2021-10-19 | End: 2021-10-23 | Stop reason: HOSPADM

## 2021-10-19 RX ORDER — OLANZAPINE 5 MG/1
5-10 TABLET, ORALLY DISINTEGRATING ORAL EVERY 6 HOURS PRN
Status: DISCONTINUED | OUTPATIENT
Start: 2021-10-19 | End: 2021-10-23 | Stop reason: HOSPADM

## 2021-10-19 RX ORDER — HYDROXYZINE HYDROCHLORIDE 25 MG/1
25 TABLET, FILM COATED ORAL EVERY 6 HOURS PRN
Status: DISCONTINUED | OUTPATIENT
Start: 2021-10-19 | End: 2021-10-23 | Stop reason: HOSPADM

## 2021-10-19 RX ORDER — HYDROMORPHONE HYDROCHLORIDE 1 MG/ML
0.5 INJECTION, SOLUTION INTRAMUSCULAR; INTRAVENOUS; SUBCUTANEOUS ONCE
Status: COMPLETED | OUTPATIENT
Start: 2021-10-19 | End: 2021-10-19

## 2021-10-19 RX ORDER — SODIUM CHLORIDE 9 MG/ML
INJECTION, SOLUTION INTRAVENOUS CONTINUOUS
Status: DISCONTINUED | OUTPATIENT
Start: 2021-10-19 | End: 2021-10-23 | Stop reason: HOSPADM

## 2021-10-19 RX ORDER — LORAZEPAM 2 MG/ML
1-2 INJECTION INTRAMUSCULAR EVERY 30 MIN PRN
Status: DISCONTINUED | OUTPATIENT
Start: 2021-10-19 | End: 2021-10-22

## 2021-10-19 RX ORDER — MULTIPLE VITAMINS W/ MINERALS TAB 9MG-400MCG
1 TAB ORAL DAILY
Status: DISCONTINUED | OUTPATIENT
Start: 2021-10-20 | End: 2021-10-23 | Stop reason: HOSPADM

## 2021-10-19 RX ORDER — OXYCODONE HYDROCHLORIDE 5 MG/1
5 TABLET ORAL EVERY 4 HOURS PRN
Status: DISCONTINUED | OUTPATIENT
Start: 2021-10-19 | End: 2021-10-23 | Stop reason: HOSPADM

## 2021-10-19 RX ORDER — DEXTROSE MONOHYDRATE 25 G/50ML
25-50 INJECTION, SOLUTION INTRAVENOUS
Status: DISCONTINUED | OUTPATIENT
Start: 2021-10-19 | End: 2021-10-23 | Stop reason: HOSPADM

## 2021-10-19 RX ORDER — HYDROMORPHONE HYDROCHLORIDE 1 MG/ML
0.5 INJECTION, SOLUTION INTRAMUSCULAR; INTRAVENOUS; SUBCUTANEOUS
Status: DISCONTINUED | OUTPATIENT
Start: 2021-10-19 | End: 2021-10-19

## 2021-10-19 RX ORDER — AMOXICILLIN 250 MG
1 CAPSULE ORAL 2 TIMES DAILY
Status: DISCONTINUED | OUTPATIENT
Start: 2021-10-19 | End: 2021-10-23 | Stop reason: HOSPADM

## 2021-10-19 RX ORDER — NICOTINE 21 MG/24HR
1 PATCH, TRANSDERMAL 24 HOURS TRANSDERMAL DAILY
Status: DISCONTINUED | OUTPATIENT
Start: 2021-10-19 | End: 2021-10-23 | Stop reason: HOSPADM

## 2021-10-19 RX ORDER — IOPAMIDOL 755 MG/ML
83 INJECTION, SOLUTION INTRAVASCULAR ONCE
Status: COMPLETED | OUTPATIENT
Start: 2021-10-19 | End: 2021-10-19

## 2021-10-19 RX ORDER — LANOLIN ALCOHOL/MO/W.PET/CERES
100 CREAM (GRAM) TOPICAL DAILY
Status: DISCONTINUED | OUTPATIENT
Start: 2021-10-20 | End: 2021-10-23 | Stop reason: HOSPADM

## 2021-10-19 RX ORDER — ONDANSETRON 4 MG/1
4 TABLET, ORALLY DISINTEGRATING ORAL EVERY 6 HOURS PRN
Status: DISCONTINUED | OUTPATIENT
Start: 2021-10-19 | End: 2021-10-23 | Stop reason: HOSPADM

## 2021-10-19 RX ORDER — HYDROXYZINE HYDROCHLORIDE 50 MG/1
50 TABLET, FILM COATED ORAL EVERY 6 HOURS PRN
Status: DISCONTINUED | OUTPATIENT
Start: 2021-10-19 | End: 2021-10-23 | Stop reason: HOSPADM

## 2021-10-19 RX ORDER — POLYETHYLENE GLYCOL 3350 17 G/17G
17 POWDER, FOR SOLUTION ORAL DAILY
Status: DISCONTINUED | OUTPATIENT
Start: 2021-10-19 | End: 2021-10-23 | Stop reason: HOSPADM

## 2021-10-19 RX ORDER — LORAZEPAM 2 MG/ML
1 INJECTION INTRAMUSCULAR ONCE
Status: COMPLETED | OUTPATIENT
Start: 2021-10-19 | End: 2021-10-19

## 2021-10-19 RX ORDER — LORAZEPAM 1 MG/1
1-2 TABLET ORAL EVERY 30 MIN PRN
Status: DISCONTINUED | OUTPATIENT
Start: 2021-10-19 | End: 2021-10-22

## 2021-10-19 RX ORDER — HYDROMORPHONE HYDROCHLORIDE 1 MG/ML
.3-.5 INJECTION, SOLUTION INTRAMUSCULAR; INTRAVENOUS; SUBCUTANEOUS EVERY 4 HOURS PRN
Status: DISCONTINUED | OUTPATIENT
Start: 2021-10-19 | End: 2021-10-20

## 2021-10-19 RX ORDER — LORAZEPAM 2 MG/ML
0.5 INJECTION INTRAMUSCULAR ONCE
Status: COMPLETED | OUTPATIENT
Start: 2021-10-19 | End: 2021-10-19

## 2021-10-19 RX ORDER — LORAZEPAM 2 MG/ML
2 INJECTION INTRAMUSCULAR ONCE
Status: COMPLETED | OUTPATIENT
Start: 2021-10-19 | End: 2021-10-19

## 2021-10-19 RX ORDER — MAGNESIUM SULFATE HEPTAHYDRATE 40 MG/ML
2 INJECTION, SOLUTION INTRAVENOUS ONCE
Status: COMPLETED | OUTPATIENT
Start: 2021-10-19 | End: 2021-10-19

## 2021-10-19 RX ADMIN — LORAZEPAM 2 MG: 2 INJECTION INTRAMUSCULAR; INTRAVENOUS at 20:43

## 2021-10-19 RX ADMIN — IOPAMIDOL 83 ML: 755 INJECTION, SOLUTION INTRAVENOUS at 15:01

## 2021-10-19 RX ADMIN — HYDROMORPHONE HYDROCHLORIDE 0.5 MG: 1 INJECTION, SOLUTION INTRAMUSCULAR; INTRAVENOUS; SUBCUTANEOUS at 16:38

## 2021-10-19 RX ADMIN — LORAZEPAM 1 MG: 2 INJECTION INTRAMUSCULAR; INTRAVENOUS at 13:02

## 2021-10-19 RX ADMIN — PANTOPRAZOLE SODIUM 40 MG: 40 INJECTION, POWDER, FOR SOLUTION INTRAVENOUS at 16:59

## 2021-10-19 RX ADMIN — SODIUM CHLORIDE 1000 ML: 9 INJECTION, SOLUTION INTRAVENOUS at 12:55

## 2021-10-19 RX ADMIN — HYDROMORPHONE HYDROCHLORIDE 0.5 MG: 1 INJECTION, SOLUTION INTRAMUSCULAR; INTRAVENOUS; SUBCUTANEOUS at 20:36

## 2021-10-19 RX ADMIN — HYDROMORPHONE HYDROCHLORIDE 0.5 MG: 1 INJECTION, SOLUTION INTRAMUSCULAR; INTRAVENOUS; SUBCUTANEOUS at 14:49

## 2021-10-19 RX ADMIN — SODIUM CHLORIDE: 9 INJECTION, SOLUTION INTRAVENOUS at 23:51

## 2021-10-19 RX ADMIN — MAGNESIUM SULFATE HEPTAHYDRATE 2 G: 40 INJECTION, SOLUTION INTRAVENOUS at 13:39

## 2021-10-19 RX ADMIN — HALOPERIDOL LACTATE 5 MG: 5 INJECTION, SOLUTION INTRAMUSCULAR at 23:47

## 2021-10-19 RX ADMIN — LORAZEPAM 2 MG: 2 INJECTION INTRAMUSCULAR; INTRAVENOUS at 21:35

## 2021-10-19 RX ADMIN — LORAZEPAM 2 MG: 2 INJECTION INTRAMUSCULAR; INTRAVENOUS at 23:47

## 2021-10-19 RX ADMIN — CLONIDINE HYDROCHLORIDE 0.1 MG: 0.1 TABLET ORAL at 21:23

## 2021-10-19 RX ADMIN — LORAZEPAM 2 MG: 2 INJECTION INTRAMUSCULAR; INTRAVENOUS at 16:41

## 2021-10-19 RX ADMIN — HYDROMORPHONE HYDROCHLORIDE 0.5 MG: 1 INJECTION, SOLUTION INTRAMUSCULAR; INTRAVENOUS; SUBCUTANEOUS at 17:56

## 2021-10-19 RX ADMIN — HYDROMORPHONE HYDROCHLORIDE 0.5 MG: 1 INJECTION, SOLUTION INTRAMUSCULAR; INTRAVENOUS; SUBCUTANEOUS at 13:00

## 2021-10-19 RX ADMIN — GABAPENTIN 100 MG: 100 CAPSULE ORAL at 21:24

## 2021-10-19 RX ADMIN — SODIUM CHLORIDE: 9 INJECTION, SOLUTION INTRAVENOUS at 21:22

## 2021-10-19 RX ADMIN — FOLIC ACID: 5 INJECTION, SOLUTION INTRAMUSCULAR; INTRAVENOUS; SUBCUTANEOUS at 17:05

## 2021-10-19 RX ADMIN — LORAZEPAM 0.5 MG: 2 INJECTION INTRAMUSCULAR; INTRAVENOUS at 14:52

## 2021-10-19 RX ADMIN — ONDANSETRON 4 MG: 2 INJECTION INTRAMUSCULAR; INTRAVENOUS at 12:57

## 2021-10-19 RX ADMIN — LORAZEPAM 2 MG: 2 INJECTION INTRAMUSCULAR; INTRAVENOUS at 22:15

## 2021-10-19 RX ADMIN — SODIUM CHLORIDE 61 ML: 9 INJECTION, SOLUTION INTRAVENOUS at 15:00

## 2021-10-19 RX ADMIN — NICOTINE 1 PATCH: 14 PATCH, EXTENDED RELEASE TRANSDERMAL at 21:41

## 2021-10-19 RX ADMIN — SUCRALFATE 1 G: 1 TABLET ORAL at 21:38

## 2021-10-19 RX ADMIN — HYDROMORPHONE HYDROCHLORIDE 0.5 MG: 1 INJECTION, SOLUTION INTRAMUSCULAR; INTRAVENOUS; SUBCUTANEOUS at 19:30

## 2021-10-19 ASSESSMENT — ACTIVITIES OF DAILY LIVING (ADL)
PATIENT_/_FAMILY_COMMUNICATION_STYLE: SPOKEN LANGUAGE (ENGLISH OR BILINGUAL)
DIFFICULTY_EATING/SWALLOWING: NO
WALKING_OR_CLIMBING_STAIRS_DIFFICULTY: NO
CONCENTRATING,_REMEMBERING_OR_MAKING_DECISIONS_DIFFICULTY: NO
WEAR_GLASSES_OR_BLIND: NO
DOING_ERRANDS_INDEPENDENTLY_DIFFICULTY: NO
FALL_HISTORY_WITHIN_LAST_SIX_MONTHS: NO
TOILETING_ISSUES: NO
DIFFICULTY_COMMUNICATING: NO
HEARING_DIFFICULTY_OR_DEAF: NO
DRESSING/BATHING_DIFFICULTY: NO
ADLS_ACUITY_SCORE: 8

## 2021-10-19 ASSESSMENT — ENCOUNTER SYMPTOMS
NAUSEA: 1
DIAPHORESIS: 1
TREMORS: 1
FLANK PAIN: 1
NERVOUS/ANXIOUS: 1
VOMITING: 1
ABDOMINAL PAIN: 1

## 2021-10-19 ASSESSMENT — MIFFLIN-ST. JEOR
SCORE: 1656.67
SCORE: 1670.04

## 2021-10-19 NOTE — PHARMACY-ADMISSION MEDICATION HISTORY
Admission medication history interview status for this patient is complete. See [facility] admission navigator for allergy information, prior to admission medications and immunization status.     Medication history interview done, indicate source(s): Patient and Family  Medication history resources (including written lists, pill bottles, clinic record):None  Pharmacy: -    Changes made to PTA medication list:  Added: jardiance  Changed: -  Reported as Not Taking: -  Removed: -    Actions taken by pharmacist (provider contacted, etc):None     Additional medication history information:None    Medication reconciliation/reorder completed by provider prior to medication history?  no   (Y/N)     For patients on insulin therapy:   Do you use sliding scale insulin based on blood sugars?   What is your pre-meal insulin coverage?    Do you typically eat three meals a day?   How many times do you check your blood glucose per day?   How many episodes of hypoglycemia do you typically have per month?   Do you have a Continuous Glucose Monitor (CGM)?      Prior to Admission medications    Medication Sig Last Dose Taking? Auth Provider   empagliflozin (JARDIANCE) 10 MG TABS tablet Take 10 mg by mouth daily Past Month at Unknown time Yes Unknown, Entered By History   glipiZIDE (GLUCOTROL) 5 MG tablet Take 5 mg by mouth daily before breakfast Past Month at Unknown time Yes Unknown, Entered By History   losartan (COZAAR) 50 MG tablet Take 50 mg by mouth daily Past Month at Unknown time Yes Unknown, Entered By History   pantoprazole (PROTONIX) 40 MG EC tablet Take 1 tablet (40 mg) by mouth 2 times daily (before meals)  Patient taking differently: Take 40 mg by mouth 2 times daily as needed   Yes Santy Oliva MD   sucralfate (CARAFATE) 1 GM/10ML suspension Take 10 mLs (1 g) by mouth 4 times daily as needed (prn upper abd pain)  Yes Fantasma Ochoa MD   albuterol (PROAIR HFA/PROVENTIL HFA/VENTOLIN HFA) 108 (90 Base) MCG/ACT inhaler Inhale 2  puffs into the lungs every 6 hours as needed for shortness of breath / dyspnea or wheezing   Fantasma Ochoa MD

## 2021-10-19 NOTE — H&P
[facility] Hospitalist Admission Note  Name: Jaiden Lyons    MRN: [id number]  YOB: 1975    Age: 46 year old  Date of admission: 10/19/2021  Primary care provider: No Ref-Primary, Physician        Assessment & Plan   Jaiden Lyons is a 46 year old male with PMHx significant for alcohol abuse and dependence, alcoholic hepatitis, alcohol withdrawal with seizure, alcohol induced pancreatitis, hypertension, asthma, previously diet-controlled diabetes, who was admitted on 10/19/2021 with abdominal pain and symptoms of alcohol withdrawal.    #Alcohol Induced Hepatitis: Presented with acute upper abdominal pain radiating to bilateral flanks with associated nonbloody emesis and nausea.  Pain improved after Dilaudid.  Per patient reminiscent of prior pancreatitis.  No symptoms of GI bleeding. Lab work shows acute hepatitis with AST greater than ALT, stable hemoglobin and lipase within normal limits.  Suspect abdominal pain is secondary to acute hepatitis versus gastritis secondary to binge alcohol use.  CT abdomen pelvis is pending.  If this shows shows inflammation in the pancreas will make n.p.o., increase fluids..  -IV fluids  -IV PPI  -restart carafate and GI cocktail PRN  -Monitor stool output and symptoms  -analgesia as needed, hold APAP for now.  Consider limited use of NSAIDs if no evidence of complications secondary to PUD on CT scan     #Alcohol Withdrawal: Last drink around 1200 day prior to presentation. Reports 1/2-1 pint hard alcohol use daily.  Unclear when the last period of sobriety was prior, estimates around February after hospitalization in 2021. Has history of alcohol abuse and dependence.  Prior withdrawals requiring hospitalization and complications of chronic alcohol use.  -Check EtOH level  -[facility] protocol  -Replace vitamins, thiamine  -Symptom triggered dosing with Ativan  -Start gabapentin in attempt to reduce severity of alcohol withdrawal, will order 100 mg every 8 hours  and titrate if renal function remains stable  -clonidine prn for HTN, diastolic BP over 100  -IV fluids    #Alcohol dependence, abuse: Daily use.  Interested in cessation.    -Manage withdrawal as noted above.    -Consult with DEC and social work to assist in outpatient resources once clinically appropriate.    #Lactic Acidosis: Lactic acid 3.4 on admission.  Secondary to binge alcohol use and alcohol withdrawal.  No evidence of sepsis or current bacterial infection.  Will not recheck lactic acid level at this time in the absence of other symptoms as would not change current plan of care.     #Tobacco dependence: Smokes a half to full pack per day.  Encourage cessation.  Nicotine replacement ordered.    #Right Hip Pain: Complains of chronic right hip pain and instability.  Has shortened right lower extremity from prior hip surgery as a teenager.  Apparently was supposed to have this surgically repaired.  Continue pain control and outpatient follow-up.  Ambulates with cane at baseline.    #Type II DM: Uncontrolled, A1c looks like it was 12.4, 6 months ago.  The patient denies taking any insulin and it is documented form 2018-19 somewhere that he had some facial swelling from unknown type of insulin treated with Benadryl with this.    It might be that he is on Glucotrol PTA alone?. Glucose on arrival is 147.  -ideally we would start him on basal insulin based on weight [ 0.4 U/75 kg] for about ~10 U nightly while not tolerating PO but given his prior reaction to insulin I would hold off on doing this for noq  -Hold Glucotrol while not tolerating p.o. and given his acute liver injury  -Monitor blood sugars and will readdress tomorrow    Awaiting formal pharmacy reconciliation to resume home medications.     DVT Prophylaxis: Pneumatic Compression Devices  Code Status: Full Code  Expected discharge: 2-3 days as withdrawal symptoms improved, tolerating p.o.  Back to prior living arrangement with outpatient resources for  "alcohol cessation..    COVID PCR STATUS: Pending, asymptomatic. No precautions.   Family Updated with Plan of Care: Sofía is at bedside.      Felicita Saravia PA-C    Primary Care Physician   Physician No Ref-Primary    Chief Complaint   \"abdominal pain\"    History is obtained from the patient   Services Used: No    History of Present Illness   Jaiden Lyons is a 46 year old male with PMHx significant for alcohol abuse and dependence, alcoholic hepatitis, alcohol withdrawal with seizure, alcohol induced pancreatitis, hypertension, asthma, and diet-controlled diabetes, who presents with abdominal pain.   Mr. Lyons developed abdominal pain in his upper abdomen that was first noted this morning.  He had radiation into both sides of his flanks.  This is similar pain to what he has had with prior episodes of alcohol induced pancreatitis.  His fiancée brought him into the ER today given his severe symptoms.  Additionally he developed nausea and vomiting that was intractable at home.  His abdominal pain is reported as constant.  He decided that he would stop drinking alcohol yesterday around noon after consuming about half a pint.     He has been drinking a pint of alcohol daily. He cannot recall when he was sober last, but estimates that it has been several months.  Possibly since when he discharged from the hospital in February of this year.  He has had no blood in his vomit.  He had a stool yesterday that was slightly loose.  He denies any known blood in his stool or dark tarry stools.  He denies fever.  He denies any toxic ingestions.  No NSAID or Tylenol use reported.  His pain is improved after Dilaudid in the emergency department.      In the ED blood pressure 128/106, pulse 121, temp 98, oxygen saturation 100% on room air, respiratory rate 18.  Labs with acute hepatitis, AST of 1413, ALT of 575, total bilirubin and alk phos within normal limits.  Lipase negative.  Renal function preserved.  Mild " hypomagnesemia.  CBC with thrombocytopenia. No etoh level checked. EKG unable to review but reportedly with sinus tachycardia.     Patient received 0.5 mg Dilaudid, 0.5 mg IV Ativan followed by 1 mg IV Ativan, 1 L fluids, 2 g mag sulfate, 4 mg Zofran, in the ED. Discussed with Dr. Colunga in the ED, full chart review including lab work, imaging, and vital signs were reviewed.  ED provider ordered a CT abdomen pelvis with contrast which is pending.  Admission was requested from the hospitalist service for further cares and monitoring    Past Medical History    I have reviewed this patient's medical history and updated it with pertinent information if needed.   Past Medical History:   Diagnosis Date     Alcohol abuse      Alcohol-induced acute pancreatitis      Asthma      Benign essential hypertension      Type 2 diabetes mellitus        Past Surgical History   I have reviewed this patient's surgical history and updated it with pertinent information if needed.  Past Surgical History:   Procedure Laterality Date     ESOPHAGOSCOPY, GASTROSCOPY, DUODENOSCOPY (EGD), COMBINED Left 02/22/2021    Procedure: ESOPHAGOGASTRODUODENOSCOPY (EGD);  Surgeon: Landry Rodriguez MD;  Location:  GI     Forearm surgery to repair [facility] from [facility]       HIP SURGERY         Prior to Admission Medications   Prior to Admission Medications   Prescriptions Last Dose Informant Patient Reported? Taking?   albuterol (PROAIR HFA/PROVENTIL HFA/VENTOLIN HFA) 108 (90 Base) MCG/ACT inhaler   No No   Sig: Inhale 2 puffs into the lungs every 4 hours as needed for shortness of breath / dyspnea INHALE 1 TO 2 PUFFS BY MOUTH EVERY 4 HOURS AS NEEDED   albuterol (PROAIR HFA/PROVENTIL HFA/VENTOLIN HFA) 108 (90 Base) MCG/ACT inhaler   No No   Sig: Inhale 2 puffs into the lungs every 6 hours as needed for shortness of breath / dyspnea or wheezing   glipiZIDE (GLUCOTROL) 5 MG tablet   Yes No   Sig: Take 5 mg by mouth daily before breakfast   losartan  (COZAAR) 50 MG tablet   Yes No   Sig: Take 50 mg by mouth daily   pantoprazole (PROTONIX) 40 MG EC tablet   No No   Sig: Take 1 tablet (40 mg) by mouth 2 times daily (before meals)   sucralfate (CARAFATE) 1 GM/10ML suspension   No No   Sig: Take 10 mLs (1 g) by mouth 4 times daily as needed (prn upper abd pain)      Facility-Administered Medications: None     Allergies   Allergies   Allergen Reactions     Fentanyl      Insulin Swelling     Reaction Date: Around 1930-1650  Face swelling  Inpatient when reaction occurred, unsure which type of insulin  Required some Benadryl to help with the swelling     Lisinopril      Face swelling, cough     Morphine Itching       Social History   I have reviewed this patient's social history and updated it with pertinent information if needed. Jaiden Lyons  reports that he has been smoking cigarettes. He has never used smokeless tobacco. He reports current alcohol use. He reports current drug use. Drug: Marijuana.    Family History   Family history reviewed, notable for colon cancer in father.    Review of Systems   The 10 point Review of Systems is negative other than noted in the HPI or here.  He reports pain in his right hip.  He ambulates with a cane.  He states that he was supposed to have his right hip replaced but this has not been done. Has chronically shortened right leg due to prior hip surgery.     Physical Exam   Temp: 98  F (36.7  C) Temp src: Oral BP: (!) 135/102 Pulse: (!) 121   Resp: 18 SpO2: 100 % O2 Device: None (Room air)    Vital Signs with Ranges  Temp:  [98  F (36.7  C)] 98  F (36.7  C)  Pulse:  [121] 121  Resp:  [18] 18  BP: (128)/(106) 128/106  SpO2:  [100 %] 100 %  0 lbs 0 oz    Constitutional: Awake, alert, fidgeting with blankets on stretcher.  Eyes: Conjunctiva and pupils examined and normal.  HEENT: Non traumatic. Dry mucous membranes, normal dentition.  Respiratory: Clear to auscultation bilaterally, no crackles or wheezing.  Cardiovascular:  tachycardic rate and rhythm, normal S1 and S2, and no murmur noted.  GI: Soft, non-distended, non-tender, bowel sounds present. No rebound tenderness or guarding.  Lymph/Hematologic: No anterior cervical or supraclavicular adenopathy.  Skin: Warm, dry. No edema.  Musculoskeletal: No gross deformities noted.  No erythema or tenderness. Moving all extremities. Has chronically shortened right leg due to prior hip surgery.   Neurologic: Bilateral upper extremity tremor with intention. Speech is clear. Moving all extremities with symmetrical strength. CN 2-12 grossly intact.  Coordination and sensation intact.   Psychiatric: flat affect.  Denies visual or audio hallucinations.  No SI, SA.    Data   Data reviewed today:        Recent Labs   Lab 10/19/21  1227   WBC 4.2   HGB 15.1   MCV 86   *      POTASSIUM 3.9   CHLORIDE 99   CO2 28   BUN 11   CR 0.57*   ANIONGAP 13   BARRY 9.0   *   ALBUMIN 4.3   PROTTOTAL 9.2*   BILITOTAL 1.0   ALKPHOS 103   *   AST 1,413*   LIPASE 248   TROPONIN <0.015       Felicita Saravia PA-C on 10/19/2021 at 1:55 PM

## 2021-10-19 NOTE — ED NOTES
"[facility]  ED Nurse Handoff Report    Jaiden Lyons is a 46 year old male   ED Chief complaint: Withdrawal and Abdominal Pain  . ED Diagnosis:   Final diagnoses:   Alcohol withdrawal syndrome with complication (H)   Chronic pancreatitis, unspecified pancreatitis type (H)     Allergies:   Allergies   Allergen Reactions     Fentanyl      Insulin Swelling     Reaction Date: Around 4002-9409  Face swelling  Inpatient when reaction occurred, unsure which type of insulin  Required some Benadryl to help with the swelling     Lisinopril      Face swelling, cough     Morphine Itching       Code Status: Full Code  Activity level - Baseline/Home:  Independent. Activity Level - Current:   Independent. Lift room needed: No. Bariatric: No   Needed: No   Isolation: No. Infection: Not Applicable  COVID r/o and special precautions.     Vital Signs:   Vitals:    10/19/21 1400 10/19/21 1430 10/19/21 1445 10/19/21 1544   BP: (!) 135/102 (!) 140/100 (!) 144/102    Pulse: 81 82 92    Resp: 13 17 15    Temp:       TempSrc:       SpO2: 95% 100% 95%    Weight:    79.4 kg (175 lb)   Height:    1.778 m (5' 10\")       Cardiac Rhythm:  ,      Pain level:    Patient confused: No. Patient Falls Risk: Yes.   Elimination Status: pt. has not voided   Patient Report - Initial Complaint: Withdrawal. Focused Assessment:  46 year old male with history of hypertension, type two diabetes mellitus, and alcoholic hepatitis who presents with withdrawal and abdominal pain. Patient stopped drinking alcohol yesterday and is going through withdrawal. He typically drinks a pint of alcohol per day. Yesterday he had half of a pint then decided to quit. His last drink was yesterday afternoon. He is complaining of diaphoresis, shakes, and anxiety. He has been vomiting this morning. He also complains of abdominal and flank pain.      Tests Performed: CT, IV. Abnormal Results:   Labs Ordered and Resulted from Time of ED Arrival Up to the " Time of Departure from the ED   COMPREHENSIVE METABOLIC PANEL - Abnormal; Notable for the following components:       Result Value    Creatinine 0.57 (*)     Glucose 147 (*)     AST 1,413 (*)      (*)     Protein Total 9.2 (*)     All other components within normal limits   LACTIC ACID WHOLE BLOOD - Abnormal; Notable for the following components:    Lactic Acid 3.4 (*)     All other components within normal limits   MAGNESIUM - Abnormal; Notable for the following components:    Magnesium 1.5 (*)     All other components within normal limits   CBC WITH PLATELETS AND DIFFERENTIAL - Abnormal; Notable for the following components:    RDW 15.1 (*)     Platelet Count 146 (*)     All other components within normal limits   LIPASE - Normal   TROPONIN I - Normal   INR - Normal   PARTIAL THROMBOPLASTIN TIME - Normal   EXTRA BLUE TOP TUBE   EXTRA RED TOP TUBE   EXTRA GREEN TOP (LITHIUM HEPARIN) TUBE   EXTRA GREEN TOP (LITHIUM HEPARIN) TUBE   EXTRA PURPLE TOP TUBE   COVID-19 VIRUS (CORONAVIRUS) BY PCR   PERIPHERAL IV CATHETER   EXTRA TUBE    Narrative:     The following orders were created for panel order Extra Tube ([place] Draw).  Procedure                               Abnormality         Status                     ---------                               -----------         ------                     Extra Blue Top Tube[[id number]]                              Final result               Extra Red Top Tube[[id number]]                               Final result               Extra Green Top (Lithium...[[id number]]                      Final result               Extra Green Top (Lithium...[[id number]]                      Final result               Extra Purple Top Tube[[id number]]                            Final result                 Please view results for these tests on the individual orders.   CBC WITH PLATELETS & DIFFERENTIAL    Narrative:     The following orders were created for panel order CBC with platelets  differential.  Procedure                               Abnormality         Status                     ---------                               -----------         ------                     CBC with platelets and d...[[id number]]  Abnormal            Final result                 Please view results for these tests on the individual orders.     CT Abdomen Pelvis w Contrast   Final Result   IMPRESSION:    1.  Mild gastric antral wall thickening without surrounding edema.   This may reflect mild gastritis.   2.  Mild gastric distention. This is favored to reflect bolus effect   assuming no symptoms concerning for gastric outlet obstruction or   gastroparesis.   3.  Chronic pancreatitis. No CT evidence of acute pancreatitis.   4.  Hepatic steatosis.      GIGI BUSH MD            SYSTEM ID:  [id number]            Treatments provided: IV ativan, IV diluadid  Family Comments: family at bedside  OBS brochure/video discussed/provided to patient:  No  ED Medications:   Medications   sodium chloride 0.9 % 1,000 mL with Infuvite Adult 10 mL, thiamine 100 mg, folic acid 1 mg infusion (has no administration in time range)   LORazepam (ATIVAN) injection 2 mg (has no administration in time range)   ondansetron (ZOFRAN) injection 4 mg (4 mg Intravenous Given 10/19/21 1257)   0.9% sodium chloride BOLUS (0 mLs Intravenous Stopped 10/19/21 1448)   LORazepam (ATIVAN) injection 1 mg (1 mg Intravenous Given 10/19/21 1302)   HYDROmorphone (PF) (DILAUDID) injection 0.5 mg (0.5 mg Intravenous Given 10/19/21 1300)   magnesium sulfate 2 g in water intermittent infusion (0 g Intravenous Stopped 10/19/21 1448)   HYDROmorphone (PF) (DILAUDID) injection 0.5 mg (0.5 mg Intravenous Given 10/19/21 1449)   LORazepam (ATIVAN) injection 0.5 mg (0.5 mg Intravenous Given 10/19/21 1452)   iopamidol (ISOVUE-370) solution 83 mL (83 mLs Intravenous Given 10/19/21 1501)   sodium chloride 0.9 % bag 500mL for CT scan flush use (61 mLs Intravenous Given  10/19/21 1500)     Drips infusing:  No  For the majority of the shift, the patient's behavior Green. Interventions performed were N/A.    Sepsis treatment initiated: No     Patient tested for COVID 19 prior to admission: YES    ED Nurse Name/Phone Number: Tanisha Baumann RN,   4:15 PM    RECEIVING UNIT ED HANDOFF REVIEW    Above ED Nurse Handoff Report was reviewed: Yes  Reviewed by: Bhanu Avelar RN on October 19, 2021 at 8:42 PM

## 2021-10-19 NOTE — ED PROVIDER NOTES
"  History   Chief Complaint:  Withdrawal and Abdominal Pain       The history is provided by the patient.      Jaiden Lyons is a 46 year old male with history of hypertension, type two diabetes mellitus, and alcoholic hepatitis who presents with withdrawal and abdominal pain. Patient stopped drinking alcohol yesterday and is going through withdrawal. He typically drinks a pint of alcohol per day. Yesterday he had half of a pint then decided to quit. His last drink was yesterday afternoon. He is complaining of diaphoresis, shakes, and anxiety. He has been vomiting this morning. He also complains of abdominal and flank pain.     Review of Systems   Constitutional: Positive for diaphoresis.   Gastrointestinal: Positive for abdominal pain, nausea and vomiting.   Genitourinary: Positive for flank pain.   Neurological: Positive for tremors.   Psychiatric/Behavioral: The patient is nervous/anxious.    All other systems reviewed and are negative.    Allergies:  Fentanyl  Insulin  Lisinopril  Morphine     Medications:  Proair hfa  Glucotrol  Cozaar  Protonix  Carafate    Past Medical History:     Alcohol abuse  Alcohol-induced acute pancreatitis  Asthma  Benign essential hypertension  Type two diabetes mellitus   Lactic acidosis  Alcoholic hepatitis  Alcoholic gastritis      Past Surgical History:    EGD, combined  Forearm surgery   Hip surgery      Family History:    Father: colon cancer  Mother: asthma    Social History:  Presents to ED alone    Physical Exam     Patient Vitals for the past 24 hrs:   BP Temp Temp src Pulse Resp SpO2 Height Weight   10/19/21 1638 -- -- -- -- 18 -- -- --   10/19/21 1630 (!) 143/101 -- -- 96 30 97 % -- --   10/19/21 1615 (!) 159/101 -- -- 92 14 94 % -- --   10/19/21 1600 (!) 144/100 -- -- 97 16 94 % -- --   10/19/21 1545 129/86 -- -- 90 19 93 % -- --   10/19/21 1544 -- -- -- -- -- -- 1.778 m (5' 10\") 79.4 kg (175 lb)   10/19/21 1445 (!) 144/102 -- -- 92 15 95 % -- --   10/19/21 1430 (!) " 140/100 -- -- 82 17 100 % -- --   10/19/21 1400 (!) 135/102 -- -- 81 13 95 % -- --   10/19/21 1345 (!) 142/102 -- -- 80 12 97 % -- --   10/19/21 1330 (!) 137/99 -- -- 88 15 94 % -- --   10/19/21 1300 (!) 136/92 -- -- 109 23 97 % -- --   10/19/21 1212 (!) 128/106 98  F (36.7  C) Oral (!) 121 18 100 % -- --       Physical Exam  Vitals and nursing note reviewed.   Constitutional:       Comments: Thin appearing vomiting   HENT:      Head: Normocephalic.   Eyes:      Extraocular Movements: Extraocular movements intact.   Cardiovascular:      Rate and Rhythm: Regular rhythm. Tachycardia present.   Pulmonary:      Effort: Pulmonary effort is normal.      Breath sounds: Normal breath sounds.   Abdominal:      Tenderness: There is abdominal tenderness in the epigastric area.      Hernia: No hernia is present.   Skin:     General: Skin is warm.      Capillary Refill: Capillary refill takes 2 to 3 seconds.   Neurological:      General: No focal deficit present.      Mental Status: He is alert and oriented to person, place, and time.   Psychiatric:         Mood and Affect: Mood is anxious.           Emergency Department Course   ECG  ECG obtained at 1258, ECG read at 1315  Sinus tachycardia  Left axis deviation  T wave abnormality, consider anterolateral ischemia  Abnormal ECG   Rate 108 bpm. IN interval 154 ms. QRS duration 92 ms. QT/QTc 352/471 ms. P-R-T axes 76 -32 76.     Imaging:  CT Abdomen Pelvis w Contrast   Final Result   IMPRESSION:    1.  Mild gastric antral wall thickening without surrounding edema.   This may reflect mild gastritis.   2.  Mild gastric distention. This is favored to reflect bolus effect   assuming no symptoms concerning for gastric outlet obstruction or   gastroparesis.   3.  Chronic pancreatitis. No CT evidence of acute pancreatitis.   4.  Hepatic steatosis.      GIGI BUSH MD            SYSTEM ID:  [id number]      [facility]    (Results Pending)     Report per  radiology    Laboratory:  Labs Ordered and Resulted from Time of ED Arrival Up to the Time of Departure from the ED   COMPREHENSIVE METABOLIC PANEL - Abnormal; Notable for the following components:       Result Value    Creatinine 0.57 (*)     Glucose 147 (*)     AST 1,413 (*)      (*)     Protein Total 9.2 (*)     All other components within normal limits   LACTIC ACID WHOLE BLOOD - Abnormal; Notable for the following components:    Lactic Acid 3.4 (*)     All other components within normal limits   MAGNESIUM - Abnormal; Notable for the following components:    Magnesium 1.5 (*)     All other components within normal limits   CBC WITH PLATELETS AND DIFFERENTIAL - Abnormal; Notable for the following components:    RDW 15.1 (*)     Platelet Count 146 (*)     All other components within normal limits   LIPASE - Normal   TROPONIN I - Normal   INR - Normal   PARTIAL THROMBOPLASTIN TIME - Normal   COVID-19 VIRUS (CORONAVIRUS) BY PCR - Normal    Narrative:     Testing was performed using the franky  SARS-CoV-2 & Influenza A/B Assay on the franky  Luda  System.  This test should be ordered for the detection of SARS-COV-2 in individuals who meet SARS-CoV-2 clinical and/or epidemiological criteria. Test performance is unknown in asymptomatic patients.  This test is for in vitro diagnostic use under the FDA EUA for laboratories certified under CLIA to perform moderate and/or high complexity testing. This test has not been FDA cleared or approved.  A negative test does not rule out the presence of PCR inhibitors in the specimen or target RNA in concentration below the limit of detection for the assay. The possibility of a false negative should be considered if the patient's recent exposure or clinical presentation suggests COVID-19.  [facility] are certified under the Clinical Laboratory Improvement Amendments of 1988 (CLIA-88) as qualified to perform moderate and/or high complexity laboratory testing.    EXTRA BLUE TOP TUBE   EXTRA RED TOP TUBE   EXTRA GREEN TOP (LITHIUM HEPARIN) TUBE   EXTRA GREEN TOP (LITHIUM HEPARIN) TUBE   EXTRA PURPLE TOP TUBE   GGT   ACETAMINOPHEN LEVEL   HEPATITIS A ANTIBODY IGM   HEPATITIS B SURFACE ANTIBODY   HEPATITIS B CORE ANTIBODY IGM   HEPATITIS C RNA, QUANTITATIVE BY PCR WITH CONFIRMATORY REFLEX TO GENOTYPING   PERIPHERAL IV CATHETER   EXTRA TUBE    Narrative:     The following orders were created for panel order Extra Tube ([place] Draw).  Procedure                               Abnormality         Status                     ---------                               -----------         ------                     Extra Blue Top Tube[[id number]]                              Final result               Extra Red Top Tube[[id number]]                               Final result               Extra Green Top (Lithium...[[id number]]                      Final result               Extra Green Top (Lithium...[[id number]]                      Final result               Extra Purple Top Tube[[id number]]                            Final result                 Please view results for these tests on the individual orders.   CBC WITH PLATELETS & DIFFERENTIAL    Narrative:     The following orders were created for panel order CBC with platelets differential.  Procedure                               Abnormality         Status                     ---------                               -----------         ------                     CBC with platelets and d...[[id number]]  Abnormal            Final result                 Please view results for these tests on the individual orders.        Emergency Department Course:  Reviewed:  I reviewed nursing notes, vitals, past medical history and Care Everywhere    Assessments:  1226 I obtained history and examined the patient as noted above.   1436 I rechecked the patient and explained findings.     Consults:  1400 I spoke with Felicita Saravia PA-C for Dr. Jauregui from  the Hospitalist service regarding patient's presentation, findings, and plan of care.     Interventions:  1255 NS, 1L, IV  1257 Zofran, 4 mg, IV  1300 Dilaudid, 0.5 mg, IV  1302 Ativan, 1 mg, IV  1339 Magnesium sulfate, 2 g, IV  1449 Dilaudid, 0.5 mg, IV  1452 Ativan, 0.5 mg, IV  1638 Dilaudid, 0.5 mg, IV  1641 Ativan, 2 mg, IV  1659 Protonix, 40 mg, IV  1705 NS with Infuvite, thiamine, folic acid, 1L, IV    Disposition:  The patient was admitted to the hospital under the care of Dr. Jauregui.     Impression & Plan     Medical Decision Making:  Patient presents with vomiting and abdominal pain.  Has a history of alcoholism last drink yesterday.  Clinical examination is consistent with withdrawal Ativan was titrated to withdrawal symptoms as well as nausea medication patient has a history of pancreatitis her lipase is negative for acute pancreatitis suspect chronic.  Patient was given IV hydrations there is elevation in his liver function tests AST the Altace approximately 2-1 suspect alcohol-related liver disease.  Regardless due to severity of illness recommend admission for pain control and withdrawal treatment.  Care was discussed with the hospitalist and will admit as an inpatient.    Diagnosis:    ICD-10-CM    1. Alcohol withdrawal syndrome with complication (H)  F10.239    2. Chronic pancreatitis, unspecified pancreatitis type (H)  K86.1      Covid-19  Jaiden Lyons was evaluated during a global COVID-19 pandemic, which necessitated consideration that the patient might be at risk for infection with the SARS-CoV-2 virus that causes COVID-19.   Applicable protocols for evaluation were followed during the patient's care.   COVID-19 was considered as part of the patient's evaluation. The plan for testing is:  a test was obtained during this visit.    Scribe Disclosure:  Gary LORA, am serving as a scribe at 12:16 PM on 10/19/2021 to document services personally performed by Bret Colunga MD  based on my observations and the provider's statements to me.          Bret Colunga MD  10/20/21 [id number]

## 2021-10-19 NOTE — ED TRIAGE NOTES
Patient comes in for evaluation of abdominal pain with a history of pancreatitis as well as ETOH withdrawal. Patient is very shaky, diaphoretic, has mild anxiety, nausea and vomiting. ABCs intact.

## 2021-10-20 LAB
ALBUMIN SERPL-MCNC: 3.2 G/DL (ref 3.4–5)
ALP SERPL-CCNC: 73 U/L (ref 40–150)
ALT SERPL W P-5'-P-CCNC: 345 U/L (ref 0–70)
ANION GAP SERPL CALCULATED.3IONS-SCNC: 8 MMOL/L (ref 3–14)
AST SERPL W P-5'-P-CCNC: 452 U/L (ref 0–45)
BILIRUB SERPL-MCNC: 1.2 MG/DL (ref 0.2–1.3)
BUN SERPL-MCNC: 13 MG/DL (ref 7–30)
CALCIUM SERPL-MCNC: 8 MG/DL (ref 8.5–10.1)
CHLORIDE BLD-SCNC: 103 MMOL/L (ref 94–109)
CK SERPL-CCNC: 712 U/L (ref 30–300)
CO2 SERPL-SCNC: 26 MMOL/L (ref 20–32)
CREAT SERPL-MCNC: 0.53 MG/DL (ref 0.66–1.25)
ERYTHROCYTE [DISTWIDTH] IN BLOOD BY AUTOMATED COUNT: 15.4 % (ref 10–15)
GFR SERPL CREATININE-BSD FRML MDRD: >90 ML/MIN/1.73M2
GLUCOSE BLD-MCNC: 124 MG/DL (ref 70–99)
GLUCOSE BLDC GLUCOMTR-MCNC: 113 MG/DL (ref 70–99)
GLUCOSE BLDC GLUCOMTR-MCNC: 147 MG/DL (ref 70–99)
HAV IGM SERPL QL IA: NONREACTIVE
HBV CORE IGM SERPL QL IA: NONREACTIVE
HBV SURFACE AB SERPL IA-ACNC: 0 M[IU]/ML
HCT VFR BLD AUTO: 36.7 % (ref 40–53)
HCV RNA SERPL NAA+PROBE-ACNC: NOT DETECTED IU/ML
HGB BLD-MCNC: 12.3 G/DL (ref 13.3–17.7)
LACTATE SERPL-SCNC: 0.9 MMOL/L (ref 0.7–2)
MCH RBC QN AUTO: 29.3 PG (ref 26.5–33)
MCHC RBC AUTO-ENTMCNC: 33.5 G/DL (ref 31.5–36.5)
MCV RBC AUTO: 87 FL (ref 78–100)
PHOSPHATE SERPL-MCNC: 3.1 MG/DL (ref 2.5–4.5)
PLAT MORPH BLD: NORMAL
PLATELET # BLD AUTO: 99 10E3/UL (ref 150–450)
POTASSIUM BLD-SCNC: 3.5 MMOL/L (ref 3.4–5.3)
PROT SERPL-MCNC: 7.2 G/DL (ref 6.8–8.8)
RBC # BLD AUTO: 4.2 10E6/UL (ref 4.4–5.9)
RBC MORPH BLD: NORMAL
SODIUM SERPL-SCNC: 137 MMOL/L (ref 133–144)
WBC # BLD AUTO: 3.4 10E3/UL (ref 4–11)

## 2021-10-20 PROCEDURE — 36415 COLL VENOUS BLD VENIPUNCTURE: CPT | Performed by: INTERNAL MEDICINE

## 2021-10-20 PROCEDURE — 120N000001 HC R&B MED SURG/OB

## 2021-10-20 PROCEDURE — 83605 ASSAY OF LACTIC ACID: CPT | Performed by: INTERNAL MEDICINE

## 2021-10-20 PROCEDURE — 82550 ASSAY OF CK (CPK): CPT | Performed by: INTERNAL MEDICINE

## 2021-10-20 PROCEDURE — 87340 HEPATITIS B SURFACE AG IA: CPT | Performed by: PHYSICIAN ASSISTANT

## 2021-10-20 PROCEDURE — 85027 COMPLETE CBC AUTOMATED: CPT | Performed by: PHYSICIAN ASSISTANT

## 2021-10-20 PROCEDURE — 250N000013 HC RX MED GY IP 250 OP 250 PS 637: Performed by: INTERNAL MEDICINE

## 2021-10-20 PROCEDURE — 258N000003 HC RX IP 258 OP 636: Performed by: INTERNAL MEDICINE

## 2021-10-20 PROCEDURE — C9113 INJ PANTOPRAZOLE SODIUM, VIA: HCPCS | Performed by: PHYSICIAN ASSISTANT

## 2021-10-20 PROCEDURE — 250N000011 HC RX IP 250 OP 636: Performed by: INTERNAL MEDICINE

## 2021-10-20 PROCEDURE — 80053 COMPREHEN METABOLIC PANEL: CPT | Performed by: PHYSICIAN ASSISTANT

## 2021-10-20 PROCEDURE — 36415 COLL VENOUS BLD VENIPUNCTURE: CPT | Performed by: PHYSICIAN ASSISTANT

## 2021-10-20 PROCEDURE — 86803 HEPATITIS C AB TEST: CPT | Performed by: PHYSICIAN ASSISTANT

## 2021-10-20 PROCEDURE — 99233 SBSQ HOSP IP/OBS HIGH 50: CPT | Performed by: INTERNAL MEDICINE

## 2021-10-20 PROCEDURE — 84100 ASSAY OF PHOSPHORUS: CPT | Performed by: INTERNAL MEDICINE

## 2021-10-20 PROCEDURE — 250N000013 HC RX MED GY IP 250 OP 250 PS 637: Performed by: PHYSICIAN ASSISTANT

## 2021-10-20 PROCEDURE — 250N000011 HC RX IP 250 OP 636: Performed by: PHYSICIAN ASSISTANT

## 2021-10-20 RX ORDER — LOSARTAN POTASSIUM 50 MG/1
50 TABLET ORAL DAILY
Status: DISCONTINUED | OUTPATIENT
Start: 2021-10-20 | End: 2021-10-23 | Stop reason: HOSPADM

## 2021-10-20 RX ORDER — ALBUTEROL SULFATE 90 UG/1
2 AEROSOL, METERED RESPIRATORY (INHALATION) EVERY 6 HOURS PRN
Status: DISCONTINUED | OUTPATIENT
Start: 2021-10-20 | End: 2021-10-23 | Stop reason: HOSPADM

## 2021-10-20 RX ORDER — DEXTROSE MONOHYDRATE, SODIUM CHLORIDE, AND POTASSIUM CHLORIDE 50; 1.49; 4.5 G/1000ML; G/1000ML; G/1000ML
INJECTION, SOLUTION INTRAVENOUS CONTINUOUS
Status: DISCONTINUED | OUTPATIENT
Start: 2021-10-20 | End: 2021-10-22

## 2021-10-20 RX ORDER — GLIPIZIDE 5 MG/1
5 TABLET ORAL
Status: DISCONTINUED | OUTPATIENT
Start: 2021-10-21 | End: 2021-10-23 | Stop reason: HOSPADM

## 2021-10-20 RX ORDER — HYDROMORPHONE HCL IN WATER/PF 6 MG/30 ML
.2-.3 PATIENT CONTROLLED ANALGESIA SYRINGE INTRAVENOUS
Status: DISCONTINUED | OUTPATIENT
Start: 2021-10-20 | End: 2021-10-23 | Stop reason: HOSPADM

## 2021-10-20 RX ADMIN — DOCUSATE SODIUM 50 MG AND SENNOSIDES 8.6 MG 2 TABLET: 8.6; 5 TABLET, FILM COATED ORAL at 21:47

## 2021-10-20 RX ADMIN — Medication: at 06:18

## 2021-10-20 RX ADMIN — POTASSIUM CHLORIDE, DEXTROSE MONOHYDRATE AND SODIUM CHLORIDE: 150; 5; 450 INJECTION, SOLUTION INTRAVENOUS at 14:44

## 2021-10-20 RX ADMIN — Medication: at 00:08

## 2021-10-20 RX ADMIN — HYDROMORPHONE HYDROCHLORIDE 0.2 MG: 0.2 INJECTION, SOLUTION INTRAMUSCULAR; INTRAVENOUS; SUBCUTANEOUS at 11:37

## 2021-10-20 RX ADMIN — PANTOPRAZOLE SODIUM 40 MG: 40 INJECTION, POWDER, FOR SOLUTION INTRAVENOUS at 11:15

## 2021-10-20 RX ADMIN — CLONIDINE HYDROCHLORIDE 0.1 MG: 0.1 TABLET ORAL at 21:47

## 2021-10-20 RX ADMIN — HYDROMORPHONE HYDROCHLORIDE 0.3 MG: 0.2 INJECTION, SOLUTION INTRAMUSCULAR; INTRAVENOUS; SUBCUTANEOUS at 14:45

## 2021-10-20 RX ADMIN — GABAPENTIN 100 MG: 100 CAPSULE ORAL at 21:47

## 2021-10-20 RX ADMIN — THIAMINE HCL TAB 100 MG 100 MG: 100 TAB at 11:16

## 2021-10-20 RX ADMIN — LOSARTAN POTASSIUM 50 MG: 50 TABLET, FILM COATED ORAL at 18:10

## 2021-10-20 RX ADMIN — MULTIPLE VITAMINS W/ MINERALS TAB 1 TABLET: TAB at 11:16

## 2021-10-20 RX ADMIN — HYDROMORPHONE HYDROCHLORIDE 0.2 MG: 0.2 INJECTION, SOLUTION INTRAMUSCULAR; INTRAVENOUS; SUBCUTANEOUS at 18:11

## 2021-10-20 RX ADMIN — CLONIDINE HYDROCHLORIDE 0.1 MG: 0.1 TABLET ORAL at 06:12

## 2021-10-20 RX ADMIN — LORAZEPAM 1 MG: 2 INJECTION INTRAMUSCULAR; INTRAVENOUS at 13:37

## 2021-10-20 RX ADMIN — GABAPENTIN 100 MG: 100 CAPSULE ORAL at 06:12

## 2021-10-20 RX ADMIN — HYDROMORPHONE HYDROCHLORIDE 0.2 MG: 0.2 INJECTION, SOLUTION INTRAMUSCULAR; INTRAVENOUS; SUBCUTANEOUS at 21:56

## 2021-10-20 RX ADMIN — LORAZEPAM 2 MG: 2 INJECTION INTRAMUSCULAR; INTRAVENOUS at 06:12

## 2021-10-20 RX ADMIN — FOLIC ACID 1 MG: 1 TABLET ORAL at 11:16

## 2021-10-20 ASSESSMENT — ACTIVITIES OF DAILY LIVING (ADL)
ADLS_ACUITY_SCORE: 9

## 2021-10-20 NOTE — PLAN OF CARE
Assessments: diastolic BP slightly elevated, scheduled clonidine administered. A&O x2-3, confused, incoherent, somewhat slurred speech, CIWA 13,13 and 2, unsteady gait. Denies SoB and nausea. Complains of pain/abdominal discomfort radiating to flank. HS     Major Shift Events: ED admit, arrived to [facility] around 2100H, triggered bed alarm multiple times    Treatment Plan: CIWA protocol, tele, PCA, monitor blood sugar, GI Consult    Bedside Nurse: Bhanu Avelar RN

## 2021-10-20 NOTE — PROGRESS NOTES
Assessments: diastolic BP slightly elevated, scheduled clonidine administered. A&O x2-3, confused, incoherent, somewhat slurred speech, CIWA 13,13 and 2, unsteady gait.  Denies SoB and nausea. Complains of pain/abdominal discomfort radiating to flank.     Major Shift Events: ED admit, arrived to [facility] around 2100H    Treatment Plan: CIWA protocol, tele, PCA, monitor blood sugar, GI Consult    Bedside Nurse: Bhanu Avelar RN

## 2021-10-20 NOTE — PROGRESS NOTES
"[facility]  Hospitalist Progress Note  Antony Jauregui MD 10/20/2021    Reason for Stay (Diagnosis): etoh withdrawal and abd pain         Assessment and Plan:      Summary of Stay: Jaiden Lyons is a 46 year old male who came to attention on 10/19/2021 with abd pain, vomiting and tremors while trying to stop drinking.     In the emergency department, patient was notably significantly tremulous and hypertensive. Labs indicate AST/ALT 1400/500, INR and PTT 1.0/25, T bili 1.5 Alk Phos 103.  And although the lipase is \"normal\", he claims that his upper abd pain is what he experienced when he had pancreatitis. CT abd/pelvis indicates findings consistent with chronic pancreatitis, though the radiologist clearly indicated \"no evidence of acute pancreatitis\".      I had admitted the patient on a PCA which I think he probably used pretty reasonably.  However this morning, he was very groggy possibly due to larger doses of benzodiazepines.  He reiterates his interest in quitting drinking.  However, I am not certain as to when he will be suitable for interview with chemical dependency.    Problem List:   1. Abdominal pain. Suspect due to gastritis, but also with significant liver inflammation and chronic pancreatitis, multifactorial may be at play.   2. Etoh withdrawal. Tremors, HTN, tachycardia.  Appears ataxic.  3. Hepatitis, probably due to alcohol, but presentation was not \"classic\" with regard to the profile of abnormal LFTs.  4. DM.      PLAN:  1.  Discontinue PCA.  I am inclined to think his abdominal pain has significantly abated.  Will continue with Dilaudid IV 0.2 to 0.3 mg every 3 hours as needed.  2.  Continues on CIWA protocol.  We should try not to sedate him excessively.  3.  He will need physical and Occupational Therapy evaluations when he is more alert.  He is having difficulty with ataxia.  4.  Continue supportive cares including IV fluids and n.p.o. status.  Continue monitoring liver " "function tests as well as clinical recovery.    DVT Prophylaxis: Pneumatic Compression Devices  Code Status: Full Code  Discharge Dispo: To be determined  Estimated Disch Date / # of Days until Disch: To be determined, probably more than 2 or 3 days        Interval History (Subjective):      Mr. Lyons appears to have become very sedated overnight.  He also though appears coherent but perhaps unwilling to interact as fully as he could.  He talks in a very soft voice but seems to have good word choice and thought processes.                  Physical Exam:      Last Vital Signs:  BP (!) 134/91 (BP Location: Right arm)   Pulse 94   Temp 97.1  F (36.2  C) (Axillary)   Resp 20   Ht 1.791 m (5' 10.5\")   Wt 77.2 kg (170 lb 4.8 oz)   SpO2 92%   BMI 24.09 kg/m      I/O last 3 completed shifts:  In: 1 [I.V.:1]  Out: -     Constitutional: Awake but somnolent-appearing, cooperative, no apparent distress   Respiratory:  Coarse breath sounds but no wheeze.   Cardiovascular: Regular rate and rhythm, normal S1 and S2, and no murmur noted   Abdomen: Normal bowel sounds, soft, non-distended.  Not particularly tender today.   Skin: No rashes, no cyanosis, dry to touch   Neuro: Alert and appears to be appropriate to situation.  Stands with difficulty.  Balance appears off.  He is mild tremor in the upper extremities.  I am not sure whether that is all due to withdrawal or is partly his baseline.   Extremities: No edema.     Other(s):        All other systems: Negative          Medications:      All current medications were reviewed with changes reflected in problem list.         Data:      All new lab and imaging data was reviewed.   Labs/Imaging:  Results for orders placed or performed during the hospital encounter of 10/19/21 (from the past 24 hour(s))   US Abdomen or Pelvis Doppler Limited    Narrative    [facility]  LOCATION: [facility]  DATE: 10/19/2021    PORTAL AND HEPATIC VENOUS " DUPLEX    INDICATION: Hepatitis. Right upper quadrant pain.    TECHNIQUE: Duplex imaging was performed utilizing gray-scale, two-dimensional images, and color-flow imaging. Doppler waveform analysis and spectral Doppler imaging are also performed.    COMPARISON: Ultrasound abdomen limited 07/02/2021    FINDINGS:  ASCITES: None    Portal and hepatic veins are patent with normal directional flow. Patent IVC. Complex fluid collection left upper quadrant adjacent to the spleen, indeterminant. Suspect this may reflect stomach.    VELOCITIES (cm/s):  Main portal vein: 31  Left portal vein: 18  Right portal vein: 40  Hepatic artery: 108      Impression    IMPRESSION:   1.  Patent hepatic and portal veins with normal directional flow.  2.  Complex fluid collection noted adjacent to the spleen, etiology indeterminate based on ultrasound images. Suspect this may reflect fluid-filled stomach.   Care Management / Social Work IP Consult    Narrative    Mary Carmen Acosta RN     10/20/2021  2:37 PM  Care Management Initial Consult    General Information  Assessment completed with: Patient, Spouse or significant other,      Type of CM/SW Visit: Initial Assessment    Primary Care Provider verified and updated as needed: Yes   Readmission within the last 30 days: no previous admission in   last 30 days      Reason for Consult: care coordination/care conference,   discharge planning, substance use concerns      Communication Assessment  Patient's communication style: spoken language (English or   Bilingual)    Hearing Difficulty or Deaf: no   Wear Glasses or Blind: no    Cognitive  Cognitive/Neuro/Behavioral: .WDL except  Level of Consciousness:   somnolent  Arousal Level: arouses to touch/gentle shaking    Orientation: disoriented to  Mood/Behavior: anxious  Best   Language: 0 - No aphasia  Speech: slurred    Living Environment:   People in home: other (see comments) (fiance')     Current living Arrangements: apartment      Able to  return to prior arrangements: yes       Family/Social Support:  Care provided by: self, other (see comments)  Provides care for: no one     Other (specify) (fiance)          Description of Support System: Supportive         Current Resources:   Patient receiving home care services: No     Community Resources: None  Equipment currently used at home: cane, straight  Supplies currently used at home: None    Employment/Financial:  Employment Status: disabled     Employment/ Comments: on social security   Financial Concerns: No concerns identified           Lifestyle & Psychosocial Needs:  Social Determinants of Health     Tobacco Use: High Risk     Smoking Tobacco Use: Current Every Day Smoker     Smokeless Tobacco Use: Never Used   Alcohol Use:      Frequency of Alcohol Consumption:      Average Number of Drinks:      Frequency of Binge Drinking:    Financial Resource Strain:      Difficulty of Paying Living Expenses:    Food Insecurity:      Worried About Running Out of Food in the Last Year:      Ran Out of Food in the Last Year:    Transportation Needs:      Lack of Transportation (Medical):      Lack of Transportation (Non-Medical):    Physical Activity:      Days of Exercise per Week:      Minutes of Exercise per Session:    Stress:      Feeling of Stress :    Social Connections:      Frequency of Communication with Friends and Family:      Frequency of Social Gatherings with Friends and Family:      Attends Taoist Services:      Active Member of Clubs or Organizations:      Attends Club or Organization Meetings:      Marital Status:    Intimate Partner Violence:      Fear of Current or Ex-Partner:      Emotionally Abused:      Physically Abused:      Sexually Abused:    Depression:      PHQ-2 Score:    Housing Stability:      Unable to Pay for Housing in the Last Year:      Number of Places Lived in the Last Year:      Unstable Housing in the Last Year:        Functional Status:  Prior to admission  patient needed assistance:    no  Mental Health Status:  Mental Health Status: No Current Concerns       Chemical Dependency Status:  Chemical Dependency Status: Current Concern       Additional Information:  Pt is admitted with Alcohol withdrawal . Pt stopped drinking 2   days ago.  Pt kept falling asleep during interview . Henrietta' by   bedside and supportive . She said he is interested in CD   resources . MD paged for CD assessment . She said his relative is   currently in an inpatient CD place in [place] . She is aware   that Pt's usually don't get placed directly into CD facilities as   usually there is a wait list . Pt does manage his own medications   . He doesn't check his blood sugar . His a1c is down to 6.6 as of   10/19 . Pt does use a cane at times . He is on disability . Pt's   [facility] can provide transport at discharge.  Pt follows with Dr Donis . He hasn't seen him in person lately . She would like   me to schedule a f/u appt.      Dr Donis-Friday, October 29th at 8:30AM  [facility]   Address: [address] [place] [address]  Phone: [phone]    Barbara Acosta RN BSN   Inpatient Care Coordination  [facility]  [phone]    Mary Carmen Acosta RN         Gastroenterology IP Consult: alcoholic with very elevated AST/ALT, GGT, normal INR, normal Bili, normal protein; Consultant may enter orders: Yes; Patient to be seen: Routine - within 24 hours; Requested Clinic/Group: [facility]; Req...    Narrative    Phyllis Pacheco PA-C     10/20/2021 11:49 AM  GASTROENTEROLOGY CONSULTATION      Jaiden Lyons  [id number]  [facility] [address]  46 year old male     Admission Date/Time: 10/19/2021  Primary Care Provider: No Ref-Primary, Physician     We were asked to see the patient in consultation by Dr. Jauregui   for evaluation of elevated liver enzymes.    CC: epigastric pain     HPI:  Jaiden Lyons is  a 46 year old male with past medical   history significant for alcohol abuse/dependence, alcohol related   hepatitis, alcohol induced pancreatitis, alcohol withdrawal   seizures, HTN, previously diet controlled DM, admitted 10/19 with   epigastric abdominal pain, nausea, vomiting with labs notable for   significantly elevated transaminases, chronic pancreatitis and   possible gastritis on CT imaging, with normal lipase.     Patient is very lethargic on my exam. He is currently on PCA and   received Ativan a few times overnight per nursing staff. History   obtained from EMR.     Patient developed upper abdominal pain yesterday morning with   radiation to both flanks reminiscent of previous episodes of   pancreatitis with associated nausea and intractable nonbloody   emesis. He had been consuming ~1 pint of alcohol daily and   stopped 2 days ago. Loose stool yesterday but no melena. No   history of NSAIDs or Tylenol use. Since admission, vomiting has   resolved, and pain controlled, no stools. No reports of fever.     He has a history of alcohol induced pancreatitis and has been   hospitalized on numerous occasions with associated alcohol   induced hepatitis/pancreatitis. This admission, transaminases are   higher than previous admissions, AST > ALT with normal lipase.    Labs showed WBC and HGB normal, low platelets 146, total   bilirubin/alk phos normal, AST 1413, , INR 1, lipase   normal 248. GGT elevated 779, elevated lactic acid 3.4.   Creatinine normal.  AST/ALT trending down today with AST now 452,   . Hepatitis B surface antibody/antigen, and core IgM   antibody are negative. Hepatitis A IgM negative. Hepatitis C   pending. COVID negative. ETOH level 0.02. Tylenol level   undetectable.    CT abd/pelvis showed stable ectasia of the main pancreatic duct   with coarsely scattered calcifications in the pancreas consistent   with chronic pancreatitis, no evidence of acute pancreatitis,   mild gastric  wall thickening with surrounding edema felt possibly   related to gastritis, hepatic steatosis. 1.7cm calcified   structure in the sarath hepatis noted felt possibly related to   calcified aneurysm.     US abd with Doppler showed patent portal venous system, fluid   collection near spleen of indeterminate etiology but suspected to   be related to fluid filled stomach.      PAST MEDICAL HISTORY:  Patient Active Problem List    Diagnosis Date Noted     Alcohol withdrawal syndrome with complication (H) 10/19/2021     Priority: Medium     Alcohol abuse with withdrawal (H) 10/19/2021     Priority: Medium     Abdominal pain, epigastric 02/21/2021     Priority: Medium     Lactic acidosis 02/21/2021     Priority: Medium     Alcohol dependence with uncomplicated intoxication (H)   02/21/2021     Priority: Medium     Intractable vomiting with nausea, unspecified vomiting type   02/21/2021     Priority: Medium     Pancreatitis 02/26/2020     Priority: Medium          ROS: A comprehensive ten point review of systems was negative   aside from those in mentioned in the HPI.       MEDICATIONS:   Prior to Admission medications    Medication Sig Start Date End Date Taking? Authorizing Provider   empagliflozin (JARDIANCE) 10 MG TABS tablet Take 10 mg by mouth   daily   Yes Unknown, Entered By History   glipiZIDE (GLUCOTROL) 5 MG tablet Take 5 mg by mouth daily before   breakfast   Yes Unknown, Entered By History   losartan (COZAAR) 50 MG tablet Take 50 mg by mouth daily   Yes   Unknown, Entered By History   pantoprazole (PROTONIX) 40 MG EC tablet Take 1 tablet (40 mg) by   mouth 2 times daily (before meals)  Patient taking differently: Take 40 mg by mouth 2 times daily as   needed  2/23/21  Yes Santy Oliva MD   sucralfate (CARAFATE) 1 GM/10ML suspension Take 10 mLs (1 g) by   mouth 4 times daily as needed (prn upper abd pain) 7/2/21  Yes   Fantasma Ochoa MD   albuterol (PROAIR HFA/PROVENTIL HFA/VENTOLIN HFA) 108 (90 Base)  "  MCG/ACT inhaler Inhale 2 puffs into the lungs every 6 hours as   needed for shortness of breath / dyspnea or wheezing 7/2/21     Fantasma Ochoa MD        ALLERGIES:   Allergies   Allergen Reactions     Fentanyl      Insulin Swelling     Reaction Date: Around 2359-5430  Face swelling  Inpatient when reaction occurred, unsure which type of insulin  Required some Benadryl to help with the swelling     Lisinopril      Face swelling, cough     Morphine Itching        SOCIAL HISTORY:  Social History     Tobacco Use     Smoking status: Current Every Day Smoker     Types: Cigarettes     Smokeless tobacco: Never Used   Substance Use Topics     Alcohol use: Yes     Comment: 1/2 to 1 pint a day     Drug use: Yes     Types: Marijuana     Comment: approximately once per month        FAMILY HISTORY:  No pertinent family history      PHYSICAL EXAM:   BP (!) 134/91 (BP Location: Right arm)   Pulse 94   Temp 97.1    F (36.2  C) (Axillary)   Resp 20   Ht 1.791 m (5' 10.5\")   Wt   77.2 kg (170 lb 4.8 oz)   SpO2 92%   BMI 24.09 kg/m       PHYSICAL EXAM:  General: lethargic, opens eyes when addressed, not answering   questions due to lethargy, NAD  SKIN: no suspicious lesions, rashes, jaundice, or spider angiomas  HEAD: Normocephalic. No masses, lesions, tenderness or   abnormalities  NECK: Neck supple. No adenopathy. Thyroid symmetric, normal size.  EYES: No scleral icterus  ENT: ENT exam normal, no neck nodes or sinus tenderness  RESPIRATORY: negative, Good diaphragmatic excursion. Lungs clear  CARDIOVASCULAR: negative, PMI normal. No lifts, heaves, or   thrills. RRR. No murmurs, clicks gallops or rub  GASTROINTESTINAL: +BS, soft, mild epigastric tenderness, ND, no   HSM, no masses/guarding/rebound  JOINT/EXTREMITIES: extremities normal- no gross deformities   noted, gait normal and normal muscle tone  NEURO: Reflexes grossly normal and symmetric. Sensation grossly   WNL.  PSYCH: no abnormal anxiety/depression  LYMPH: No " anterior cervical, posterior cervical, or   supraclavicular adenopathy     LABS:  I reviewed the patient's new clinical lab test results.   Recent Labs   Lab Test 10/19/21  1227 07/02/21 0318 06/01/21  0050 02/24/21  1617 02/22/21  0531   WBC 4.2 3.4* 4.0   < > 2.9*   HGB 15.1 13.1* 13.5   < > 12.6*   MCV 86 83 84   < > 87   * 170 193   < > 150   INR 1.00  --   --   --  1.07    < > = values in this interval not displayed.     Recent Labs   Lab Test 10/19/21  1227 07/02/21  0318 06/01/21  0050    140 140   POTASSIUM 3.9 3.4 3.9   CHLORIDE 99 106 106   CO2 28 27 27   BUN 11 12 10   ANIONGAP 13 7 7   BARRY 9.0 8.8 9.4     Recent Labs   Lab Test 10/19/21  1227 07/02/21  0318 06/01/21  0050   ALBUMIN 4.3 3.9 3.7   BILITOTAL 1.0 0.4 0.4   * 386* 76*   AST 1,413* 337* 123*   ALKPHOS 103 92 91   LIPASE 248 171 91        IMAGING  I personally reviewed the patient's new imaging results.    CT ABDOMEN PELVIS W CONTRAST 10/19/2021 3:09 PM     CLINICAL HISTORY: Epigastric pain     TECHNIQUE: CT scan of the abdomen and pelvis was performed   following  injection of IV contrast. Multiplanar reformats were obtained.   Dose  reduction techniques were used.  CONTRAST: 83 mL Isovue-[id number]     COMPARISON: CT exams 07/02/2021 and 6/1/2021     FINDINGS:   LOWER CHEST: Stable right basilar scarring.     HEPATOBILIARY: Hepatic steatosis. Stable 1.7 cm the calcified  structure in the sarath hepatis region, perhaps a peripherally  calcified aneurysm. Normal gallbladder and bile ducts     PANCREAS: Stable ectasia of the main duct and stable scattered   coarse  calcifications consistent with chronic pancreatitis.     SPLEEN: Stable atrophy.     ADRENAL GLANDS: Stable 2.5 cm left adrenal gland nodule, likely   an  adenoma. Recommend attention on follow-up. Stable right adrenal   gland  thickening.     KIDNEYS/BLADDER: Stable small bilateral renal cortical cysts. No  follow-up is needed. No hydronephrosis or hydroureter. Stable    diffuse  bladder wall thickening which may reflect lack of distention   and/or  chronic hypertrophy.     BOWEL: Mild gastric distention likely related to bolus effect.   Mild  gastric antral wall thickening without surrounding edema. Normal  caliber small bowel without inflammatory changes. Normal   appendix.  Distal colonic diverticulosis without evidence of acute  diverticulitis. No free air or free fluid.     PELVIC ORGANS: Borderline enlarged prostate gland. No free fluid.     ADDITIONAL FINDINGS: Mild atherosclerosis.     MUSCULOSKELETAL: Right proximal femur fixation pins. Severe right   hip  osteoarthritis. Spinal degenerative changes.                                                                      IMPRESSION:   1.  Mild gastric antral wall thickening without surrounding   edema.  This may reflect mild gastritis.  2.  Mild gastric distention. This is favored to reflect bolus   effect  assuming no symptoms concerning for gastric outlet obstruction or  gastroparesis.  3.  Chronic pancreatitis. No CT evidence of acute pancreatitis.  4.  Hepatic steatosis.    [facility]  LOCATION: [facility]  DATE: 10/19/2021     PORTAL AND HEPATIC VENOUS DUPLEX     INDICATION: Hepatitis. Right upper quadrant pain.     TECHNIQUE: Duplex imaging was performed utilizing gray-scale,   two-dimensional images, and color-flow imaging. Doppler waveform   analysis and spectral Doppler imaging are also performed.     COMPARISON: Ultrasound abdomen limited 07/02/2021     FINDINGS:  ASCITES: None     Portal and hepatic veins are patent with normal directional flow.   Patent IVC. Complex fluid collection left upper quadrant adjacent   to the spleen, indeterminant. Suspect this may reflect stomach.     VELOCITIES (cm/s):  Main portal vein: 31  Left portal vein: 18  Right portal vein: 40  Hepatic artery: 108                                                                  IMPRESSION:   1.  Patent hepatic  and portal veins with normal directional flow.  2.  Complex fluid collection noted adjacent to the spleen,   etiology indeterminate based on ultrasound images. Suspect this   may reflect fluid-filled stomach.     CONSULTATION ASSESSMENT AND PLAN:    46 year old male with past medical history significant for   alcohol abuse/dependence, alcohol related hepatitis, alcohol   induced pancreatitis, alcohol withdrawal seizures, HTN,   previously diet controlled DM, admitted 10/19 with epigastric   abdominal pain, nausea, vomiting with labs notable for   significantly elevated transaminases, chronic pancreatitis and   possible gastritis on CT imaging, with normal lipase. US doppler   portal veins negative for obstruction.    1. Elevated liver enzymes. Suspect component of alcohol induced   hepatitis but typically levels not this high therefore consider   infectious/viral causes. Abd US Doppler not showing thrombosis.   Ischemia in the differential. Do not suspect choledocholithiasis   with normal bilirubin and alk phos. Tylenol level noted to be   undetectable. Liver synthetic function appears preserved with   normal INR. Fortunately, labs are improving today. Danyel DF 1.   MELD 6.   --Trend LFTs.   --Await hepatitis C sonali.   --DF not high enough for steroids.     2. Epigastric pain. Viral illness in the setting of more acute   LFT elevation considered with nausea, vomiting. Differential also   included chronic pancreatitis, gastritis/PUD with noted gastric   wall thickening on CT in the setting of heavy alcohol use. Lipase   is normal and no acute pancreatic changes on imaging. No evidence   of biliary obstruction. No signs of GI bleeding, HGB stable.   --NPO.  --Pain management per primary team.   --IV PPI BID.    3. ETOH withdrawal. CIWA protocol. Appears over-medicated on my   exam.     Reviewed with Dr. Rodriguez.     Thank you for asking us to participate in the care of this   patient.    Phyllis Pacheco,  PAC  [facility] ([facility])        Glucose by meter   Result Value Ref Range    GLUCOSE BY METER POCT 183 (H) 70 - 99 mg/dL   Glucose by meter   Result Value Ref Range    GLUCOSE BY METER POCT 113 (H) 70 - 99 mg/dL   Comprehensive metabolic panel   Result Value Ref Range    Sodium 137 133 - 144 mmol/L    Potassium 3.5 3.4 - 5.3 mmol/L    Chloride 103 94 - 109 mmol/L    Carbon Dioxide (CO2) 26 20 - 32 mmol/L    Anion Gap 8 3 - 14 mmol/L    Urea Nitrogen 13 7 - 30 mg/dL    Creatinine 0.53 (L) 0.66 - 1.25 mg/dL    Calcium 8.0 (L) 8.5 - 10.1 mg/dL    Glucose 124 (H) 70 - 99 mg/dL    Alkaline Phosphatase 73 40 - 150 U/L     (H) 0 - 45 U/L     (H) 0 - 70 U/L    Protein Total 7.2 6.8 - 8.8 g/dL    Albumin 3.2 (L) 3.4 - 5.0 g/dL    Bilirubin Total 1.2 0.2 - 1.3 mg/dL    GFR Estimate >90 >60 mL/min/1.73m2   CBC with platelets   Result Value Ref Range    WBC Count 3.4 (L) 4.0 - 11.0 10e3/uL    RBC Count 4.20 (L) 4.40 - 5.90 10e6/uL    Hemoglobin 12.3 (L) 13.3 - 17.7 g/dL    Hematocrit 36.7 (L) 40.0 - 53.0 %    MCV 87 78 - 100 fL    MCH 29.3 26.5 - 33.0 pg    MCHC 33.5 31.5 - 36.5 g/dL    RDW 15.4 (H) 10.0 - 15.0 %    Platelet Count 99 (L) 150 - 450 10e3/uL   CK total   Result Value Ref Range     (H) 30 - 300 U/L   RBC and Platelet Morphology   Result Value Ref Range    Platelet Assessment  Automated Count Confirmed. Platelet morphology is normal.     Automated Count Confirmed. Platelet morphology is normal.    RBC Morphology Confirmed RBC Indices    Phosphorus   Result Value Ref Range    Phosphorus 3.1 2.5 - 4.5 mg/dL   Lactic Acid STAT   Result Value Ref Range    Lactic Acid 0.9 0.7 - 2.0 mmol/L

## 2021-10-20 NOTE — CONSULTS
GASTROENTEROLOGY CONSULTATION      Jaiden Lyons  [address]  [facility] [address]  46 year old male     Admission Date/Time: 10/19/2021  Primary Care Provider: No Ref-Primary, Physician     We were asked to see the patient in consultation by Dr. Jauregui for evaluation of elevated liver enzymes.    CC: epigastric pain     HPI:  Jaiden Lyons is a 46 year old male with past medical history significant for alcohol abuse/dependence, alcohol related hepatitis, alcohol induced pancreatitis, alcohol withdrawal seizures, HTN, previously diet controlled DM, admitted 10/19 with epigastric abdominal pain, nausea, vomiting with labs notable for significantly elevated transaminases, chronic pancreatitis and possible gastritis on CT imaging, with normal lipase.     Patient is very lethargic on my exam. He is currently on PCA and received Ativan a few times overnight per nursing staff. History obtained from EMR.     Patient developed upper abdominal pain yesterday morning with radiation to both flanks reminiscent of previous episodes of pancreatitis with associated nausea and intractable nonbloody emesis. He had been consuming ~1 pint of alcohol daily and stopped 2 days ago. Loose stool yesterday but no melena. No history of NSAIDs or Tylenol use. Since admission, vomiting has resolved, and pain controlled, no stools. No reports of fever.     He has a history of alcohol induced pancreatitis and has been hospitalized on numerous occasions with associated alcohol induced hepatitis/pancreatitis. This admission, transaminases are higher than previous admissions, AST > ALT with normal lipase.    Labs showed WBC and HGB normal, low platelets 146, total bilirubin/alk phos normal, AST 1413, , INR 1, lipase normal 248. GGT elevated 779, elevated lactic acid 3.4. Creatinine normal.  AST/ALT trending down today with AST now 452, . Hepatitis B surface antibody/antigen, and core IgM antibody are negative.  Hepatitis A IgM negative. Hepatitis C pending. COVID negative. ETOH level 0.02. Tylenol level undetectable.    CT abd/pelvis showed stable ectasia of the main pancreatic duct with coarsely scattered calcifications in the pancreas consistent with chronic pancreatitis, no evidence of acute pancreatitis, mild gastric wall thickening with surrounding edema felt possibly related to gastritis, hepatic steatosis. 1.7cm calcified structure in the sarath hepatis noted felt possibly related to calcified aneurysm.     US abd with Doppler showed patent portal venous system, fluid collection near spleen of indeterminate etiology but suspected to be related to fluid filled stomach.      PAST MEDICAL HISTORY:  Patient Active Problem List    Diagnosis Date Noted     Alcohol withdrawal syndrome with complication (H) 10/19/2021     Priority: Medium     Alcohol abuse with withdrawal (H) 10/19/2021     Priority: Medium     Abdominal pain, epigastric 02/21/2021     Priority: Medium     Lactic acidosis 02/21/2021     Priority: Medium     Alcohol dependence with uncomplicated intoxication (H) 02/21/2021     Priority: Medium     Intractable vomiting with nausea, unspecified vomiting type 02/21/2021     Priority: Medium     Pancreatitis 02/26/2020     Priority: Medium          ROS: A comprehensive ten point review of systems was negative aside from those in mentioned in the HPI.       MEDICATIONS:   Prior to Admission medications    Medication Sig Start Date End Date Taking? Authorizing Provider   empagliflozin (JARDIANCE) 10 MG TABS tablet Take 10 mg by mouth daily   Yes Unknown, Entered By History   glipiZIDE (GLUCOTROL) 5 MG tablet Take 5 mg by mouth daily before breakfast   Yes Unknown, Entered By History   losartan (COZAAR) 50 MG tablet Take 50 mg by mouth daily   Yes Unknown, Entered By History   pantoprazole (PROTONIX) 40 MG EC tablet Take 1 tablet (40 mg) by mouth 2 times daily (before meals)  Patient taking differently: Take 40 mg  "by mouth 2 times daily as needed  2/23/21  Yes Santy Oliva MD   sucralfate (CARAFATE) 1 GM/10ML suspension Take 10 mLs (1 g) by mouth 4 times daily as needed (prn upper abd pain) 7/2/21  Yes Fantasma Ochoa MD   albuterol (PROAIR HFA/PROVENTIL HFA/VENTOLIN HFA) 108 (90 Base) MCG/ACT inhaler Inhale 2 puffs into the lungs every 6 hours as needed for shortness of breath / dyspnea or wheezing 7/2/21   Fantasma Ochoa MD        ALLERGIES:   Allergies   Allergen Reactions     Fentanyl      Insulin Swelling     Reaction Date: Around 4335-4948  Face swelling  Inpatient when reaction occurred, unsure which type of insulin  Required some Benadryl to help with the swelling     Lisinopril      Face swelling, cough     Morphine Itching        SOCIAL HISTORY:  Social History     Tobacco Use     Smoking status: Current Every Day Smoker     Types: Cigarettes     Smokeless tobacco: Never Used   Substance Use Topics     Alcohol use: Yes     Comment: 1/2 to 1 pint a day     Drug use: Yes     Types: Marijuana     Comment: approximately once per month        FAMILY HISTORY:  No pertinent family history      PHYSICAL EXAM:   BP (!) 134/91 (BP Location: Right arm)   Pulse 94   Temp 97.1  F (36.2  C) (Axillary)   Resp 20   Ht 1.791 m (5' 10.5\")   Wt 77.2 kg (170 lb 4.8 oz)   SpO2 92%   BMI 24.09 kg/m       PHYSICAL EXAM:  General: lethargic, opens eyes when addressed, not answering questions due to lethargy, NAD  SKIN: no suspicious lesions, rashes, jaundice, or spider angiomas  HEAD: Normocephalic. No masses, lesions, tenderness or abnormalities  NECK: Neck supple. No adenopathy. Thyroid symmetric, normal size.  EYES: No scleral icterus  ENT: ENT exam normal, no neck nodes or sinus tenderness  RESPIRATORY: negative, Good diaphragmatic excursion. Lungs clear  CARDIOVASCULAR: negative, PMI normal. No lifts, heaves, or thrills. RRR. No murmurs, clicks gallops or rub  GASTROINTESTINAL: +BS, soft, mild epigastric tenderness, ND, no " HSM, no masses/guarding/rebound  JOINT/EXTREMITIES: extremities normal- no gross deformities noted, gait normal and normal muscle tone  NEURO: Reflexes grossly normal and symmetric. Sensation grossly WNL.  PSYCH: no abnormal anxiety/depression  LYMPH: No anterior cervical, posterior cervical, or supraclavicular adenopathy     LABS:  I reviewed the patient's new clinical lab test results.   Recent Labs   Lab Test 10/19/21  1227 07/02/21 0318 06/01/21  0050 02/24/21  1617 02/22/21  0531   WBC 4.2 3.4* 4.0   < > 2.9*   HGB 15.1 13.1* 13.5   < > 12.6*   MCV 86 83 84   < > 87   * 170 193   < > 150   INR 1.00  --   --   --  1.07    < > = values in this interval not displayed.     Recent Labs   Lab Test 10/19/21  1227 07/02/21  0318 06/01/21  0050    140 140   POTASSIUM 3.9 3.4 3.9   CHLORIDE 99 106 106   CO2 28 27 27   BUN 11 12 10   ANIONGAP 13 7 7   BARRY 9.0 8.8 9.4     Recent Labs   Lab Test 10/19/21  1227 07/02/21  0318 06/01/21  0050   ALBUMIN 4.3 3.9 3.7   BILITOTAL 1.0 0.4 0.4   * 386* 76*   AST 1,413* 337* 123*   ALKPHOS 103 92 91   LIPASE 248 171 91        IMAGING  I personally reviewed the patient's new imaging results.    CT ABDOMEN PELVIS W CONTRAST 10/19/2021 3:09 PM     CLINICAL HISTORY: Epigastric pain     TECHNIQUE: CT scan of the abdomen and pelvis was performed following  injection of IV contrast. Multiplanar reformats were obtained. Dose  reduction techniques were used.  CONTRAST: 83 mL Isovue-[id number]     COMPARISON: CT exams 07/02/2021 and 6/1/2021     FINDINGS:   LOWER CHEST: Stable right basilar scarring.     HEPATOBILIARY: Hepatic steatosis. Stable 1.7 cm the calcified  structure in the sarath hepatis region, perhaps a peripherally  calcified aneurysm. Normal gallbladder and bile ducts     PANCREAS: Stable ectasia of the main duct and stable scattered coarse  calcifications consistent with chronic pancreatitis.     SPLEEN: Stable atrophy.     ADRENAL GLANDS: Stable 2.5 cm left adrenal  gland nodule, likely an  adenoma. Recommend attention on follow-up. Stable right adrenal gland  thickening.     KIDNEYS/BLADDER: Stable small bilateral renal cortical cysts. No  follow-up is needed. No hydronephrosis or hydroureter. Stable diffuse  bladder wall thickening which may reflect lack of distention and/or  chronic hypertrophy.     BOWEL: Mild gastric distention likely related to bolus effect. Mild  gastric antral wall thickening without surrounding edema. Normal  caliber small bowel without inflammatory changes. Normal appendix.  Distal colonic diverticulosis without evidence of acute  diverticulitis. No free air or free fluid.     PELVIC ORGANS: Borderline enlarged prostate gland. No free fluid.     ADDITIONAL FINDINGS: Mild atherosclerosis.     MUSCULOSKELETAL: Right proximal femur fixation pins. Severe right hip  osteoarthritis. Spinal degenerative changes.                                                                      IMPRESSION:   1.  Mild gastric antral wall thickening without surrounding edema.  This may reflect mild gastritis.  2.  Mild gastric distention. This is favored to reflect bolus effect  assuming no symptoms concerning for gastric outlet obstruction or  gastroparesis.  3.  Chronic pancreatitis. No CT evidence of acute pancreatitis.  4.  Hepatic steatosis.    [facility]  LOCATION: [facility]  DATE: 10/19/2021     PORTAL AND HEPATIC VENOUS DUPLEX     INDICATION: Hepatitis. Right upper quadrant pain.     TECHNIQUE: Duplex imaging was performed utilizing gray-scale, two-dimensional images, and color-flow imaging. Doppler waveform analysis and spectral Doppler imaging are also performed.     COMPARISON: Ultrasound abdomen limited 07/02/2021     FINDINGS:  ASCITES: None     Portal and hepatic veins are patent with normal directional flow. Patent IVC. Complex fluid collection left upper quadrant adjacent to the spleen, indeterminant. Suspect this may reflect  stomach.     VELOCITIES (cm/s):  Main portal vein: 31  Left portal vein: 18  Right portal vein: 40  Hepatic artery: 108                                                                  IMPRESSION:   1.  Patent hepatic and portal veins with normal directional flow.  2.  Complex fluid collection noted adjacent to the spleen, etiology indeterminate based on ultrasound images. Suspect this may reflect fluid-filled stomach.     CONSULTATION ASSESSMENT AND PLAN:    46 year old male with past medical history significant for alcohol abuse/dependence, alcohol related hepatitis, alcohol induced pancreatitis, alcohol withdrawal seizures, HTN, previously diet controlled DM, admitted 10/19 with epigastric abdominal pain, nausea, vomiting with labs notable for significantly elevated transaminases, chronic pancreatitis and possible gastritis on CT imaging, with normal lipase. US doppler portal veins negative for obstruction.    1. Elevated liver enzymes. Suspect component of alcohol induced hepatitis but typically levels not this high therefore consider infectious/viral causes. Abd US Doppler not showing thrombosis. Ischemia in the differential. Do not suspect choledocholithiasis with normal bilirubin and alk phos. Tylenol level noted to be undetectable. Liver synthetic function appears preserved with normal INR. Fortunately, labs are improving today. Danyel DF 1. MELD 6.   --Trend LFTs.   --Await hepatitis C sonali.   --DF not high enough for steroids.     2. Epigastric pain. Viral illness in the setting of more acute LFT elevation considered with nausea, vomiting. Differential also included chronic pancreatitis, gastritis/PUD with noted gastric wall thickening on CT in the setting of heavy alcohol use. Lipase is normal and no acute pancreatic changes on imaging. No evidence of biliary obstruction. No signs of GI bleeding, HGB stable.   --NPO.  --Pain management per primary team.   --IV PPI BID.    3. ETOH withdrawal. ZORAIDA  protocol. Appears over-medicated on my exam.     Reviewed with Dr. Rodriguez.     Thank you for asking us to participate in the care of this patient.    DESIRE Hoff  [facility] ([facility])

## 2021-10-20 NOTE — PLAN OF CARE
To Do:  End of Shift Summary  For vital signs and complete assessments, please see documentation flowsheets.     Pertinent assessments: Slightly hypertensive. Bedside attendant in beginning of shift. Tremors in arms bilaterally, anxious, disoriented to time. CIWA score of 6 at 2300, ativan and haldol given. Ativan given again at 0600.  Seizure pads placed. PCA initiated; hydromorphone patient controlled. Lung sounds diminished throughout. Last BM on the 18th, bowel sounds normoactive and audible. B, 113    Major Shift Events CIWAs    Treatment Plan: IV fluids, GI/, pain control     Bedside Nurse: Joya Neely RN

## 2021-10-20 NOTE — CONSULTS
Care Management Initial Consult    General Information  Assessment completed with: Patient, Spouse or significant other,    Type of CM/SW Visit: Initial Assessment    Primary Care Provider verified and updated as needed: Yes   Readmission within the last 30 days: no previous admission in last 30 days      Reason for Consult: care coordination/care conference, discharge planning, substance use concerns      Communication Assessment  Patient's communication style: spoken language (English or Bilingual)    Hearing Difficulty or Deaf: no   Wear Glasses or Blind: no    Cognitive  Cognitive/Neuro/Behavioral: .WDL except  Level of Consciousness: somnolent  Arousal Level: arouses to touch/gentle shaking  Orientation: disoriented to  Mood/Behavior: anxious  Best Language: 0 - No aphasia  Speech: slurred    Living Environment:   People in home: other (see comments) (fiance')     Current living Arrangements: apartment      Able to return to prior arrangements: yes       Family/Social Support:  Care provided by: self, other (see comments)  Provides care for: no one     Other (specify) (fiance)          Description of Support System: Supportive         Current Resources:   Patient receiving home care services: No     Community Resources: None  Equipment currently used at home: cane, straight  Supplies currently used at home: None    Employment/Financial:  Employment Status: disabled     Employment/ Comments: on social security   Financial Concerns: No concerns identified           Lifestyle & Psychosocial Needs:  Social Determinants of Health     Tobacco Use: High Risk     Smoking Tobacco Use: Current Every Day Smoker     Smokeless Tobacco Use: Never Used   Alcohol Use:      Frequency of Alcohol Consumption:      Average Number of Drinks:      Frequency of Binge Drinking:    Financial Resource Strain:      Difficulty of Paying Living Expenses:    Food Insecurity:      Worried About Running Out of Food in the Last Year:       Ran Out of Food in the Last Year:    Transportation Needs:      Lack of Transportation (Medical):      Lack of Transportation (Non-Medical):    Physical Activity:      Days of Exercise per Week:      Minutes of Exercise per Session:    Stress:      Feeling of Stress :    Social Connections:      Frequency of Communication with Friends and Family:      Frequency of Social Gatherings with Friends and Family:      Attends Sabianism Services:      Active Member of Clubs or Organizations:      Attends Club or Organization Meetings:      Marital Status:    Intimate Partner Violence:      Fear of Current or Ex-Partner:      Emotionally Abused:      Physically Abused:      Sexually Abused:    Depression:      PHQ-2 Score:    Housing Stability:      Unable to Pay for Housing in the Last Year:      Number of Places Lived in the Last Year:      Unstable Housing in the Last Year:        Functional Status:  Prior to admission patient needed assistance:    no  Mental Health Status:  Mental Health Status: No Current Concerns       Chemical Dependency Status:  Chemical Dependency Status: Current Concern       Additional Information:  Pt is admitted with Alcohol withdrawal . Pt stopped drinking 2 days ago.  Pt kept falling asleep during interview . Henrietta' by bedside and supportive . She said he is interested in CD resources . MD paged for CD assessment . She said his relative is currently in an inpatient CD place in [place] . She is aware that Pt's usually don't get placed directly into CD facilities as usually there is a wait list . Pt does manage his own medications . He doesn't check his blood sugar . His a1c is down to 6.6 as of 10/19 . Pt does use a cane at times . He is on disability . Pt's Fiance' can provide transport at discharge.  Pt follows with Dr Donis . He hasn't seen him in person lately . She would like me to schedule a f/u appt.      Dr Donis-Friday, October 29th at 8:30AM  [facility]    Address: [address] [place] [address]  Phone: [phone]    Barbara Acosta RN BSN   Inpatient Care Coordination  [facility]  [phone]    Mary Carmen Acosta, RN

## 2021-10-20 NOTE — PLAN OF CARE
Pt up Ax1 with belt, unsteady. Somnolent in am, seemed to be overly medicated, hypoxic, had to apply O2 3L, able to wean off. Disoriented. Speech garbled. Was on PCA, d/c'd, switched to PRN IV Dilaudid, gave x2. Upper abd pain. CIWAs, gave IV Ativan 1mg x1. IVF infusing. Nauseated after taking po meds. On clear liquids, had broth x1. Urine alexandria.

## 2021-10-21 LAB
ALBUMIN SERPL-MCNC: 2.9 G/DL (ref 3.4–5)
ALP SERPL-CCNC: 74 U/L (ref 40–150)
ALT SERPL W P-5'-P-CCNC: 220 U/L (ref 0–70)
ANION GAP SERPL CALCULATED.3IONS-SCNC: 7 MMOL/L (ref 3–14)
AST SERPL W P-5'-P-CCNC: 202 U/L (ref 0–45)
BILIRUB DIRECT SERPL-MCNC: 0.3 MG/DL (ref 0–0.2)
BILIRUB SERPL-MCNC: 1.2 MG/DL (ref 0.2–1.3)
BUN SERPL-MCNC: 8 MG/DL (ref 7–30)
CALCIUM SERPL-MCNC: 7.8 MG/DL (ref 8.5–10.1)
CHLORIDE BLD-SCNC: 102 MMOL/L (ref 94–109)
CO2 SERPL-SCNC: 24 MMOL/L (ref 20–32)
CREAT SERPL-MCNC: 0.52 MG/DL (ref 0.66–1.25)
GFR SERPL CREATININE-BSD FRML MDRD: >90 ML/MIN/1.73M2
GLUCOSE BLD-MCNC: 161 MG/DL (ref 70–99)
GLUCOSE BLDC GLUCOMTR-MCNC: 125 MG/DL (ref 70–99)
GLUCOSE BLDC GLUCOMTR-MCNC: 145 MG/DL (ref 70–99)
GLUCOSE BLDC GLUCOMTR-MCNC: 169 MG/DL (ref 70–99)
GLUCOSE BLDC GLUCOMTR-MCNC: 204 MG/DL (ref 70–99)
HBV SURFACE AG SERPL QL IA: NONREACTIVE
HCV AB SERPL QL IA: NONREACTIVE
MAGNESIUM SERPL-MCNC: 1.7 MG/DL (ref 1.6–2.3)
PHOSPHATE SERPL-MCNC: 2.9 MG/DL (ref 2.5–4.5)
POTASSIUM BLD-SCNC: 3.7 MMOL/L (ref 3.4–5.3)
PROT SERPL-MCNC: 6.9 G/DL (ref 6.8–8.8)
SODIUM SERPL-SCNC: 133 MMOL/L (ref 133–144)

## 2021-10-21 PROCEDURE — C9113 INJ PANTOPRAZOLE SODIUM, VIA: HCPCS | Performed by: PHYSICIAN ASSISTANT

## 2021-10-21 PROCEDURE — 80048 BASIC METABOLIC PNL TOTAL CA: CPT | Performed by: INTERNAL MEDICINE

## 2021-10-21 PROCEDURE — 36415 COLL VENOUS BLD VENIPUNCTURE: CPT | Performed by: INTERNAL MEDICINE

## 2021-10-21 PROCEDURE — 99232 SBSQ HOSP IP/OBS MODERATE 35: CPT | Performed by: INTERNAL MEDICINE

## 2021-10-21 PROCEDURE — 250N000013 HC RX MED GY IP 250 OP 250 PS 637: Performed by: PHYSICIAN ASSISTANT

## 2021-10-21 PROCEDURE — 83735 ASSAY OF MAGNESIUM: CPT | Performed by: INTERNAL MEDICINE

## 2021-10-21 PROCEDURE — 258N000003 HC RX IP 258 OP 636: Performed by: INTERNAL MEDICINE

## 2021-10-21 PROCEDURE — 999N000127 HC STATISTIC PERIPHERAL IV START W US GUIDANCE

## 2021-10-21 PROCEDURE — 82248 BILIRUBIN DIRECT: CPT | Performed by: PHYSICIAN ASSISTANT

## 2021-10-21 PROCEDURE — 250N000011 HC RX IP 250 OP 636: Performed by: INTERNAL MEDICINE

## 2021-10-21 PROCEDURE — 250N000011 HC RX IP 250 OP 636: Performed by: PHYSICIAN ASSISTANT

## 2021-10-21 PROCEDURE — 250N000013 HC RX MED GY IP 250 OP 250 PS 637: Performed by: INTERNAL MEDICINE

## 2021-10-21 PROCEDURE — 84100 ASSAY OF PHOSPHORUS: CPT | Performed by: INTERNAL MEDICINE

## 2021-10-21 PROCEDURE — 999N000216 HC STATISTIC ADULT CD FACE TO FACE-NO CHRG

## 2021-10-21 PROCEDURE — 120N000001 HC R&B MED SURG/OB

## 2021-10-21 RX ORDER — HYDROMORPHONE HYDROCHLORIDE 2 MG/1
2 TABLET ORAL 4 TIMES DAILY
Status: DISCONTINUED | OUTPATIENT
Start: 2021-10-21 | End: 2021-10-23 | Stop reason: HOSPADM

## 2021-10-21 RX ORDER — SUCRALFATE ORAL 1 G/10ML
1 SUSPENSION ORAL
Status: DISCONTINUED | OUTPATIENT
Start: 2021-10-21 | End: 2021-10-23 | Stop reason: HOSPADM

## 2021-10-21 RX ADMIN — CLONIDINE HYDROCHLORIDE 0.1 MG: 0.1 TABLET ORAL at 13:51

## 2021-10-21 RX ADMIN — POTASSIUM CHLORIDE, DEXTROSE MONOHYDRATE AND SODIUM CHLORIDE: 150; 5; 450 INJECTION, SOLUTION INTRAVENOUS at 21:44

## 2021-10-21 RX ADMIN — CLONIDINE HYDROCHLORIDE 0.1 MG: 0.1 TABLET ORAL at 21:28

## 2021-10-21 RX ADMIN — POLYETHYLENE GLYCOL 3350 17 G: 17 POWDER, FOR SOLUTION ORAL at 08:40

## 2021-10-21 RX ADMIN — FOLIC ACID 1 MG: 1 TABLET ORAL at 08:40

## 2021-10-21 RX ADMIN — HYDROMORPHONE HYDROCHLORIDE 0.2 MG: 0.2 INJECTION, SOLUTION INTRAMUSCULAR; INTRAVENOUS; SUBCUTANEOUS at 00:35

## 2021-10-21 RX ADMIN — HYDROMORPHONE HYDROCHLORIDE 2 MG: 2 TABLET ORAL at 21:29

## 2021-10-21 RX ADMIN — SUCRALFATE ORAL 1 G: 1 SUSPENSION ORAL at 17:26

## 2021-10-21 RX ADMIN — OXYCODONE HYDROCHLORIDE 5 MG: 5 TABLET ORAL at 15:33

## 2021-10-21 RX ADMIN — DOCUSATE SODIUM 50 MG AND SENNOSIDES 8.6 MG 1 TABLET: 8.6; 5 TABLET, FILM COATED ORAL at 21:28

## 2021-10-21 RX ADMIN — HYDROMORPHONE HYDROCHLORIDE 0.2 MG: 0.2 INJECTION, SOLUTION INTRAMUSCULAR; INTRAVENOUS; SUBCUTANEOUS at 11:05

## 2021-10-21 RX ADMIN — Medication 5 MG: at 02:37

## 2021-10-21 RX ADMIN — THIAMINE HCL TAB 100 MG 100 MG: 100 TAB at 08:40

## 2021-10-21 RX ADMIN — DOCUSATE SODIUM 50 MG AND SENNOSIDES 8.6 MG 1 TABLET: 8.6; 5 TABLET, FILM COATED ORAL at 08:39

## 2021-10-21 RX ADMIN — OXYCODONE HYDROCHLORIDE 5 MG: 5 TABLET ORAL at 19:33

## 2021-10-21 RX ADMIN — POTASSIUM CHLORIDE, DEXTROSE MONOHYDRATE AND SODIUM CHLORIDE: 150; 5; 450 INJECTION, SOLUTION INTRAVENOUS at 00:35

## 2021-10-21 RX ADMIN — SUCRALFATE ORAL 1 G: 1 SUSPENSION ORAL at 21:29

## 2021-10-21 RX ADMIN — HYDROMORPHONE HYDROCHLORIDE 0.2 MG: 0.2 INJECTION, SOLUTION INTRAMUSCULAR; INTRAVENOUS; SUBCUTANEOUS at 16:57

## 2021-10-21 RX ADMIN — GABAPENTIN 100 MG: 100 CAPSULE ORAL at 05:34

## 2021-10-21 RX ADMIN — MULTIPLE VITAMINS W/ MINERALS TAB 1 TABLET: TAB at 08:39

## 2021-10-21 RX ADMIN — HYDROMORPHONE HYDROCHLORIDE 0.2 MG: 0.2 INJECTION, SOLUTION INTRAMUSCULAR; INTRAVENOUS; SUBCUTANEOUS at 05:36

## 2021-10-21 RX ADMIN — PANTOPRAZOLE SODIUM 40 MG: 40 INJECTION, POWDER, FOR SOLUTION INTRAVENOUS at 08:46

## 2021-10-21 RX ADMIN — GABAPENTIN 100 MG: 100 CAPSULE ORAL at 21:29

## 2021-10-21 RX ADMIN — LOSARTAN POTASSIUM 50 MG: 50 TABLET, FILM COATED ORAL at 08:40

## 2021-10-21 RX ADMIN — OXYCODONE HYDROCHLORIDE 5 MG: 5 TABLET ORAL at 08:52

## 2021-10-21 RX ADMIN — GLIPIZIDE 5 MG: 5 TABLET ORAL at 08:40

## 2021-10-21 RX ADMIN — CLONIDINE HYDROCHLORIDE 0.1 MG: 0.1 TABLET ORAL at 05:34

## 2021-10-21 RX ADMIN — NICOTINE 1 PATCH: 14 PATCH, EXTENDED RELEASE TRANSDERMAL at 08:53

## 2021-10-21 RX ADMIN — GABAPENTIN 100 MG: 100 CAPSULE ORAL at 13:51

## 2021-10-21 RX ADMIN — POTASSIUM CHLORIDE, DEXTROSE MONOHYDRATE AND SODIUM CHLORIDE: 150; 5; 450 INJECTION, SOLUTION INTRAVENOUS at 10:58

## 2021-10-21 ASSESSMENT — ACTIVITIES OF DAILY LIVING (ADL)
ADLS_ACUITY_SCORE: 9
ADLS_ACUITY_SCORE: 7
ADLS_ACUITY_SCORE: 9
ADLS_ACUITY_SCORE: 7
ADLS_ACUITY_SCORE: 9
ADLS_ACUITY_SCORE: 7

## 2021-10-21 NOTE — CONSULTS
10/21/2021        Spoke with pt via telephone to screen for CD services. Pt reports he would not be interested in an evaluation or inpatient CD treatment at this time. Pt reports he would like to receive CD resources. Pt reports he does not have email and requested resources be sent to unit SW. Email has been sent to SW. Pt is able to call Mental Health and Addiction Services Line: [phone] and make an appt through [employer] for an evaluation after he is discharged.     Home  [employer]  https://www.Think2    Supportive Services   SMART Recovery  Https://www.smartrecovery.org    Alcoholics Anonymous  http://www.aa.org  Community Drug & Alcohol Support Resources   Alcoholics Anonymous   24/7 Phone Line: 851.964.9880   https://[place].org/   https://a[place].org/   For additional list of online meetings: http://aa-intergroup.org       SOFÍA Das  Phone: [phone]  Email: [email or web address]

## 2021-10-21 NOTE — PLAN OF CARE
Pertinent assessments: Pt A&Ox3 w/ confusion, VSS, Denied N/V or SOB. IV dilaudid given x1, PO oxy x1 for pain management. SBA w/ IV pole and G-Belt. CIWA 3 & 3, Tolerating clear liquid diet. D5 w/ 0.45% NS and Kcl @100 mL/hr.  &125.    Major Shift Events: none    Treatment Plan: [facility] protocol, tele, monitor blood sugar, GI Consult, IV fluids, pain management.    Bedside Nurse: Chelita Huntley RN

## 2021-10-21 NOTE — PROGRESS NOTES
"GASTROENTEROLOGY PROGRESS NOTE        SUBJECTIVE:  Reports overall improvement in general wellbeing since yesterday.  He does continue to have abdominal discomfort.  No nausea or vomiting.  No fevers and no chills.     OBJECTIVE:    /82   Pulse 85   Temp 97.7  F (36.5  C) (Oral)   Resp 20   Ht 1.791 m (5' 10.5\")   Wt 77.2 kg (170 lb 4.8 oz)   SpO2 97%   BMI 24.09 kg/m    Temp (24hrs), Av.9  F (36.6  C), Min:97.3  F (36.3  C), Max:98.3  F (36.8  C)    Patient Vitals for the past 72 hrs:   Weight   10/19/21 2106 77.2 kg (170 lb 4.8 oz)   10/19/21 1544 79.4 kg (175 lb)       Intake/Output Summary (Last 24 hours) at 10/21/2021 0944  Last data filed at 10/21/2021 0653  Gross per 24 hour   Intake 2584 ml   Output 100 ml   Net 2484 ml        PHYSICAL EXAM     Constitutional: Resting comfortably, no distress.    Abdomen: Soft, tender across upper abdomen, no rebound or guarding.         Additional Comments:  ROS, FH, SH: See initial GI consult for details.     I have reviewed the patient's new clinical lab results:     Recent Labs   Lab Test 10/20/21  0726 10/19/21  1227 07/02/21  0318 21  1617 21  0531   WBC 3.4* 4.2 3.4*   < > 2.9*   HGB 12.3* 15.1 13.1*   < > 12.6*   MCV 87 86 83   < > 87   PLT 99* 146* 170   < > 150   INR  --  1.00  --   --  1.07    < > = values in this interval not displayed.     Recent Labs   Lab Test 10/21/21  0747 10/20/21  0726 10/19/21  1227   POTASSIUM 3.7 3.5 3.9   CHLORIDE 102 103 99   CO2 24 26 28   BUN 8 13 11   ANIONGAP 7 8 13     Recent Labs   Lab Test 10/20/21  0726 10/19/21  1227 21  0318 21  0050 21  0050   ALBUMIN 3.2* 4.3 3.9   < > 3.7   BILITOTAL 1.2 1.0 0.4   < > 0.4   * 575* 386*   < > 76*   * 1,413* 337*   < > 123*   LIPASE  --  248 171  --  91    < > = values in this interval not displayed.        ASSESSMENT/ PLAN  Jaiden V White 46-year-old male with medical history of ongoing alcohol abuse, history of acute/chronic " pancreatitis, alcoholic hepatitis with history of withdrawal seizures, and poorly controlled diabetes who presented to the hospital with abdominal pain, nausea, and vomiting found to have elevated LFTs.  Ultrasound, CT and lab work otherwise unrevealing, except for chronic pancreatic changes.      1.  Chronic pancreatitis: Suspect exacerbation of chronic pancreatitis due to ongoing alcohol use.  Abnormal LFTs of unclear etiology.  Most likely viral illness versus alcohol.  His liver synthetic function is preserved.  No evidence of hepatic ischemia, thrombosis, drug toxicity, or hepatitis B/C negative.  -- Awaiting recheck of LFTs today.  -- Continue pain management per medicine team.  Currently on a PCA.  -- Ongoing management for alcohol withdrawal protocol.  Encouraged complete cessation from alcohol  -- N.p.o., continue IV fluids and supportive care.      Discussed with Dr. Rodriguez.    Nubia Sharma PA-C  [facility] ( [facility])

## 2021-10-21 NOTE — PROGRESS NOTES
Hospitalist Medicine Progress Note   [facility]       Jaiden Lyons is a 46 year old gentleman with history of alcohol abuse and dependence, alcoholic hepatitis, alcohol withdrawal with seizures and alcohol induced pancreatitis, hypertension, asthma, diet-controlled diabetes mellitus type 2 was admitted to the [facility] 10/19/2021 with upper abdominal pain radiating to both the flanks associated with nausea vomiting after he stopped drinking a days ago.  CT scan abdomen at the time of admission showed mild gastritis antral wall thickening without edema, mild gastric distention, chronic pancreatitis without any evidence of acute pancreatitis with hepatic steatosis with a normal lipase of 248.  Magnesium was low at 1.5 his Covid test was negative       Date of Admission:  10/19/2021  Assessment & Plan     Alcoholic gastritis  On treatment with pantoprazole IV which will be changed to oral    Alcohol withdrawal  Patient is on CIWA scale  I do not think he has DT    Alcohol liver disease  Patient's AST and ALT are decreasing    Type 2 diabetes mellitus  On glipizide and sliding scale insulin    Alcohol abuse  On multivitamin thiamine and folic acid    Chronic Pancreatitis   Will give Dilaudid 2 mg 4 times per day scheduled    Plan:   CD consult   Change pantoprazole to oral from IV dosing  Start Sucralfate PTA dose   Start Dilaudid 2 mg 4 times per day       Diet: Advance Diet as Tolerated: Clear Liquid Diet    DVT Prophylaxis: Pneumatic Compression Devices  Arango Catheter: Not present  Code Status: Full Code               The patient's care was discussed with the Patient and SO significant Other .    Héctor De La Cruz MD  Hospitalist Service  [facility]    ______________________________________________________________________    Interval History     Symptoms   Has 6/10 Abdominal pain which is present before eating     Review of Systems:   No nausea or  Vomiting.   No Hallucinations     Data reviewed today: I reviewed all medications, new labs and imaging results over the last 24 hours.     Physical Exam   Vital Signs: Temp: 97.3  F (36.3  C) Temp src: Oral BP: 114/79 Pulse: 80   Resp: 20 SpO2: 99 % O2 Device: None (Room air) Oxygen Delivery: 3 LPM  Weight: 170 lbs 4.8 oz      GENERAL: Patient is not in acute distress  HEENT: EOM+,Conjunctiva is clear   NECK:  no Jugular Venous distention  HEART: S1 S2 regular Rate and Rhythm, there is no murmur,   LUNGS: Respirations are not laboured, Lungs are clear to auscultation without Crepitations or Wheezing   ABDOMEN: Soft , there is epigastric tenderness ,Bowel Sounds are  Positive   LOWER LIMBS: no  Pedal Edema  Bilaterally   CNS:  Alert,  Oriented x 3, Moving all the Four Limbs     Data   Recent Labs   Lab 10/21/21  1230 10/21/21  0747 10/21/21  0218 10/20/21  2154 10/20/21  0726 10/19/21  2315 10/19/21  1227   WBC  --   --   --   --  3.4*  --  4.2   HGB  --   --   --   --  12.3*  --  15.1   MCV  --   --   --   --  87  --  86   PLT  --   --   --   --  99*  --  146*   INR  --   --   --   --   --   --  1.00   NA  --  133  --   --  137  --  140   POTASSIUM  --  3.7  --   --  3.5  --  3.9   CHLORIDE  --  102  --   --  103  --  99   CO2  --  24  --   --  26  --  28   BUN  --  8  --   --  13  --  11   CR  --  0.52*  --   --  0.53*  --  0.57*   ANIONGAP  --  7  --   --  8  --  13   BARRY  --  7.8*  --   --  8.0*  --  9.0   * 161* 169*   < > 124*   < > 147*   ALBUMIN  --  2.9*  --   --  3.2*  --  4.3   PROTTOTAL  --  6.9  --   --  7.2  --  9.2*   BILITOTAL  --  1.2  --   --  1.2  --  1.0   ALKPHOS  --  74  --   --  73  --  103   ALT  --  220*  --   --  345*  --  575*   AST  --  202*  --   --  452*  --  1,413*   LIPASE  --   --   --   --   --   --  248   TROPONIN  --   --   --   --   --   --  <0.015    < > = values in this interval not displayed.         No results found for this or any previous visit (from the past 24  hour(s)).

## 2021-10-21 NOTE — PLAN OF CARE
To Do:  End of Shift Summary  For vital signs and complete assessments, please see documentation flowsheets.     Pertinent assessments: Pt A&Ox3 w/ confusion, VSS, Denied N/V or SOB. IV dilauded given for pain management. SBA w/ IV pole and G-Belt. CIWA 2&2, Tolerating clear liquid diet. PO melatonin given for sleep. Pt also reported having a med. BM.     Major Shift Events Uneventful.     Treatment Plan: [facility] protocol, tele, PCA, monitor blood sugar, GI Consult    Bedside Nurse: Sangita Fierro RN

## 2021-10-21 NOTE — PLAN OF CARE
End of Shift Summary  For vital signs and complete assessments, please see documentation flowsheets.     Pertinent assessments: Pt scores 5 on CIWA scale. He has tremors and is confused partially to date and place. He is unable to do serial additions. Pt unsteady when up so staff assisting with transfers and using gait belt. Bed and chair alarms in place and active. Pain in bilateral abdomen. Oxycodone and Dilaudid for comfort. Pt states pain gets a little worse when eating clears. Pt encouraged to go slowly with eating and Carafate started. Will wait to advance diet at this time. VSS. Afebrile.  Treatment Plan: Pain control, ADAT, CIWA protocol and Ativan prn.

## 2021-10-21 NOTE — PLAN OF CARE
Assessments: A&O x3 with some confusion. BP slightly elevated otherwise stable. Losartan started this evening. Transfers A1 with belt/IV pole. Calm and pleasant during shift. CIWA 2 and 2. O2 3L/nc, dips with room air to high 80s. PRN IV 0.2mg Dilaudid given 2x with relief. Denies nausea, headache. Tolerated clear liquid diet.     Treatment Plan: IVF,  pain control, CIWA protocol, PT/OT eval    Bedside Nurse: Bhanu Avelar RN

## 2021-10-22 LAB
ALBUMIN SERPL-MCNC: 2.9 G/DL (ref 3.4–5)
ALP SERPL-CCNC: 71 U/L (ref 40–150)
ALT SERPL W P-5'-P-CCNC: 167 U/L (ref 0–70)
ANION GAP SERPL CALCULATED.3IONS-SCNC: 6 MMOL/L (ref 3–14)
AST SERPL W P-5'-P-CCNC: 107 U/L (ref 0–45)
BILIRUB SERPL-MCNC: 1.1 MG/DL (ref 0.2–1.3)
BUN SERPL-MCNC: 4 MG/DL (ref 7–30)
CALCIUM SERPL-MCNC: 8.1 MG/DL (ref 8.5–10.1)
CHLORIDE BLD-SCNC: 103 MMOL/L (ref 94–109)
CO2 SERPL-SCNC: 25 MMOL/L (ref 20–32)
CREAT SERPL-MCNC: 0.49 MG/DL (ref 0.66–1.25)
ERYTHROCYTE [DISTWIDTH] IN BLOOD BY AUTOMATED COUNT: 14.6 % (ref 10–15)
GFR SERPL CREATININE-BSD FRML MDRD: >90 ML/MIN/1.73M2
GLUCOSE BLD-MCNC: 184 MG/DL (ref 70–99)
GLUCOSE BLDC GLUCOMTR-MCNC: 198 MG/DL (ref 70–99)
GLUCOSE BLDC GLUCOMTR-MCNC: 203 MG/DL (ref 70–99)
GLUCOSE BLDC GLUCOMTR-MCNC: 218 MG/DL (ref 70–99)
GLUCOSE BLDC GLUCOMTR-MCNC: 230 MG/DL (ref 70–99)
HCT VFR BLD AUTO: 36.1 % (ref 40–53)
HGB BLD-MCNC: 12.1 G/DL (ref 13.3–17.7)
MAGNESIUM SERPL-MCNC: 1.5 MG/DL (ref 1.6–2.3)
MCH RBC QN AUTO: 29.6 PG (ref 26.5–33)
MCHC RBC AUTO-ENTMCNC: 33.5 G/DL (ref 31.5–36.5)
MCV RBC AUTO: 88 FL (ref 78–100)
PHOSPHATE SERPL-MCNC: 2.5 MG/DL (ref 2.5–4.5)
PLATELET # BLD AUTO: 103 10E3/UL (ref 150–450)
POTASSIUM BLD-SCNC: 3.9 MMOL/L (ref 3.4–5.3)
PROT SERPL-MCNC: 6.8 G/DL (ref 6.8–8.8)
RBC # BLD AUTO: 4.09 10E6/UL (ref 4.4–5.9)
SODIUM SERPL-SCNC: 134 MMOL/L (ref 133–144)
WBC # BLD AUTO: 3.8 10E3/UL (ref 4–11)

## 2021-10-22 PROCEDURE — 999N000216 HC STATISTIC ADULT CD FACE TO FACE-NO CHRG

## 2021-10-22 PROCEDURE — 250N000011 HC RX IP 250 OP 636: Performed by: INTERNAL MEDICINE

## 2021-10-22 PROCEDURE — 250N000013 HC RX MED GY IP 250 OP 250 PS 637: Performed by: INTERNAL MEDICINE

## 2021-10-22 PROCEDURE — 120N000001 HC R&B MED SURG/OB

## 2021-10-22 PROCEDURE — 250N000013 HC RX MED GY IP 250 OP 250 PS 637: Performed by: PHYSICIAN ASSISTANT

## 2021-10-22 PROCEDURE — C9113 INJ PANTOPRAZOLE SODIUM, VIA: HCPCS | Performed by: PHYSICIAN ASSISTANT

## 2021-10-22 PROCEDURE — 250N000011 HC RX IP 250 OP 636: Performed by: PHYSICIAN ASSISTANT

## 2021-10-22 PROCEDURE — 99232 SBSQ HOSP IP/OBS MODERATE 35: CPT | Performed by: INTERNAL MEDICINE

## 2021-10-22 PROCEDURE — 85027 COMPLETE CBC AUTOMATED: CPT | Performed by: INTERNAL MEDICINE

## 2021-10-22 PROCEDURE — 83735 ASSAY OF MAGNESIUM: CPT | Performed by: INTERNAL MEDICINE

## 2021-10-22 PROCEDURE — 84100 ASSAY OF PHOSPHORUS: CPT | Performed by: INTERNAL MEDICINE

## 2021-10-22 PROCEDURE — 82374 ASSAY BLOOD CARBON DIOXIDE: CPT | Performed by: INTERNAL MEDICINE

## 2021-10-22 PROCEDURE — 82947 ASSAY GLUCOSE BLOOD QUANT: CPT | Performed by: INTERNAL MEDICINE

## 2021-10-22 PROCEDURE — 36415 COLL VENOUS BLD VENIPUNCTURE: CPT | Performed by: INTERNAL MEDICINE

## 2021-10-22 RX ORDER — MAGNESIUM OXIDE 400 MG/1
400 TABLET ORAL 3 TIMES DAILY
Status: DISCONTINUED | OUTPATIENT
Start: 2021-10-22 | End: 2021-10-23 | Stop reason: HOSPADM

## 2021-10-22 RX ORDER — PANTOPRAZOLE SODIUM 40 MG/1
40 TABLET, DELAYED RELEASE ORAL
Status: DISCONTINUED | OUTPATIENT
Start: 2021-10-23 | End: 2021-10-23 | Stop reason: HOSPADM

## 2021-10-22 RX ADMIN — HYDROMORPHONE HYDROCHLORIDE 2 MG: 2 TABLET ORAL at 08:45

## 2021-10-22 RX ADMIN — LORAZEPAM 2 MG: 2 INJECTION INTRAMUSCULAR; INTRAVENOUS at 09:34

## 2021-10-22 RX ADMIN — Medication 400 MG: at 16:17

## 2021-10-22 RX ADMIN — GLIPIZIDE 5 MG: 5 TABLET ORAL at 08:45

## 2021-10-22 RX ADMIN — GABAPENTIN 100 MG: 100 CAPSULE ORAL at 21:51

## 2021-10-22 RX ADMIN — SUCRALFATE ORAL 1 G: 1 SUSPENSION ORAL at 16:18

## 2021-10-22 RX ADMIN — HYDROMORPHONE HYDROCHLORIDE 0.2 MG: 0.2 INJECTION, SOLUTION INTRAMUSCULAR; INTRAVENOUS; SUBCUTANEOUS at 23:58

## 2021-10-22 RX ADMIN — HYDROMORPHONE HYDROCHLORIDE 0.2 MG: 0.2 INJECTION, SOLUTION INTRAMUSCULAR; INTRAVENOUS; SUBCUTANEOUS at 16:11

## 2021-10-22 RX ADMIN — FOLIC ACID 1 MG: 1 TABLET ORAL at 08:45

## 2021-10-22 RX ADMIN — THIAMINE HCL TAB 100 MG 100 MG: 100 TAB at 08:45

## 2021-10-22 RX ADMIN — HYDROMORPHONE HYDROCHLORIDE 0.2 MG: 0.2 INJECTION, SOLUTION INTRAMUSCULAR; INTRAVENOUS; SUBCUTANEOUS at 05:52

## 2021-10-22 RX ADMIN — GABAPENTIN 100 MG: 100 CAPSULE ORAL at 05:50

## 2021-10-22 RX ADMIN — PANTOPRAZOLE SODIUM 40 MG: 40 INJECTION, POWDER, FOR SOLUTION INTRAVENOUS at 08:51

## 2021-10-22 RX ADMIN — SUCRALFATE ORAL 1 G: 1 SUSPENSION ORAL at 13:47

## 2021-10-22 RX ADMIN — Medication 400 MG: at 21:52

## 2021-10-22 RX ADMIN — HYDROMORPHONE HYDROCHLORIDE 2 MG: 2 TABLET ORAL at 21:52

## 2021-10-22 RX ADMIN — HYDROMORPHONE HYDROCHLORIDE 0.2 MG: 0.2 INJECTION, SOLUTION INTRAMUSCULAR; INTRAVENOUS; SUBCUTANEOUS at 01:22

## 2021-10-22 RX ADMIN — NICOTINE 1 PATCH: 14 PATCH, EXTENDED RELEASE TRANSDERMAL at 13:02

## 2021-10-22 RX ADMIN — Medication 400 MG: at 08:45

## 2021-10-22 RX ADMIN — CLONIDINE HYDROCHLORIDE 0.1 MG: 0.1 TABLET ORAL at 21:52

## 2021-10-22 RX ADMIN — MULTIPLE VITAMINS W/ MINERALS TAB 1 TABLET: TAB at 08:45

## 2021-10-22 RX ADMIN — HYDROMORPHONE HYDROCHLORIDE 2 MG: 2 TABLET ORAL at 18:31

## 2021-10-22 RX ADMIN — CLONIDINE HYDROCHLORIDE 0.1 MG: 0.1 TABLET ORAL at 05:50

## 2021-10-22 RX ADMIN — LORAZEPAM 1 MG: 1 TABLET ORAL at 08:45

## 2021-10-22 RX ADMIN — LOSARTAN POTASSIUM 50 MG: 50 TABLET, FILM COATED ORAL at 08:45

## 2021-10-22 RX ADMIN — ONDANSETRON 4 MG: 2 INJECTION INTRAMUSCULAR; INTRAVENOUS at 09:03

## 2021-10-22 RX ADMIN — SUCRALFATE ORAL 1 G: 1 SUSPENSION ORAL at 21:52

## 2021-10-22 ASSESSMENT — ACTIVITIES OF DAILY LIVING (ADL)
ADLS_ACUITY_SCORE: 5

## 2021-10-22 NOTE — PLAN OF CARE
End of Shift Summary  For vital signs and complete assessments, please see documentation flowsheets.     Pertinent assessments: Afebrile. LS diminished. BS hypoactive. Pt reports 7/10 pain, received IV Dilaudid x2. Clear Liquid diet, tolerating without issues. Denies nausea. Ax1 with belt to ambulate, pt can be unsteady at times. PIV - IVF. CIWA score of 6 and 6, no ativan required. Tele monitoring - SR, HR 70. BG was 230. Confused and disorientated; alarm on bed for safety. Slept well.     Major Shift Events: none    Treatment Plan: Monitor CIWA scores, Advance  and tolerate diet.

## 2021-10-22 NOTE — PROGRESS NOTES
CD resources emailed to SW. Met with patient to provide these resources and they were already given by SW 10/21. Patient declined any further SW needs.     CHERRY Lugo, [facility]   Inpatient Care Coordination  [facility]   [phone]

## 2021-10-22 NOTE — PROGRESS NOTES
Hospitalist Medicine Progress Note   [facility]       Jaiden Lyons is a 46 year old gentleman with history of alcohol abuse and dependence, alcoholic hepatitis, alcohol withdrawal with seizures and alcohol induced pancreatitis, hypertension, asthma, diet-controlled diabetes mellitus type 2 was admitted to the [facility] 10/19/2021 with upper abdominal pain radiating to both the flanks associated with nausea vomiting after he stopped drinking a days ago.  CT scan abdomen at the time of admission showed mild gastritis antral wall thickening without edema, mild gastric distention, chronic pancreatitis without any evidence of acute pancreatitis with hepatic steatosis with a normal lipase of 248.  Magnesium was low at 1.5 his Covid test was negative       Date of Admission:  10/19/2021  Assessment & Plan     Alcoholic gastritis  On treatment with pantoprazole IV which was changed to oral    Alcohol withdrawal  Patient is on CIWA scale  I do not think he has DT at this time     Alcohol liver disease  Patient's AST and ALT are decreasing    Type 2 diabetes mellitus  On glipizide and sliding scale insulin  Now on sliding scale insulin     Alcohol abuse  On multivitamin thiamine and folic acid    Chronic Pancreatitis   Will give Dilaudid 2 mg 4 times per day scheduled    Plan:   Monitor abdominal pain   Check Lipase and Amylase   Check CMP in am   IV Fluids 125 ml/hr   Discharge in 1-2 days       Diet: Advance Diet as Tolerated: Regular Diet Adult    DVT Prophylaxis: Pneumatic Compression Devices  Arango Catheter: Not present  Code Status: Full Code               The patient's care was discussed with the Patient and SO significant Other .    Héctor De La Cruz MD  Hospitalist Service  [facility]    ______________________________________________________________________    Interval History     Symptoms   Says he had abdominal pain which increased with eating following  which he had nausea and Vomiting       Review of Systems:   No nausea or Vomiting.   No Hallucinations     Data reviewed today: I reviewed all medications, new labs and imaging results over the last 24 hours.     Physical Exam   Vital Signs: Temp: 98.2  F (36.8  C) Temp src: Oral BP: 121/82 Pulse: 71   Resp: 20 SpO2: 99 % O2 Device: None (Room air)    Weight: 170 lbs 4.8 oz      GENERAL: Patient is not in acute distress  HEENT: EOM+,Conjunctiva is clear   NECK:  no Jugular Venous distention  HEART: S1 S2 regular Rate and Rhythm, there is no murmur,   LUNGS: Respirations are not laboured, Lungs are clear to auscultation without Crepitations or Wheezing   ABDOMEN: Soft , there is epigastric tenderness ,Bowel Sounds are  Positive   LOWER LIMBS: no  Pedal Edema  Bilaterally   CNS:  Alert,  Oriented x 3, Moving all the Four Limbs     Data   Recent Labs   Lab 10/22/21  0848 10/22/21  0700 10/22/21  0126 10/21/21  1230 10/21/21  0747 10/20/21  2154 10/20/21  0726 10/19/21  2315 10/19/21  1227   WBC  --  3.8*  --   --   --   --  3.4*  --  4.2   HGB  --  12.1*  --   --   --   --  12.3*  --  15.1   MCV  --  88  --   --   --   --  87  --  86   PLT  --  103*  --   --   --   --  99*  --  146*   INR  --   --   --   --   --   --   --   --  1.00   NA  --  134  --   --  133  --  137  --  140   POTASSIUM  --  3.9  --   --  3.7  --  3.5  --  3.9   CHLORIDE  --  103  --   --  102  --  103  --  99   CO2  --  25  --   --  24  --  26  --  28   BUN  --  4*  --   --  8  --  13  --  11   CR  --  0.49*  --   --  0.52*  --  0.53*  --  0.57*   ANIONGAP  --  6  --   --  7  --  8  --  13   BARRY  --  8.1*  --   --  7.8*  --  8.0*  --  9.0   * 184* 230*   < > 161*   < > 124*   < > 147*   ALBUMIN  --  2.9*  --   --  2.9*  --  3.2*  --  4.3   PROTTOTAL  --  6.8  --   --  6.9  --  7.2  --  9.2*   BILITOTAL  --  1.1  --   --  1.2  --  1.2  --  1.0   ALKPHOS  --  71  --   --  74  --  73  --  103   ALT  --  167*  --   --  220*  --  345*  --  575*    AST  --  107*  --   --  202*  --  452*  --  1,413*   LIPASE  --   --   --   --   --   --   --   --  248   TROPONIN  --   --   --   --   --   --   --   --  <0.015    < > = values in this interval not displayed.         No results found for this or any previous visit (from the past 24 hour(s)).

## 2021-10-22 NOTE — CONSULTS
10/22/2021      Spoke with pt via telephone to ask him if he had changed his mind about referrals to inpatient CD treatment. Pt reports he was thinking about it. Pt was offered a CD evaluation. Pt reports he didn't have time due to he was about to eat and then discharge to go to an appointment in [facility]. Pt reports he received CD resources and is able to call Mental Health and Addiction Services Line: [phone] and make an appt through [employer] for an evaluation after he is discharged.       SOFÍA Das  Phone: [phone]  Email: [email or web address]

## 2021-10-22 NOTE — PROGRESS NOTES
"GASTROENTEROLOGY PROGRESS NOTE        SUBJECTIVE:  Continues abdominal pain, no nausea or vomiting. No fever or chills.      OBJECTIVE:    /82 (BP Location: Left arm)   Pulse 71   Temp 98.2  F (36.8  C) (Oral)   Resp 20   Ht 1.791 m (5' 10.5\")   Wt 77.2 kg (170 lb 4.8 oz)   SpO2 99%   BMI 24.09 kg/m    Temp (24hrs), Av  F (36.7  C), Min:97.3  F (36.3  C), Max:98.5  F (36.9  C)    Patient Vitals for the past 72 hrs:   Weight   10/19/21 2106 77.2 kg (170 lb 4.8 oz)   10/19/21 1544 79.4 kg (175 lb)       Intake/Output Summary (Last 24 hours) at 10/22/2021 1007  Last data filed at 10/22/2021 0900  Gross per 24 hour   Intake 3288 ml   Output 1945 ml   Net 1343 ml        PHYSICAL EXAM     Constitutional: NAD    Abdomen: epigastric and LUQ tenderness, no rebound or guarding         Additional Comments:  ROS, FH, SH: See initial GI consult for details.     I have reviewed the patient's new clinical lab results:     Recent Labs   Lab Test 10/22/21  0700 10/20/21  0726 10/19/21  1227 21  1617 21  0531   WBC 3.8* 3.4* 4.2   < > 2.9*   HGB 12.1* 12.3* 15.1   < > 12.6*   MCV 88 87 86   < > 87   * 99* 146*   < > 150   INR  --   --  1.00  --  1.07    < > = values in this interval not displayed.     Recent Labs   Lab Test 10/22/21  0700 10/21/21  0747 10/20/21  0726   POTASSIUM 3.9 3.7 3.5   CHLORIDE 103 102 103   CO2 25 24 26   BUN 4* 8 13   ANIONGAP 6 7 8     Recent Labs   Lab Test 10/22/21  0700 10/21/21  0747 10/20/21  0726 10/19/21  1227 10/19/21  1227 21  0318 21  0050 21  0050   ALBUMIN 2.9* 2.9* 3.2*   < > 4.3 3.9   < > 3.7   BILITOTAL 1.1 1.2 1.2   < > 1.0 0.4   < > 0.4   * 220* 345*   < > 575* 386*   < > 76*   * 202* 452*   < > 1,413* 337*   < > 123*   LIPASE  --   --   --   --  248 171  --  91    < > = values in this interval not displayed.        ASSESSMENT/ PLAN  Jaiden V White 46-year-old male with medical history of ongoing alcohol abuse, history " of acute/chronic pancreatitis, alcoholic hepatitis with history of withdrawal seizures, and poorly controlled diabetes who presented to the hospital with abdominal pain, nausea, and vomiting found to have elevated LFTs.  Ultrasound, CT and lab work otherwise unrevealing, except for chronic pancreatic changes and mild gastritis.       1.  Chronic pancreatitis: Suspect exacerbation of chronic pancreatitis due to ongoing alcohol use.  Abnormal LFTs of unclear etiology.  Most likely viral illness versus alcohol, improving.  His liver synthetic function is preserved.  No evidence of hepatic ischemia, thrombosis, drug toxicity, or hepatitis B/C negative.     -- LFTs continue to improve.  -- On PPI for mild gastritis seen on CT  -- Continue pain management per medicine team.   -- Ongoing management for alcohol withdrawal protocol.  Encouraged complete cessation from alcohol  -- Clear liquids. Can advance diet as tolerated once abdominal pain improving. Suggest pancreatic enzymes once advanced beyond clears.       Discussed with FAUZIA CaliC  [facility] ( [facility])

## 2021-10-23 VITALS
HEIGHT: 71 IN | WEIGHT: 170.3 LBS | OXYGEN SATURATION: 97 % | SYSTOLIC BLOOD PRESSURE: 135 MMHG | BODY MASS INDEX: 23.84 KG/M2 | RESPIRATION RATE: 20 BRPM | TEMPERATURE: 97.9 F | DIASTOLIC BLOOD PRESSURE: 83 MMHG | HEART RATE: 62 BPM

## 2021-10-23 LAB
ALBUMIN SERPL-MCNC: 2.9 G/DL (ref 3.4–5)
ALP SERPL-CCNC: 68 U/L (ref 40–150)
ALT SERPL W P-5'-P-CCNC: 137 U/L (ref 0–70)
AMYLASE SERPL-CCNC: 69 U/L (ref 30–110)
ANION GAP SERPL CALCULATED.3IONS-SCNC: 6 MMOL/L (ref 3–14)
AST SERPL W P-5'-P-CCNC: 65 U/L (ref 0–45)
BILIRUB SERPL-MCNC: 0.8 MG/DL (ref 0.2–1.3)
BUN SERPL-MCNC: 4 MG/DL (ref 7–30)
CALCIUM SERPL-MCNC: 8.5 MG/DL (ref 8.5–10.1)
CHLORIDE BLD-SCNC: 101 MMOL/L (ref 94–109)
CO2 SERPL-SCNC: 27 MMOL/L (ref 20–32)
CREAT SERPL-MCNC: 0.56 MG/DL (ref 0.66–1.25)
GFR SERPL CREATININE-BSD FRML MDRD: >90 ML/MIN/1.73M2
GLUCOSE BLD-MCNC: 171 MG/DL (ref 70–99)
GLUCOSE BLDC GLUCOMTR-MCNC: 196 MG/DL (ref 70–99)
LIPASE SERPL-CCNC: 123 U/L (ref 73–393)
MAGNESIUM SERPL-MCNC: 1.8 MG/DL (ref 1.6–2.3)
PHOSPHATE SERPL-MCNC: 2.9 MG/DL (ref 2.5–4.5)
POTASSIUM BLD-SCNC: 3.8 MMOL/L (ref 3.4–5.3)
PROT SERPL-MCNC: 6.9 G/DL (ref 6.8–8.8)
SODIUM SERPL-SCNC: 134 MMOL/L (ref 133–144)

## 2021-10-23 PROCEDURE — 250N000013 HC RX MED GY IP 250 OP 250 PS 637: Performed by: PHYSICIAN ASSISTANT

## 2021-10-23 PROCEDURE — 84100 ASSAY OF PHOSPHORUS: CPT | Performed by: INTERNAL MEDICINE

## 2021-10-23 PROCEDURE — 250N000013 HC RX MED GY IP 250 OP 250 PS 637: Performed by: INTERNAL MEDICINE

## 2021-10-23 PROCEDURE — 250N000011 HC RX IP 250 OP 636: Performed by: INTERNAL MEDICINE

## 2021-10-23 PROCEDURE — 83690 ASSAY OF LIPASE: CPT | Performed by: INTERNAL MEDICINE

## 2021-10-23 PROCEDURE — 36415 COLL VENOUS BLD VENIPUNCTURE: CPT | Performed by: INTERNAL MEDICINE

## 2021-10-23 PROCEDURE — 83735 ASSAY OF MAGNESIUM: CPT | Performed by: INTERNAL MEDICINE

## 2021-10-23 PROCEDURE — 82150 ASSAY OF AMYLASE: CPT | Performed by: INTERNAL MEDICINE

## 2021-10-23 PROCEDURE — 80053 COMPREHEN METABOLIC PANEL: CPT | Performed by: INTERNAL MEDICINE

## 2021-10-23 PROCEDURE — 99239 HOSP IP/OBS DSCHRG MGMT >30: CPT | Performed by: INTERNAL MEDICINE

## 2021-10-23 PROCEDURE — 258N000003 HC RX IP 258 OP 636: Performed by: INTERNAL MEDICINE

## 2021-10-23 RX ORDER — HYDROMORPHONE HYDROCHLORIDE 2 MG/1
2 TABLET ORAL 4 TIMES DAILY
Qty: 12 TABLET | Refills: 0 | Status: SHIPPED | OUTPATIENT
Start: 2021-10-23 | End: 2021-11-21

## 2021-10-23 RX ORDER — FOLIC ACID 1 MG/1
1 TABLET ORAL DAILY
Qty: 30 TABLET | Refills: 0 | Status: ON HOLD | OUTPATIENT
Start: 2021-10-23 | End: 2022-07-07

## 2021-10-23 RX ORDER — HYDROXYZINE HYDROCHLORIDE 25 MG/1
25 TABLET, FILM COATED ORAL EVERY 6 HOURS PRN
Qty: 20 TABLET | Refills: 0 | Status: ON HOLD | OUTPATIENT
Start: 2021-10-23 | End: 2022-07-07

## 2021-10-23 RX ORDER — LANOLIN ALCOHOL/MO/W.PET/CERES
100 CREAM (GRAM) TOPICAL DAILY
Qty: 30 TABLET | Refills: 0 | Status: ON HOLD | OUTPATIENT
Start: 2021-10-23 | End: 2022-01-01

## 2021-10-23 RX ADMIN — HYDROMORPHONE HYDROCHLORIDE 1 MG: 1 INJECTION, SOLUTION INTRAMUSCULAR; INTRAVENOUS; SUBCUTANEOUS at 09:00

## 2021-10-23 RX ADMIN — CLONIDINE HYDROCHLORIDE 0.1 MG: 0.1 TABLET ORAL at 05:45

## 2021-10-23 RX ADMIN — HYDROMORPHONE HYDROCHLORIDE 0.2 MG: 0.2 INJECTION, SOLUTION INTRAMUSCULAR; INTRAVENOUS; SUBCUTANEOUS at 04:06

## 2021-10-23 RX ADMIN — GLIPIZIDE 5 MG: 5 TABLET ORAL at 06:45

## 2021-10-23 RX ADMIN — SODIUM CHLORIDE: 9 INJECTION, SOLUTION INTRAVENOUS at 01:17

## 2021-10-23 RX ADMIN — GABAPENTIN 100 MG: 100 CAPSULE ORAL at 05:45

## 2021-10-23 RX ADMIN — PANTOPRAZOLE SODIUM 40 MG: 40 TABLET, DELAYED RELEASE ORAL at 06:45

## 2021-10-23 ASSESSMENT — ACTIVITIES OF DAILY LIVING (ADL)
ADLS_ACUITY_SCORE: 5

## 2021-10-23 NOTE — PLAN OF CARE
Pt having abdominal and back pain, Dilaudid for comfort. Pt confused partially to time and forgetful. No nausea. Tolerating regular diet for dinner. Good appetite. Good intake of fluids. Glucose 218 at dinner time but this reading is after pt ate his meal. He forgets to call to have bedside glucose check before dinner. Bedtime check is 198.

## 2021-10-23 NOTE — PLAN OF CARE
Pertinent assessments: A&Ox4 this shift. VSS on room air. Pt rated pain in abdominal/back 9/10, prn dilaudid given. Denies SOB, N/V. Ax1 with belt to ambulate.  Tele SR. .    Treatment Plan: Monitor CIWA scores, Advance diet as tolerated.

## 2021-10-23 NOTE — DISCHARGE SUMMARY
[facility]  Hospitalist Discharge Summary      Date of Admission:  10/19/2021  Date of Discharge:  10/23/2021  Discharging Provider: Héctor De La Cruz MD      Discharge Diagnoses   Alcoholic Gastritis   Alcohol Liver disease   Mild alcohol withdrawal  Type 2 diabetes mellitus  Chronic pancreatitis  Alcohol abuse      Follow-ups Needed After Discharge   Follow-up Appointments     Follow-up and recommended labs and tests       Follow up with primary care provider, Freddie Best, within 7 days   for hospital follow- up.  No follow up labs or test are needed.  Will need Chemical dependency evaluation and treatment for alcohol use                 Discharge Disposition   Discharged to home  Condition at discharge: Stable      Hospital Course   Jaiden Lyons is a 46 year old gentleman with history of alcohol abuse and dependence, alcoholic hepatitis, alcohol withdrawal with seizures and alcohol induced pancreatitis, hypertension, asthma, diet-controlled diabetes mellitus type 2 was admitted to the [facility] 10/19/2021 with upper abdominal pain radiating to both the flanks associated with nausea vomiting after he stopped drinking a days ago.  CT scan abdomen at the time of admission showed mild gastritis antral wall thickening without edema, mild gastric distention, chronic pancreatitis without any evidence of acute pancreatitis with hepatic steatosis with a normal lipase of 248.  Magnesium was low at 1.5 which was replaced his Covid test was negative.   Patient did not have any significant alcohol withdrawal.  He tolerated oral food.  Complained of severe 9/10 abdominal pain after I woke him up from his sleep.  He has chronic pancreatitis and might need pain management for that.  On the day of discharge patient was able to eat food and keep it down without any nausea or vomiting.  His amylase and lipase levels were normal.   He would require a chemical dependency evaluation and  treatment and the chemical dependency counselor who talked to him in the hospital was unable to do a complete evaluation as the patient wanted to eat and not talk to the chemical dependency counselor in the hospital.  Resources were given and patient did express that he is able to call mental health and addiction services at [phone].     Consultations This Hospital Stay   CARE MANAGEMENT / SOCIAL WORK IP CONSULT  GASTROENTEROLOGY IP CONSULT  CHEMICAL DEPENDENCY IP CONSULT  CHEMICAL DEPENDENCY IP CONSULT    Code Status   Full Code    Time Spent on this Encounter   I, Héctor De La Cruz MD, personally saw the patient today and spent greater than 30 minutes discharging this patient.       Héctor De La Cruz MD  [place] [address] [facility]  [address] [place] [address]  Phone: [phone]  Fax: [phone]  ______________________________________________________________________    Physical Exam   Vital Signs: Temp: 97.9  F (36.6  C) Temp src: Oral BP: 135/83 Pulse: 62   Resp: 20 SpO2: 97 % O2 Device: None (Room air)    Weight: 170 lbs 4.8 oz  GENERAL: Patient is not in acute distress  HEENT: EOM+,Conjunctiva is clear   NECK:  no Jugular Venous distention  HEART: S1 S2 regular Rate and Rhythm, there is no murmur,   LUNGS: Respirations are not laboured, Lungs are clear to auscultation without Crepitations or Wheezing   ABDOMEN: Soft , there is no epigastric tenderness ,Bowel Sounds are  Positive   LOWER LIMBS: no  Pedal Edema  Bilaterally   CNS:  Alert,  Oriented x 3, Moving all the Four Limbs        Primary Care Physician   Freddie Best    Discharge Orders      Reason for your hospital stay    Abdominal pain and mild alcohol withdrawal     Follow-up and recommended labs and tests     Follow up with primary care provider, Freddie Best, within 7 days for hospital follow- up.  No follow up labs or test are needed.  Will need Chemical dependency evaluation and treatment for  alcohol use     Activity    Your activity upon discharge: activity as tolerated     Diet    Follow this diet upon discharge: Orders Placed This Encounter  Diabetic Diet       Significant Results and Procedures   Most Recent 3 CBC's:Recent Labs   Lab Test 10/22/21  0700 10/20/21  0726 10/19/21  1227   WBC 3.8* 3.4* 4.2   HGB 12.1* 12.3* 15.1   MCV 88 87 86   * 99* 146*     Most Recent 3 BMP's:Recent Labs   Lab Test 10/23/21  0705 10/23/21  0204 10/22/21  2103 10/22/21  0848 10/22/21  0700 10/21/21  1230 10/21/21  0747     --   --   --  134  --  133   POTASSIUM 3.8  --   --   --  3.9  --  3.7   CHLORIDE 101  --   --   --  103  --  102   CO2 27  --   --   --  25  --  24   BUN 4*  --   --   --  4*  --  8   CR 0.56*  --   --   --  0.49*  --  0.52*   ANIONGAP 6  --   --   --  6  --  7   BARRY 8.5  --   --   --  8.1*  --  7.8*   * 196* 198*   < > 184*   < > 161*    < > = values in this interval not displayed.     Most Recent 2 LFT's:Recent Labs   Lab Test 10/23/21  0705 10/22/21  0700   AST 65* 107*   * 167*   ALKPHOS 68 71   BILITOTAL 0.8 1.1   ,   Results for orders placed or performed during the hospital encounter of 10/19/21   CT Abdomen Pelvis w Contrast    Narrative    CT ABDOMEN PELVIS W CONTRAST 10/19/2021 3:09 PM    CLINICAL HISTORY: Epigastric pain    TECHNIQUE: CT scan of the abdomen and pelvis was performed following  injection of IV contrast. Multiplanar reformats were obtained. Dose  reduction techniques were used.  CONTRAST: 83 mL Isovue-370    COMPARISON: CT exams 07/02/2021 and 6/1/2021    FINDINGS:   LOWER CHEST: Stable right basilar scarring.    HEPATOBILIARY: Hepatic steatosis. Stable 1.7 cm the calcified  structure in the sarath hepatis region, perhaps a peripherally  calcified aneurysm. Normal gallbladder and bile ducts    PANCREAS: Stable ectasia of the main duct and stable scattered coarse  calcifications consistent with chronic pancreatitis.    SPLEEN: Stable  atrophy.    ADRENAL GLANDS: Stable 2.5 cm left adrenal gland nodule, likely an  adenoma. Recommend attention on follow-up. Stable right adrenal gland  thickening.    KIDNEYS/BLADDER: Stable small bilateral renal cortical cysts. No  follow-up is needed. No hydronephrosis or hydroureter. Stable diffuse  bladder wall thickening which may reflect lack of distention and/or  chronic hypertrophy.    BOWEL: Mild gastric distention likely related to bolus effect. Mild  gastric antral wall thickening without surrounding edema. Normal  caliber small bowel without inflammatory changes. Normal appendix.  Distal colonic diverticulosis without evidence of acute  diverticulitis. No free air or free fluid.    PELVIC ORGANS: Borderline enlarged prostate gland. No free fluid.    ADDITIONAL FINDINGS: Mild atherosclerosis.    MUSCULOSKELETAL: Right proximal femur fixation pins. Severe right hip  osteoarthritis. Spinal degenerative changes.      Impression    IMPRESSION:   1.  Mild gastric antral wall thickening without surrounding edema.  This may reflect mild gastritis.  2.  Mild gastric distention. This is favored to reflect bolus effect  assuming no symptoms concerning for gastric outlet obstruction or  gastroparesis.  3.  Chronic pancreatitis. No CT evidence of acute pancreatitis.  4.  Hepatic steatosis.    GIGI BUSH MD         SYSTEM ID:  [id number]   US Abdomen or Pelvis Doppler Limited    [facility] RADIOLOGY  LOCATION: [facility]  DATE: 10/19/2021    PORTAL AND HEPATIC VENOUS DUPLEX    INDICATION: Hepatitis. Right upper quadrant pain.    TECHNIQUE: Duplex imaging was performed utilizing gray-scale, two-dimensional images, and color-flow imaging. Doppler waveform analysis and spectral Doppler imaging are also performed.    COMPARISON: Ultrasound abdomen limited 07/02/2021    FINDINGS:  ASCITES: None    Portal and hepatic veins are patent with normal directional flow. Patent IVC. Complex  fluid collection left upper quadrant adjacent to the spleen, indeterminant. Suspect this may reflect stomach.    VELOCITIES (cm/s):  Main portal vein: 31  Left portal vein: 18  Right portal vein: 40  Hepatic artery: 108      Impression    IMPRESSION:   1.  Patent hepatic and portal veins with normal directional flow.  2.  Complex fluid collection noted adjacent to the spleen, etiology indeterminate based on ultrasound images. Suspect this may reflect fluid-filled stomach.       Discharge Medications   Current Discharge Medication List      START taking these medications    Details   folic acid (FOLVITE) 1 MG tablet Take 1 tablet (1 mg) by mouth daily  Qty: 30 tablet, Refills: 0    Associated Diagnoses: Alcohol abuse with withdrawal (H)      HYDROmorphone (DILAUDID) 2 MG tablet Take 1 tablet (2 mg) by mouth 4 times daily  Qty: 12 tablet, Refills: 0    Associated Diagnoses: Chronic pancreatitis, unspecified pancreatitis type (H)      hydrOXYzine (ATARAX) 25 MG tablet Take 1 tablet (25 mg) by mouth every 6 hours as needed for anxiety  Qty: 20 tablet, Refills: 0    Associated Diagnoses: Anxiety      thiamine (B-1) 100 MG tablet Take 1 tablet (100 mg) by mouth daily  Qty: 30 tablet, Refills: 0    Associated Diagnoses: Alcohol abuse with withdrawal (H)         CONTINUE these medications which have NOT CHANGED    Details   empagliflozin (JARDIANCE) 10 MG TABS tablet Take 10 mg by mouth daily      glipiZIDE (GLUCOTROL) 5 MG tablet Take 5 mg by mouth daily before breakfast      losartan (COZAAR) 50 MG tablet Take 50 mg by mouth daily      pantoprazole (PROTONIX) 40 MG EC tablet Take 1 tablet (40 mg) by mouth 2 times daily (before meals)  Qty: 60 tablet, Refills: 3    Associated Diagnoses: Abdominal pain, epigastric      sucralfate (CARAFATE) 1 GM/10ML suspension Take 10 mLs (1 g) by mouth 4 times daily as needed (prn upper abd pain)  Qty: 420 mL, Refills: 0      albuterol (PROAIR HFA/PROVENTIL HFA/VENTOLIN HFA) 108 (90 Base)  MCG/ACT inhaler Inhale 2 puffs into the lungs every 6 hours as needed for shortness of breath / dyspnea or wheezing  Qty: 18 g, Refills: 0           Allergies   Allergies   Allergen Reactions     Fentanyl      Insulin Swelling     Reaction Date: Around 1801-9645  Face swelling  Inpatient when reaction occurred, unsure which type of insulin  Required some Benadryl to help with the swelling     Lisinopril      Face swelling, cough     Morphine Itching

## 2021-10-25 ENCOUNTER — PATIENT OUTREACH (OUTPATIENT)
Dept: CARE COORDINATION | Facility: CLINIC | Age: 46
End: 2021-10-25

## 2021-10-25 DIAGNOSIS — Z71.89 OTHER SPECIFIED COUNSELING: ICD-10-CM

## 2021-10-25 NOTE — PROGRESS NOTES
Clinic Care Coordination Contact  [facility]/Voicemail       Clinical Data: Care Coordinator Outreach  Outreach attempted x 1.  Left message on patient's voicemail with call back information and requested return call.  Plan: Care Coordinator will try to reach patient again in 1-2 business days.    Abhinav Carbajal  Community Health Worker  [facility]  Ph:[phone]

## 2021-10-26 NOTE — PROGRESS NOTES
Clinic Care Coordination Contact  [facility]/Voicemail    Clinical Data: Care Coordinator Outreach  Reason for referral: TCM outreach  Outreach attempted x 2.  Left message on patient's voicemail with call back information and requested return call.  Plan Care Coordinator will do no further outreaches at this time.    Tyesha Alvarez   Community Health Worker   Connected Care Resource [facility]

## 2021-11-21 ENCOUNTER — APPOINTMENT (OUTPATIENT)
Dept: CT IMAGING | Facility: CLINIC | Age: 46
End: 2021-11-21
Attending: EMERGENCY MEDICINE
Payer: COMMERCIAL

## 2021-11-21 ENCOUNTER — HOSPITAL ENCOUNTER (OUTPATIENT)
Facility: CLINIC | Age: 46
Setting detail: OBSERVATION
Discharge: HOME | End: 2021-11-21
Attending: EMERGENCY MEDICINE | Admitting: INTERNAL MEDICINE
Payer: COMMERCIAL

## 2021-11-21 VITALS
HEIGHT: 70 IN | RESPIRATION RATE: 16 BRPM | SYSTOLIC BLOOD PRESSURE: 116 MMHG | TEMPERATURE: 98.8 F | DIASTOLIC BLOOD PRESSURE: 85 MMHG | BODY MASS INDEX: 24.34 KG/M2 | WEIGHT: 170 LBS | OXYGEN SATURATION: 97 % | HEART RATE: 83 BPM

## 2021-11-21 DIAGNOSIS — J02.9 SORE THROAT: ICD-10-CM

## 2021-11-21 PROBLEM — J05.10 EPIGLOTTITIS: Status: ACTIVE | Noted: 2021-11-21

## 2021-11-21 LAB
ALBUMIN SERPL-MCNC: 3.8 G/DL (ref 3.4–5)
ALP SERPL-CCNC: 95 U/L (ref 40–150)
ALT SERPL W P-5'-P-CCNC: 21 U/L (ref 0–70)
ANION GAP SERPL CALCULATED.3IONS-SCNC: 11 MMOL/L (ref 3–14)
AST SERPL W P-5'-P-CCNC: 13 U/L (ref 0–45)
BASOPHILS # BLD MANUAL: 0 10E3/UL (ref 0–0.2)
BASOPHILS NFR BLD MANUAL: 0 %
BILIRUB SERPL-MCNC: 0.6 MG/DL (ref 0.2–1.3)
BUN SERPL-MCNC: 10 MG/DL (ref 7–30)
CALCIUM SERPL-MCNC: 9.7 MG/DL (ref 8.5–10.1)
CHLORIDE BLD-SCNC: 97 MMOL/L (ref 94–109)
CO2 SERPL-SCNC: 26 MMOL/L (ref 20–32)
CREAT SERPL-MCNC: 0.68 MG/DL (ref 0.66–1.25)
EOSINOPHIL # BLD MANUAL: 0.1 10E3/UL (ref 0–0.7)
EOSINOPHIL NFR BLD MANUAL: 1 %
ERYTHROCYTE [DISTWIDTH] IN BLOOD BY AUTOMATED COUNT: 12.5 % (ref 10–15)
ETHANOL SERPL-MCNC: <0.01 G/DL
GFR SERPL CREATININE-BSD FRML MDRD: >90 ML/MIN/1.73M2
GLUCOSE BLD-MCNC: 294 MG/DL (ref 70–99)
HCT VFR BLD AUTO: 42.1 % (ref 40–53)
HGB BLD-MCNC: 14.1 G/DL (ref 13.3–17.7)
LYMPHOCYTES # BLD MANUAL: 2.4 10E3/UL (ref 0.8–5.3)
LYMPHOCYTES NFR BLD MANUAL: 25 %
MCH RBC QN AUTO: 28.4 PG (ref 26.5–33)
MCHC RBC AUTO-ENTMCNC: 33.5 G/DL (ref 31.5–36.5)
MCV RBC AUTO: 85 FL (ref 78–100)
MONOCYTES # BLD MANUAL: 0.5 10E3/UL (ref 0–1.3)
MONOCYTES NFR BLD MANUAL: 5 %
NEUTROPHILS # BLD MANUAL: 6.5 10E3/UL (ref 1.6–8.3)
NEUTROPHILS NFR BLD MANUAL: 69 %
PLAT MORPH BLD: NORMAL
PLATELET # BLD AUTO: 256 10E3/UL (ref 150–450)
POTASSIUM BLD-SCNC: 3.5 MMOL/L (ref 3.4–5.3)
PROT SERPL-MCNC: 8.7 G/DL (ref 6.8–8.8)
RBC # BLD AUTO: 4.96 10E6/UL (ref 4.4–5.9)
RBC MORPH BLD: NORMAL
SARS-COV-2 RNA RESP QL NAA+PROBE: NEGATIVE
SODIUM SERPL-SCNC: 134 MMOL/L (ref 133–144)
TROPONIN I SERPL-MCNC: <0.015 UG/L (ref 0–0.04)
WBC # BLD AUTO: 9.4 10E3/UL (ref 4–11)

## 2021-11-21 PROCEDURE — 71260 CT THORAX DX C+: CPT

## 2021-11-21 PROCEDURE — 250N000009 HC RX 250: Performed by: EMERGENCY MEDICINE

## 2021-11-21 PROCEDURE — 258N000003 HC RX IP 258 OP 636: Performed by: EMERGENCY MEDICINE

## 2021-11-21 PROCEDURE — 93005 ELECTROCARDIOGRAM TRACING: CPT

## 2021-11-21 PROCEDURE — 36415 COLL VENOUS BLD VENIPUNCTURE: CPT | Performed by: EMERGENCY MEDICINE

## 2021-11-21 PROCEDURE — 87635 SARS-COV-2 COVID-19 AMP PRB: CPT | Performed by: EMERGENCY MEDICINE

## 2021-11-21 PROCEDURE — 70491 CT SOFT TISSUE NECK W/DYE: CPT

## 2021-11-21 PROCEDURE — 250N000011 HC RX IP 250 OP 636: Performed by: EMERGENCY MEDICINE

## 2021-11-21 PROCEDURE — 82040 ASSAY OF SERUM ALBUMIN: CPT | Performed by: EMERGENCY MEDICINE

## 2021-11-21 PROCEDURE — C9803 HOPD COVID-19 SPEC COLLECT: HCPCS

## 2021-11-21 PROCEDURE — 99285 EMERGENCY DEPT VISIT HI MDM: CPT | Mod: 25

## 2021-11-21 PROCEDURE — 82077 ASSAY SPEC XCP UR&BREATH IA: CPT | Performed by: EMERGENCY MEDICINE

## 2021-11-21 PROCEDURE — G0378 HOSPITAL OBSERVATION PER HR: HCPCS

## 2021-11-21 PROCEDURE — 96361 HYDRATE IV INFUSION ADD-ON: CPT

## 2021-11-21 PROCEDURE — 85027 COMPLETE CBC AUTOMATED: CPT | Performed by: EMERGENCY MEDICINE

## 2021-11-21 PROCEDURE — 84484 ASSAY OF TROPONIN QUANT: CPT | Performed by: EMERGENCY MEDICINE

## 2021-11-21 PROCEDURE — 96365 THER/PROPH/DIAG IV INF INIT: CPT | Mod: 59

## 2021-11-21 PROCEDURE — 96375 TX/PRO/DX INJ NEW DRUG ADDON: CPT

## 2021-11-21 PROCEDURE — 96367 TX/PROPH/DG ADDL SEQ IV INF: CPT

## 2021-11-21 PROCEDURE — 250N000011 HC RX IP 250 OP 636: Performed by: INTERNAL MEDICINE

## 2021-11-21 PROCEDURE — 99220 PR INITIAL OBSERVATION CARE,LEVEL III: CPT | Performed by: INTERNAL MEDICINE

## 2021-11-21 PROCEDURE — 96376 TX/PRO/DX INJ SAME DRUG ADON: CPT

## 2021-11-21 RX ORDER — DEXAMETHASONE SODIUM PHOSPHATE 10 MG/ML
6 INJECTION, SOLUTION INTRAMUSCULAR; INTRAVENOUS EVERY 12 HOURS
Status: DISCONTINUED | OUTPATIENT
Start: 2021-11-21 | End: 2021-11-21 | Stop reason: HOSPADM

## 2021-11-21 RX ORDER — KETOROLAC TROMETHAMINE 15 MG/ML
15 INJECTION, SOLUTION INTRAMUSCULAR; INTRAVENOUS ONCE
Status: COMPLETED | OUTPATIENT
Start: 2021-11-21 | End: 2021-11-21

## 2021-11-21 RX ORDER — IOPAMIDOL 755 MG/ML
500 INJECTION, SOLUTION INTRAVASCULAR ONCE
Status: COMPLETED | OUTPATIENT
Start: 2021-11-21 | End: 2021-11-21

## 2021-11-21 RX ORDER — CALCIUM CARBONATE 500 MG/1
1 TABLET, CHEWABLE ORAL 2 TIMES DAILY
Qty: 20 TABLET | Refills: 0 | Status: SHIPPED | OUTPATIENT
Start: 2021-11-21 | End: 2021-12-01

## 2021-11-21 RX ORDER — OXYMETAZOLINE HYDROCHLORIDE 0.05 G/100ML
SPRAY NASAL
Status: DISCONTINUED
Start: 2021-11-21 | End: 2021-11-21 | Stop reason: HOSPADM

## 2021-11-21 RX ORDER — LIDOCAINE 40 MG/G
CREAM TOPICAL
Status: DISCONTINUED | OUTPATIENT
Start: 2021-11-21 | End: 2021-11-21

## 2021-11-21 RX ORDER — DEXAMETHASONE SODIUM PHOSPHATE 10 MG/ML
10 INJECTION, SOLUTION INTRAMUSCULAR; INTRAVENOUS ONCE
Status: COMPLETED | OUTPATIENT
Start: 2021-11-21 | End: 2021-11-21

## 2021-11-21 RX ORDER — CEFTRIAXONE 1 G/1
1 INJECTION, POWDER, FOR SOLUTION INTRAMUSCULAR; INTRAVENOUS ONCE
Status: COMPLETED | OUTPATIENT
Start: 2021-11-21 | End: 2021-11-21

## 2021-11-21 RX ORDER — HYDROMORPHONE HYDROCHLORIDE 1 MG/ML
0.5 INJECTION, SOLUTION INTRAMUSCULAR; INTRAVENOUS; SUBCUTANEOUS
Status: DISCONTINUED | OUTPATIENT
Start: 2021-11-21 | End: 2021-11-21

## 2021-11-21 RX ORDER — AMPICILLIN AND SULBACTAM 2; 1 G/1; G/1
3 INJECTION, POWDER, FOR SOLUTION INTRAMUSCULAR; INTRAVENOUS EVERY 6 HOURS
Status: DISCONTINUED | OUTPATIENT
Start: 2021-11-21 | End: 2021-11-21 | Stop reason: HOSPADM

## 2021-11-21 RX ORDER — SUCRALFATE 1 G/1
1 TABLET ORAL 4 TIMES DAILY
Qty: 40 TABLET | Refills: 0 | Status: SHIPPED | OUTPATIENT
Start: 2021-11-21 | End: 2021-12-01

## 2021-11-21 RX ORDER — HYDROMORPHONE HCL IN WATER/PF 6 MG/30 ML
0.2 PATIENT CONTROLLED ANALGESIA SYRINGE INTRAVENOUS
Status: DISCONTINUED | OUTPATIENT
Start: 2021-11-21 | End: 2021-11-21 | Stop reason: HOSPADM

## 2021-11-21 RX ADMIN — HYDROMORPHONE HYDROCHLORIDE 0.5 MG: 1 INJECTION, SOLUTION INTRAMUSCULAR; INTRAVENOUS; SUBCUTANEOUS at 02:36

## 2021-11-21 RX ADMIN — KETOROLAC TROMETHAMINE 15 MG: 15 INJECTION, SOLUTION INTRAMUSCULAR; INTRAVENOUS at 01:27

## 2021-11-21 RX ADMIN — CEFTRIAXONE 1 G: 1 INJECTION, POWDER, FOR SOLUTION INTRAMUSCULAR; INTRAVENOUS at 02:36

## 2021-11-21 RX ADMIN — AMPICILLIN SODIUM AND SULBACTAM SODIUM 3 G: 2; 1 INJECTION, POWDER, FOR SOLUTION INTRAMUSCULAR; INTRAVENOUS at 05:46

## 2021-11-21 RX ADMIN — SODIUM CHLORIDE 80 ML: 9 INJECTION, SOLUTION INTRAVENOUS at 01:36

## 2021-11-21 RX ADMIN — HYDROMORPHONE HYDROCHLORIDE 0.5 MG: 1 INJECTION, SOLUTION INTRAMUSCULAR; INTRAVENOUS; SUBCUTANEOUS at 03:30

## 2021-11-21 RX ADMIN — SODIUM CHLORIDE 1000 ML: 9 INJECTION, SOLUTION INTRAVENOUS at 01:11

## 2021-11-21 RX ADMIN — DEXAMETHASONE SODIUM PHOSPHATE 10 MG: 10 INJECTION INTRAMUSCULAR; INTRAVENOUS at 02:36

## 2021-11-21 RX ADMIN — HYDROMORPHONE HYDROCHLORIDE 0.5 MG: 1 INJECTION, SOLUTION INTRAMUSCULAR; INTRAVENOUS; SUBCUTANEOUS at 05:02

## 2021-11-21 RX ADMIN — HYDROMORPHONE HYDROCHLORIDE 0.2 MG: 0.2 INJECTION, SOLUTION INTRAMUSCULAR; INTRAVENOUS; SUBCUTANEOUS at 06:06

## 2021-11-21 RX ADMIN — HYDROMORPHONE HYDROCHLORIDE 0.2 MG: 0.2 INJECTION, SOLUTION INTRAMUSCULAR; INTRAVENOUS; SUBCUTANEOUS at 08:56

## 2021-11-21 RX ADMIN — IOPAMIDOL 160 ML: 755 INJECTION, SOLUTION INTRAVENOUS at 01:36

## 2021-11-21 ASSESSMENT — MIFFLIN-ST. JEOR: SCORE: 1647.36

## 2021-11-21 ASSESSMENT — ENCOUNTER SYMPTOMS
WEAKNESS: 0
NUMBNESS: 0
TROUBLE SWALLOWING: 1
NAUSEA: 1
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
## 2021-11-21 NOTE — DISCHARGE INSTRUCTIONS
Please remain on a liquid or soft diet until your sore throat resolves.    Discharge Instructions  Sore Throat  You were seen today for a sore throat.  Most (>80%) sore throats are caused by a virus. Antibiotics do not help with viral infections, but you can fight off the virus on your own.  In this case, your sore throat would be treated with medications for your pain and fever.    Strep throat is a kind of sore throat caused by Group A streptococcus bacteria.  This type of sore throat is treated with antibiotics.  If you had a rapid test done today for strep throat and it did not show infection, a culture is done in some cases. The culture can take several days to complete. If the culture shows you have strep throat, we will call you and get you a prescription for antibiotics. We will not contact you with a negative culture result.  Generally, every Emergency Department visit should have a follow-up clinic visit with either a primary or a specialty clinic/provider. Please follow-up as instructed by your emergency provider today.  Return to the Emergency Department if:  If you have difficulty breathing.  If you are drooling because you are unable to swallow.  You become dehydrated due to difficulty drinking. Signs of dehydration include weakness, dry mouth, and urinating less than 3 times per day.  If you develop swelling of the neck or tongue.  If you develop a high fever with either severe or unusual headache or stiff neck.    Treatment:    Pain relief -- Non-prescription pain medications, such as Tylenol  (acetaminophen) or Motrin , Advil  (ibuprofen) are usually recommended for pain.  Do not use a medicine that you are allergic to, or if your provider has told you not to use it.  Soft or liquid diet. Concentrate on liquids to keep yourself hydrated. Cold liquids (popsicles, ice cream, etc.) may feel good on your throat.  If you were given a prescription for medicine here today, be sure to read all of the  information (including the package insert) that comes with your prescription.  This will include important information about the medicine, its side effects, and any warnings that you need to know about.  The pharmacist who fills the prescription can provide more information and answer questions you may have about the medicine.  If you have questions or concerns that the pharmacist cannot address, please call or return to the Emergency Department.   Remember that you can always come back to the Emergency Department if you are not able to see your regular provider in the amount of time listed above, if you get any new symptoms, or if there is anything that worries you.

## 2021-11-21 NOTE — CONSULTS
Consult Date: 11/21/2021    HISTORY OF PRESENT ILLNESS:  This is a 46-year-old patient brought into the ER early this morning for a sore throat.  He complained mainly of severe odynophagia.  He had no airway complaints.  Film was done showing what was thought to be swelling of the epiglottis.  The patient remained in the ER for observation.  He has had no respiratory symptoms or signs of obstruction.  No past history of similar event.  He still has a sore throat, but can swallow and handle his secretions.  He was given Rocephin and Decadron and then just more recently this morning, 3 grams of Unasyn.  His symptoms are improved, but he still has the odynophagia.  He still has had no respiratory complaints.  I was consulted in order to examine his airway.    PAST MEDICAL HISTORY:  Significant for reflux for which he has had upper endoscopy and is on a daily antacid; I did not know the specific one.  He has diabetes.     PHYSICAL EXAMINATION:    GENERAL:  The patient is sitting comfortably, normal voice, normal respirations.  Oxygen saturation was 97% to 98%.  MOUTH:  MOUTH:  Oropharynx was clear.  No swelling.  No trismus.     NECK:  Neck normal to palpation, no adenopathy.    NEUROLOGIC:  Cranial nerves intact.   NOSE:  Nasal airway is clear.    PROCEDURE:  Fiberoptic laryngoscopy was performed.  Both sides of the nose were sprayed with Afrin to decongest the nose.  The flexible scope was then passed easily through the right nasal cavity.  The nose and nasopharynx were normal.  Hypopharynx showed normal epiglottis, normal vocal cords and normal subglottic trachea.  The cords themselves were normal.  Normal mobility.  He did have some swelling and erythema of the postcricoid space and on the arytenoids with no evidence of infection.  This finding is often consistent with reflux or viral swelling.    IMPRESSION:  The patient with odynophagia with no evidence on scope exam of epiglottitis.  His airway is patent.   Swelling of the postcricoid region and arytenoids.  This could be viral.  It could also be related to his history of reflux exacerbated by a viral process.  He has had benefit with treatment here, which is probably from the steroids.  He has received Rocephin and Unasyn.    RECOMMENDATION:  I think the patient can be discharged now that I have examined his airway, which appears patent.  I told him to continue to observe for any signs of airway restriction and return if this occurs.  I recommend he continue his daily antacid.  He said he can swallow, it just takes more time.  He has received IV fluids.  He just needs to maintain on a liquid or soft diet until symptoms improve.  He may benefit from something like Carafate to help with the irritated area or at least maybe some Gaviscon or double-action Gaviscon with bicarbonate to help prevent reflux and as a further antacid.  He could use this after meals and at bedtime.  I would expect his symptoms to improve and return as needed.  However, if his sore throat persists, he should follow up with ENT this coming week.      ADDENDUM:  I have not yet had an opportunity to speak with the emergency room physician, who was in a high-level emergency.  I gave my number to the nurse for the emergency room physician to call me when available to discuss this case.    Fantasma Rey MD        D: 2021   T: 2021   MT: CHANTAL    Name:     MARINO FIGUEROA  MRN:      [id number]        Account:      [id number]   :      1975           Consult Date: 2021     Document: [id number]

## 2021-11-21 NOTE — CONSULTS
See ED note for SW consult.     CHERRY Cohen, [facility]  , ED Care Management   [phone]

## 2021-11-21 NOTE — ED NOTES
[facility]  ED Nurse Handoff Report    Jadien Lyons is a 46 year old male   ED Chief complaint: Trouble Swallowing  . ED Diagnosis:   Final diagnoses:   Epiglottitis     Allergies:   Allergies   Allergen Reactions     Fentanyl      Insulin Swelling     Reaction Date: Around 7680-3289  Face swelling  Inpatient when reaction occurred, unsure which type of insulin  Required some Benadryl to help with the swelling     Lisinopril      Face swelling, cough     Morphine Itching       Code Status: Full Code  Activity level - Baseline/Home:  Independent. Activity Level - Current:   Independent. Lift room needed: No. Bariatric: No   Needed: No   Isolation: No Infection: Not Applicable.     Vital Signs:   Vitals:    11/21/21 0143 11/21/21 0251 11/21/21 0252 11/21/21 0253   BP:       Pulse:       Resp:       Temp:       TempSrc:       SpO2: 98% 95% 96% 99%   Weight:       Height:           Cardiac Rhythm:  ,      Pain level:    Patient confused: No. Patient Falls Risk: No.   Elimination Status: Has not had urge to void since being in ED   Patient Report -Difficulty swallowing, pt. Swallowing water and tolerating it in ED  Labs Ordered and Resulted from Time of ED Arrival to Time of ED Departure   COMPREHENSIVE METABOLIC PANEL - Abnormal       Result Value    Sodium 134      Potassium 3.5      Chloride 97      Carbon Dioxide (CO2) 26      Anion Gap 11      Urea Nitrogen 10      Creatinine 0.68      Calcium 9.7      Glucose 294 (*)     Alkaline Phosphatase 95      AST 13      ALT 21      Protein Total 8.7      Albumin 3.8      Bilirubin Total 0.6      GFR Estimate >90     ETHYL ALCOHOL LEVEL - Normal    Alcohol ethyl <0.01     CBC WITH PLATELETS AND DIFFERENTIAL - Normal    WBC Count 9.4      RBC Count 4.96      Hemoglobin 14.1      Hematocrit 42.1      MCV 85      MCH 28.4      MCHC 33.5      RDW 12.5      Platelet Count 256     TROPONIN I - Normal    Troponin I <0.015     DIFFERENTIAL    %  Neutrophils 69      % Lymphocytes 25      % Monocytes 5      % Eosinophils 1      % Basophils 0      Absolute Neutrophils 6.5      Absolute Lymphocytes 2.4      Absolute Monocytes 0.5      Absolute Eosinophils 0.1      Absolute Basophils 0.0      RBC Morphology Confirmed RBC Indices      Platelet Assessment        Value: Automated Count Confirmed. Platelet morphology is normal.   COVID-19 VIRUS (CORONAVIRUS) BY PCR     CT Chest w Contrast   Final Result   IMPRESSION:    1.  No infiltrate, pleural effusion or pneumomediastinum.   2.  Wall thickening of the distal esophagus near the GE junction not excluded. Endoscopy follow-up recommended.   3.  Chronic pancreatitis.   4.  Rim calcification at the sarath hepatis similar.   5.  Gastric distention.      Soft tissue neck CT w contrast   Final Result   IMPRESSION:    1.  The base of the epiglottis is mildly edematous and there is mild aryepiglottic edema consistent with supraglottitis.   2.  Elongated left styloid process, almost reaching the hyoid bone. This may be incidental but can in some cases be associated with pain. Correlate clinically.          Treatments provided: IV bolus, IV ABX, IV steroids, pain meds  Family Comments: @ bedside  OBS brochure/video discussed/provided to patient:  Yes  ED Medications:   Medications   lidocaine 1 % 0.1-1 mL (has no administration in time range)   lidocaine (LMX4) cream (has no administration in time range)   sodium chloride (PF) 0.9% PF flush 3 mL (3 mLs Intracatheter Not Given 11/21/21 0141)   sodium chloride (PF) 0.9% PF flush 3 mL (has no administration in time range)   HYDROmorphone (PF) (DILAUDID) injection 0.5 mg (0.5 mg Intravenous Given 11/21/21 0236)   0.9% sodium chloride BOLUS (1,000 mLs Intravenous New Bag 11/21/21 0111)   ketorolac (TORADOL) injection 15 mg (15 mg Intravenous Given 11/21/21 0127)   iopamidol (ISOVUE-370) solution 500 mL (160 mLs Intravenous Given 11/21/21 0136)   for CT scan flush use (80 mLs  Intravenous Given 11/21/21 0136)   dexamethasone PF (DECADRON) injection 10 mg (10 mg Intravenous Given 11/21/21 0236)   cefTRIAXone (ROCEPHIN) 1 g vial to attach to  mL bag for ADULTS or NS 50 mL bag for PEDS (1 g Intravenous New Bag 11/21/21 0236)     Drips infusing:  Yes  For the majority of the shift, the patient's behavior Green. Interventions performed were NA.    Sepsis treatment initiated: No     Patient tested for COVID 19 prior to admission: YES, pending    ED Nurse Name/Phone Number: Nataly Wade RN,   2:55 AM
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
## 2021-11-21 NOTE — ED NOTES
Patient was changed over from Dr. Kady delvalle pending ENT evaluation.  Patient was seen for sore throat in which CT scan revealed supraglottitis and concerns of inflammation the base of the epiglottis.  ENT was to come evaluate the patient.  Dr. Rey of ENT graciously came to the ED to evaluate the patient including fiberoptic laryngoscopy.  He did not find any infectious findings or findings to suggest epiglottitis.  He felt this was most likely secondary to viral illness or reflux.  Patient received steroids and antibiotics in the ED.  He recommended discharge home with Gaviscon and sucralfate.  Patient to follow-up with ENT as needed.  Liquid/soft diet while symptoms persist then may progress diet as tolerated.     Dominic Ordoñez MD  11/21/21 1910

## 2021-11-21 NOTE — ED TRIAGE NOTES
Here for trouble swallowing started 30 minutes ago while watching tv associated with nausea and hurts to swallow. ABCs intact.

## 2021-11-21 NOTE — ED PROVIDER NOTES
History     Chief Complaint:  Trouble Swallowing    The history is provided by the patient.      Jaiden Lyons is a 46 year old male with history of alcohol abuse, asthma, hypertension, and type 2 diabetes mellitus who presents with trouble swallowing. Starting 30 minutes ago when the patient was watching TV he began to have trouble swallowing along with some nausea. Upon arrival to the ED he denies any numbness, weakness, or double vision. The patient did restart his diabetes medications yesterday which he has not taken for months.     Review of Systems   HENT: Positive for trouble swallowing.    Eyes: Negative for visual disturbance.   Gastrointestinal: Positive for nausea.   Neurological: Negative for weakness and numbness.   All other systems reviewed and are negative.    Allergies:  Fentanyl  Insulin  Lisinopril  Morphine    Medications:  albuterol inhaler  empagliflozin   folic acid   glipizide   hydroxyzine   losartan  pantoprazole   sucralfate   thiamine   beclomethasone dipropionate  Oxycodone acetaminophen     Past Medical History:     Alcohol abuse  H/o Alcohol-induced acute pancreatitis  Asthma  Benign essential hypertension  Type 2 diabetes mellitus     Past Surgical History:    Esophagoscopy, gastroscopy, duodenoscopy  Forearm surgery to repair injuries from [facility]  Hip surgery      Family History:    Colon cancer   diabetes   Asthma     Social History:  Presents with wife and child.   PCP: Freddie Donis     Physical Exam     Patient Vitals for the past 24 hrs:   BP Temp Temp src Pulse Resp SpO2 Height Weight   11/21/21 0330 120/88 -- -- 90 -- -- -- --   11/21/21 0315 -- -- -- -- -- 96 % -- --   11/21/21 0313 -- -- -- -- -- 96 % -- --   11/21/21 0312 -- -- -- -- -- 97 % -- --   11/21/21 0311 -- -- -- -- -- 96 % -- --   11/21/21 0253 -- -- -- -- -- 99 % -- --   11/21/21 0252 -- -- -- -- -- 96 % -- --   11/21/21 0251 -- -- -- -- -- 95 % -- --   11/21/21 0143 -- -- -- -- -- 98 % -- --   11/21/21  "0121 -- -- -- -- -- 99 % -- --   11/21/21 0120 -- -- -- -- -- 99 % -- --   11/21/21 0119 -- -- -- -- -- 99 % -- --   11/21/21 0115 -- -- -- -- -- 100 % -- --   11/21/21 0100  149/95 -- -- -- -- 99 % -- --   11/21/21 0036  168/116 98.8  F (37.1  C) Temporal  127 16 99 % 1.778 m (5' 10\") 77.1 kg (170 lb)     Physical Exam  Nursing note and vitals reviewed.  Constitutional: Cooperative.   HENT:   Mouth/Throat: Mucous membranes are normal. Tolerating his secretion, appears to have difficulty with swallowing. Posterior oropharynx normal in appearance.   Eyes: Pupils are equal, round, and reactive to light. EOMI  Cardiovascular: Normal rate, regular rhythm and normal heart sounds.  No murmur.  Pulmonary/Chest: Effort normal and breath sounds normal. No respiratory distress. No wheezes. No rales.   Abdominal: Soft. Normal appearance and bowel sounds are normal. No distension. There is no tenderness. There is no rigidity and no guarding. .   Neurological: Alert. Cranial nerves 2-12 intact, normal strength. Normal sensation.   Skin: Skin is warm and dry.   Psychiatric: Normal mood and affect.     Emergency Department Course     ECG  ECG obtained at 0143, ECG read at 0149  Normal sinus rhythm. Prolonged QT.    No significant change as compared to prior, dated 10/19/2021.  Rate 99 bpm. AZ interval 164 ms. QRS duration 104 ms. QT/QTc 406/521 ms. P-R-T axes 59 -11 67.     Imaging:  CT Chest w Contrast   Final Result   IMPRESSION:    1.  No infiltrate, pleural effusion or pneumomediastinum.   2.  Wall thickening of the distal esophagus near the GE junction not excluded. Endoscopy follow-up recommended.   3.  Chronic pancreatitis.   4.  Rim calcification at the sarath hepatis similar.   5.  Gastric distention.      Soft tissue neck CT w contrast   Final Result   IMPRESSION:    1.  The base of the epiglottis is mildly edematous and there is mild aryepiglottic edema consistent with supraglottitis.   2.  Elongated left styloid " process, almost reaching the hyoid bone. This may be incidental but can in some cases be associated with pain. Correlate clinically.        Report per radiology    Laboratory:  Labs Ordered and Resulted from Time of ED Arrival to Time of ED Departure   COMPREHENSIVE METABOLIC PANEL - Abnormal       Result Value    Sodium 134      Potassium 3.5      Chloride 97      Carbon Dioxide (CO2) 26      Anion Gap 11      Urea Nitrogen 10      Creatinine 0.68      Calcium 9.7      Glucose 294 (*)     Alkaline Phosphatase 95      AST 13      ALT 21      Protein Total 8.7      Albumin 3.8      Bilirubin Total 0.6      GFR Estimate >90     ETHYL ALCOHOL LEVEL - Normal    Alcohol ethyl <0.01     CBC WITH PLATELETS AND DIFFERENTIAL - Normal    WBC Count 9.4      RBC Count 4.96      Hemoglobin 14.1      Hematocrit 42.1      MCV 85      MCH 28.4      MCHC 33.5      RDW 12.5      Platelet Count 256     TROPONIN I - Normal    Troponin I <0.015     COVID-19 VIRUS (CORONAVIRUS) BY PCR - Normal    SARS CoV2 PCR Negative     DIFFERENTIAL    % Neutrophils 69      % Lymphocytes 25      % Monocytes 5      % Eosinophils 1      % Basophils 0      Absolute Neutrophils 6.5      Absolute Lymphocytes 2.4      Absolute Monocytes 0.5      Absolute Eosinophils 0.1      Absolute Basophils 0.0      RBC Morphology Confirmed RBC Indices      Platelet Assessment        Value: Automated Count Confirmed. Platelet morphology is normal.     Reviewed:  I reviewed nursing notes, vitals, past medical history and Care Everywhere    Assessments:  0052 I obtained history and examined the patient as noted above.   0216 I rechecked the patient and explained findings.     Consults:  0223 I consulted with Dr. Rey, ENT, regarding the patient's history and presentation here in the emergency department.  0305 I consulted with Dr. Ch of the hospitalist services who is in agreement to accept the patient for admission.     Interventions:  0111 0.9% sodium chloride  bolus, 1,000 mL, IV   0127 Ketorolac, 15 mg, IV   0236 Dexamethasone, 10 mg, IV   0236 Hydromorphone, 0.5 mg, IV   0236 Ceftriaxone, 1 g, IV  0330 Hydromorphone, 0.5 mg, IV     Disposition:  The patient was admitted to the hospital under the care of Dr. Ch.     Impression & Plan     Medical Decision Making:  Jaiden Lyons is a 46 year old male who presents with rather sudden onset of difficulty swallowing and some chest pain. Work up here shows radiographic evidence of edema at the base of the epiglottis as well as supra glottic region. This of course responds to the sight of his pain. I am worried this could represent early epiglottitis and certainly can quickly progress. At this time he is tolerating his secretions well, has no stridor on exam. His voice is somewhat hoarse. IV decadron as well as rocephin administered. ENT has been consulted. They will see him in the morning and have been very clear that if any of his symptoms progress they are more then happy to come in to take him to the operating room for controlled intubation as well visualization of the airway. Other findings on CT scan of the chest show what is likely distal esophagitis. He will admitted at this time to the hospitalist service at stable but guarded condition.     Diagnosis:    ICD-10-CM    1. Epiglottitis  J05.10        Scribe Disclosure:  Sunita LORA, am serving as a scribe at 12:51 AM on 11/21/2021 to document services personally performed by Fantasma Ochoa MD based on my observations and the provider's statements to me.            Fantasma Ochoa MD  11/21/21 8860

## 2021-11-22 ENCOUNTER — PATIENT OUTREACH (OUTPATIENT)
Dept: CARE COORDINATION | Facility: CLINIC | Age: 46
End: 2021-11-22
Payer: COMMERCIAL

## 2021-11-22 DIAGNOSIS — Z71.89 OTHER SPECIFIED COUNSELING: ICD-10-CM

## 2021-11-22 LAB
ATRIAL RATE - MUSE: 99 BPM
DIASTOLIC BLOOD PRESSURE - MUSE: NORMAL MMHG
INTERPRETATION ECG - MUSE: NORMAL
P AXIS - MUSE: 59 DEGREES
PR INTERVAL - MUSE: 164 MS
QRS DURATION - MUSE: 104 MS
QT - MUSE: 406 MS
QTC - MUSE: 521 MS
R AXIS - MUSE: -11 DEGREES
SYSTOLIC BLOOD PRESSURE - MUSE: NORMAL MMHG
T AXIS - MUSE: 67 DEGREES
VENTRICULAR RATE- MUSE: 99 BPM

## 2021-11-22 NOTE — PROGRESS NOTES
Clinic Care Coordination Contact  [facility]/Voicemail       Clinical Data: Care Coordinator Outreach  Outreach attempted x 1.  Left message on patient's voicemail with call back information and requested return call.  Plan:  Care Coordinator will try to reach patient again in 1-2 business days.    Abhinav Carbajal  Community Health Worker  [facility]  Ph:[phone]

## 2021-11-23 NOTE — PROGRESS NOTES
"Clinic Care Coordination Contact  [facility]: Post-Discharge Note  SITUATION                                                      Admission:    Admission Date: 11/21/21   Reason for Admission: Trouble Swallowing  Discharge:   Discharge Date: 11/21/21  Discharge Diagnosis: Trouble Swallowing    BACKGROUND                                                      Jaiden Lyons is a 46 year old male with history of alcohol abuse, asthma, hypertension, and type 2 diabetes mellitus who presents with trouble swallowing. Starting 30 minutes ago when the patient was watching TV he began to have trouble swallowing along with some nausea. Upon arrival to the ED he denies any numbness, weakness, or double vision. The patient did restart his diabetes medications yesterday which he has not taken for months.     ASSESSMENT           Discharge Assessment  How are you doing now that you are home?: \" I am doing good\"  How are your symptoms? (Red Flag symptoms escalate to triage hotline per guidelines): Improved  Do you feel your condition is stable enough to be safe at home until your provider visit?: Yes  Does the patient have their discharge instructions? : Yes  Does the patient have questions regarding their discharge instructions? : No  Were you started on any new medications or were there changes to any of your previous medications? : Yes  Does the patient have all of their medications?: Yes  Do you have questions regarding any of your medications? : No  Do you have all of your needed medical supplies or equipment (DME)?  (i.e. oxygen tank, CPAP, cane, etc.): Yes  Discharge follow-up appointment scheduled within 14 calendar days? : No  Is patient agreeable to assistance with scheduling? : No    Post-op ([facility] Only)  If the patient had a surgery or procedure, do they have any questions for a nurse?: No           PLAN                                                      Outpatient Plan:  Follow-up with PCP .    No future " appointments.      For any urgent concerns, please contact our 24 hour nurse triage line: [phone] ([phone])         Abhinav Carbajal

## 2021-12-27 PROCEDURE — 93010 ELECTROCARDIOGRAM REPORT: CPT | Performed by: EMERGENCY MEDICINE

## 2021-12-27 PROCEDURE — 99285 EMERGENCY DEPT VISIT HI MDM: CPT | Mod: 25 | Performed by: EMERGENCY MEDICINE

## 2021-12-27 PROCEDURE — 250N000011 HC RX IP 250 OP 636: Performed by: EMERGENCY MEDICINE

## 2021-12-27 PROCEDURE — 93005 ELECTROCARDIOGRAM TRACING: CPT | Performed by: EMERGENCY MEDICINE

## 2021-12-27 RX ORDER — ONDANSETRON 4 MG/1
4 TABLET, ORALLY DISINTEGRATING ORAL ONCE
Status: COMPLETED | OUTPATIENT
Start: 2021-12-27 | End: 2021-12-27

## 2021-12-27 RX ADMIN — ONDANSETRON 4 MG: 4 TABLET, ORALLY DISINTEGRATING ORAL at 23:35

## 2021-12-28 ENCOUNTER — HOSPITAL ENCOUNTER (EMERGENCY)
Facility: CLINIC | Age: 46
Discharge: HOME | End: 2021-12-28
Attending: EMERGENCY MEDICINE | Admitting: EMERGENCY MEDICINE
Payer: COMMERCIAL

## 2021-12-28 ENCOUNTER — APPOINTMENT (OUTPATIENT)
Dept: CT IMAGING | Facility: CLINIC | Age: 46
End: 2021-12-28
Attending: EMERGENCY MEDICINE
Payer: COMMERCIAL

## 2021-12-28 VITALS
DIASTOLIC BLOOD PRESSURE: 89 MMHG | HEART RATE: 90 BPM | RESPIRATION RATE: 16 BRPM | SYSTOLIC BLOOD PRESSURE: 119 MMHG | TEMPERATURE: 97.5 F | OXYGEN SATURATION: 97 %

## 2021-12-28 VITALS
SYSTOLIC BLOOD PRESSURE: 132 MMHG | HEART RATE: 94 BPM | DIASTOLIC BLOOD PRESSURE: 90 MMHG | BODY MASS INDEX: 24.34 KG/M2 | HEIGHT: 70 IN | RESPIRATION RATE: 18 BRPM | OXYGEN SATURATION: 96 % | WEIGHT: 170 LBS | TEMPERATURE: 99 F

## 2021-12-28 DIAGNOSIS — K29.20 ACUTE ALCOHOLIC GASTRITIS WITHOUT HEMORRHAGE: ICD-10-CM

## 2021-12-28 DIAGNOSIS — R10.13 ABDOMINAL PAIN, EPIGASTRIC: ICD-10-CM

## 2021-12-28 DIAGNOSIS — K86.1 CHRONIC PANCREATITIS, UNSPECIFIED PANCREATITIS TYPE (H): ICD-10-CM

## 2021-12-28 LAB
ALBUMIN SERPL-MCNC: 3.5 G/DL (ref 3.4–5)
ALBUMIN SERPL-MCNC: 4.1 G/DL (ref 3.4–5)
ALP SERPL-CCNC: 69 U/L (ref 40–150)
ALP SERPL-CCNC: 80 U/L (ref 40–150)
ALT SERPL W P-5'-P-CCNC: 32 U/L (ref 0–70)
ALT SERPL W P-5'-P-CCNC: 44 U/L (ref 0–70)
ANION GAP SERPL CALCULATED.3IONS-SCNC: 13 MMOL/L (ref 3–14)
ANION GAP SERPL CALCULATED.3IONS-SCNC: 13 MMOL/L (ref 3–14)
AST SERPL W P-5'-P-CCNC: 42 U/L (ref 0–45)
AST SERPL W P-5'-P-CCNC: 59 U/L (ref 0–45)
BASOPHILS # BLD AUTO: 0.1 10E3/UL (ref 0–0.2)
BASOPHILS # BLD AUTO: 0.1 10E3/UL (ref 0–0.2)
BASOPHILS NFR BLD AUTO: 1 %
BASOPHILS NFR BLD AUTO: 1 %
BILIRUB DIRECT SERPL-MCNC: 0.2 MG/DL (ref 0–0.2)
BILIRUB SERPL-MCNC: 0.6 MG/DL (ref 0.2–1.3)
BILIRUB SERPL-MCNC: 0.8 MG/DL (ref 0.2–1.3)
BUN SERPL-MCNC: 10 MG/DL (ref 7–30)
BUN SERPL-MCNC: 9 MG/DL (ref 7–30)
CALCIUM SERPL-MCNC: 8.6 MG/DL (ref 8.5–10.1)
CALCIUM SERPL-MCNC: 8.6 MG/DL (ref 8.5–10.1)
CHLORIDE BLD-SCNC: 101 MMOL/L (ref 94–109)
CHLORIDE BLD-SCNC: 108 MMOL/L (ref 94–109)
CO2 SERPL-SCNC: 22 MMOL/L (ref 20–32)
CO2 SERPL-SCNC: 23 MMOL/L (ref 20–32)
CREAT SERPL-MCNC: 0.54 MG/DL (ref 0.66–1.25)
CREAT SERPL-MCNC: 0.6 MG/DL (ref 0.66–1.25)
EOSINOPHIL # BLD AUTO: 0 10E3/UL (ref 0–0.7)
EOSINOPHIL # BLD AUTO: 0 10E3/UL (ref 0–0.7)
EOSINOPHIL NFR BLD AUTO: 1 %
EOSINOPHIL NFR BLD AUTO: 1 %
ERYTHROCYTE [DISTWIDTH] IN BLOOD BY AUTOMATED COUNT: 14.3 % (ref 10–15)
ERYTHROCYTE [DISTWIDTH] IN BLOOD BY AUTOMATED COUNT: 14.9 % (ref 10–15)
ETHANOL SERPL-MCNC: 0.24 G/DL
ETHANOL SERPL-MCNC: 0.25 G/DL
GFR SERPL CREATININE-BSD FRML MDRD: >90 ML/MIN/1.73M2
GFR SERPL CREATININE-BSD FRML MDRD: >90 ML/MIN/1.73M2
GLUCOSE BLD-MCNC: 129 MG/DL (ref 70–99)
GLUCOSE BLD-MCNC: 81 MG/DL (ref 70–99)
HCT VFR BLD AUTO: 41.6 % (ref 40–53)
HCT VFR BLD AUTO: 46.5 % (ref 40–53)
HGB BLD-MCNC: 13.8 G/DL (ref 13.3–17.7)
HGB BLD-MCNC: 15.1 G/DL (ref 13.3–17.7)
HOLD SPECIMEN: NORMAL
IMM GRANULOCYTES # BLD: 0 10E3/UL
IMM GRANULOCYTES # BLD: 0 10E3/UL
IMM GRANULOCYTES NFR BLD: 0 %
IMM GRANULOCYTES NFR BLD: 0 %
INR PPP: 1.01 (ref 0.86–1.14)
LIPASE SERPL-CCNC: 103 U/L (ref 73–393)
LIPASE SERPL-CCNC: 88 U/L (ref 73–393)
LYMPHOCYTES # BLD AUTO: 0.9 10E3/UL (ref 0.8–5.3)
LYMPHOCYTES # BLD AUTO: 1.8 10E3/UL (ref 0.8–5.3)
LYMPHOCYTES NFR BLD AUTO: 22 %
LYMPHOCYTES NFR BLD AUTO: 40 %
MCH RBC QN AUTO: 28.1 PG (ref 26.5–33)
MCH RBC QN AUTO: 28.4 PG (ref 26.5–33)
MCHC RBC AUTO-ENTMCNC: 32.5 G/DL (ref 31.5–36.5)
MCHC RBC AUTO-ENTMCNC: 33.2 G/DL (ref 31.5–36.5)
MCV RBC AUTO: 85 FL (ref 78–100)
MCV RBC AUTO: 87 FL (ref 78–100)
MONOCYTES # BLD AUTO: 0.4 10E3/UL (ref 0–1.3)
MONOCYTES # BLD AUTO: 0.5 10E3/UL (ref 0–1.3)
MONOCYTES NFR BLD AUTO: 13 %
MONOCYTES NFR BLD AUTO: 8 %
NEUTROPHILS # BLD AUTO: 2.2 10E3/UL (ref 1.6–8.3)
NEUTROPHILS # BLD AUTO: 2.5 10E3/UL (ref 1.6–8.3)
NEUTROPHILS NFR BLD AUTO: 50 %
NEUTROPHILS NFR BLD AUTO: 63 %
NRBC # BLD AUTO: 0 10E3/UL
NRBC # BLD AUTO: 0 10E3/UL
NRBC BLD AUTO-RTO: 0 /100
NRBC BLD AUTO-RTO: 0 /100
PLATELET # BLD AUTO: 199 10E3/UL (ref 150–450)
PLATELET # BLD AUTO: 200 10E3/UL (ref 150–450)
POTASSIUM BLD-SCNC: 3.8 MMOL/L (ref 3.4–5.3)
POTASSIUM BLD-SCNC: 4.2 MMOL/L (ref 3.4–5.3)
PROT SERPL-MCNC: 7.7 G/DL (ref 6.8–8.8)
PROT SERPL-MCNC: 8.7 G/DL (ref 6.8–8.8)
RBC # BLD AUTO: 4.91 10E6/UL (ref 4.4–5.9)
RBC # BLD AUTO: 5.32 10E6/UL (ref 4.4–5.9)
SODIUM SERPL-SCNC: 137 MMOL/L (ref 133–144)
SODIUM SERPL-SCNC: 143 MMOL/L (ref 133–144)
TROPONIN I SERPL HS-MCNC: 9 NG/L
WBC # BLD AUTO: 4 10E3/UL (ref 4–11)
WBC # BLD AUTO: 4.6 10E3/UL (ref 4–11)

## 2021-12-28 PROCEDURE — 99284 EMERGENCY DEPT VISIT MOD MDM: CPT | Mod: 25

## 2021-12-28 PROCEDURE — 84484 ASSAY OF TROPONIN QUANT: CPT | Performed by: EMERGENCY MEDICINE

## 2021-12-28 PROCEDURE — 36415 COLL VENOUS BLD VENIPUNCTURE: CPT | Performed by: EMERGENCY MEDICINE

## 2021-12-28 PROCEDURE — 85025 COMPLETE CBC W/AUTO DIFF WBC: CPT | Performed by: EMERGENCY MEDICINE

## 2021-12-28 PROCEDURE — 82248 BILIRUBIN DIRECT: CPT | Performed by: EMERGENCY MEDICINE

## 2021-12-28 PROCEDURE — 85610 PROTHROMBIN TIME: CPT | Performed by: EMERGENCY MEDICINE

## 2021-12-28 PROCEDURE — 80053 COMPREHEN METABOLIC PANEL: CPT | Performed by: EMERGENCY MEDICINE

## 2021-12-28 PROCEDURE — 250N000013 HC RX MED GY IP 250 OP 250 PS 637: Performed by: EMERGENCY MEDICINE

## 2021-12-28 PROCEDURE — 83690 ASSAY OF LIPASE: CPT | Performed by: EMERGENCY MEDICINE

## 2021-12-28 PROCEDURE — 250N000009 HC RX 250: Performed by: EMERGENCY MEDICINE

## 2021-12-28 PROCEDURE — 74177 CT ABD & PELVIS W/CONTRAST: CPT

## 2021-12-28 PROCEDURE — 96374 THER/PROPH/DIAG INJ IV PUSH: CPT

## 2021-12-28 PROCEDURE — 250N000011 HC RX IP 250 OP 636: Performed by: EMERGENCY MEDICINE

## 2021-12-28 PROCEDURE — 82077 ASSAY SPEC XCP UR&BREATH IA: CPT | Performed by: EMERGENCY MEDICINE

## 2021-12-28 PROCEDURE — 96374 THER/PROPH/DIAG INJ IV PUSH: CPT | Mod: 59 | Performed by: EMERGENCY MEDICINE

## 2021-12-28 PROCEDURE — 96375 TX/PRO/DX INJ NEW DRUG ADDON: CPT

## 2021-12-28 PROCEDURE — 84155 ASSAY OF PROTEIN SERUM: CPT | Performed by: EMERGENCY MEDICINE

## 2021-12-28 PROCEDURE — C9113 INJ PANTOPRAZOLE SODIUM, VIA: HCPCS | Performed by: EMERGENCY MEDICINE

## 2021-12-28 RX ORDER — PANTOPRAZOLE SODIUM 40 MG/1
40 TABLET, DELAYED RELEASE ORAL
Qty: 60 TABLET | Refills: 0 | Status: ON HOLD | OUTPATIENT
Start: 2021-12-28 | End: 2022-07-09

## 2021-12-28 RX ORDER — ONDANSETRON 2 MG/ML
4 INJECTION INTRAMUSCULAR; INTRAVENOUS
Status: DISCONTINUED | OUTPATIENT
Start: 2021-12-28 | End: 2021-12-28 | Stop reason: HOSPADM

## 2021-12-28 RX ORDER — IOPAMIDOL 755 MG/ML
100 INJECTION, SOLUTION INTRAVASCULAR ONCE
Status: COMPLETED | OUTPATIENT
Start: 2021-12-28 | End: 2021-12-28

## 2021-12-28 RX ADMIN — HYDROMORPHONE HYDROCHLORIDE 1 MG: 2 TABLET ORAL at 21:13

## 2021-12-28 RX ADMIN — ONDANSETRON 4 MG: 2 INJECTION INTRAMUSCULAR; INTRAVENOUS at 21:13

## 2021-12-28 RX ADMIN — SODIUM CHLORIDE 61 ML: 9 INJECTION, SOLUTION INTRAVENOUS at 07:36

## 2021-12-28 RX ADMIN — FAMOTIDINE 20 MG: 10 INJECTION, SOLUTION INTRAVENOUS at 21:15

## 2021-12-28 RX ADMIN — LIDOCAINE HYDROCHLORIDE 30 ML: 20 SOLUTION OROPHARYNGEAL at 06:55

## 2021-12-28 RX ADMIN — IOPAMIDOL 83 ML: 755 INJECTION, SOLUTION INTRAVENOUS at 07:30

## 2021-12-28 RX ADMIN — PANTOPRAZOLE SODIUM 40 MG: 40 INJECTION, POWDER, FOR SOLUTION INTRAVENOUS at 05:48

## 2021-12-28 ASSESSMENT — ENCOUNTER SYMPTOMS
CONSTIPATION: 1
ABDOMINAL PAIN: 1
COUGH: 1
VOMITING: 1
BACK PAIN: 1

## 2021-12-28 ASSESSMENT — MIFFLIN-ST. JEOR: SCORE: 1647.36

## 2021-12-28 NOTE — DISCHARGE INSTRUCTIONS
Take your pantoprazole as directed.  Be sure to take 20-30 minutes before your eat or drink anything else other than water.  Avoid alcohol use.    Follow-up with your Primary Care Clinic.    Return to the emergency department if fever, vomiting, or other concerns.

## 2021-12-28 NOTE — ED NOTES
Emergency Department Patient Sign-out       Brief HPI:  This is a 46 year old male signed out to me by Dr. Cuevas .  See initial ED Provider note for details of the presentation.            Significant Events prior to my assuming care: Patient to the emergency department with epigastric abdominal pain concerning for gastritis.  He also has a history of pancreatitis.  Has normal labs.  CT of abdomen/pelvis pending at time of signout.      Exam:   Patient Vitals for the past 24 hrs:   BP Temp Temp src Pulse Resp SpO2   12/28/21 0700 119/89 -- -- -- -- --   12/28/21 0655 119/89 -- -- 90 -- 97 %   12/28/21 0649 (!) 122/91 -- -- 85 -- 100 %   12/28/21 0359 116/88 -- -- 94 -- 98 %   12/27/21 2337 134/86 97.5  F (36.4  C) Oral 102 16 98 %           ED RESULTS:   Results for orders placed or performed during the hospital encounter of 12/28/21 (from the past 24 hour(s))   EKG 12 lead     Status: None (Preliminary result)    Collection Time: 12/27/21 11:27 PM   Result Value Ref Range    Systolic Blood Pressure  mmHg    Diastolic Blood Pressure  mmHg    Ventricular Rate 94 BPM    Atrial Rate 94 BPM    MT Interval 156 ms    QRS Duration 88 ms     ms    QTc 475 ms    P Axis 68 degrees    R AXIS -13 degrees    T Axis 61 degrees    Interpretation ECG Sinus rhythm  Normal ECG      Comprehensive metabolic panel     Status: Abnormal    Collection Time: 12/28/21  5:26 AM   Result Value Ref Range    Sodium 143 133 - 144 mmol/L    Potassium 3.8 3.4 - 5.3 mmol/L    Chloride 108 94 - 109 mmol/L    Carbon Dioxide (CO2) 22 20 - 32 mmol/L    Anion Gap 13 3 - 14 mmol/L    Urea Nitrogen 9 7 - 30 mg/dL    Creatinine 0.54 (L) 0.66 - 1.25 mg/dL    Calcium 8.6 8.5 - 10.1 mg/dL    Glucose 129 (H) 70 - 99 mg/dL    Alkaline Phosphatase 69 40 - 150 U/L    AST 42 0 - 45 U/L    ALT 32 0 - 70 U/L    Protein Total 7.7 6.8 - 8.8 g/dL    Albumin 3.5 3.4 - 5.0 g/dL    Bilirubin Total 0.6 0.2 - 1.3 mg/dL    GFR Estimate >90 >60 mL/min/1.73m2    Lipase     Status: Normal    Collection Time: 12/28/21  5:26 AM   Result Value Ref Range    Lipase 88 73 - 393 U/L   CBC with platelets differential     Status: None    Collection Time: 12/28/21  5:26 AM    Narrative    The following orders were created for panel order CBC with platelets differential.  Procedure                               Abnormality         Status                     ---------                               -----------         ------                     CBC with platelets and d...[[id number]]                      Final result                 Please view results for these tests on the individual orders.   Troponin I     Status: Normal    Collection Time: 12/28/21  5:26 AM   Result Value Ref Range    Troponin I High Sensitivity 9 <79 ng/L   Alcohol level blood     Status: Abnormal    Collection Time: 12/28/21  5:26 AM   Result Value Ref Range    Alcohol ethyl 0.25 (H) <=0.01 g/dL   CBC with platelets and differential     Status: None    Collection Time: 12/28/21  5:26 AM   Result Value Ref Range    WBC Count 4.6 4.0 - 11.0 10e3/uL    RBC Count 4.91 4.40 - 5.90 10e6/uL    Hemoglobin 13.8 13.3 - 17.7 g/dL    Hematocrit 41.6 40.0 - 53.0 %    MCV 85 78 - 100 fL    MCH 28.1 26.5 - 33.0 pg    MCHC 33.2 31.5 - 36.5 g/dL    RDW 14.3 10.0 - 15.0 %    Platelet Count 200 150 - 450 10e3/uL    % Neutrophils 50 %    % Lymphocytes 40 %    % Monocytes 8 %    % Eosinophils 1 %    % Basophils 1 %    % Immature Granulocytes 0 %    NRBCs per 100 WBC 0 <1 /100    Absolute Neutrophils 2.2 1.6 - 8.3 10e3/uL    Absolute Lymphocytes 1.8 0.8 - 5.3 10e3/uL    Absolute Monocytes 0.4 0.0 - 1.3 10e3/uL    Absolute Eosinophils 0.0 0.0 - 0.7 10e3/uL    Absolute Basophils 0.1 0.0 - 0.2 10e3/uL    Absolute Immature Granulocytes 0.0 <=0.4 10e3/uL    Absolute NRBCs 0.0 10e3/uL   INR     Status: Normal    Collection Time: 12/28/21  6:21 AM   Result Value Ref Range    INR 1.01 0.86 - 1.14   CT Abdomen Pelvis w Contrast     Status:  None (Preliminary result)    Collection Time: 12/28/21  7:44 AM    Narrative    CT ABDOMEN PELVIS WITH CONTRAST 12/28/2021 7:44 AM    CLINICAL HISTORY: Epigastric pain.    TECHNIQUE: CT scan of the abdomen and pelvis was performed following  injection of IV contrast. Multiplanar reformats were obtained. Dose  reduction techniques were used.  CONTRAST: 83 mL Isovue 370    COMPARISON: CT abdomen and pelvis 10/19/2021.    FINDINGS:   LOWER CHEST: Normal.    HEPATOBILIARY: Diffuse hepatic steatosis. Stable appearing  gallbladder. No calcified stones. Stable calcified structure at the  sarath hepatis measuring 1.4 cm image 98.    PANCREAS: Scattered pancreas calcifications again noted that may  relate to chronic pancreatitis. No acute pancreas abnormality is seen.    SPLEEN: Normal.    ADRENAL GLANDS: Stable left adrenal nodule measuring 2.1 cm image 86.  These are commonly adenomas. Unremarkable right adrenal.    KIDNEYS/BLADDER: No significant mass, stones, or hydronephrosis. There  are simple or benign cysts. No follow up is needed.    BOWEL: Normal appendix. No acute bowel abnormality. No free air or  free fluid. No abscess.    PELVIC ORGANS: Prostate is 4.8 cm. No acute abnormality.    ADDITIONAL FINDINGS: Scattered vascular calcifications.    MUSCULOSKELETAL: Unremarkable.      Impression    IMPRESSION:   1.  No acute abnormality is seen.  2.  Fatty liver.  3.  Stable peripherally calcified structure at the sarath hepatis is  perhaps a vascular structure such as a stable aneurysm.  4.  Pancreas calcifications consistent with chronic pancreatitis.        ED MEDICATIONS:   Medications   ondansetron (ZOFRAN-ODT) ODT tab 4 mg (4 mg Oral Given 12/27/21 6281)   pantoprazole (PROTONIX) IV push injection 40 mg (40 mg Intravenous Given 12/28/21 3354)   lidocaine (viscous) (XYLOCAINE) 2 % 15 mL, alum & mag hydroxide-simethicone (MAALOX) 15 mL GI Cocktail (30 mLs Oral Given 12/28/21 3572)   iopamidol (ISOVUE-370) solution 100  mL (83 mLs Intravenous Given 12/28/21 0766)   sodium chloride 0.9 % bag 100mL (61 mLs Intravenous Given 12/28/21 0736)     CT scan without acute intra-abdominal pathology.  He is doing clinically well after above therapies.  He has been out of his PPI for some time.  Protonix prescription sent to his preferred pharmacy.  Return precautions provided.    Impression:    ICD-10-CM    1. Abdominal pain, epigastric  R10.13        Plan:    Discharged home.  PPI.  Primary care clinic follow-up.  Return if fever, vomiting or worsening symptoms.      LUIS FERRARA MD, Luis Cazares MD  12/29/21 0474

## 2021-12-28 NOTE — ED PROVIDER NOTES
ED Provider Note  [facility]      History     Chief Complaint   Patient presents with     Shortness of Breath     HPI  Jaiden Lyons is a 46 year old male who has a past medical history of alcohol abuse and pancreatitis who presents with epigastric pain nausea vomiting which she reports is consistent with his typical pancreatitis flares.  Pain radiates to bilateral flanks which is also typical for him.  He reports symptoms started 2 days ago and he cannot keep anything down.  He also notes that he is having some urinary urgency but no burning or dysuria.  He reports persistent vomiting but no hematemesis.  No black or tarry stools.  Per triage note he complained of shortness of breath which he reports he felt that his asthma was acting up but otherwise he is not currently experiencing shortness of breath or chest pain.  He endorses slight cough though denies fever or other upper respiratory symptoms and states that he is vaccinated against covid.  He notes that he usually goes to [facility] for his medical care.  He endorses drinking earlier today.    Past Medical History  Past Medical History:   Diagnosis Date     Alcohol abuse      Alcohol-induced acute pancreatitis      Asthma      Benign essential hypertension      Type 2 diabetes mellitus      Past Surgical History:   Procedure Laterality Date     ESOPHAGOSCOPY, GASTROSCOPY, DUODENOSCOPY (EGD), COMBINED Left 02/22/2021    Procedure: ESOPHAGOGASTRODUODENOSCOPY (EGD);  Surgeon: Landry Rodriguez MD;  Location:  GI     Forearm surgery to repair injuries from [facility]       HIP SURGERY       albuterol (PROAIR HFA/PROVENTIL HFA/VENTOLIN HFA) 108 (90 Base) MCG/ACT inhaler  folic acid (FOLVITE) 1 MG tablet  hydrOXYzine (ATARAX) 25 MG tablet  pantoprazole (PROTONIX) 40 MG EC tablet  calcium carbonate (TUMS) 500 MG chewable tablet  empagliflozin (JARDIANCE) 10 MG TABS tablet  glipiZIDE (GLUCOTROL) 5 MG  tablet  HYDROcodone-acetaminophen (NORCO) 5-325 MG tablet  losartan (COZAAR) 50 MG tablet  promethazine-DM (PHENERGAN-DM) 6.25-15 MG/5ML syrup      Allergies   Allergen Reactions     Fentanyl      Insulin Swelling     Reaction Date: Around 4030-7137  Face swelling  Inpatient when reaction occurred, unsure which type of insulin  Required some Benadryl to help with the swelling    Tolerated aspart/glargine while inpt 12/2021     Lisinopril      Face swelling, cough     Morphine Itching     Family History  No family history on file.  Social History   Social History     Tobacco Use     Smoking status: Current Every Day Smoker     Types: Cigarettes     Smokeless tobacco: Never Used   Substance Use Topics     Alcohol use: Yes     Comment: 1/2 to 1 pint a day     Drug use: Yes     Types: Marijuana     Comment: approximately once per month      Past medical history, past surgical history, medications, allergies, family history, and social history were reviewed with the patient. No additional pertinent items.       Review of Systems  A complete review of systems was performed with pertinent positives and negatives noted in the HPI, and all other systems negative.    Physical Exam   BP: 134/86  Pulse: 102  Temp: 97.5  F (36.4  C)  Resp: 16  Weight:  (kyle)  SpO2: 98 %  Physical Exam  General: awake, alert, uncomfortable appearing  Head: normal cephalic  HEENT: pupils equal, conjugate gaze intact  Neck: Supple  CV: regular rate and rhythm without murmur  Lungs: Slight expiratory wheezes noted, no increased work of breathing  Abd: soft, epigastric tenderness without guarding, no peritoneal signs  EXT: lower extremities without swelling or edema  Neuro: awake, answers questions appropriately. No focal deficits noted       ED Course      Procedures       The medical record was reviewed and interpreted.  Current labs reviewed and interpreted.  Previous labs reviewed and interpreted.  EKG reviewed and interpreted: No sign of acute  ischemia.              Results for orders placed or performed during the hospital encounter of 12/28/21   Lipase     Status: Normal   Result Value Ref Range    Lipase 103 73 - 393 U/L   Hepatic function panel     Status: Abnormal   Result Value Ref Range    Bilirubin Total 0.8 0.2 - 1.3 mg/dL    Bilirubin Direct 0.2 0.0 - 0.2 mg/dL    Protein Total 8.7 6.8 - 8.8 g/dL    Albumin 4.1 3.4 - 5.0 g/dL    Alkaline Phosphatase 80 40 - 150 U/L    AST 59 (H) 0 - 45 U/L    ALT 44 0 - 70 U/L   Basic metabolic panel     Status: Abnormal   Result Value Ref Range    Sodium 137 133 - 144 mmol/L    Potassium 4.2 3.4 - 5.3 mmol/L    Chloride 101 94 - 109 mmol/L    Carbon Dioxide (CO2) 23 20 - 32 mmol/L    Anion Gap 13 3 - 14 mmol/L    Urea Nitrogen 10 7 - 30 mg/dL    Creatinine 0.60 (L) 0.66 - 1.25 mg/dL    Calcium 8.6 8.5 - 10.1 mg/dL    Glucose 81 70 - 99 mg/dL    GFR Estimate >90 >60 mL/min/1.73m2   Alcohol level blood     Status: Abnormal   Result Value Ref Range    Alcohol ethyl 0.24 (H) <=0.01 g/dL   CBC with platelets and differential     Status: None   Result Value Ref Range    WBC Count 4.0 4.0 - 11.0 10e3/uL    RBC Count 5.32 4.40 - 5.90 10e6/uL    Hemoglobin 15.1 13.3 - 17.7 g/dL    Hematocrit 46.5 40.0 - 53.0 %    MCV 87 78 - 100 fL    MCH 28.4 26.5 - 33.0 pg    MCHC 32.5 31.5 - 36.5 g/dL    RDW 14.9 10.0 - 15.0 %    Platelet Count 199 150 - 450 10e3/uL    % Neutrophils 63 %    % Lymphocytes 22 %    % Monocytes 13 %    % Eosinophils 1 %    % Basophils 1 %    % Immature Granulocytes 0 %    NRBCs per 100 WBC 0 <1 /100    Absolute Neutrophils 2.5 1.6 - 8.3 10e3/uL    Absolute Lymphocytes 0.9 0.8 - 5.3 10e3/uL    Absolute Monocytes 0.5 0.0 - 1.3 10e3/uL    Absolute Eosinophils 0.0 0.0 - 0.7 10e3/uL    Absolute Basophils 0.1 0.0 - 0.2 10e3/uL    Absolute Immature Granulocytes 0.0 <=0.4 10e3/uL    Absolute NRBCs 0.0 10e3/uL   Extra Blue Top Tube     Status: None   Result Value Ref Range    Hold Specimen JIC    Extra Red Top  Tube     Status: None   Result Value Ref Range    Hold Specimen JIC    Extra Green Top (Lithium Heparin) Tube     Status: None   Result Value Ref Range    Hold Specimen JIC    CBC with Platelets & Differential     Status: None    Narrative    The following orders were created for panel order CBC with Platelets & Differential.  Procedure                               Abnormality         Status                     ---------                               -----------         ------                     CBC with platelets and d...[[id number]]                      Final result                 Please view results for these tests on the individual orders.   [place] Draw     Status: None    Narrative    The following orders were created for panel order [place] Draw.  Procedure                               Abnormality         Status                     ---------                               -----------         ------                     Extra Blue Top Tube[[id number]]                              Final result               Extra Red Top Tube[[id number]]                               Final result               Extra Green Top (Lithium...[[id number]]                      Final result                 Please view results for these tests on the individual orders.   Results for orders placed or performed during the hospital encounter of 12/28/21   CT Abdomen Pelvis w Contrast     Status: None    Narrative    CT ABDOMEN PELVIS WITH CONTRAST 12/28/2021 7:44 AM    CLINICAL HISTORY: Epigastric pain.    TECHNIQUE: CT scan of the abdomen and pelvis was performed following  injection of IV contrast. Multiplanar reformats were obtained. Dose  reduction techniques were used.  CONTRAST: 83 mL Isovue 370    COMPARISON: CT abdomen and pelvis 10/19/2021.    FINDINGS:   LOWER CHEST: Normal.    HEPATOBILIARY: Diffuse hepatic steatosis. Stable appearing  gallbladder. No calcified stones. Stable calcified structure at the  sarath hepatis measuring 1.4  cm image 98.    PANCREAS: Scattered pancreas calcifications again noted that may  relate to chronic pancreatitis. No acute pancreas abnormality is seen.    SPLEEN: Normal.    ADRENAL GLANDS: Stable left adrenal nodule measuring 2.1 cm image 86.  These are commonly adenomas. Unremarkable right adrenal.    KIDNEYS/BLADDER: No significant mass, stones, or hydronephrosis. There  are simple or benign cysts. No follow up is needed.    BOWEL: Normal appendix. No acute bowel abnormality. No free air or  free fluid. No abscess.    PELVIC ORGANS: Prostate is 4.8 cm. No acute abnormality.    ADDITIONAL FINDINGS: Scattered vascular calcifications.    MUSCULOSKELETAL: Unremarkable.      Impression    IMPRESSION:   1.  No acute abnormality is seen.  2.  Fatty liver.  3.  Stable peripherally calcified structure at the sarath hepatis is  perhaps a vascular structure such as a stable aneurysm.  4.  Pancreas calcifications consistent with chronic pancreatitis.     ARELY MICHAEL MD         SYSTEM ID:  [id number]   Comprehensive metabolic panel     Status: Abnormal   Result Value Ref Range    Sodium 143 133 - 144 mmol/L    Potassium 3.8 3.4 - 5.3 mmol/L    Chloride 108 94 - 109 mmol/L    Carbon Dioxide (CO2) 22 20 - 32 mmol/L    Anion Gap 13 3 - 14 mmol/L    Urea Nitrogen 9 7 - 30 mg/dL    Creatinine 0.54 (L) 0.66 - 1.25 mg/dL    Calcium 8.6 8.5 - 10.1 mg/dL    Glucose 129 (H) 70 - 99 mg/dL    Alkaline Phosphatase 69 40 - 150 U/L    AST 42 0 - 45 U/L    ALT 32 0 - 70 U/L    Protein Total 7.7 6.8 - 8.8 g/dL    Albumin 3.5 3.4 - 5.0 g/dL    Bilirubin Total 0.6 0.2 - 1.3 mg/dL    GFR Estimate >90 >60 mL/min/1.73m2   Lipase     Status: Normal   Result Value Ref Range    Lipase 88 73 - 393 U/L   INR     Status: Normal   Result Value Ref Range    INR 1.01 0.86 - 1.14   Troponin I     Status: Normal   Result Value Ref Range    Troponin I High Sensitivity 9 <79 ng/L   Alcohol level blood     Status: Abnormal   Result Value Ref Range    Alcohol  ethyl 0.25 (H) <=0.01 g/dL   CBC with platelets and differential     Status: None   Result Value Ref Range    WBC Count 4.6 4.0 - 11.0 10e3/uL    RBC Count 4.91 4.40 - 5.90 10e6/uL    Hemoglobin 13.8 13.3 - 17.7 g/dL    Hematocrit 41.6 40.0 - 53.0 %    MCV 85 78 - 100 fL    MCH 28.1 26.5 - 33.0 pg    MCHC 33.2 31.5 - 36.5 g/dL    RDW 14.3 10.0 - 15.0 %    Platelet Count 200 150 - 450 10e3/uL    % Neutrophils 50 %    % Lymphocytes 40 %    % Monocytes 8 %    % Eosinophils 1 %    % Basophils 1 %    % Immature Granulocytes 0 %    NRBCs per 100 WBC 0 <1 /100    Absolute Neutrophils 2.2 1.6 - 8.3 10e3/uL    Absolute Lymphocytes 1.8 0.8 - 5.3 10e3/uL    Absolute Monocytes 0.4 0.0 - 1.3 10e3/uL    Absolute Eosinophils 0.0 0.0 - 0.7 10e3/uL    Absolute Basophils 0.1 0.0 - 0.2 10e3/uL    Absolute Immature Granulocytes 0.0 <=0.4 10e3/uL    Absolute NRBCs 0.0 10e3/uL   EKG 12 lead     Status: None   Result Value Ref Range    Systolic Blood Pressure  mmHg    Diastolic Blood Pressure  mmHg    Ventricular Rate 94 BPM    Atrial Rate 94 BPM    MD Interval 156 ms    QRS Duration 88 ms     ms    QTc 475 ms    P Axis 68 degrees    R AXIS -13 degrees    T Axis 61 degrees    Interpretation ECG       Sinus rhythm  Normal ECG  Unconfirmed report - interpretation of this ECG is computer generated - see medical record for final interpretation  Confirmed by - EMERGENCY ROOM, PHYSICIAN ([id number]),  DANIELLE JAIME ([id number]) on 12/29/2021 5:07:25 PM     CBC with platelets differential     Status: None    Narrative    The following orders were created for panel order CBC with platelets differential.  Procedure                               Abnormality         Status                     ---------                               -----------         ------                     CBC with platelets and d...[[id number]]                      Final result                 Please view results for these tests on the individual orders.     Medications    ondansetron (ZOFRAN-ODT) ODT tab 4 mg (4 mg Oral Given 12/27/21 5871)   pantoprazole (PROTONIX) IV push injection 40 mg (40 mg Intravenous Given 12/28/21 0581)   lidocaine (viscous) (XYLOCAINE) 2 % 15 mL, alum & mag hydroxide-simethicone (MAALOX) 15 mL GI Cocktail (30 mLs Oral Given 12/28/21 0655)   iopamidol (ISOVUE-370) solution 100 mL (83 mLs Intravenous Given 12/28/21 0730)   sodium chloride 0.9 % bag 100mL (61 mLs Intravenous Given 12/28/21 0736)        Assessments & Plan (with Medical Decision Making)   Dillan is a 46-year-old male who presents with epigastric pain which he reports feels similar to his history of recurrent pancreatitis.    On exam he is nontoxic-appearing, is in no acute distress, has normal vital signs have some epigastric tenderness but no guarding or peritoneal signs on exam.    Differential include things such as pancreatitis, ACS, gastritis, gastric ulcer disease, gastroenteritis, hepatitis or cholecystitis.  Initial evaluation include labs, IV fluids, and reassessment.      Patient had an EKG done triage, shows normal sinus rhythm without signs of acute ischemia.  Troponin is negative.    Patient's initial laboratory studies are unremarkable including normal LFTs, normal lipase, normal electrolytes, normal hemoglobin along with no left shift.  Patient was initially given Zofran and Protonix which he states did not improve his abdominal pain.  Per chart review patient has had several presentations consistent with today's presentation to outside ERs and he was found to have gastritis on several occasions, likely secondary to chronic alcohol use.  No documented pancreatitis exacerbations recently on laboratory studies or imaging.  Ordered GI cocktail which patient refused.  Requesting Dilaudid.  Will obtain CT abdomen pelvis to make sure not missing other lying intra-abdominal pathology but will not be given Dilaudid unless pathology is demonstrated on CT imaging.      Patient was signed  out to the day doctor awaiting CT results.  Patient been vitally stable while in the emergency department.    Final impression and disposition pending at this time.    I have reviewed the nursing notes. I have reviewed the findings, diagnosis, plan and need for follow up with the patient.    Discharge Medication List as of 12/28/2021  8:35 AM          Final diagnoses:   Abdominal pain, epigastric       --  Angel PARKER [facility] EMERGENCY DEPARTMENT  12/27/2021     Angel Cuevas MD  01/04/22 1244

## 2021-12-29 ENCOUNTER — HOSPITAL ENCOUNTER (INPATIENT)
Facility: CLINIC | Age: 46
LOS: 3 days | Discharge: HOME | End: 2022-01-01
Attending: EMERGENCY MEDICINE | Admitting: HOSPITALIST
Payer: COMMERCIAL

## 2021-12-29 ENCOUNTER — APPOINTMENT (OUTPATIENT)
Dept: GENERAL RADIOLOGY | Facility: CLINIC | Age: 46
End: 2021-12-29
Attending: EMERGENCY MEDICINE
Payer: COMMERCIAL

## 2021-12-29 DIAGNOSIS — U07.1 INFECTION DUE TO 2019 NOVEL CORONAVIRUS: ICD-10-CM

## 2021-12-29 DIAGNOSIS — K29.20 ACUTE ALCOHOLIC GASTRITIS WITHOUT HEMORRHAGE: ICD-10-CM

## 2021-12-29 LAB
ALBUMIN SERPL-MCNC: 3.9 G/DL (ref 3.4–5)
ALP SERPL-CCNC: 74 U/L (ref 40–150)
ALT SERPL W P-5'-P-CCNC: 47 U/L (ref 0–70)
ANION GAP SERPL CALCULATED.3IONS-SCNC: 12 MMOL/L (ref 3–14)
APTT PPP: 26 SECONDS (ref 22–38)
AST SERPL W P-5'-P-CCNC: 58 U/L (ref 0–45)
ATRIAL RATE - MUSE: 92 BPM
ATRIAL RATE - MUSE: 94 BPM
BILIRUB SERPL-MCNC: 0.8 MG/DL (ref 0.2–1.3)
BUN SERPL-MCNC: 11 MG/DL (ref 7–30)
CALCIUM SERPL-MCNC: 9 MG/DL (ref 8.5–10.1)
CHLORIDE BLD-SCNC: 100 MMOL/L (ref 94–109)
CO2 SERPL-SCNC: 24 MMOL/L (ref 20–32)
CREAT SERPL-MCNC: 0.57 MG/DL (ref 0.66–1.25)
CRP SERPL-MCNC: <2.9 MG/L (ref 0–8)
D DIMER PPP FEU-MCNC: 1.89 UG/ML FEU (ref 0–0.5)
DIASTOLIC BLOOD PRESSURE - MUSE: NORMAL MMHG
DIASTOLIC BLOOD PRESSURE - MUSE: NORMAL MMHG
ETHANOL SERPL-MCNC: <0.01 G/DL
FIBRINOGEN PPP-MCNC: 306 MG/DL (ref 170–490)
FLUAV RNA SPEC QL NAA+PROBE: NEGATIVE
FLUBV RNA RESP QL NAA+PROBE: NEGATIVE
GFR SERPL CREATININE-BSD FRML MDRD: >90 ML/MIN/1.73M2
GLUCOSE BLD-MCNC: 175 MG/DL (ref 70–99)
GLUCOSE BLDC GLUCOMTR-MCNC: 190 MG/DL (ref 70–99)
GLUCOSE BLDC GLUCOMTR-MCNC: 285 MG/DL (ref 70–99)
GLUCOSE BLDC GLUCOMTR-MCNC: 296 MG/DL (ref 70–99)
HOLD SPECIMEN: NORMAL
INR PPP: 1.06 (ref 0.85–1.15)
INTERPRETATION ECG - MUSE: NORMAL
INTERPRETATION ECG - MUSE: NORMAL
LACTATE SERPL-SCNC: 1.3 MMOL/L (ref 0.7–2)
LDH SERPL L TO P-CCNC: 173 U/L (ref 85–227)
LIPASE SERPL-CCNC: 78 U/L (ref 73–393)
MAGNESIUM SERPL-MCNC: 1.7 MG/DL (ref 1.6–2.3)
P AXIS - MUSE: 56 DEGREES
P AXIS - MUSE: 68 DEGREES
PHOSPHATE SERPL-MCNC: 3.2 MG/DL (ref 2.5–4.5)
POTASSIUM BLD-SCNC: 4 MMOL/L (ref 3.4–5.3)
PR INTERVAL - MUSE: 148 MS
PR INTERVAL - MUSE: 156 MS
PROT SERPL-MCNC: 8.1 G/DL (ref 6.8–8.8)
QRS DURATION - MUSE: 88 MS
QRS DURATION - MUSE: 98 MS
QT - MUSE: 380 MS
QT - MUSE: 398 MS
QTC - MUSE: 475 MS
QTC - MUSE: 492 MS
R AXIS - MUSE: -13 DEGREES
R AXIS - MUSE: -24 DEGREES
RETICS # AUTO: 0.07 10E6/UL (ref 0.03–0.1)
RETICS/RBC NFR AUTO: 1.3 % (ref 0.5–2)
SARS-COV-2 RNA RESP QL NAA+PROBE: POSITIVE
SODIUM SERPL-SCNC: 136 MMOL/L (ref 133–144)
SYSTOLIC BLOOD PRESSURE - MUSE: NORMAL MMHG
SYSTOLIC BLOOD PRESSURE - MUSE: NORMAL MMHG
T AXIS - MUSE: 46 DEGREES
T AXIS - MUSE: 61 DEGREES
TROPONIN I SERPL HS-MCNC: 7 NG/L
VENTRICULAR RATE- MUSE: 92 BPM
VENTRICULAR RATE- MUSE: 94 BPM

## 2021-12-29 PROCEDURE — 96361 HYDRATE IV INFUSION ADD-ON: CPT

## 2021-12-29 PROCEDURE — 36415 COLL VENOUS BLD VENIPUNCTURE: CPT | Performed by: EMERGENCY MEDICINE

## 2021-12-29 PROCEDURE — 85384 FIBRINOGEN ACTIVITY: CPT | Performed by: NURSE PRACTITIONER

## 2021-12-29 PROCEDURE — C9803 HOPD COVID-19 SPEC COLLECT: HCPCS

## 2021-12-29 PROCEDURE — 80053 COMPREHEN METABOLIC PANEL: CPT | Performed by: EMERGENCY MEDICINE

## 2021-12-29 PROCEDURE — 83690 ASSAY OF LIPASE: CPT | Performed by: EMERGENCY MEDICINE

## 2021-12-29 PROCEDURE — 99285 EMERGENCY DEPT VISIT HI MDM: CPT | Mod: 25

## 2021-12-29 PROCEDURE — 250N000013 HC RX MED GY IP 250 OP 250 PS 637: Performed by: NURSE PRACTITIONER

## 2021-12-29 PROCEDURE — 83605 ASSAY OF LACTIC ACID: CPT | Performed by: NURSE PRACTITIONER

## 2021-12-29 PROCEDURE — 93005 ELECTROCARDIOGRAM TRACING: CPT

## 2021-12-29 PROCEDURE — 83735 ASSAY OF MAGNESIUM: CPT | Performed by: EMERGENCY MEDICINE

## 2021-12-29 PROCEDURE — 82040 ASSAY OF SERUM ALBUMIN: CPT | Performed by: EMERGENCY MEDICINE

## 2021-12-29 PROCEDURE — 96374 THER/PROPH/DIAG INJ IV PUSH: CPT

## 2021-12-29 PROCEDURE — 87636 SARSCOV2 & INF A&B AMP PRB: CPT | Performed by: EMERGENCY MEDICINE

## 2021-12-29 PROCEDURE — 250N000011 HC RX IP 250 OP 636: Performed by: EMERGENCY MEDICINE

## 2021-12-29 PROCEDURE — 85730 THROMBOPLASTIN TIME PARTIAL: CPT | Performed by: NURSE PRACTITIONER

## 2021-12-29 PROCEDURE — 250N000013 HC RX MED GY IP 250 OP 250 PS 637: Performed by: EMERGENCY MEDICINE

## 2021-12-29 PROCEDURE — 86140 C-REACTIVE PROTEIN: CPT | Performed by: NURSE PRACTITIONER

## 2021-12-29 PROCEDURE — 96375 TX/PRO/DX INJ NEW DRUG ADDON: CPT

## 2021-12-29 PROCEDURE — 84484 ASSAY OF TROPONIN QUANT: CPT | Performed by: NURSE PRACTITIONER

## 2021-12-29 PROCEDURE — 250N000013 HC RX MED GY IP 250 OP 250 PS 637: Performed by: HOSPITALIST

## 2021-12-29 PROCEDURE — 258N000003 HC RX IP 258 OP 636: Performed by: NURSE PRACTITIONER

## 2021-12-29 PROCEDURE — 83615 LACTATE (LD) (LDH) ENZYME: CPT | Performed by: NURSE PRACTITIONER

## 2021-12-29 PROCEDURE — 85610 PROTHROMBIN TIME: CPT | Performed by: NURSE PRACTITIONER

## 2021-12-29 PROCEDURE — HZ2ZZZZ DETOXIFICATION SERVICES FOR SUBSTANCE ABUSE TREATMENT: ICD-10-PCS | Performed by: NURSE PRACTITIONER

## 2021-12-29 PROCEDURE — 85045 AUTOMATED RETICULOCYTE COUNT: CPT | Performed by: NURSE PRACTITIONER

## 2021-12-29 PROCEDURE — 258N000003 HC RX IP 258 OP 636: Performed by: EMERGENCY MEDICINE

## 2021-12-29 PROCEDURE — C9113 INJ PANTOPRAZOLE SODIUM, VIA: HCPCS | Performed by: EMERGENCY MEDICINE

## 2021-12-29 PROCEDURE — 71045 X-RAY EXAM CHEST 1 VIEW: CPT

## 2021-12-29 PROCEDURE — 85379 FIBRIN DEGRADATION QUANT: CPT | Performed by: EMERGENCY MEDICINE

## 2021-12-29 PROCEDURE — 84100 ASSAY OF PHOSPHORUS: CPT | Performed by: EMERGENCY MEDICINE

## 2021-12-29 PROCEDURE — 250N000009 HC RX 250: Performed by: NURSE PRACTITIONER

## 2021-12-29 PROCEDURE — 120N000001 HC R&B MED SURG/OB

## 2021-12-29 PROCEDURE — 82077 ASSAY SPEC XCP UR&BREATH IA: CPT | Performed by: EMERGENCY MEDICINE

## 2021-12-29 PROCEDURE — 250N000011 HC RX IP 250 OP 636: Performed by: NURSE PRACTITIONER

## 2021-12-29 RX ORDER — LORAZEPAM 2 MG/ML
1-2 INJECTION INTRAMUSCULAR EVERY 30 MIN PRN
Status: DISCONTINUED | OUTPATIENT
Start: 2021-12-29 | End: 2021-12-29

## 2021-12-29 RX ORDER — CLONIDINE HYDROCHLORIDE 0.1 MG/1
0.1 TABLET ORAL EVERY 8 HOURS
Status: DISCONTINUED | OUTPATIENT
Start: 2021-12-29 | End: 2022-01-01 | Stop reason: HOSPADM

## 2021-12-29 RX ORDER — DEXTROSE MONOHYDRATE 25 G/50ML
25-50 INJECTION, SOLUTION INTRAVENOUS
Status: DISCONTINUED | OUTPATIENT
Start: 2021-12-29 | End: 2021-12-30

## 2021-12-29 RX ORDER — GABAPENTIN 100 MG/1
100 CAPSULE ORAL EVERY 8 HOURS
Status: DISCONTINUED | OUTPATIENT
Start: 2022-01-05 | End: 2022-01-01 | Stop reason: HOSPADM

## 2021-12-29 RX ORDER — ONDANSETRON 4 MG/1
4 TABLET, ORALLY DISINTEGRATING ORAL EVERY 6 HOURS PRN
Status: DISCONTINUED | OUTPATIENT
Start: 2021-12-29 | End: 2022-01-01 | Stop reason: HOSPADM

## 2021-12-29 RX ORDER — CALCIUM CARBONATE 500 MG/1
1 TABLET, CHEWABLE ORAL 3 TIMES DAILY PRN
COMMUNITY
End: 2022-12-19

## 2021-12-29 RX ORDER — ACETAMINOPHEN 325 MG/1
650 TABLET ORAL EVERY 6 HOURS PRN
Status: DISCONTINUED | OUTPATIENT
Start: 2021-12-29 | End: 2022-01-01 | Stop reason: HOSPADM

## 2021-12-29 RX ORDER — FLUMAZENIL 0.1 MG/ML
0.2 INJECTION, SOLUTION INTRAVENOUS
Status: DISCONTINUED | OUTPATIENT
Start: 2021-12-29 | End: 2022-01-01 | Stop reason: HOSPADM

## 2021-12-29 RX ORDER — DIAZEPAM 10 MG
10 TABLET ORAL EVERY 30 MIN PRN
Status: DISCONTINUED | OUTPATIENT
Start: 2021-12-29 | End: 2022-01-01 | Stop reason: HOSPADM

## 2021-12-29 RX ORDER — DEXTROSE MONOHYDRATE 25 G/50ML
25-50 INJECTION, SOLUTION INTRAVENOUS
Status: DISCONTINUED | OUTPATIENT
Start: 2021-12-29 | End: 2021-12-31

## 2021-12-29 RX ORDER — FOLIC ACID 1 MG/1
1 TABLET ORAL DAILY
Status: DISCONTINUED | OUTPATIENT
Start: 2021-12-29 | End: 2022-01-01 | Stop reason: HOSPADM

## 2021-12-29 RX ORDER — OLANZAPINE 5 MG/1
5-10 TABLET, ORALLY DISINTEGRATING ORAL EVERY 6 HOURS PRN
Status: DISCONTINUED | OUTPATIENT
Start: 2021-12-29 | End: 2021-12-30

## 2021-12-29 RX ORDER — GABAPENTIN 300 MG/1
600 CAPSULE ORAL EVERY 8 HOURS
Status: DISCONTINUED | OUTPATIENT
Start: 2022-01-01 | End: 2022-01-01 | Stop reason: HOSPADM

## 2021-12-29 RX ORDER — PANTOPRAZOLE SODIUM 40 MG/1
40 TABLET, DELAYED RELEASE ORAL
Status: DISCONTINUED | OUTPATIENT
Start: 2021-12-29 | End: 2022-01-01 | Stop reason: HOSPADM

## 2021-12-29 RX ORDER — ALBUTEROL SULFATE 90 UG/1
2 AEROSOL, METERED RESPIRATORY (INHALATION) EVERY 6 HOURS PRN
Status: DISCONTINUED | OUTPATIENT
Start: 2021-12-29 | End: 2022-01-01 | Stop reason: HOSPADM

## 2021-12-29 RX ORDER — GABAPENTIN 300 MG/1
900 CAPSULE ORAL EVERY 8 HOURS
Status: COMPLETED | OUTPATIENT
Start: 2021-12-29 | End: 2022-01-01

## 2021-12-29 RX ORDER — LORAZEPAM 1 MG/1
1-2 TABLET ORAL EVERY 30 MIN PRN
Status: DISCONTINUED | OUTPATIENT
Start: 2021-12-29 | End: 2021-12-29

## 2021-12-29 RX ORDER — PROCHLORPERAZINE MALEATE 5 MG
10 TABLET ORAL EVERY 6 HOURS PRN
Status: DISCONTINUED | OUTPATIENT
Start: 2021-12-29 | End: 2022-01-01 | Stop reason: HOSPADM

## 2021-12-29 RX ORDER — DIAZEPAM 10 MG/2ML
5-10 INJECTION, SOLUTION INTRAMUSCULAR; INTRAVENOUS EVERY 30 MIN PRN
Status: DISCONTINUED | OUTPATIENT
Start: 2021-12-29 | End: 2022-01-01 | Stop reason: HOSPADM

## 2021-12-29 RX ORDER — ONDANSETRON 2 MG/ML
4 INJECTION INTRAMUSCULAR; INTRAVENOUS ONCE
Status: COMPLETED | OUTPATIENT
Start: 2021-12-29 | End: 2021-12-29

## 2021-12-29 RX ORDER — HALOPERIDOL 5 MG/ML
2.5-5 INJECTION INTRAMUSCULAR EVERY 6 HOURS PRN
Status: DISCONTINUED | OUTPATIENT
Start: 2021-12-29 | End: 2021-12-30

## 2021-12-29 RX ORDER — ACETAMINOPHEN 650 MG/1
650 SUPPOSITORY RECTAL EVERY 6 HOURS PRN
Status: DISCONTINUED | OUTPATIENT
Start: 2021-12-29 | End: 2022-01-01 | Stop reason: HOSPADM

## 2021-12-29 RX ORDER — FLUMAZENIL 0.1 MG/ML
0.2 INJECTION, SOLUTION INTRAVENOUS
Status: DISCONTINUED | OUTPATIENT
Start: 2021-12-29 | End: 2021-12-29

## 2021-12-29 RX ORDER — AMOXICILLIN 250 MG
1 CAPSULE ORAL 2 TIMES DAILY PRN
Status: DISCONTINUED | OUTPATIENT
Start: 2021-12-29 | End: 2022-01-01 | Stop reason: HOSPADM

## 2021-12-29 RX ORDER — ALBUTEROL SULFATE 90 UG/1
2 AEROSOL, METERED RESPIRATORY (INHALATION) EVERY 4 HOURS PRN
Status: DISCONTINUED | OUTPATIENT
Start: 2021-12-29 | End: 2021-12-29

## 2021-12-29 RX ORDER — GABAPENTIN 300 MG/1
300 CAPSULE ORAL EVERY 8 HOURS
Status: DISCONTINUED | OUTPATIENT
Start: 2022-01-03 | End: 2022-01-01 | Stop reason: HOSPADM

## 2021-12-29 RX ORDER — ONDANSETRON 2 MG/ML
4 INJECTION INTRAMUSCULAR; INTRAVENOUS EVERY 6 HOURS PRN
Status: DISCONTINUED | OUTPATIENT
Start: 2021-12-29 | End: 2022-01-01 | Stop reason: HOSPADM

## 2021-12-29 RX ORDER — NICOTINE POLACRILEX 4 MG
15-30 LOZENGE BUCCAL
Status: DISCONTINUED | OUTPATIENT
Start: 2021-12-29 | End: 2021-12-31

## 2021-12-29 RX ORDER — HALOPERIDOL 5 MG/ML
2.5-5 INJECTION INTRAMUSCULAR EVERY 6 HOURS PRN
Status: DISCONTINUED | OUTPATIENT
Start: 2021-12-29 | End: 2022-01-01 | Stop reason: HOSPADM

## 2021-12-29 RX ORDER — OLANZAPINE 5 MG/1
5-10 TABLET, ORALLY DISINTEGRATING ORAL EVERY 6 HOURS PRN
Status: DISCONTINUED | OUTPATIENT
Start: 2021-12-29 | End: 2022-01-01 | Stop reason: HOSPADM

## 2021-12-29 RX ORDER — MULTIPLE VITAMINS W/ MINERALS TAB 9MG-400MCG
1 TAB ORAL DAILY
Status: DISCONTINUED | OUTPATIENT
Start: 2021-12-29 | End: 2022-01-01 | Stop reason: HOSPADM

## 2021-12-29 RX ORDER — NICOTINE POLACRILEX 4 MG
15-30 LOZENGE BUCCAL
Status: DISCONTINUED | OUTPATIENT
Start: 2021-12-29 | End: 2021-12-30

## 2021-12-29 RX ORDER — GABAPENTIN 600 MG/1
1200 TABLET ORAL ONCE
Status: COMPLETED | OUTPATIENT
Start: 2021-12-29 | End: 2021-12-29

## 2021-12-29 RX ORDER — AMOXICILLIN 250 MG
2 CAPSULE ORAL 2 TIMES DAILY PRN
Status: DISCONTINUED | OUTPATIENT
Start: 2021-12-29 | End: 2022-01-01 | Stop reason: HOSPADM

## 2021-12-29 RX ORDER — GLIPIZIDE 5 MG/1
5 TABLET ORAL
Status: DISCONTINUED | OUTPATIENT
Start: 2021-12-29 | End: 2022-01-01 | Stop reason: HOSPADM

## 2021-12-29 RX ORDER — DIPHENHYDRAMINE HYDROCHLORIDE 50 MG/ML
12.5 INJECTION INTRAMUSCULAR; INTRAVENOUS ONCE
Status: COMPLETED | OUTPATIENT
Start: 2021-12-29 | End: 2021-12-29

## 2021-12-29 RX ORDER — DEXAMETHASONE SODIUM PHOSPHATE 10 MG/ML
6 INJECTION, SOLUTION INTRAMUSCULAR; INTRAVENOUS ONCE
Status: COMPLETED | OUTPATIENT
Start: 2021-12-29 | End: 2021-12-29

## 2021-12-29 RX ORDER — ACETAMINOPHEN 500 MG
1000 TABLET ORAL ONCE
Status: COMPLETED | OUTPATIENT
Start: 2021-12-29 | End: 2021-12-29

## 2021-12-29 RX ORDER — CALCIUM CARBONATE 500 MG/1
500 TABLET, CHEWABLE ORAL 3 TIMES DAILY PRN
Status: DISCONTINUED | OUTPATIENT
Start: 2021-12-29 | End: 2022-01-01 | Stop reason: HOSPADM

## 2021-12-29 RX ORDER — PROCHLORPERAZINE 25 MG
25 SUPPOSITORY, RECTAL RECTAL EVERY 12 HOURS PRN
Status: DISCONTINUED | OUTPATIENT
Start: 2021-12-29 | End: 2022-01-01 | Stop reason: HOSPADM

## 2021-12-29 RX ORDER — KETOROLAC TROMETHAMINE 15 MG/ML
15 INJECTION, SOLUTION INTRAMUSCULAR; INTRAVENOUS ONCE
Status: COMPLETED | OUTPATIENT
Start: 2021-12-29 | End: 2021-12-29

## 2021-12-29 RX ORDER — LIDOCAINE 40 MG/G
CREAM TOPICAL
Status: DISCONTINUED | OUTPATIENT
Start: 2021-12-29 | End: 2022-01-01 | Stop reason: HOSPADM

## 2021-12-29 RX ADMIN — ONDANSETRON 4 MG: 2 INJECTION INTRAMUSCULAR; INTRAVENOUS at 07:50

## 2021-12-29 RX ADMIN — ACETAMINOPHEN 1000 MG: 500 TABLET, FILM COATED ORAL at 09:14

## 2021-12-29 RX ADMIN — KETOROLAC TROMETHAMINE 15 MG: 15 INJECTION, SOLUTION INTRAMUSCULAR; INTRAVENOUS at 07:52

## 2021-12-29 RX ADMIN — SODIUM CHLORIDE 1000 ML: 9 INJECTION, SOLUTION INTRAVENOUS at 07:48

## 2021-12-29 RX ADMIN — PANTOPRAZOLE SODIUM 80 MG: 40 INJECTION, POWDER, FOR SOLUTION INTRAVENOUS at 07:54

## 2021-12-29 RX ADMIN — DIPHENHYDRAMINE HYDROCHLORIDE 12.5 MG: 50 INJECTION INTRAMUSCULAR; INTRAVENOUS at 07:51

## 2021-12-29 RX ADMIN — ACETAMINOPHEN 650 MG: 325 TABLET, FILM COATED ORAL at 14:11

## 2021-12-29 RX ADMIN — FOLIC ACID: 5 INJECTION, SOLUTION INTRAMUSCULAR; INTRAVENOUS; SUBCUTANEOUS at 14:57

## 2021-12-29 RX ADMIN — GABAPENTIN 900 MG: 300 CAPSULE ORAL at 19:18

## 2021-12-29 RX ADMIN — THIAMINE HCL TAB 100 MG 100 MG: 100 TAB at 14:11

## 2021-12-29 RX ADMIN — PANTOPRAZOLE SODIUM 40 MG: 40 TABLET, DELAYED RELEASE ORAL at 19:18

## 2021-12-29 RX ADMIN — SODIUM CHLORIDE 1000 ML: 9 INJECTION, SOLUTION INTRAVENOUS at 09:47

## 2021-12-29 RX ADMIN — PROCHLORPERAZINE EDISYLATE 10 MG: 5 INJECTION INTRAMUSCULAR; INTRAVENOUS at 07:59

## 2021-12-29 RX ADMIN — CLONIDINE HYDROCHLORIDE 0.1 MG: 0.1 TABLET ORAL at 14:11

## 2021-12-29 RX ADMIN — DEXAMETHASONE SODIUM PHOSPHATE 6 MG: 10 INJECTION INTRAMUSCULAR; INTRAVENOUS at 09:15

## 2021-12-29 RX ADMIN — ENOXAPARIN SODIUM 40 MG: 40 INJECTION SUBCUTANEOUS at 14:12

## 2021-12-29 RX ADMIN — CLONIDINE HYDROCHLORIDE 0.1 MG: 0.1 TABLET ORAL at 22:44

## 2021-12-29 RX ADMIN — DIAZEPAM 10 MG: 10 TABLET ORAL at 19:18

## 2021-12-29 RX ADMIN — DIAZEPAM 10 MG: 10 TABLET ORAL at 14:11

## 2021-12-29 RX ADMIN — FOLIC ACID 1 MG: 1 TABLET ORAL at 14:11

## 2021-12-29 RX ADMIN — GLIPIZIDE 5 MG: 5 TABLET ORAL at 19:18

## 2021-12-29 RX ADMIN — MULTIPLE VITAMINS W/ MINERALS TAB 1 TABLET: TAB at 14:11

## 2021-12-29 RX ADMIN — GABAPENTIN 1200 MG: 600 TABLET, FILM COATED ORAL at 14:11

## 2021-12-29 ASSESSMENT — ACTIVITIES OF DAILY LIVING (ADL)
ADLS_ACUITY_SCORE: 5
ADLS_ACUITY_SCORE: 5
ADLS_ACUITY_SCORE: 12
ADLS_ACUITY_SCORE: 3
ADLS_ACUITY_SCORE: 5
ADLS_ACUITY_SCORE: 5
ADLS_ACUITY_SCORE: 12
ADLS_ACUITY_SCORE: 5
ADLS_ACUITY_SCORE: 5
ADLS_ACUITY_SCORE: 3
ADLS_ACUITY_SCORE: 5

## 2021-12-29 NOTE — PHARMACY-ADMISSION MEDICATION HISTORY
Admission medication history interview status for this patient is complete. See [facility] admission navigator for allergy information, prior to admission medications and immunization status.     Medication history interview done, indicate source(s): Patient  Medication history resources (including written lists, pill bottles, clinic record):None  Pharmacy: [facility] [address] [place]     Changes made to PTA medication list:  Added: PRN TUMS  Changed: none   Reported as Not Taking: patient stated that he has not been taking thiamine. He said that it has been a while since he took folic acid, losartan and protonix   Removed: none     Actions taken by pharmacist (provider contacted, etc):None     Additional medication history information: Patient stated that he should be taking QVAR inhaler but did not get it from the pharmacy. He was unsure if he ever had sucralfate.      Medication reconciliation/reorder completed by provider prior to medication history? No       Prior to Admission medications    Medication Sig Last Dose Taking? Auth Provider   albuterol (PROAIR HFA/PROVENTIL HFA/VENTOLIN HFA) 108 (90 Base) MCG/ACT inhaler Inhale 2 puffs into the lungs every 6 hours as needed for shortness of breath / dyspnea or wheezing  Yes Fantasma Ochoa MD   calcium carbonate (TUMS) 500 MG chewable tablet Take 1 chew tab by mouth 3 times daily as needed for heartburn  Yes Unknown, Entered By History   folic acid (FOLVITE) 1 MG tablet Take 1 tablet (1 mg) by mouth daily More than a month at Unknown time Yes Héctor De La Cruz MD   hydrOXYzine (ATARAX) 25 MG tablet Take 1 tablet (25 mg) by mouth every 6 hours as needed for anxiety  Yes Héctor De La Cruz MD   losartan (COZAAR) 50 MG tablet Take 50 mg by mouth daily More than a month at Unknown time Yes Unknown, Entered By History   pantoprazole (PROTONIX) 40 MG EC tablet Take 1 tablet (40 mg) by mouth 2 times daily (before meals) More than a month at Unknown time Yes Luis Sofia MD    empagliflozin (JARDIANCE) 10 MG TABS tablet Take 10 mg by mouth daily 12/25/2021  Unknown, Entered By History   glipiZIDE (GLUCOTROL) 5 MG tablet Take 5 mg by mouth daily before breakfast 12/25/2021  Unknown, Entered By History   thiamine (B-1) 100 MG tablet Take 1 tablet (100 mg) by mouth daily  Patient not taking: Reported on 12/29/2021 Not Taking at Unknown time  Héctor De La Cruz MD

## 2021-12-29 NOTE — ED NOTES
[facility]  ED Nurse Handoff Report    Jaiden Lyons is a 46 year old male   ED Chief complaint: Nausea & Vomiting  . ED Diagnosis:   Final diagnoses:   Infection due to 2019 novel coronavirus   Acute alcoholic gastritis without hemorrhage     Allergies:   Allergies   Allergen Reactions     Fentanyl      Insulin Swelling     Reaction Date: Around 7258-8255  Face swelling  Inpatient when reaction occurred, unsure which type of insulin  Required some Benadryl to help with the swelling     Lisinopril      Face swelling, cough     Morphine Itching       Code Status: Full Code  Activity level - Baseline/Home:  Independent. Activity Level - Current:   Independent. Lift room needed: No. Bariatric: No   Needed: No   Isolation: Yes. Infection: Not Applicable.     Vital Signs:   Vitals:    12/29/21 0900 12/29/21 0915 12/29/21 0930 12/29/21 0945   BP: (!) 144/92 126/72 122/75 124/84   Pulse: 89 92 92 98   Resp:       Temp:       TempSrc:       SpO2: 96% 95% 95% 90%       Cardiac Rhythm:  ,      Pain level:    Patient confused: No. Patient Falls Risk: Yes.   Elimination Status: Has voided   Patient Report - Initial Complaint: Nausea an vomiting, seen multiple times for this today and yesterday. Focused Assessment: WNL   Tests Performed:   Admission on 12/28/2021, Discharged on 12/28/2021   Component Date Value Ref Range Status     Lipase 12/28/2021 103  73 - 393 U/L Final     Bilirubin Total 12/28/2021 0.8  0.2 - 1.3 mg/dL Final     Bilirubin Direct 12/28/2021 0.2  0.0 - 0.2 mg/dL Final     Protein Total 12/28/2021 8.7  6.8 - 8.8 g/dL Final     Albumin 12/28/2021 4.1  3.4 - 5.0 g/dL Final     Alkaline Phosphatase 12/28/2021 80  40 - 150 U/L Final     AST 12/28/2021 59* 0 - 45 U/L Final     ALT 12/28/2021 44  0 - 70 U/L Final     Sodium 12/28/2021 137  133 - 144 mmol/L Final     Potassium 12/28/2021 4.2  3.4 - 5.3 mmol/L Final     Chloride 12/28/2021 101  94 - 109 mmol/L Final     Carbon Dioxide (CO2)  12/28/2021 23  20 - 32 mmol/L Final     Anion Gap 12/28/2021 13  3 - 14 mmol/L Final     Urea Nitrogen 12/28/2021 10  7 - 30 mg/dL Final     Creatinine 12/28/2021 0.60* 0.66 - 1.25 mg/dL Final     Calcium 12/28/2021 8.6  8.5 - 10.1 mg/dL Final     Glucose 12/28/2021 81  70 - 99 mg/dL Final     GFR Estimate 12/28/2021 >90  >60 mL/min/1.73m2 Final    Effective December 21, 2021 eGFRcr in adults is calculated using the 2021 CKD-EPI creatinine equation which includes age and gender (Antoine et al., [place], DOI: 10.1056/RPGOyi5003062)     Alcohol ethyl 12/28/2021 0.24* <=0.01 g/dL Final     WBC Count 12/28/2021 4.0  4.0 - 11.0 10e3/uL Final     RBC Count 12/28/2021 5.32  4.40 - 5.90 10e6/uL Final     Hemoglobin 12/28/2021 15.1  13.3 - 17.7 g/dL Final     Hematocrit 12/28/2021 46.5  40.0 - 53.0 % Final     MCV 12/28/2021 87  78 - 100 fL Final     MCH 12/28/2021 28.4  26.5 - 33.0 pg Final     MCHC 12/28/2021 32.5  31.5 - 36.5 g/dL Final     RDW 12/28/2021 14.9  10.0 - 15.0 % Final     Platelet Count 12/28/2021 199  150 - 450 10e3/uL Final     % Neutrophils 12/28/2021 63  % Final     % Lymphocytes 12/28/2021 22  % Final     % Monocytes 12/28/2021 13  % Final     % Eosinophils 12/28/2021 1  % Final     % Basophils 12/28/2021 1  % Final     % Immature Granulocytes 12/28/2021 0  % Final     NRBCs per 100 WBC 12/28/2021 0  <1 /100 Final     Absolute Neutrophils 12/28/2021 2.5  1.6 - 8.3 10e3/uL Final     Absolute Lymphocytes 12/28/2021 0.9  0.8 - 5.3 10e3/uL Final     Absolute Monocytes 12/28/2021 0.5  0.0 - 1.3 10e3/uL Final     Absolute Eosinophils 12/28/2021 0.0  0.0 - 0.7 10e3/uL Final     Absolute Basophils 12/28/2021 0.1  0.0 - 0.2 10e3/uL Final     Absolute Immature Granulocytes 12/28/2021 0.0  <=0.4 10e3/uL Final     Absolute NRBCs 12/28/2021 0.0  10e3/uL Final     Hold Specimen 12/28/2021 JIC   Final     Hold Specimen 12/28/2021 JIC   Final     Hold Specimen 12/28/2021 JIC   Final     . Abnormal Results: See results.    Treatments provided: See MAR   Family Comments: NA  OBS brochure/video discussed/provided to patient:  N/A  ED Medications:   Medications   OLANZapine zydis (zyPREXA) ODT tab 5-10 mg (has no administration in time range)     Or   haloperidol lactate (HALDOL) injection 2.5-5 mg (has no administration in time range)   flumazenil (ROMAZICON) injection 0.2 mg (has no administration in time range)   melatonin tablet 5 mg (has no administration in time range)   LORazepam (ATIVAN) tablet 1-2 mg (has no administration in time range)     Or   LORazepam (ATIVAN) injection 1-2 mg (has no administration in time range)   thiamine (B-1) tablet 100 mg (has no administration in time range)   folic acid (FOLVITE) tablet 1 mg (has no administration in time range)   multivitamin w/minerals (THERA-VIT-M) tablet 1 tablet (has no administration in time range)   0.9% sodium chloride BOLUS (1,000 mLs Intravenous New Bag 12/29/21 0947)   0.9% sodium chloride BOLUS (0 mLs Intravenous Stopped 12/29/21 0907)   ondansetron (ZOFRAN) injection 4 mg (4 mg Intravenous Given 12/29/21 0750)   prochlorperazine (COMPAZINE) injection 10 mg (10 mg Intravenous Given 12/29/21 0759)   diphenhydrAMINE (BENADRYL) injection 12.5 mg (12.5 mg Intravenous Given 12/29/21 0751)   ketorolac (TORADOL) injection 15 mg (15 mg Intravenous Given 12/29/21 0752)   acetaminophen (TYLENOL) tablet 1,000 mg (1,000 mg Oral Given 12/29/21 0914)   pantoprazole (PROTONIX) IV push injection 80 mg (80 mg Intravenous Given 12/29/21 0754)   dexamethasone PF (DECADRON) injection 6 mg (6 mg Intravenous Given 12/29/21 0915)     Drips infusing:  No  For the majority of the shift, the patient's behavior Green. Interventions performed were NA.    Sepsis treatment initiated: No     Patient tested for COVID 19 prior to admission: YES    ED Nurse Name/Phone Number: Kathy Thakkar RN,   9:59 AM    RECEIVING UNIT ED HANDOFF REVIEW    Above ED Nurse Handoff Report was reviewed:  Yes  Reviewed by: Cheryl Riddle RN on December 29, 2021 at 12:09 PM

## 2021-12-29 NOTE — ED PROVIDER NOTES
History     Chief Complaint:  Nausea & Vomiting       HPI  Jaiden Lyons is a 46 year old male with a history of alcohol abuse and pancreatitis who presents for evaluation of abdominal pain, nausea, vomiting. Mr. Lyons has had ongoing vomiting with constipation and bilateral abdominal pain for the past few days. He reports needing to present to a separate ER yesterday and then again to our clinic here overnight; he was discharged last night, but, while waiting in triage for a bus, began vomiting again and represented. He has had a dry cough with chills for that past few days.  He received the first dose of his Covid vaccine a few months ago. He did admit to drinking yesterday morning, but denies any alcohol use since that time. He has not had any abdominal surgeries.     [facility] ED 12/28/21  CT Abdomen Pelvis w Contrast (12/28/21 07:44)  1.  No acute abnormality is seen.  2.  Fatty liver.  3.  Stable peripherally calcified structure at the sarath hepatis is  perhaps a vascular structure such as a stable aneurysm.  4.  Pancreas calcifications consistent with chronic pancreatitis.        Review of Systems  Full ROS completed and negative other than pertinent positives and negatives noted in HPI    Allergies:  Fentanyl  Insulin  Lisinopril  Morphine     Medications:  Albuterol  Jardiance  Folvite  Glucotrol  Atarax  Cozaar  Protonix  Thiamine     Past Medical History:     Alcohol abuse with withdrawal  Alcohol dependence  Pancreatitis  Asthma  Hypertension  Type 2 diabetes mellitus  Lactic acidosis  Epiglottitis       Past Surgical History:    Combined esophagoscopy, gastroscopy, and duodenoscopy  Forearm surgery to repair injuries from [facility]  Hip surgery Social History:    Social History  Patient presents alone.  Patient drinks alcohol.    Physical Exam     Patient Vitals for the past 24 hrs:   BP Temp Temp src Pulse Resp SpO2   12/29/21 0453 (!) 147/94 97.8  F (36.6  C) Temporal (!) 129 22 98 %          Physical  Exam  Constitutional: Well developed, forlorn, nontox appearance  Head: Atraumatic.   Mouth/Throat: Oropharynx is clear and tacky.   Neck:  no stridor  Eyes: no scleral icterus  Cardiovascular: reg tachycardia, 2+ bilat radial pulses  Pulmonary/Chest: nml resp effort  Abdominal: ND, soft, diffuse tenderness, no rebound or guarding   Ext: Warm, well perfused, no edema  Neurological: A&O, symmetric facies, moves ext x4  Skin: Skin is warm and dry.   Psychiatric: Behavior is normal. Thought content normal.   Nursing note and vitals reviewed.    Emergency Department Course     ECG:  ECG taken at 0756, ECG read at 0800  Normal sinus rhythm  Prolonged QT  Abnormal ECG  Rate 92 bpm. OH interval 148 ms. QRS duration 98 ms. QT/QTc 398/492 ms. P-R-T axes 56 -24 46.     Imaging:    XR Chest Port 1 View  IMPRESSION:Blunting of the right costophrenic angle and adjacent  reticular opacities correlate with pleural and parenchymal scarring  seen on recent comparison CT. No acute findings. No focal pneumonic  consolidation or pleural effusion. Normal heart size.    GIGI BUSH MD      Reading per radiology     Laboratory:    CMP: Ast 58 (H) Glucose 175 (H) o/w WNL (Creatinine 0.57 (L))  Lipase: 78  Alcohol ethyl: <0.01  Magnesium: 1.7     Symptomatic Influenza A/B & SARS-CoV2 (COVID-19) Virus PCR Multiplex:  Covid Positive   Influenza A: Negative Influenza B Negative       Emergency Department Course:    Reviewed:  I reviewed nursing notes, vitals and past medical history    Assessments:  0715 I assessed the patient as above.   0853 Patient rechecked and updated.      Interventions:  0748 NS 1000 mL IV   0750 Zofran 4 mg IV   0751 Benadryl 12.5 mg IV  0752 Toradol 15 mg IV   0754 Protonix 80 mg IV  0759 Compazine 10 mg IV     Consult:  0946 I spoke with Dr. Gonzalez of the hospitalist service for Dr. Kline regarding patient's presentation, findings, and plan of care.     Disposition:  Admitted to the care of   Kline    Impression & Plan     Medical Decision Making:  Jaiden Lyons is a 46-year-old male presenting with nausea and vomiting, cough, chills     Differential diagnosis includes alcohol gastritis, vomiting NOS, hepatitis, pancreatitis, acute on chronic pancreatitis, chronic pancreatitis, alcohol withdrawal, electrolyte abnormality, dehydration, COVID-19, pneumonia.  EKG obtained due to isolated vomiting and significant for mildly prolonged QT.  Labs significant for positive covid, etoh undetectable; CBC deferred given no history of bleeding and within normal limits approximately 12 hours ago.  Interventions as noted above with improvement in symptoms.  Given normal vital signs and no evidence of acute alcohol withdrawal, do not feel patient meets criteria for admission at this time. Given the patient's undetectable alcohol level, history of withdrawal seizures, positive Covid diagnosis and mildly prolonged QTC, he was admitted to the hospital service for symptom control and monitoring.  Pt counseled on all results, disposition and diagnosis.  They are understanding and agreeable to plan. Patient admitted in stable condition.      Covid-19  Jaiden Lyons was evaluated during a global COVID-19 pandemic, which necessitated consideration that the patient might be at risk for infection with the SARS-CoV-2 virus that causes COVID-19.   Applicable protocols for evaluation were followed during the patient's care.   COVID-19 was considered as part of the patient's evaluation. The plan for testing is:  a test was obtained during this visit.    Diagnosis:     ICD-10-CM    1. Infection due to 2019 novel coronavirus  U07.1    2. Acute alcoholic gastritis without hemorrhage  K29.20         Disposition:  Admitted.    Scribe Disclosure:  Clint LORA, am serving as a scribe at 7:23 AM on 12/29/2021 to document services personally performed by Wayne Francois MD based on my observations and the provider's  statements to me.     [facility]  December 29, 2021      Wayne Francois MD  12/29/21 0957

## 2021-12-29 NOTE — ED TRIAGE NOTES
"Presents to ED with pancreatitis. Last episode 3-4 months ago. Last drink at 1330. Pt has had withdrawal seizures in past. Pt states \"everything hurts.\" ABCs intact. HR 97. Pt A&OX4.   "

## 2021-12-29 NOTE — ED PROVIDER NOTES
History   Chief Complaint:  Abdominal Pain, Constipation, and Alcohol Intoxication       The history is provided by the patient.      Jaiden Lyons is a 46 year old male with history of alcohol abuse and pancreatitis who presents for evaluation of abdominal pain and constipation. He reports abdominal pain across the top of the abdomen and around his sides into his back. The patient notes constipation, saying he has not had a bowel movement in 3 days. He also reports that he has been unable to keep anything down, including fluids. The patient has a cough, but says this is not new. His last episode of pancreatitis was 3-4 months ago. The patient reports drinking today, with his last drink at 1330. He notes past withdrawal seizures. The patient was seen at the [facility] earlier today. He had normal labs at this time.     Laboratory & CT Results from [facility] Emergency Department   Laboratory Results (12/28/21 05:26)  CBC: WBC 4.6, HGB 13.8,     CMP: Creatinine 0.54 (L), Glucose 129 (H) o/w WNL  Lipase: 88  Troponin High Sensitivity: 9  Alcohol ethyl: 0.25 (H)    CT Abdomen Pelvis w Contrast (12/28/21 07:44)  1.  No acute abnormality is seen.  2.  Fatty liver.  3.  Stable peripherally calcified structure at the sarath hepatis is  perhaps a vascular structure such as a stable aneurysm.  4.  Pancreas calcifications consistent with chronic pancreatitis.     Review of Systems   Respiratory: Positive for cough.    Gastrointestinal: Positive for abdominal pain, constipation and vomiting.   Musculoskeletal: Positive for back pain.   All other systems reviewed and are negative.      Allergies:  Fentanyl  Insulin  Lisinopril  Morphine    Medications:  Albuterol  Jardiance  Folvite  Glucotrol  Atarax  Cozaar  Protonix  Thiamine    Past Medical History:     Alcohol abuse with withdrawal  Alcohol dependence  Pancreatitis  Asthma  Hypertension  Type 2 diabetes mellitus  Lactic acidosis  Epiglottitis      Past  "Surgical History:    Combined esophagoscopy, gastroscopy, and duodenoscopy  Forearm surgery to repair injuries from [facility]  Hip surgery     Social History:  The patient presents alone.   He was seen at [facility] Emergency Department this morning.    He reported alcohol use this morning.     Physical Exam     Patient Vitals for the past 24 hrs:   BP Temp Temp src Pulse Resp SpO2 Height Weight   12/28/21 2125 -- -- -- -- -- 96 % -- --   12/28/21 2115 (!) 132/90 -- -- 94 18 97 % -- --   12/28/21 1950 (!) 122/98 99  F (37.2  C) Temporal 97 19 97 % 1.778 m (5' 10\") 77.1 kg (170 lb)       Physical Exam  Constitutional: Alert, attentive  HENT:    Nose: Nose normal.    Mouth/Throat: Oropharynx is clear, mucous membranes are moist   Eyes: EOM are normal.   CV: regular rate and rhythm; no murmurs, rubs or gallups  Chest: Effort normal and breath sounds normal.   GI:  There is no tenderness. No distension. Normal bowel sounds  MSK: Normal range of motion.   Neurological: Alert, attentive  Skin: Skin is warm and dry.      Emergency Department Course     Laboratory:  Labs Ordered and Resulted from Time of ED Arrival to Time of ED Departure   BASIC METABOLIC PANEL - Abnormal       Result Value    Sodium 137      Potassium 4.2      Chloride 101      Carbon Dioxide (CO2) 23      Anion Gap 13      Urea Nitrogen 10      Creatinine 0.60 (*)     Calcium 8.6      Glucose 81      GFR Estimate >90     CBC WITH PLATELETS AND DIFFERENTIAL    WBC Count 4.0      RBC Count 5.32      Hemoglobin 15.1      Hematocrit 46.5      MCV 87      MCH 28.4      MCHC 32.5      RDW 14.9      Platelet Count 199      % Neutrophils 63      % Lymphocytes 22      % Monocytes 13      % Eosinophils 1      % Basophils 1      % Immature Granulocytes 0      NRBCs per 100 WBC 0      Absolute Neutrophils 2.5      Absolute Lymphocytes 0.9      Absolute Monocytes 0.5      Absolute Eosinophils 0.0      Absolute Basophils 0.1      Absolute Immature Granulocytes 0.0      Absolute " NRBCs 0.0          Emergency Department Course:     Reviewed:  I reviewed nursing notes, vitals, past medical history and Care Everywhere    Assessments:  2051 I obtained history and examined the patient as noted above.   2120 I rechecked the patient and explained findings. At this point the patient is safe for discharge.     Interventions:  2113 Zofran 4 mg IV  2113 Dilaudid half-tab 1 mg Oral  2115 Pepcid 20 mg IV     Disposition:  The patient was discharged to home.     Impression & Plan     Medical Decision Making:  This is a 46-year-old male with history of chronic alcohol abuse, chronic alcoholic gastritis, and chronic pancreatitis, who presents for evaluation of abdominal pain.  Of note, the patient has been reportedly to both [facility] and [facility] today for similar symptoms and requesting pain medications.  Regarding his [facility] visit, the patient had normal labs and CT.  Here, the patient's labs are reassuringly normal.  He has a benign abdominal exam and normal vital signs except for slight hypertension.  I had an extended conversation with the patient regarding his alcohol use, chronic pancreatitis and gastritis.  I explained the [facility] and  chronic pain policy, including its underpinnings and goals for optimal management of chronic pain, and that I would be unable to provide IV or prescription narcotics in this visit.  The patient demonstrated understanding of this policy and my explanation, and I provided chronic pain resources.  He is neither appearing to have significant pain nor is he actively vomiting.  Supportive care is administered and recommended outpatient care and primary care follow-up.  Plan primary care follow-up in 2 to 3 days and return precautions for worse pain, vomiting, or any other concerns.        Diagnosis:    ICD-10-CM    1. Chronic pancreatitis, unspecified pancreatitis type (H)  K86.1    2. Acute alcoholic gastritis without hemorrhage  K29.20          [place]  Disclosure:  I, Shivani Sharma, am serving as a scribe at 8:43 PM on 12/28/2021 to document services personally performed by Fantasma Renteria MD based on my observations and the provider's statements to me.          Fantasma Renteria MD  12/28/21 3876

## 2021-12-29 NOTE — H&P
[facility]    History and Physical - [facility] MedicineService        Date of Admission:  12/29/2021    Assessment & Plan      Jaiden Lyons is a 46 year old male who has a history of EtOH use disorder, pancreatitis secondary to EtOH use, T2DM, asthma, HTN  and is admitted for evaluation of abdominal pain, nauseam and vomiting.  He endorses ongoing intermittent episodes of nausea and vomiting, along with bilateral abdominal pain for the past few days.  He has been seen in the ED on several occassions over the last several days.  He was discharged from the ED last evening and was waiting for a bus when he began vomiting again and represented for evaluation.  He also endorses a dry cough with chills for the past few days.  COVID PCR positive.  Not hypoxic.  Last EtOH was in the AM of 12/28/2021.      # Confirmed viral COVID-19 infection         Symptom Onset 12/27/2021   Date of 1st Positive Test 12/29/2021   Vaccination Status Not Vaccinated, but interested/contemplative       - Admit to medical floor with continuous pulse ox, COVID-19 special precautions  - Oxygen: continue current support with NC O2; titrate to keep SpO2 between 90-96%  - Labs: standard COVID admission labs ordered   - Imaging: chest x-ray ordered  - Breathing treatments: Albuterol inhaler PRN; avoid nebulizers in favor of MDIs   - IV fluids: ordered at a rate of 100 mL/hr; hydrate cautiously to avoid worsening respiratory status with volume overload.  - Antibiotics: not indicated   - COVID-Focused Medications: Given dexamethasone in the ED.  Continue for now.  Hold on Remdisivir.  - DVT Prophylaxis: at high risk of thrombotic complications due to COVID-19 (DDimer = N/A ).          - PROPHYLACTIC dosing: lovenox 30mg daily        - consider anticoag on discharge for 30 days & until return to normal mobility       # EtOH WD with history of EtOH WD seizure:  # EtOH gastritis:  -- CIWA protocol.  Gabapentin,  Diazepam, Haldol PRN  -- Replete elytes.    -- Seizure precautions  -- Monitor for WD  -- Replete Thiamine, Folic, MVI    Chronic Medical Issues:  # Diabetes, type II: last A1C 6.6 % (Ref range: 0.0 - 5.6 %)    -- Correctional scale coverage.  Hypoglycemia protocol.  -- Change IVF to D5 NS given EtOH WD.   -- Hold Jardiance and Glipizide for now.     # GERD: Continue PPI    # Asthma: Lungs quescient.  PRN Albuterol.      # HTN: PTA Losartan.  Hold for now.       Diet:  Diabetic.  Replete IVF.  Replace elytes PRN. Trend labs.  DVT Prophylaxis: Enoxaparin (Lovenox) SQ  Arango Catheter: Not present  Fluids: Judicious use given COVID  Central Lines: None  Code Status:   Full              Disposition Plan   Expected Discharge:  Pending clinical course   Anticipated discharge location:  Awaiting care coordination huddle  Delays:            The patient's care was discussed with the Attending Physician, Dr. Kline and Patient.    ALFREDO Lima [facility] Medicine Service  [facility]  Securely message with the Vocera Web Console (learn more here)  Text page via [facility] Paging/Directory      ______________________________________________________________________    Chief Complaint   Multiple    History is obtained from the patient and chart    History of Present Illness   Jaiden Lyons is a 46 year old male who has a history of EtOH use disorder, pancreatitis secondary to EtOH use, T2DM, asthma, HTN  and is admitted for evaluation of abdominal pain, nauseam and vomiting.  He endorses ongoing intermittent episodes of nausea and vomiting, along with bilateral abdominal pain for the past few days.  He has been seen in the ED on several occassions over the last several days.  He was discharged from the ED last evening and was waiting for a bus when he began vomiting again and represented for evaluation.  He also endorses a dry cough with chills for the past few days.  COVID PCR positive.  Not  hypoxic.  Last EtOH was in the AM of 12/28/2021.      ED course:  Several ED visits, IVF, labs, meds  Pertinent findings: CMP reassuring sans mild hyperglycemia and mild elevation in AST. Normal lipase. EtOH negative (12/28 .24-.25), Mg 1.7, PO4 3.2, Flu A/B negative and COVID +.  CIWA 5.   Pertinent treatment: NS 2L, APAP, Decadron 6 mg, Benadryl 12.5, Toradol 15 mg, GI cocktail, Zofran, Protonix,     Review of Systems    12 point ROS as above otherwise negative    Past Medical History    I have reviewed this patient's medical history and updated it with pertinent information if needed.   Past Medical History:   Diagnosis Date     Alcohol abuse      Alcohol-induced acute pancreatitis      Asthma      Benign essential hypertension      Type 2 diabetes mellitus         Past Surgical History   I have reviewed this patient's surgical history and updated it with pertinent information if needed.  Past Surgical History:   Procedure Laterality Date     ESOPHAGOSCOPY, GASTROSCOPY, DUODENOSCOPY (EGD), COMBINED Left 02/22/2021    Procedure: ESOPHAGOGASTRODUODENOSCOPY (EGD);  Surgeon: Landry Rodriguez MD;  Location:  GI     Forearm surgery to repair [facility] from [facility]       HIP SURGERY          Social History   I have reviewed this patient's social history and updated it with pertinent information if needed. Jaiden Lyons  reports that he has been smoking cigarettes. He has never used smokeless tobacco. He reports current alcohol use. He reports current drug use. Drug: Marijuana.    Family History     Reviewed and is non contributory to presenting illness    Prior to Admission Medications   Prior to Admission Medications   Prescriptions Last Dose Informant Patient Reported? Taking?   albuterol (PROAIR HFA/PROVENTIL HFA/VENTOLIN HFA) 108 (90 Base) MCG/ACT inhaler   No No   Sig: Inhale 2 puffs into the lungs every 6 hours as needed for shortness of breath / dyspnea or wheezing   empagliflozin (JARDIANCE) 10 MG TABS tablet    Yes No   Sig: Take 10 mg by mouth daily   folic acid (FOLVITE) 1 MG tablet   No No   Sig: Take 1 tablet (1 mg) by mouth daily   glipiZIDE (GLUCOTROL) 5 MG tablet   Yes No   Sig: Take 5 mg by mouth daily before breakfast   hydrOXYzine (ATARAX) 25 MG tablet   No No   Sig: Take 1 tablet (25 mg) by mouth every 6 hours as needed for anxiety   losartan (COZAAR) 50 MG tablet   Yes No   Sig: Take 50 mg by mouth daily   pantoprazole (PROTONIX) 40 MG EC tablet   No No   Sig: Take 1 tablet (40 mg) by mouth 2 times daily (before meals)   thiamine (B-1) 100 MG tablet   No No   Sig: Take 1 tablet (100 mg) by mouth daily      Facility-Administered Medications: None     Allergies   Allergies   Allergen Reactions     Fentanyl      Insulin Swelling     Reaction Date: Around 1627-1836  Face swelling  Inpatient when reaction occurred, unsure which type of insulin  Required some Benadryl to help with the swelling     Lisinopril      Face swelling, cough     Morphine Itching       Physical Exam   Vital Signs: Temp: 97.8  F (36.6  C) Temp src: Temporal BP: 131/85 Pulse: 96   Resp: 22 SpO2: 92 % O2 Device: None (Room air)    Weight: 0 lbs 0 oz    Constitutional: Forlorn, non-toxic, no acute distress.  Eyes: Non scleral icterus  ENT: Atraumatic  Hematologic / Lymphatic: no cervical lymphadenopathy and no supraclavicular lymphadenopathy  Respiratory: No increased work of breathing, good air exchange, clear to auscultation bilaterally, no crackles or wheezing  Cardiovascular: Normal apical impulse, regular rate and rhythm, normal S1 and S2, no S3 or S4, and no murmur noted  GI: No scars, normal bowel sounds, soft, non-distended, non-tender, no masses palpated, no hepatosplenomegally  Genitounirinary: NA  Skin: no bruising or bleeding and multiple tattoos.  No evidence of skin infection or significant lesions  Musculoskeletal: There is no redness, warmth, or swelling of the joints.  Full range of motion noted.  Motor strength is 5 out of 5  all extremities bilaterally.  Tone is normal.  Neurologic: AxOX3.  Mental Status Exam:  Level of Alertness:   awake  Orientation:   person, place, time  Memory:   normal  Fund of Knowledge:  normal  Attention/Concentration:  abnormal - distracted  Language:  normal  Cranial Nerves:  cranial nerves II-XII are grossly intact  Motor Exam:  moves all extremities well and symmetrically  Sensory:  Sensory intact  Neuropsychiatric: General: restless, aggitated and poor eye contact  Affect: anxious    Data   Data reviewed today: I reviewed all medications, new labs and imaging results over the last 24 hours. I personally reviewed as noted below.    Recent Labs   Lab 12/29/21  0741 12/28/21 2054 12/28/21  0621 12/28/21  0526   WBC  --  4.0  --  4.6   HGB  --  15.1  --  13.8   MCV  --  87  --  85   PLT  --  199  --  200   INR  --   --  1.01  --     137  --  143   POTASSIUM 4.0 4.2  --  3.8   CHLORIDE 100 101  --  108   CO2 24 23  --  22   BUN 11 10  --  9   CR 0.57* 0.60*  --  0.54*   ANIONGAP 12 13  --  13   BARRY 9.0 8.6  --  8.6   * 81  --  129*   ALBUMIN 3.9 4.1  --  3.5   PROTTOTAL 8.1 8.7  --  7.7   BILITOTAL 0.8 0.8  --  0.6   ALKPHOS 74 80  --  69   ALT 47 44  --  32   AST 58* 59*  --  42   LIPASE 78 103  --  88     4.0    \    15.1    /    199   N 63    L N/A    136    100    11 /   ------------------------------------ 175 (H)   ALT 47   AST 58 (H)   AP 74   ALB 3.9   Ca 9.0  4.0    24    0.57 (L) \    % RETIC N/A    LDH N/A  Troponin N/A    BNP N/A    CK N/A  INR N/A   PTT N/A    D-dimer N/A    Fibrinogen N/A    Antithrombin N/A  Ferritin N/A  CRP N/A    IL-6 N/A  Recent Results (from the past 24 hour(s))   XR Chest Port 1 View    Narrative    XR CHEST PORT 1 VIEW 12/29/2021 8:19 AM    HISTORY: cough, chills    COMPARISON: CT chest 11/21/2021      Impression    IMPRESSION:Blunting of the right costophrenic angle and adjacent  reticular opacities correlate with pleural and parenchymal scarring  seen  on recent comparison CT. No acute findings. No focal pneumonic  consolidation or pleural effusion. Normal heart size.    GIGI BUSH MD         SYSTEM ID:  [id number]     EKG: SR with prolonged QTC.  .

## 2021-12-30 LAB
ANION GAP SERPL CALCULATED.3IONS-SCNC: 7 MMOL/L (ref 3–14)
BUN SERPL-MCNC: 10 MG/DL (ref 7–30)
CALCIUM SERPL-MCNC: 8.1 MG/DL (ref 8.5–10.1)
CHLORIDE BLD-SCNC: 103 MMOL/L (ref 94–109)
CO2 SERPL-SCNC: 24 MMOL/L (ref 20–32)
CREAT SERPL-MCNC: 0.49 MG/DL (ref 0.66–1.25)
CRP SERPL-MCNC: <2.9 MG/L (ref 0–8)
D DIMER PPP FEU-MCNC: 1.3 UG/ML FEU (ref 0–0.5)
ERYTHROCYTE [DISTWIDTH] IN BLOOD BY AUTOMATED COUNT: 14 % (ref 10–15)
FIBRINOGEN PPP-MCNC: 245 MG/DL (ref 170–490)
GFR SERPL CREATININE-BSD FRML MDRD: >90 ML/MIN/1.73M2
GLUCOSE BLD-MCNC: 234 MG/DL (ref 70–99)
GLUCOSE BLDC GLUCOMTR-MCNC: 234 MG/DL (ref 70–99)
GLUCOSE BLDC GLUCOMTR-MCNC: 244 MG/DL (ref 70–99)
GLUCOSE BLDC GLUCOMTR-MCNC: 247 MG/DL (ref 70–99)
GLUCOSE BLDC GLUCOMTR-MCNC: 255 MG/DL (ref 70–99)
GLUCOSE BLDC GLUCOMTR-MCNC: 272 MG/DL (ref 70–99)
GLUCOSE BLDC GLUCOMTR-MCNC: 328 MG/DL (ref 70–99)
HCT VFR BLD AUTO: 36.3 % (ref 40–53)
HGB BLD-MCNC: 11.6 G/DL (ref 13.3–17.7)
LDH SERPL L TO P-CCNC: 143 U/L (ref 85–227)
MCH RBC QN AUTO: 28 PG (ref 26.5–33)
MCHC RBC AUTO-ENTMCNC: 32 G/DL (ref 31.5–36.5)
MCV RBC AUTO: 88 FL (ref 78–100)
PLATELET # BLD AUTO: 143 10E3/UL (ref 150–450)
POTASSIUM BLD-SCNC: 4 MMOL/L (ref 3.4–5.3)
RBC # BLD AUTO: 4.14 10E6/UL (ref 4.4–5.9)
SODIUM SERPL-SCNC: 134 MMOL/L (ref 133–144)
TROPONIN I SERPL HS-MCNC: 6 NG/L
WBC # BLD AUTO: 4.4 10E3/UL (ref 4–11)

## 2021-12-30 PROCEDURE — 36415 COLL VENOUS BLD VENIPUNCTURE: CPT | Performed by: NURSE PRACTITIONER

## 2021-12-30 PROCEDURE — 250N000013 HC RX MED GY IP 250 OP 250 PS 637: Performed by: HOSPITALIST

## 2021-12-30 PROCEDURE — 250N000011 HC RX IP 250 OP 636: Performed by: NURSE PRACTITIONER

## 2021-12-30 PROCEDURE — 85379 FIBRIN DEGRADATION QUANT: CPT | Performed by: NURSE PRACTITIONER

## 2021-12-30 PROCEDURE — 250N000009 HC RX 250: Performed by: NURSE PRACTITIONER

## 2021-12-30 PROCEDURE — 250N000013 HC RX MED GY IP 250 OP 250 PS 637: Performed by: EMERGENCY MEDICINE

## 2021-12-30 PROCEDURE — 99233 SBSQ HOSP IP/OBS HIGH 50: CPT | Performed by: STUDENT IN AN ORGANIZED HEALTH CARE EDUCATION/TRAINING PROGRAM

## 2021-12-30 PROCEDURE — 250N000013 HC RX MED GY IP 250 OP 250 PS 637: Performed by: NURSE PRACTITIONER

## 2021-12-30 PROCEDURE — 83615 LACTATE (LD) (LDH) ENZYME: CPT | Performed by: NURSE PRACTITIONER

## 2021-12-30 PROCEDURE — 120N000001 HC R&B MED SURG/OB

## 2021-12-30 PROCEDURE — 85384 FIBRINOGEN ACTIVITY: CPT | Performed by: NURSE PRACTITIONER

## 2021-12-30 PROCEDURE — 85027 COMPLETE CBC AUTOMATED: CPT | Performed by: NURSE PRACTITIONER

## 2021-12-30 PROCEDURE — 84484 ASSAY OF TROPONIN QUANT: CPT | Performed by: NURSE PRACTITIONER

## 2021-12-30 PROCEDURE — 250N000013 HC RX MED GY IP 250 OP 250 PS 637: Performed by: STUDENT IN AN ORGANIZED HEALTH CARE EDUCATION/TRAINING PROGRAM

## 2021-12-30 PROCEDURE — 82310 ASSAY OF CALCIUM: CPT | Performed by: NURSE PRACTITIONER

## 2021-12-30 PROCEDURE — 250N000012 HC RX MED GY IP 250 OP 636 PS 637: Performed by: STUDENT IN AN ORGANIZED HEALTH CARE EDUCATION/TRAINING PROGRAM

## 2021-12-30 PROCEDURE — 258N000003 HC RX IP 258 OP 636: Performed by: NURSE PRACTITIONER

## 2021-12-30 PROCEDURE — 86140 C-REACTIVE PROTEIN: CPT | Performed by: NURSE PRACTITIONER

## 2021-12-30 RX ORDER — NICOTINE POLACRILEX 4 MG
15-30 LOZENGE BUCCAL
Status: DISCONTINUED | OUTPATIENT
Start: 2021-12-30 | End: 2022-01-01 | Stop reason: HOSPADM

## 2021-12-30 RX ORDER — OXYCODONE HYDROCHLORIDE 5 MG/1
5 TABLET ORAL ONCE
Status: COMPLETED | OUTPATIENT
Start: 2021-12-30 | End: 2021-12-30

## 2021-12-30 RX ORDER — NALOXONE HYDROCHLORIDE 0.4 MG/ML
0.4 INJECTION, SOLUTION INTRAMUSCULAR; INTRAVENOUS; SUBCUTANEOUS
Status: DISCONTINUED | OUTPATIENT
Start: 2021-12-30 | End: 2022-01-01 | Stop reason: HOSPADM

## 2021-12-30 RX ORDER — NALOXONE HYDROCHLORIDE 0.4 MG/ML
0.2 INJECTION, SOLUTION INTRAMUSCULAR; INTRAVENOUS; SUBCUTANEOUS
Status: DISCONTINUED | OUTPATIENT
Start: 2021-12-30 | End: 2022-01-01 | Stop reason: HOSPADM

## 2021-12-30 RX ORDER — DEXTROSE MONOHYDRATE 25 G/50ML
25-50 INJECTION, SOLUTION INTRAVENOUS
Status: DISCONTINUED | OUTPATIENT
Start: 2021-12-30 | End: 2022-01-01 | Stop reason: HOSPADM

## 2021-12-30 RX ORDER — OXYCODONE HYDROCHLORIDE 5 MG/1
5 TABLET ORAL 3 TIMES DAILY PRN
Status: DISCONTINUED | OUTPATIENT
Start: 2021-12-30 | End: 2022-01-01 | Stop reason: HOSPADM

## 2021-12-30 RX ADMIN — MULTIPLE VITAMINS W/ MINERALS TAB 1 TABLET: TAB at 09:12

## 2021-12-30 RX ADMIN — ENOXAPARIN SODIUM 40 MG: 40 INJECTION SUBCUTANEOUS at 13:11

## 2021-12-30 RX ADMIN — GABAPENTIN 900 MG: 300 CAPSULE ORAL at 17:30

## 2021-12-30 RX ADMIN — CLONIDINE HYDROCHLORIDE 0.1 MG: 0.1 TABLET ORAL at 20:31

## 2021-12-30 RX ADMIN — ACETAMINOPHEN 650 MG: 325 TABLET, FILM COATED ORAL at 05:41

## 2021-12-30 RX ADMIN — FOLIC ACID 1 MG: 1 TABLET ORAL at 09:12

## 2021-12-30 RX ADMIN — GLIPIZIDE 5 MG: 5 TABLET ORAL at 09:12

## 2021-12-30 RX ADMIN — GABAPENTIN 900 MG: 300 CAPSULE ORAL at 01:53

## 2021-12-30 RX ADMIN — THIAMINE HCL TAB 100 MG 100 MG: 100 TAB at 09:12

## 2021-12-30 RX ADMIN — OXYCODONE HYDROCHLORIDE 5 MG: 5 TABLET ORAL at 22:52

## 2021-12-30 RX ADMIN — PANTOPRAZOLE SODIUM 40 MG: 40 TABLET, DELAYED RELEASE ORAL at 17:26

## 2021-12-30 RX ADMIN — FOLIC ACID: 5 INJECTION, SOLUTION INTRAMUSCULAR; INTRAVENOUS; SUBCUTANEOUS at 13:12

## 2021-12-30 RX ADMIN — CLONIDINE HYDROCHLORIDE 0.1 MG: 0.1 TABLET ORAL at 05:24

## 2021-12-30 RX ADMIN — INSULIN ASPART 3 UNITS: 100 INJECTION, SOLUTION INTRAVENOUS; SUBCUTANEOUS at 13:26

## 2021-12-30 RX ADMIN — GLIPIZIDE 5 MG: 5 TABLET ORAL at 17:26

## 2021-12-30 RX ADMIN — PANTOPRAZOLE SODIUM 40 MG: 40 TABLET, DELAYED RELEASE ORAL at 09:12

## 2021-12-30 RX ADMIN — OXYCODONE HYDROCHLORIDE 5 MG: 5 TABLET ORAL at 10:54

## 2021-12-30 RX ADMIN — INSULIN ASPART 4 UNITS: 100 INJECTION, SOLUTION INTRAVENOUS; SUBCUTANEOUS at 22:52

## 2021-12-30 RX ADMIN — DIAZEPAM 10 MG: 10 TABLET ORAL at 05:43

## 2021-12-30 RX ADMIN — OXYCODONE HYDROCHLORIDE 5 MG: 5 TABLET ORAL at 13:23

## 2021-12-30 RX ADMIN — DIAZEPAM 10 MG: 10 TABLET ORAL at 22:52

## 2021-12-30 RX ADMIN — SENNOSIDES AND DOCUSATE SODIUM 1 TABLET: 50; 8.6 TABLET ORAL at 09:22

## 2021-12-30 RX ADMIN — ALBUTEROL SULFATE 2 PUFF: 90 AEROSOL, METERED RESPIRATORY (INHALATION) at 05:25

## 2021-12-30 RX ADMIN — ALBUTEROL SULFATE 2 PUFF: 90 AEROSOL, METERED RESPIRATORY (INHALATION) at 13:25

## 2021-12-30 RX ADMIN — DIAZEPAM 10 MG: 10 TABLET ORAL at 13:23

## 2021-12-30 RX ADMIN — CLONIDINE HYDROCHLORIDE 0.1 MG: 0.1 TABLET ORAL at 13:10

## 2021-12-30 RX ADMIN — GABAPENTIN 900 MG: 300 CAPSULE ORAL at 10:50

## 2021-12-30 ASSESSMENT — ACTIVITIES OF DAILY LIVING (ADL)
ADLS_ACUITY_SCORE: 5
ADLS_ACUITY_SCORE: 3
ADLS_ACUITY_SCORE: 5
ADLS_ACUITY_SCORE: 3
ADLS_ACUITY_SCORE: 3
ADLS_ACUITY_SCORE: 5
ADLS_ACUITY_SCORE: 3
ADLS_ACUITY_SCORE: 5
ADLS_ACUITY_SCORE: 3
ADLS_ACUITY_SCORE: 3
ADLS_ACUITY_SCORE: 5
ADLS_ACUITY_SCORE: 5
ADLS_ACUITY_SCORE: 3
ADLS_ACUITY_SCORE: 5
ADLS_ACUITY_SCORE: 5

## 2021-12-30 NOTE — UTILIZATION REVIEW
"  Admission Status; Secondary Review Determination     Admission Date: 12/29/2021  7:19 AM      Under the authority of the Utilization Management Committee, the utilization review process indicated a secondary review on the above patient.  The review outcome is based on review of the medical records, discussions with staff, and applying clinical experience noted on the date of the review.        (x)      Inpatient Status Appropriate - This patient's medical care is consistent with medical management for inpatient care and reasonable inpatient medical practice.      () Observation Status Appropriate - This patient does not meet hospital inpatient criteria and is placed in observation status. If this patient's primary payer is Medicare and was admitted as an inpatient, Condition Code 44 should be used and patient status changed to \"observation\".   () Admission Status NOT Appropriate - This patient's medical care is not consistent with medical management for Inpatient or Observation Status.          RATIONALE FOR DETERMINATION   Jaiden Lyons is a 46 year old male who has a history of EtOH use disorder, pancreatitis secondary to EtOH use, T2DM, asthma, and HTN. He presented to the hospital with abdominal pain, nausea, and vomiting. Found to have COVID-19 infection with acute hypoxic respiratory failure. He is requiring supplemental oxygen. He is now going through acute alcohol withdrawals. He is expected to require prolonged hospital stay. Due to this patient's complex past medical history, COVID-19 infection with acute hypoxic respite failure, need for continued supplemental oxygen, acute alcohol withdrawals, and expectation of a prolonged hospital stay, it is appropriate to have him admitted to the hospital as an inpatient.      The severity of illness, intensity of service provided, expected LOS and risk for adverse outcome make the care complex, high risk and appropriate for hospital admission.        The " information on this document is developed by the utilization review team in order for the business office to ensure compliance.  This only denotes the appropriateness of proper admission status and does not reflect the quality of care rendered.         The definitions of Inpatient Status and Observation Status used in making the determination above are those provided in the CMS Coverage Manual, Chapter 1 and Chapter 6, section 70.4.      Sincerely,     Rusty Randolph D.O.  Utilization Review/ Case Management  [facility].

## 2021-12-30 NOTE — PROGRESS NOTES
SW is aware of the need for an assessment due to a high URR and for CD. SW attempted to call the number on file for the patient and a family member answered stating it was the wrong number. She gave me Jaiden's number which is [phone]. There was no answer. SW attempted to call the patient's room number [phone] and the line was busy. SW will attempt again later this afternoon. SW is unable to go into patient's room due to COVID. Phone number was updated on patient's information.     Addendum 1204: SW called patient. He was talking to the adarsh and asked I call back later.     Addendum 1334: SW attempted to call patient again. Patient's phone was busy. SW vocered nurse to see when a good time to speak to the patient would be.     Addendum 1501- SW attempted to call patient. Line was busy.     CHERRY Cohen, SW  , ED Care Management   [phone]

## 2021-12-30 NOTE — PROGRESS NOTES
[facility]    Medicine Progress Note - Hospitalist Service       Date of Admission:  12/29/2021    Assessment & Plan         Jaiden Lyons is a 46 year old male who has a history of EtOH use disorder, pancreatitis secondary to EtOH use, T2DM, asthma, HTN  and is admitted for evaluation of abdominal pain, nauseam and vomiting.  He endorses ongoing intermittent episodes of nausea and vomiting, along with bilateral abdominal pain for the past few days.  He has been seen in the ED on several occassions over the last several days.  He was discharged from the ED last evening and was waiting for a bus when he began vomiting again and represented for evaluation.  He also endorses a dry cough with chills for the past few days.  COVID PCR positive.  Not hypoxic.  Last EtOH was in the AM of 12/28/2021.      Confirmed viral COVID-19 infection    Asthma  Symptom Onset 12/27/2021   Date of 1st Positive Test 12/29/2021   Vaccination Status Not Vaccinated, but interested/contemplative       - Admit to medical floor with continuous pulse ox, COVID-19 special precautions  - Oxygen: continue current support with NC O2; titrate to keep SpO2 between 90-96%   -12/30 on room air: desatted to mid 80s when up and walking, stabilized when standing still  - Labs: standard COVID admission labs ordered   - Breathing treatments: Albuterol inhaler PRN; avoid nebulizers in favor of MDIs   - Antibiotics: not indicated   - COVID-Focused Medications: Given dexamethasone in the ED.  Continue for now.  Hold on Remdisivir as no increasing oxygen requirements.  - DVT Prophylaxis: at high risk of thrombotic complications due to COVID-19 (DDimer = 1.3).          - PROPHYLACTIC dosing: lovenox 30mg daily        - consider anticoag on discharge for 30 days & until return to normal mobility       Diabetes mellitus  Patient willing to try insulin here in hospital. A1c 6.6. On steroids for COVID.  - glipizde 5mg BID (home  medication)  - medium dose sliding scale insulin  - glargine 10u at bedtime  -    Alcohol use disorder  - CIWA protocol: Ativan, gabapentin, clonidine  - thiamine, folate         Diet: Moderate Consistent Carb (60 g CHO per Meal) Diet    DVT Prophylaxis: Enoxaparin (Lovenox) SQ  Arango Catheter: Not present  Central Lines: None  Code Status: Full Code      Disposition Plan   Expected Discharge: 01/02/2022     Anticipated discharge location:  Awaiting care coordination huddle  Delays:     Covid Test Positive  Other (Add Comment)            The patient's care was discussed with the Patient.    Rosina Wan MD  Hospitalist Service  [facility]  Securely message with the Vocera Web Console (learn more here)  Text page via [employer] Paging/Directory        Clinically Significant Risk Factors Present on Admission             # Diabetes, type II: last A1C 6.6 % (Ref range: 0.0 - 5.6 %)      ______________________________________________________________________    Interval History   No acute events overnight. Patient feels very fatigued and weak. His muscles ache. He feels like his breathing is heavy and he has to work hard. Able to ambulate independently to the bathroom. He is nervous about getting worse.    Data reviewed today: I reviewed all medications, new labs and imaging results over the last 24 hours. I personally reviewed no images or EKG's today.    Physical Exam   Vital Signs: Temp: 97  F (36.1  C) Temp src: Axillary BP: 117/78 Pulse: 79   Resp: 16 SpO2: 97 % O2 Device: Nasal cannula Oxygen Delivery: 1 LPM  Weight: 160 lbs 4.8 oz  GEN: Alert and appropriately interactive for age, tired appearing  EYES: Eyes grossly normal to inspection, conjunctivae and sclerae normal  RESP: No increase work of breathing appreciated, on room air, expiratory wheezing throughout all lung fields  CV: Warm and well perfused peripheral extremities, RRR no murmur  MS: no gross musculoskeletal defects noted, no  edema  NEURO: Alert and oriented. Gait normal. Speech fluent.    PSYCH: Affect tearful. Appearance well groomed.

## 2021-12-30 NOTE — PROGRESS NOTES
"Paged by nursing that patient's BG is 296 prior to dinner.  He is diabetic.  He is on glipizide and jardiance which are on hold.  He is admitted with COVID-19 and started on steroids.    -Discontinued D5 containing fluids. Bolus prn if needed for fluids.  He has allergy listed to insulin with facial swelling noted.  I talked to him on the phone about this and he states \"his face swelled up.\" He is not able to tell me what kind of insulin it was.    -He takes glipizide 5 mg daily at home.  Will start glipizide 5 mg BID with meals. I have asked nursing to discuss with pharmacy if he received insulin during prior hospitalizations and if he tolerated it.     Alexei Garcia MD    "

## 2021-12-30 NOTE — PROGRESS NOTES
"SPIRITUAL HEALTH SERVICES Progress Note    Patient requested to see a .  He is [employer] and identifies his candace as a resource.  He is fearful that he has COVID on top of all of his other chronic health conditions.  He asked for prayer.  I prayed that he might feel God's presence in his hospital room; that he might trust in God's mercy and loving kindness.  Finally, I prayed for health and strength in the days ahead.    Patient said, \"Thank you so much.  That really meant a lot.\"      Rev. Renee Iniguez M.Div.  Staff   Phone  [phone]    "

## 2021-12-30 NOTE — PLAN OF CARE
Medicated once this shift, mostly for anxiety, with valuim with good response.  APPETITE GOOD.   Started coverage insulin at lunchtime . No problem with insulin, as dr spoke with pt about it being an allergy.    Lungs dim, encouraged IS. O2  1l    Tele SR

## 2021-12-30 NOTE — PLAN OF CARE
VSS. Pt tolerating RA while awake but desats to 86% while asleep. Placed on 1L O2, sats in mid 90s. Pt A&Ox4. C/o pain to abdomen/back. Tylenol given with some relief. LS clear/dim. Tele SR. No CP or SOB reported. Infuvite fluids infusing.  and 285 (MD aware). Pt has allergy to insulin (see MD note). Good appetite today. No nausea or vomiting. CIWA 2, 8, 2, 8, 5. Alarms on for safety.

## 2021-12-30 NOTE — PROVIDER NOTIFICATION
MD notified: FYI pts BG is 296 prior to dinner. Do you want to give start any insulin? Thanks!  Writer discussed with MD that pt has allergy to insulin so no insulin to be given at this time until we understand allergy better per pt report/with help of pharmacist.     MD notified: [facility], pharmacist looked into insulin allergy but couldn't find any info on which type of insulin caused the reaction. Found that when the reaction happened benedryl was given and pt was okay.  No new orders. Will continue to monitor BG.

## 2021-12-30 NOTE — PLAN OF CARE
A/O, up with stand by assist. Tele SR. CIWA monitoring. 3,8,3. Received PO valium x1. . Covid isolation maintained. On decadron. Initially on room air but patient O2 dips down to 89% with activity.  Remains 1L NC on nights, sats between 95-96% at rest. Will continue to monitor.     /77 (BP Location: Right arm)   Pulse 70   Temp 97  F (36.1  C) (Axillary)   Resp 24   Wt 72.7 kg (160 lb 4.8 oz)   SpO2 93%   BMI 23.00 kg/m

## 2021-12-31 LAB
ANION GAP SERPL CALCULATED.3IONS-SCNC: 6 MMOL/L (ref 3–14)
BUN SERPL-MCNC: 10 MG/DL (ref 7–30)
CALCIUM SERPL-MCNC: 8.4 MG/DL (ref 8.5–10.1)
CHLORIDE BLD-SCNC: 103 MMOL/L (ref 94–109)
CO2 SERPL-SCNC: 25 MMOL/L (ref 20–32)
CREAT SERPL-MCNC: 0.43 MG/DL (ref 0.66–1.25)
CRP SERPL-MCNC: <2.9 MG/L (ref 0–8)
D DIMER PPP FEU-MCNC: 0.73 UG/ML FEU (ref 0–0.5)
ERYTHROCYTE [DISTWIDTH] IN BLOOD BY AUTOMATED COUNT: 14.2 % (ref 10–15)
FIBRINOGEN PPP-MCNC: 278 MG/DL (ref 170–490)
GFR SERPL CREATININE-BSD FRML MDRD: >90 ML/MIN/1.73M2
GLUCOSE BLD-MCNC: 269 MG/DL (ref 70–99)
GLUCOSE BLDC GLUCOMTR-MCNC: 180 MG/DL (ref 70–99)
GLUCOSE BLDC GLUCOMTR-MCNC: 242 MG/DL (ref 70–99)
GLUCOSE BLDC GLUCOMTR-MCNC: 248 MG/DL (ref 70–99)
GLUCOSE BLDC GLUCOMTR-MCNC: 251 MG/DL (ref 70–99)
GLUCOSE BLDC GLUCOMTR-MCNC: 294 MG/DL (ref 70–99)
GLUCOSE BLDC GLUCOMTR-MCNC: 374 MG/DL (ref 70–99)
HCT VFR BLD AUTO: 38.2 % (ref 40–53)
HGB BLD-MCNC: 12.2 G/DL (ref 13.3–17.7)
LDH SERPL L TO P-CCNC: 128 U/L (ref 85–227)
MCH RBC QN AUTO: 28.2 PG (ref 26.5–33)
MCHC RBC AUTO-ENTMCNC: 31.9 G/DL (ref 31.5–36.5)
MCV RBC AUTO: 88 FL (ref 78–100)
PLATELET # BLD AUTO: 123 10E3/UL (ref 150–450)
POTASSIUM BLD-SCNC: 3.7 MMOL/L (ref 3.4–5.3)
RBC # BLD AUTO: 4.33 10E6/UL (ref 4.4–5.9)
SODIUM SERPL-SCNC: 134 MMOL/L (ref 133–144)
WBC # BLD AUTO: 3.7 10E3/UL (ref 4–11)

## 2021-12-31 PROCEDURE — 250N000013 HC RX MED GY IP 250 OP 250 PS 637: Performed by: NURSE PRACTITIONER

## 2021-12-31 PROCEDURE — 250N000013 HC RX MED GY IP 250 OP 250 PS 637: Performed by: HOSPITALIST

## 2021-12-31 PROCEDURE — 86140 C-REACTIVE PROTEIN: CPT | Performed by: NURSE PRACTITIONER

## 2021-12-31 PROCEDURE — 250N000012 HC RX MED GY IP 250 OP 636 PS 637: Performed by: STUDENT IN AN ORGANIZED HEALTH CARE EDUCATION/TRAINING PROGRAM

## 2021-12-31 PROCEDURE — 250N000011 HC RX IP 250 OP 636: Performed by: NURSE PRACTITIONER

## 2021-12-31 PROCEDURE — 250N000013 HC RX MED GY IP 250 OP 250 PS 637: Performed by: EMERGENCY MEDICINE

## 2021-12-31 PROCEDURE — 85384 FIBRINOGEN ACTIVITY: CPT | Performed by: NURSE PRACTITIONER

## 2021-12-31 PROCEDURE — 36415 COLL VENOUS BLD VENIPUNCTURE: CPT | Performed by: NURSE PRACTITIONER

## 2021-12-31 PROCEDURE — 82310 ASSAY OF CALCIUM: CPT | Performed by: NURSE PRACTITIONER

## 2021-12-31 PROCEDURE — 250N000013 HC RX MED GY IP 250 OP 250 PS 637: Performed by: INTERNAL MEDICINE

## 2021-12-31 PROCEDURE — 250N000013 HC RX MED GY IP 250 OP 250 PS 637: Performed by: STUDENT IN AN ORGANIZED HEALTH CARE EDUCATION/TRAINING PROGRAM

## 2021-12-31 PROCEDURE — 120N000001 HC R&B MED SURG/OB

## 2021-12-31 PROCEDURE — 99232 SBSQ HOSP IP/OBS MODERATE 35: CPT | Performed by: INTERNAL MEDICINE

## 2021-12-31 PROCEDURE — 83615 LACTATE (LD) (LDH) ENZYME: CPT | Performed by: NURSE PRACTITIONER

## 2021-12-31 PROCEDURE — 85379 FIBRIN DEGRADATION QUANT: CPT | Performed by: NURSE PRACTITIONER

## 2021-12-31 PROCEDURE — 85014 HEMATOCRIT: CPT | Performed by: NURSE PRACTITIONER

## 2021-12-31 RX ORDER — LORAZEPAM 0.5 MG/1
0.5 TABLET ORAL EVERY 4 HOURS PRN
Status: DISCONTINUED | OUTPATIENT
Start: 2021-12-31 | End: 2022-01-01 | Stop reason: HOSPADM

## 2021-12-31 RX ORDER — GUAIFENESIN/DEXTROMETHORPHAN 100-10MG/5
10 SYRUP ORAL EVERY 4 HOURS PRN
Status: DISCONTINUED | OUTPATIENT
Start: 2021-12-31 | End: 2022-01-01 | Stop reason: HOSPADM

## 2021-12-31 RX ADMIN — GUAIFENESIN AND DEXTROMETHORPHAN 10 ML: 100; 10 SYRUP ORAL at 13:00

## 2021-12-31 RX ADMIN — Medication 5 MG: at 01:40

## 2021-12-31 RX ADMIN — INSULIN ASPART 4 UNITS: 100 INJECTION, SOLUTION INTRAVENOUS; SUBCUTANEOUS at 17:40

## 2021-12-31 RX ADMIN — GLIPIZIDE 5 MG: 5 TABLET ORAL at 17:14

## 2021-12-31 RX ADMIN — ALBUTEROL SULFATE 2 PUFF: 90 AEROSOL, METERED RESPIRATORY (INHALATION) at 01:32

## 2021-12-31 RX ADMIN — INSULIN ASPART 3 UNITS: 100 INJECTION, SOLUTION INTRAVENOUS; SUBCUTANEOUS at 08:30

## 2021-12-31 RX ADMIN — OXYCODONE HYDROCHLORIDE 5 MG: 5 TABLET ORAL at 13:00

## 2021-12-31 RX ADMIN — OXYCODONE HYDROCHLORIDE 5 MG: 5 TABLET ORAL at 21:47

## 2021-12-31 RX ADMIN — INSULIN ASPART 3 UNITS: 100 INJECTION, SOLUTION INTRAVENOUS; SUBCUTANEOUS at 12:33

## 2021-12-31 RX ADMIN — ACETAMINOPHEN 650 MG: 325 TABLET, FILM COATED ORAL at 10:14

## 2021-12-31 RX ADMIN — GLIPIZIDE 5 MG: 5 TABLET ORAL at 08:28

## 2021-12-31 RX ADMIN — ALBUTEROL SULFATE 2 PUFF: 90 AEROSOL, METERED RESPIRATORY (INHALATION) at 19:53

## 2021-12-31 RX ADMIN — CLONIDINE HYDROCHLORIDE 0.1 MG: 0.1 TABLET ORAL at 04:26

## 2021-12-31 RX ADMIN — PANTOPRAZOLE SODIUM 40 MG: 40 TABLET, DELAYED RELEASE ORAL at 08:28

## 2021-12-31 RX ADMIN — ENOXAPARIN SODIUM 40 MG: 40 INJECTION SUBCUTANEOUS at 12:30

## 2021-12-31 RX ADMIN — INSULIN GLARGINE 10 UNITS: 100 INJECTION, SOLUTION SUBCUTANEOUS at 01:34

## 2021-12-31 RX ADMIN — MULTIPLE VITAMINS W/ MINERALS TAB 1 TABLET: TAB at 08:28

## 2021-12-31 RX ADMIN — GABAPENTIN 900 MG: 300 CAPSULE ORAL at 01:36

## 2021-12-31 RX ADMIN — DIAZEPAM 10 MG: 10 TABLET ORAL at 17:38

## 2021-12-31 RX ADMIN — CLONIDINE HYDROCHLORIDE 0.1 MG: 0.1 TABLET ORAL at 12:30

## 2021-12-31 RX ADMIN — PANTOPRAZOLE SODIUM 40 MG: 40 TABLET, DELAYED RELEASE ORAL at 17:14

## 2021-12-31 RX ADMIN — ALBUTEROL SULFATE 2 PUFF: 90 AEROSOL, METERED RESPIRATORY (INHALATION) at 08:38

## 2021-12-31 RX ADMIN — CLONIDINE HYDROCHLORIDE 0.1 MG: 0.1 TABLET ORAL at 21:47

## 2021-12-31 RX ADMIN — GABAPENTIN 900 MG: 300 CAPSULE ORAL at 17:38

## 2021-12-31 RX ADMIN — GABAPENTIN 900 MG: 300 CAPSULE ORAL at 10:14

## 2021-12-31 RX ADMIN — THIAMINE HCL TAB 100 MG 100 MG: 100 TAB at 08:28

## 2021-12-31 RX ADMIN — FOLIC ACID 1 MG: 1 TABLET ORAL at 08:28

## 2021-12-31 RX ADMIN — OXYCODONE HYDROCHLORIDE 5 MG: 5 TABLET ORAL at 04:25

## 2021-12-31 ASSESSMENT — ACTIVITIES OF DAILY LIVING (ADL)
ADLS_ACUITY_SCORE: 5
ADLS_ACUITY_SCORE: 3
ADLS_ACUITY_SCORE: 6
ADLS_ACUITY_SCORE: 3
ADLS_ACUITY_SCORE: 6
ADLS_ACUITY_SCORE: 3
ADLS_ACUITY_SCORE: 3
ADLS_ACUITY_SCORE: 6
ADLS_ACUITY_SCORE: 3
ADLS_ACUITY_SCORE: 6

## 2021-12-31 NOTE — CONSULTS
Care Management Discharge Note    Discharge Date: 01/02/2022       Discharge Disposition: Home    Discharge Services: None    Discharge DME: None    Discharge Transportation: family or friend will provide    Private pay costs discussed: Not applicable    PAS Confirmation Code:  (N/A)  Patient/family educated on Medicare website which has current facility and service quality ratings:  (N/A)    Education Provided on the Discharge Plan:  yes  Persons Notified of Discharge Plans: Pt's bedside nurse  Patient/Family in Agreement with the Plan: yes    Handoff Referral Completed: No    Additional Information:  Sagrario called into the pt's room to identify and discharge needs and CD resources.  The pt identified no needs from Sagrario at this time.  Sw offered CD resources and the pt said that he is in the hospital due to COVID, not for CD resources.  Sw explained that given his recent ETOH use, Sw was just wanting to follow-up and he again said that he does not need any resources.    Pt admitted with Acute Alcoholic Gastritis without Hemorrhage and COVID-19, noted to have unplanned readmission risk of 37%.    Sagrario will sign off at this time as the pt declined any services or resources.  Please update Sagrario if needs arise.    CHERRY Lucas, [facility]  Inpatient Care Coordination  [facility]  [phone]

## 2021-12-31 NOTE — PLAN OF CARE
Pt A/O x 4, argumentative about bed alarms. Education provided about calling prior to OOB for observed unsteadiness on feet and risk for falling; pt became very argumentative and continues to get out of bed without calling, informed refusal for bed alarms on. PRN PO oxycodone, tylenol, and robitussin given per pt request for reported HA and cough discomfort. CIWA 6,6. , 251, 248 (MD paged to inform that pt is reporting increased hunger d/t steroids and having lunch box now, asked if MD wants additional insulin coverage and to please place orders if so). Tele SR w/ prolong QT, denied CP. PIV to left intact, SL. Up ad collins against this writer's advice. Maintained covid iso precautions. Will continue POC.

## 2021-12-31 NOTE — PLAN OF CARE
VSS. Aox4. Ind. 1L NC. RA possible at rest. Good appetite. Insulin coverage continued. No issue w insulin during shift. Lantus requested from pharmacy bc no lantus in room. NC needed during sleep and activity. Continent of B&B. Pt showered. Bedding changed. Valium given d/t anxiety and agitation. Oxycodone given for pain.

## 2021-12-31 NOTE — PLAN OF CARE
A/O, up independently and uses urinal at bedside. Tele SR. CIWA monitoring with valium protocol. Covid isolation maintained. Continues to be on 1L NC, sats 95-96%, LS diminished. Pt continues to have mild SOB with activity. VSS. Refused bed alarms, patient's call light within reach and able to call make needs known. Will continue to monitor.     /73 (BP Location: Right arm)   Pulse 72   Temp 97.8  F (36.6  C) (Oral)   Resp 18   Wt 72.7 kg (160 lb 4.8 oz)   SpO2 95%   BMI 23.00 kg/m

## 2021-12-31 NOTE — PROGRESS NOTES
[facility]  Hospitalist Progress Note  John Barriga M.D., M.B.A.   12/31/2021    Reason for Stay/active problem list      Acute hypoxic respiratory failure secondary to COVID-19 infection    Alcohol use disorder and concern for alcohol withdrawal syndrome         Assessment and Plan:        Summary of Stay: Jaiden Lyons is a 46 year old male who has a history of EtOH use disorder, pancreatitis secondary to EtOH use, T2DM, asthma, HTN  and is admitted for evaluation of abdominal pain, nauseam and vomiting.  He endorses ongoing intermittent episodes of nausea and vomiting, along with bilateral abdominal pain for the past few days.  He has been seen in the ED on several occassions over the last several days.  He was discharged from the ED last evening and was waiting for a bus when he began vomiting again and represented for evaluation.  He also endorses a dry cough with chills for the past few days.  COVID PCR positive.  Not hypoxic at rest.  Last EtOH was in the AM of 12/28/2021.      Problem List with Assessment and Plan:      1. COVID-19 infection, bronchial asthma    Symptom onset-December 27    Covid positive test-December 29    Vaccination status-unvaccinated    Hypoxic with exertion requiring supplemental nasal oxygen    Checks x-ray without significant infiltrate    Continue supplemental oxygen as needed, albuterol nebs as needed, Lovenox for DVT prophylaxis    Was started on dexamethasone in the ED, continue for now.  Not wheezing    No need for remdesivir for now    2. Alcohol use disorder, concern for alcohol withdrawal syndrome      On gabapentin protocol, stable.  Add as needed Ativan      3. Diabetes mellitus type 2      On home glipizide    On medium sliding scale insulin and 10 of glargine at bedtime here    Blood glucose elevated with all readings over 200.  Increase Lantus to 15 units, monitor blood sugar and cover with sliding scale insulin    4.  Nausea vomiting  abdominal pain: Resolved    No acute pathology on CT imaging study on admission except for fatty liver, stable chronic pancreatitis findings      Plan for today:    Continue alcohol withdrawal treatment program, supplemental oxygen as needed, glucose control        LDA     Access : Peripheral    Arango Catheter: Not present        FEN :    Orders Placed This Encounter      Moderate Consistent Carb (60 g CHO per Meal) Diet           Intake/Output Summary (Last 24 hours) at 12/31/2021 1401  Last data filed at 12/31/2021 1234  Gross per 24 hour   Intake 1440 ml   Output 2675 ml   Net -1235 ml          DVT Prophylaxis:     Enoxaparin (Lovenox) SQ    Code Status:      Full Code    Estimated discharge  disposition and plan:   Home tomorrow pending improvement of hypoxia and his blood sugar control            Interval History (Subjective):        Patient is seen and examined by me today and medical record reviewed.Overnight events noted and care discussed with nursing staff.  Patient care assumed today.  He denies any new symptoms.  Continues to be feeling very weak.  No nausea vomiting or diarrhea noted.  No fever or chills.  Patient was assessed by social service team.                       Physical Exam:        Last Vital Signs:  /79 (BP Location: Right arm, Patient Position: Semi-Abdul's)   Pulse 76   Temp 97.3  F (36.3  C) (Axillary)   Resp 18   Wt 72.7 kg (160 lb 4.8 oz)   SpO2 95%   BMI 23.00 kg/m      Wt Readings from Last 1 Encounters:   12/29/21 72.7 kg (160 lb 4.8 oz)       Wt Readings from Last 5 Encounters:   12/29/21 72.7 kg (160 lb 4.8 oz)   12/28/21 77.1 kg (170 lb)   11/21/21 77.1 kg (170 lb)   10/19/21 77.2 kg (170 lb 4.8 oz)   07/02/21 75 kg (165 lb 5.5 oz)        Constitutional: Awake, alert, cooperative, no apparent distress     Respiratory: Clear to auscultation bilaterally, no crackles or wheezing   Cardiovascular: Regular rate and rhythm, normal S1 and S2, and no murmur noted   Abdomen:  Normal bowel sounds, soft, non-distended, non-tender   Skin: No new rashes, no cyanosis, dry to touch   Neuro: Alert with  no new focal weakness   Extremities: No edema, normal range of motion   Other(s):        All other systems: Negative          Medications:        All current medications were reviewed with changes reflected in problem list.         Data:      All new lab and imaging data was reviewed.      Data reviewed today: I reviewed all new labs and imaging results over the last 24 hours. I personally reviewed no images or EKG's today      Recent Labs   Lab 12/31/21  0802 12/30/21  0721 12/28/21 2054   WBC 3.7* 4.4 4.0   HGB 12.2* 11.6* 15.1   HCT 38.2* 36.3* 46.5   MCV 88 88 87   * 143* 199     No results for input(s): CULT in the last 168 hours.  Recent Labs   Lab 12/31/21  1209 12/31/21  0820 12/31/21  0802 12/30/21  0723 12/30/21  0721 12/29/21  1336 12/29/21  0742 12/29/21  0741 12/28/21 2054 12/28/21  0526   NA  --   --  134  --  134  --   --  136 137 143   POTASSIUM  --   --  3.7  --  4.0  --   --  4.0 4.2 3.8   CHLORIDE  --   --  103  --  103  --   --  100 101 108   CO2  --   --  25  --  24  --   --  24 23 22   ANIONGAP  --   --  6  --  7  --   --  12 13 13   * 242* 269*   < > 234*   < >  --  175* 81 129*   BUN  --   --  10  --  10  --   --  11 10 9   CR  --   --  0.43*  --  0.49*  --   --  0.57* 0.60* 0.54*   GFRESTIMATED  --   --  >90  --  >90  --   --  >90 >90 >90   BARRY  --   --  8.4*  --  8.1*  --   --  9.0 8.6 8.6   MAG  --   --   --   --   --   --  1.7  --   --   --    PHOS  --   --   --   --   --   --  3.2  --   --   --    PROTTOTAL  --   --   --   --   --   --   --  8.1 8.7 7.7   ALBUMIN  --   --   --   --   --   --   --  3.9 4.1 3.5   BILITOTAL  --   --   --   --   --   --   --  0.8 0.8 0.6   ALKPHOS  --   --   --   --   --   --   --  74 80 69   AST  --   --   --   --   --   --   --  58* 59* 42   ALT  --   --   --   --   --   --   --  47 44 32    < > = values in this  interval not displayed.       Recent Labs   Lab 12/31/21  1209 12/31/21  0820 12/31/21  0802 12/31/21  0404 12/30/21  2251   * 242* 269* 374* 328*       Recent Labs   Lab 12/29/21  1356 12/28/21  0621   INR 1.06 1.01         No results for input(s): TROPONIN, TROPI, TROPR in the last 168 hours.    Invalid input(s): TROP, TROPONINIES    No results found for this or any previous visit (from the past 48 hour(s)).    COVID Status:  COVID-19 PCR Results    COVID-19 PCR Results 2/20/21 2/24/21 6/1/21 10/19/21 11/21/21 12/29/21   SARS-CoV-2 Virus Specimen Source Nasopharyngeal  Nasopharyngeal      Flu A/B & SARS-COV-2 PCR Source  Nasopharyngeal       SARS-CoV-2 PCR Result NEGATIVE NEGATIVE NEGATIVE      SARS CoV2 PCR    Negative Negative Positive (A)   (A) Abnormal value       Comments are available for some flowsheets but are not being displayed.         COVID-19 Antibody Results, Testing for Immunity    COVID-19 Antibody Results, Testing for Immunity   No data to display.              Disclaimer: This note consists of symbols derived from keyboarding, dictation and/or voice recognition software. As a result, there may be errors in the script that have gone undetected. Please consider this when interpreting information found in this chart.

## 2022-01-01 VITALS
HEART RATE: 72 BPM | RESPIRATION RATE: 20 BRPM | OXYGEN SATURATION: 97 % | DIASTOLIC BLOOD PRESSURE: 71 MMHG | BODY MASS INDEX: 23 KG/M2 | TEMPERATURE: 97.8 F | WEIGHT: 160.3 LBS | SYSTOLIC BLOOD PRESSURE: 114 MMHG

## 2022-01-01 LAB
GLUCOSE BLDC GLUCOMTR-MCNC: 156 MG/DL (ref 70–99)
GLUCOSE BLDC GLUCOMTR-MCNC: 275 MG/DL (ref 70–99)
GLUCOSE BLDC GLUCOMTR-MCNC: 292 MG/DL (ref 70–99)
GLUCOSE BLDC GLUCOMTR-MCNC: 319 MG/DL (ref 70–99)
GLUCOSE BLDC GLUCOMTR-MCNC: 334 MG/DL (ref 70–99)

## 2022-01-01 PROCEDURE — 250N000013 HC RX MED GY IP 250 OP 250 PS 637: Performed by: NURSE PRACTITIONER

## 2022-01-01 PROCEDURE — 99239 HOSP IP/OBS DSCHRG MGMT >30: CPT | Performed by: INTERNAL MEDICINE

## 2022-01-01 PROCEDURE — 250N000013 HC RX MED GY IP 250 OP 250 PS 637: Performed by: HOSPITALIST

## 2022-01-01 PROCEDURE — 250N000013 HC RX MED GY IP 250 OP 250 PS 637: Performed by: EMERGENCY MEDICINE

## 2022-01-01 RX ORDER — DEXTROMETHORPHAN HYDROBROMIDE AND PROMETHAZINE HYDROCHLORIDE 15; 6.25 MG/5ML; MG/5ML
5 SYRUP ORAL EVERY 4 HOURS PRN
Qty: 180 ML | Refills: 0 | Status: SHIPPED | OUTPATIENT
Start: 2022-01-01 | End: 2022-07-07

## 2022-01-01 RX ORDER — DEXTROMETHORPHAN HYDROBROMIDE AND PROMETHAZINE HYDROCHLORIDE 15; 6.25 MG/5ML; MG/5ML
5 SYRUP ORAL EVERY 4 HOURS PRN
Qty: 180 ML | Refills: 0 | Status: SHIPPED | OUTPATIENT
Start: 2022-01-01 | End: 2022-01-01

## 2022-01-01 RX ORDER — HYDROCODONE BITARTRATE AND ACETAMINOPHEN 5; 325 MG/1; MG/1
1 TABLET ORAL EVERY 6 HOURS PRN
Qty: 18 TABLET | Refills: 0 | Status: SHIPPED | OUTPATIENT
Start: 2022-01-01 | End: 2022-01-04

## 2022-01-01 RX ADMIN — INSULIN ASPART 4 UNITS: 100 INJECTION, SOLUTION INTRAVENOUS; SUBCUTANEOUS at 09:21

## 2022-01-01 RX ADMIN — MULTIPLE VITAMINS W/ MINERALS TAB 1 TABLET: TAB at 09:20

## 2022-01-01 RX ADMIN — FOLIC ACID 1 MG: 1 TABLET ORAL at 09:21

## 2022-01-01 RX ADMIN — GABAPENTIN 900 MG: 300 CAPSULE ORAL at 02:50

## 2022-01-01 RX ADMIN — CLONIDINE HYDROCHLORIDE 0.1 MG: 0.1 TABLET ORAL at 06:03

## 2022-01-01 RX ADMIN — Medication 5 MG: at 02:50

## 2022-01-01 RX ADMIN — ALBUTEROL SULFATE 2 PUFF: 90 AEROSOL, METERED RESPIRATORY (INHALATION) at 00:24

## 2022-01-01 RX ADMIN — DIAZEPAM 10 MG: 10 TABLET ORAL at 00:23

## 2022-01-01 RX ADMIN — THIAMINE HCL TAB 100 MG 100 MG: 100 TAB at 09:21

## 2022-01-01 RX ADMIN — ALBUTEROL SULFATE 2 PUFF: 90 AEROSOL, METERED RESPIRATORY (INHALATION) at 06:23

## 2022-01-01 RX ADMIN — INSULIN ASPART 1 UNITS: 100 INJECTION, SOLUTION INTRAVENOUS; SUBCUTANEOUS at 11:17

## 2022-01-01 RX ADMIN — GABAPENTIN 900 MG: 300 CAPSULE ORAL at 11:14

## 2022-01-01 RX ADMIN — PANTOPRAZOLE SODIUM 40 MG: 40 TABLET, DELAYED RELEASE ORAL at 09:21

## 2022-01-01 RX ADMIN — GLIPIZIDE 5 MG: 5 TABLET ORAL at 09:20

## 2022-01-01 ASSESSMENT — ACTIVITIES OF DAILY LIVING (ADL)
ADLS_ACUITY_SCORE: 5

## 2022-01-01 NOTE — PLAN OF CARE
VSS kadi pain this morning. Pt drowsy this morning as well. Pt wake later, and wanting to discontinue home. CIWA 0. Bg 319 and 156  on Novolog.  Plan for discharge today. Went over discharge in with pt. Questions asked and answered. Pt verbal understanding. Pt dc'd at 1250 per wc with all belongings,paperwork and Norco, pt to  Promethazine-DM at [facility].

## 2022-01-01 NOTE — PLAN OF CARE
For VS and complete assessments, please see documentation in flowsheets.     Pertinent shift assessments: VSS. A&Ox4. Transfers ind (refuses alarms and GB, but steady). Denies CP/SOB/Dizziness/ Light-headedness. LS clear. On RA.Tele SR. CIWA protocol, gave 1x PO Valium with relief of headache and anxiety. Melatonin given for sleep.  and 275. Tele SR.      Expected Discharge Date/Disposition: today home

## 2022-01-01 NOTE — PROGRESS NOTES
Care Management Discharge Note    Discharge Date: 01/01/2022       Discharge Disposition: Outpatient Chemical Dependency,Home    Discharge Services: None    Discharge DME: None    Discharge Transportation: family or friend will provide    Private pay costs discussed: Not applicable    PAS Confirmation Code:  (N/A)  Patient/family educated on Medicare website which has current facility and service quality ratings:  (N/A)    Education Provided on the Discharge Plan:  yes  Persons Notified of Discharge Plans: Pt, pt's kerri Thompson  Patient/Family in Agreement with the Plan: yes    Handoff Referral Completed: No    Additional Information:  Sagrario received a call from the pt's Donna manning [phone], who requested that Sw call her back to discuss CD resources for the pt.  Sagrario spoke with the pt who gave Sw verbal permission to contact Donna.    Sagrario spoke with Donna and informed her that they can provide her with CD resources.  Sagrario told her that the pt declined resources yesterday.  Donna said that the pt is now agreeable to OP CD treatment.  Donna said that the pt always tries to quit drinking on his own, but it is not long-term.  Donna spoke with the pt who said he would go to OP, but not IP.  Donna is hopeful that with some assistance, the pt will be able to remain sober long-term.  Sagrario explained the CD resource brochure with Donna and told her that it would be with the pt's discharge paperwork.    Sagrario provided the CD resource brochure to the bedside nurse.    Sw will continue to be available as needed until discharge.      CHERRY Lucas, [facility]  Inpatient Care Coordination  [facility]  [phone]

## 2022-01-01 NOTE — PLAN OF CARE
Vitals: Temp: 97.8  F (36.6  C) Temp src: Oral BP: 121/80 Pulse: 81   Resp: 18 SpO2: 96 % O2 Device: None (Room air) Oxygen Delivery: 1 LPM    Pain: headache 8/10.   Neuro: A&O x4. Able to make needs known.   Resp: LS diminished. Denies SOB, cough.   Cardiac/tele: Tele SR. Denies CP or SOB.   GI/ WDL  Skin: Refuses skin checks, but wdl from what can be seen.   LDA: Left PIV SL  Labs: CRP <2.9 D-dimer 0.73, Fibrinogen 278, Lactate dehydranese 128 K+ 3.7  Diet: mod cho diet  Activity: Ind in room. Refuses bed alarms and gait belt. Fairly steady on his feet.   Sig events this shift:  and 180. CIWA score: 13 and 4. Gave 10 mg. Valium, seemed to work.   Plan: Continue to follow plan of care.

## 2022-01-01 NOTE — DISCHARGE SUMMARY
Physician Discharge Summary           [facility]    Name: Jaiden Lyons    MRN: [id number]     YOB: 1975    Age: 46 year old                                                     Primary care provider: Freddie Donis    Admit date:  12/29/2021    Discharge date and time: 1/1/2022  1:00 PM    Discharge Physician: John Barriga M.D., M.B.A.       Primary Discharge Diagnosis      Acute hypoxic respiratory failure secondary to COVID-19 infection  Alcohol use disorder and concern for alcohol withdrawal syndrome  Chronic pancreatitis    Secondary Diagnosis /chronic medical conditions     Past Medical History:   Diagnosis Date     Alcohol abuse      Alcohol-induced acute pancreatitis      Asthma      Benign essential hypertension      Type 2 diabetes mellitus      Past Surgical History:  Past Surgical History:   Procedure Laterality Date     ESOPHAGOSCOPY, GASTROSCOPY, DUODENOSCOPY (EGD), COMBINED Left 02/22/2021    Procedure: ESOPHAGOGASTRODUODENOSCOPY (EGD);  Surgeon: Landry Rodriguez MD;  Location:  GI     Forearm surgery to repair injuries from [facility]       HIP SURGERY             Brief Summary of Hospital stay :       Please refer to  Admission H&P note for full details of patient presentation.    Reason for Hospitalization(C/C,HPI and brief patient summary):abdominal pain, nauseam and vomiting      Significant findings(Primary diagnosis )Procedures and treatments provided(Hospital course ,consults, procedures):Please see below for details    Jaiden Lyons is a 46 year old male who has a history of EtOH use disorder, pancreatitis secondary to EtOH use, T2DM, asthma, HTN  and is admitted for evaluation of abdominal pain, nauseam and vomiting.  He endorses ongoing intermittent episodes of nausea and vomiting, along with bilateral abdominal pain for the past few days.  He has been seen in the ED on several occassions over the last  several days.  He was discharged from the ED last evening and was waiting for a bus when he began vomiting again and represented for evaluation.  He also endorses a dry cough with chills for the past few days.  COVID PCR positive.  Not hypoxic at rest.  Last EtOH was in the AM of 12/28/2021.      Problem list (medical problems addressed during hospital stay):  1. COVID-19 infection, bronchial asthma    Symptom onset-December 27    Covid positive test-December 29    Vaccination status-unvaccinated    Hypoxic with exertion requiring supplemental nasal oxygen- resolved     Checks x-ray without significant infiltrate    Continued supplemental oxygen as needed, albuterol nebs as needed, Lovenox for DVT prophylaxis    Was started on dexamethasone in the ED, given in house and stopped on discharge        2. Alcohol use disorder, concern for alcohol withdrawal syndrome       On gabapentin protocol, stable.  Add as needed Ativan    No significant withdrawal symptoms      3. Diabetes mellitus type 2       Resumed  home glipizide       4.  Nausea vomiting abdominal pain: Resolved    No acute pathology on CT imaging study on admission except for fatty liver, stable chronic pancreatitis findings    Few vicodin pills given on discharge     I discussed the potential for narcotic drug dependence and he understood this and will be discussing with his primary care physician for treatment of his chronic pain         Consultations during hospital stay:       CARE MANAGEMENT / SOCIAL WORK IP CONSULT      Patient discharge Condition:     stable    /71 (BP Location: Right arm)   Pulse 72   Temp 97.8  F (36.6  C) (Oral)   Resp 20   Wt 72.7 kg (160 lb 4.8 oz)   SpO2 97%   BMI 23.00 kg/m         Discharge Instructions:       Patient/family instructions: Written discharge instruction given to patient/family    Discharge Medications:       Review of your medicines      START taking      Dose / Directions   HYDROcodone-acetaminophen  5-325 MG tablet  Commonly known as: NORCO  Used for: Acute alcoholic gastritis without hemorrhage      Dose: 1 tablet  Take 1 tablet by mouth every 6 hours as needed for pain  Quantity: 18 tablet  Refills: 0     promethazine-DM 6.25-15 MG/5ML syrup  Commonly known as: PHENERGAN-DM  Used for: Infection due to 2019 novel coronavirus      Dose: 5 mL  Take 5 mLs by mouth every 4 hours as needed for cough  Quantity: 180 mL  Refills: 0        CONTINUE these medicines which have NOT CHANGED      Dose / Directions   albuterol 108 (90 Base) MCG/ACT inhaler  Commonly known as: PROAIR HFA/PROVENTIL HFA/VENTOLIN HFA      Dose: 2 puff  Inhale 2 puffs into the lungs every 6 hours as needed for shortness of breath / dyspnea or wheezing  Quantity: 18 g  Refills: 0     calcium carbonate 500 MG chewable tablet  Commonly known as: TUMS      Dose: 1 chew tab  Take 1 chew tab by mouth 3 times daily as needed for heartburn  Refills: 0     empagliflozin 10 MG Tabs tablet  Commonly known as: JARDIANCE      Dose: 10 mg  Take 10 mg by mouth daily  Refills: 0     folic acid 1 MG tablet  Commonly known as: FOLVITE  Used for: Alcohol abuse with withdrawal (H)      Dose: 1 mg  Take 1 tablet (1 mg) by mouth daily  Quantity: 30 tablet  Refills: 0     glipiZIDE 5 MG tablet  Commonly known as: GLUCOTROL      Dose: 5 mg  Take 5 mg by mouth daily before breakfast  Refills: 0     hydrOXYzine 25 MG tablet  Commonly known as: ATARAX  Used for: Anxiety      Dose: 25 mg  Take 1 tablet (25 mg) by mouth every 6 hours as needed for anxiety  Quantity: 20 tablet  Refills: 0     losartan 50 MG tablet  Commonly known as: COZAAR      Dose: 50 mg  Take 50 mg by mouth daily  Refills: 0     pantoprazole 40 MG EC tablet  Commonly known as: PROTONIX  Used for: Abdominal pain, epigastric      Dose: 40 mg  Take 1 tablet (40 mg) by mouth 2 times daily (before meals)  Quantity: 60 tablet  Refills: 0        STOP taking    thiamine 100 MG tablet  Commonly known as: B-1               Where to get your medicines      These medications were sent to [facility] - [place], [place] - [address]  [address] [facility] [address]    Phone: [phone]     HYDROcodone-acetaminophen 5-325 MG tablet     These medications were sent to [id number] - [place], [place] - [address], [facility] [address]    Phone: [phone]     promethazine-DM 6.25-15 MG/5ML syrup          Discharge diet:Orders Placed This Encounter      Diet    diabetic diet      Discharge activity:Activity as tolerated      Discharge follow-up:    Follow up with primary care provider in 7 days or earlier if symptoms return or gets worse.      Other instructions:    We discussed with patient/family about detail discharge instructions as well as discharge medications above including potential risks,side effects and benefits.Patient/family understood benefits and potential serious side effects of taking these medications and need to follow up with PCP if the patient develops complications.  Patient is also advised to see a doctor immediately for severe symptoms.        Major procedure performed/  Significant Diagnostic Studies:            Results for orders placed or performed during the hospital encounter of 12/29/21   XR Chest Port 1 View    Narrative    XR CHEST PORT 1 VIEW 12/29/2021 8:19 AM    HISTORY: cough, chills    COMPARISON: CT chest 11/21/2021      Impression    IMPRESSION:Blunting of the right costophrenic angle and adjacent  reticular opacities correlate with pleural and parenchymal scarring  seen on recent comparison CT. No acute findings. No focal pneumonic  consolidation or pleural effusion. Normal heart size.    GIGI BUSH MD         SYSTEM ID:  [id number]       Recent Labs   Lab 12/31/21  0802 12/30/21  0721 12/28/21 2054   WBC 3.7* 4.4 4.0   HGB 12.2* 11.6* 15.1   HCT 38.2* 36.3* 46.5   MCV  88 88 87   * 143* 199     No results for input(s): CULT in the last 168 hours.  Recent Labs   Lab 01/01/22  1117 01/01/22  0722 01/01/22  0650 12/31/21  0820 12/31/21  0802 12/30/21  0723 12/30/21  0721 12/29/21  1336 12/29/21  0742 12/29/21  0741 12/28/21  2054 12/28/21  0526   NA  --   --   --   --  134  --  134  --   --  136 137 143   POTASSIUM  --   --   --   --  3.7  --  4.0  --   --  4.0 4.2 3.8   CHLORIDE  --   --   --   --  103  --  103  --   --  100 101 108   CO2  --   --   --   --  25  --  24  --   --  24 23 22   ANIONGAP  --   --   --   --  6  --  7  --   --  12 13 13   * 319* 292*   < > 269*   < > 234*   < >  --  175* 81 129*   BUN  --   --   --   --  10  --  10  --   --  11 10 9   CR  --   --   --   --  0.43*  --  0.49*  --   --  0.57* 0.60* 0.54*   GFRESTIMATED  --   --   --   --  >90  --  >90  --   --  >90 >90 >90   BARRY  --   --   --   --  8.4*  --  8.1*  --   --  9.0 8.6 8.6   MAG  --   --   --   --   --   --   --   --  1.7  --   --   --    PHOS  --   --   --   --   --   --   --   --  3.2  --   --   --    PROTTOTAL  --   --   --   --   --   --   --   --   --  8.1 8.7 7.7   ALBUMIN  --   --   --   --   --   --   --   --   --  3.9 4.1 3.5   BILITOTAL  --   --   --   --   --   --   --   --   --  0.8 0.8 0.6   ALKPHOS  --   --   --   --   --   --   --   --   --  74 80 69   AST  --   --   --   --   --   --   --   --   --  58* 59* 42   ALT  --   --   --   --   --   --   --   --   --  47 44 32    < > = values in this interval not displayed.       Recent Labs   Lab 01/01/22  1117 01/01/22  0722 01/01/22  0650 01/01/22  0253 01/01/22  0004   * 319* 292* 275* 334*       Recent Labs   Lab 12/29/21  1356 12/28/21  0621   INR 1.06 1.01             Pending Results:       Unresulted Labs Ordered in the Past 30 Days of this Admission     No orders found from 11/29/2021 to 12/30/2021.             Patient Allergies:       Allergies   Allergen Reactions     Fentanyl      Insulin Swelling      Reaction Date: Around 9021-4385  Face swelling  Inpatient when reaction occurred, unsure which type of insulin  Required some Benadryl to help with the swelling    Tolerated aspart/glargine while inpt 12/2021     Lisinopril      Face swelling, cough     Morphine Itching         Disposition:     Disposition: home         I saw and evaluated the patient on day of discharge and  discharge instructions reviewed  and  all the patient's questions and concerns addressed. Over 30 minutes spent on discharge and coordination of discharge process for this patient.      Disclaimer: This note consists of symbols derived from keyboarding, dictation and/or voice recognition software. As a result, there may be errors in the script that have gone undetected. Please consider this when interpreting information found in this chart

## 2022-01-02 DIAGNOSIS — Z71.89 OTHER SPECIFIED COUNSELING: ICD-10-CM

## 2022-01-03 ENCOUNTER — PATIENT OUTREACH (OUTPATIENT)
Dept: CARE COORDINATION | Facility: CLINIC | Age: 47
End: 2022-01-03
Payer: COMMERCIAL

## 2022-01-03 NOTE — PROGRESS NOTES
Clinic Care Coordination Contact  [facility]/Voicemail       Clinical Data: Care Coordinator Outreach  Outreach attempted x 1.  Left message on patient's voicemail with call back information and requested return call.  Plan: Care Coordinator will try to reach patient again in 1-2 business days.

## 2022-01-04 NOTE — PROGRESS NOTES
Clinic Care Coordination Contact    Background: Care Coordination referral placed from [facility] discharge report for reason of patient meeting criteria for a TCM outreach call by [facility] team.    Assessment: Upon chart review, [facility] Team member will cancel/close the referral for TCM outreach due to reason below:    Patient isn't interested in speaking with care team today and disconnected call    Plan: Care Coordination referral for TCM outreach canceled.    Heidy Larsen  Community Health Worker  [facility] [place]  [phone]

## 2022-02-25 ENCOUNTER — HOSPITAL ENCOUNTER (EMERGENCY)
Facility: CLINIC | Age: 47
Discharge: HOME | End: 2022-02-25

## 2022-02-25 VITALS
OXYGEN SATURATION: 97 % | HEART RATE: 102 BPM | SYSTOLIC BLOOD PRESSURE: 126 MMHG | RESPIRATION RATE: 16 BRPM | TEMPERATURE: 98 F | WEIGHT: 166 LBS | DIASTOLIC BLOOD PRESSURE: 84 MMHG | BODY MASS INDEX: 23.82 KG/M2

## 2022-02-28 ENCOUNTER — TELEPHONE (OUTPATIENT)
Dept: BEHAVIORAL HEALTH | Facility: CLINIC | Age: 47
End: 2022-02-28
Payer: COMMERCIAL

## 2022-02-28 NOTE — TELEPHONE ENCOUNTER
This W attempted to call pt twice and up until 8:20 a.m. for his scheduled NIC assessment at 8:00 a.m.  Phone went directly to a busy signal.  OBCs had left a message at home number, but the pt has not called in.  Pt is a no show for his appointment.

## 2022-02-28 NOTE — TELEPHONE ENCOUNTER
A second phone call was made out to patient to check them in for virtual appointment today at 0800, their mobile number gives a busy tone, and a brief voice message was left at their home number for patient to return all.

## 2022-04-02 ENCOUNTER — APPOINTMENT (OUTPATIENT)
Dept: GENERAL RADIOLOGY | Facility: CLINIC | Age: 47
End: 2022-04-02
Attending: EMERGENCY MEDICINE
Payer: COMMERCIAL

## 2022-04-02 ENCOUNTER — APPOINTMENT (OUTPATIENT)
Dept: CT IMAGING | Facility: CLINIC | Age: 47
End: 2022-04-02
Attending: EMERGENCY MEDICINE
Payer: COMMERCIAL

## 2022-04-02 ENCOUNTER — HOSPITAL ENCOUNTER (EMERGENCY)
Facility: CLINIC | Age: 47
Discharge: HOME | End: 2022-04-02
Attending: EMERGENCY MEDICINE | Admitting: EMERGENCY MEDICINE
Payer: COMMERCIAL

## 2022-04-02 VITALS
DIASTOLIC BLOOD PRESSURE: 101 MMHG | HEART RATE: 90 BPM | SYSTOLIC BLOOD PRESSURE: 150 MMHG | RESPIRATION RATE: 16 BRPM | TEMPERATURE: 98.2 F | OXYGEN SATURATION: 98 %

## 2022-04-02 DIAGNOSIS — M16.11 ARTHRITIS OF RIGHT HIP: ICD-10-CM

## 2022-04-02 PROCEDURE — 73502 X-RAY EXAM HIP UNI 2-3 VIEWS: CPT

## 2022-04-02 PROCEDURE — 250N000011 HC RX IP 250 OP 636: Performed by: EMERGENCY MEDICINE

## 2022-04-02 PROCEDURE — 94640 AIRWAY INHALATION TREATMENT: CPT

## 2022-04-02 PROCEDURE — 99284 EMERGENCY DEPT VISIT MOD MDM: CPT | Mod: 25

## 2022-04-02 PROCEDURE — 250N000013 HC RX MED GY IP 250 OP 250 PS 637: Performed by: EMERGENCY MEDICINE

## 2022-04-02 PROCEDURE — 72192 CT PELVIS W/O DYE: CPT

## 2022-04-02 PROCEDURE — 96372 THER/PROPH/DIAG INJ SC/IM: CPT | Performed by: EMERGENCY MEDICINE

## 2022-04-02 RX ORDER — HYDROCODONE BITARTRATE AND ACETAMINOPHEN 5; 325 MG/1; MG/1
2 TABLET ORAL ONCE
Status: COMPLETED | OUTPATIENT
Start: 2022-04-02 | End: 2022-04-02

## 2022-04-02 RX ORDER — ALBUTEROL SULFATE 90 UG/1
2 AEROSOL, METERED RESPIRATORY (INHALATION) ONCE
Status: COMPLETED | OUTPATIENT
Start: 2022-04-02 | End: 2022-04-02

## 2022-04-02 RX ORDER — IBUPROFEN 200 MG
400 TABLET ORAL EVERY 8 HOURS PRN
Qty: 60 TABLET | Refills: 0 | Status: SHIPPED | OUTPATIENT
Start: 2022-04-02 | End: 2022-07-07

## 2022-04-02 RX ORDER — KETOROLAC TROMETHAMINE 30 MG/ML
30 INJECTION, SOLUTION INTRAMUSCULAR; INTRAVENOUS ONCE
Status: DISCONTINUED | OUTPATIENT
Start: 2022-04-02 | End: 2022-04-02

## 2022-04-02 RX ORDER — HYDROCODONE BITARTRATE AND ACETAMINOPHEN 5; 325 MG/1; MG/1
1 TABLET ORAL EVERY 6 HOURS PRN
Qty: 6 TABLET | Refills: 0 | Status: SHIPPED | OUTPATIENT
Start: 2022-04-02 | End: 2022-04-05

## 2022-04-02 RX ORDER — KETOROLAC TROMETHAMINE 30 MG/ML
30 INJECTION, SOLUTION INTRAMUSCULAR; INTRAVENOUS ONCE
Status: COMPLETED | OUTPATIENT
Start: 2022-04-02 | End: 2022-04-02

## 2022-04-02 RX ADMIN — KETOROLAC TROMETHAMINE 30 MG: 30 INJECTION, SOLUTION INTRAMUSCULAR; INTRAVENOUS at 09:31

## 2022-04-02 RX ADMIN — ALBUTEROL SULFATE 2 PUFF: 90 AEROSOL, METERED RESPIRATORY (INHALATION) at 10:14

## 2022-04-02 RX ADMIN — HYDROCODONE BITARTRATE AND ACETAMINOPHEN 2 TABLET: 5; 325 TABLET ORAL at 08:27

## 2022-04-02 ASSESSMENT — ENCOUNTER SYMPTOMS
COUGH: 0
FEVER: 0
SHORTNESS OF BREATH: 0
APPETITE CHANGE: 1
CHILLS: 0
ARTHRALGIAS: 1
ABDOMINAL PAIN: 0

## 2022-04-02 NOTE — ED PROVIDER NOTES
"  History     Chief Complaint:  Hip Pain    The history is provided by the patient.      Jaiden Lyons is a 47 year old male accompanied with a friend with a history of type two diabetes mellitus, hypertension, and pancreatitis who presents to the emergency department for right hip pain. Jaiden reports he had pins placed in his right hip approximately ten years ago. He was helping someone move yesterday, and he notes increased physical activity. Jaiden notes his hip suddenly \"gave out\" on him last night, but he did not fall or sustain any notable injuries at that time. He has needed a cane for ambulation and has not been able to bear weight since his hip went out. He notes he was not able to sleep last evening, and his pain has increased. This prompted him to present to the emergency department. Here, he reports he has had a previous injection in his right hip, and this is the worse his pain has been in years. He denied any fever, chills, or cough. He did not note any shortness of breath, abdominal or chest pain. He endorsed that the pain does not radiate to his knee, and is only present in his right hip.    Review of Systems   Constitutional: Positive for appetite change (unable to bear weight or ambulate). Negative for chills and fever.   Respiratory: Negative for cough and shortness of breath.    Cardiovascular: Negative for chest pain.   Gastrointestinal: Negative for abdominal pain.   Musculoskeletal: Positive for arthralgias and gait problem.   All other systems reviewed and are negative.    Allergies:  Fentanyl  Insulin  Lisinopril  Morphine    Medications:    albuterol (PROAIR HFA/PROVENTIL HFA/VENTOLIN HFA) 108 (90 Base) MCG/ACT inhaler  calcium carbonate (TUMS) 500 MG chewable tablet  empagliflozin (JARDIANCE) 10 MG TABS tablet  folic acid (FOLVITE) 1 MG tablet  glipiZIDE (GLUCOTROL) 5 MG tablet  hydrOXYzine (ATARAX) 25 MG tablet  losartan (COZAAR) 50 MG tablet  pantoprazole (PROTONIX) 40 MG EC " tablet  promethazine-DM (PHENERGAN-DM) 6.25-15 MG/5ML syrup  beclomethasone dipropionate (Qvar RediHaler) 40 mcg/actuation HFA inhaler    losartan (COZAAR) 50 mg tablet    Indications: HTN (hypertension)   codeine-guaiFENesin (ROBITUSSIN AC)  mg/5 mL liquid    naltrexone (REVIA) 50 mg tablet    hydrocortisone 2.5% cream    ketoconazole 2% topical (NIZORAL) cream    oxyCODONE-acetaminophen (Percocet) 5-325 mg per tablet      Past Medical History:    Adrenal nodule   Hip pain, right   Cough due to ACE inhibitor     Environmental allergies   Alcohol abuse   Alcohol-induced acute pancreatitis   Asthma   Benign essential hypertension   Type 2 diabetes mellitus   Acute alcoholic gastritis without hemorrhage  Infection due to 2019 novel coronavirus    Past Surgical History:    Esophagoscopy, Gastroscopy, duodenoscopy, combined  Forearm, surgery to repaid [facility] from [facility]  Hip surgery     Physical Exam     Patient Vitals for the past 24 hrs:   BP Temp Temp src Pulse Resp SpO2   04/02/22 1105 (!) 150/101 -- -- 90 -- --   04/02/22 1055 -- -- -- -- -- 98 %   04/02/22 1000 -- -- -- -- -- 96 %   04/02/22 0950 (!) 149/104 -- -- 85 -- --   04/02/22 0830 (!) 148/101 -- -- 88 -- 98 %   04/02/22 0736 (!) 153/111 98.2  F (36.8  C) Temporal 104 16 99 %     Physical Exam  Gen:  Pleasant, appears stated age.    Eye:   Sclera non-injected.      Extremity Exam: Full AROM of all joints without pain except the following:  R LE  Inspection: Healed incision to the right hip.  No overlying redness or rash.  No bruising.  Chronic shortening of the right lower extremity compared to the left.  Palpation: Tenderness to the greater trochanter, over the inguinal ligament, and proximal thigh.  No warmth or effusion.  ROM: Pain with flexion and extension, as well as logroll of the right leg.  Full range of motion in the knee and ankle on the right.  Strength: 5 out of 5 EHL, FHL, dorsiflexion, plantarflexion.  Limited hip flexion and knee  extension secondary to hip pain.  Sensation: Fine touch in the distribution of superficial peroneal, deep peroneal, saphenous, sural, lateral/medial plantar, and tibial nerves is intact.  Distal Pulses: intact DP, CR < 2 seconds    Neurologic:    Non-focal exam without asymmetric weakness or numbness.    Fluent speech.    Psychiatric:     Normal affect with appropriate interaction with examiner.    Emergency Department Course     Imaging:  CT Pelvis Bone wo Contrast   Preliminary Result   IMPRESSION:   1.  Severe endstage right hip osteoarthritis with axial migration.   2.  Healed previously screw fixed right proximal femur fracture deformity. No CT finding for femoral head AVN.   3.  No CT finding for acute displaced lower lumbar spine, sacral, pelvic or proximal femur fracture or hip dislocation. No superior or inferior pubic ramus fracture identified.   4.  MRI is more sensitive to detect nondisplaced radiographically occult pelvic/hip fractures.         XR Pelvis w Hip Right 1 View   Final Result   IMPRESSION: Postop healed previously screw fixed right proximal femur fracture. Severe endstage right hip osteoarthritis with axial migration and marginal spurring. Juxta-articular sclerosis in the right acetabulum with subchondral cyst formation. No    acute right hip fracture or dislocation. Bilateral acetabular over coverage with mild left hip osteoarthritis. No acute displaced pelvic fracture. Bony excrescences emanate from the left iliac wing. Symmetric SI joints.           Procedures:    Emergency Department Course:    Reviewed:  I reviewed nursing notes, vitals, past history and care everywhere    Assessments:  0808 I obtained history and examined the patient as noted above.   1017 I rechecked the patient and explained findings.     Interventions:  0827  HYDROcodone-acetaminophen (NORCO) 5-325 MG per tablet 2 tablet, PO  0931  ketorolac (TORADOL) injection 30 mg, Intramuscular   1014  albuterol (PROVENTIL  HFA/VENTOLIN HFA) inhaler, Inhalation     Disposition:  The patient was discharged to home.    Impression & Plan      Medical Decision Making:  Jaiden Lyons is a 47 year old male accompanied with a friend with a history of type two diabetes mellitus, hypertension, and pancreatitis who presents to the emergency department for right hip pain.  On exam, he has signs of pain with movement and palpation of the right hip, but no evidence for septic arthritis, acute trauma, or overlying cellulitis.  Radiographs are negative for hardware failure or fracture.  He does have evidence of severe osteoarthritis.  At this point, I do not think MRI is indicated, given no recent trauma, low suspicion for fracture given exam and imaging.  I recommended follow-up with [facility] orthopedics, to place a referral.  The patient states that he has been told he needs a hip replacement, and it seems that his arthritis has advanced to a very severe degree, and is causing severe debilitation.  Return to the ED for new or worsening symptoms, such as inability to ambulate, numbness or weakness, fevers or chills, or other concerns.    Diagnosis:    ICD-10-CM    1. Arthritis of right hip  M16.11 Orthopedic  Referral     Discharge Medications:  Discharge Medication List as of 4/2/2022 11:02 AM      START taking these medications    Details   HYDROcodone-acetaminophen (NORCO) 5-325 MG tablet Take 1 tablet by mouth every 6 hours as needed for severe pain, Disp-6 tablet, R-0, Local Print      ibuprofen (ADVIL/MOTRIN) 200 MG tablet Take 2 tablets (400 mg) by mouth every 8 hours as needed for moderate pain, Disp-60 tablet, R-0, Local Print           Scribe Disclosure:  GUIDO, Kay Barth, am serving as a scribe at 8:08 AM on 4/2/2022 to document services personally performed by Svetlana Rosario MD based on my observations and the provider's statements to me.      Svetlana Rosario MD  04/03/22 6677

## 2022-04-02 NOTE — ED TRIAGE NOTES
Chronic RT hip pain, that is worse today.  Pt states while he was ambulating his Rt leg gave out and he fell.  Pain is worse today than it has been in a long time.  No meds taken PTA for pain,  
No

## 2022-05-17 ENCOUNTER — APPOINTMENT (OUTPATIENT)
Dept: CT IMAGING | Facility: CLINIC | Age: 47
End: 2022-05-17
Attending: EMERGENCY MEDICINE
Payer: COMMERCIAL

## 2022-05-17 ENCOUNTER — HOSPITAL ENCOUNTER (EMERGENCY)
Facility: CLINIC | Age: 47
Discharge: HOME | End: 2022-05-17
Attending: EMERGENCY MEDICINE | Admitting: EMERGENCY MEDICINE
Payer: COMMERCIAL

## 2022-05-17 VITALS
WEIGHT: 170 LBS | TEMPERATURE: 98.1 F | RESPIRATION RATE: 18 BRPM | SYSTOLIC BLOOD PRESSURE: 133 MMHG | HEIGHT: 70 IN | DIASTOLIC BLOOD PRESSURE: 95 MMHG | BODY MASS INDEX: 24.34 KG/M2 | HEART RATE: 73 BPM | OXYGEN SATURATION: 98 %

## 2022-05-17 DIAGNOSIS — K29.20 ACUTE ALCOHOLIC GASTRITIS WITHOUT HEMORRHAGE: ICD-10-CM

## 2022-05-17 DIAGNOSIS — F10.10 ALCOHOL ABUSE: ICD-10-CM

## 2022-05-17 DIAGNOSIS — K70.10 ALCOHOLIC HEPATITIS WITHOUT ASCITES (H): ICD-10-CM

## 2022-05-17 DIAGNOSIS — R10.9 NONSPECIFIC ABDOMINAL PAIN: ICD-10-CM

## 2022-05-17 LAB
ALBUMIN SERPL-MCNC: 4.1 G/DL (ref 3.4–5)
ALP SERPL-CCNC: 104 U/L (ref 40–150)
ALT SERPL W P-5'-P-CCNC: 349 U/L (ref 0–70)
ANION GAP SERPL CALCULATED.3IONS-SCNC: 11 MMOL/L (ref 3–14)
AST SERPL W P-5'-P-CCNC: 819 U/L (ref 0–45)
BASOPHILS # BLD AUTO: 0 10E3/UL (ref 0–0.2)
BASOPHILS NFR BLD AUTO: 1 %
BILIRUB DIRECT SERPL-MCNC: 0.2 MG/DL (ref 0–0.2)
BILIRUB SERPL-MCNC: 1.4 MG/DL (ref 0.2–1.3)
BUN SERPL-MCNC: 12 MG/DL (ref 7–30)
CALCIUM SERPL-MCNC: 9 MG/DL (ref 8.5–10.1)
CHLORIDE BLD-SCNC: 99 MMOL/L (ref 94–109)
CO2 SERPL-SCNC: 26 MMOL/L (ref 20–32)
CREAT SERPL-MCNC: 0.5 MG/DL (ref 0.66–1.25)
EOSINOPHIL # BLD AUTO: 0 10E3/UL (ref 0–0.7)
EOSINOPHIL NFR BLD AUTO: 1 %
ERYTHROCYTE [DISTWIDTH] IN BLOOD BY AUTOMATED COUNT: 15.2 % (ref 10–15)
ETHANOL SERPL-MCNC: 0.03 G/DL
GFR SERPL CREATININE-BSD FRML MDRD: >90 ML/MIN/1.73M2
GLUCOSE BLD-MCNC: 114 MG/DL (ref 70–99)
GLUCOSE BLDC GLUCOMTR-MCNC: 95 MG/DL (ref 70–99)
HCT VFR BLD AUTO: 42.6 % (ref 40–53)
HGB BLD-MCNC: 14.3 G/DL (ref 13.3–17.7)
IMM GRANULOCYTES # BLD: 0 10E3/UL
IMM GRANULOCYTES NFR BLD: 0 %
LIPASE SERPL-CCNC: 114 U/L (ref 73–393)
LYMPHOCYTES # BLD AUTO: 1 10E3/UL (ref 0.8–5.3)
LYMPHOCYTES NFR BLD AUTO: 31 %
MCH RBC QN AUTO: 27.8 PG (ref 26.5–33)
MCHC RBC AUTO-ENTMCNC: 33.6 G/DL (ref 31.5–36.5)
MCV RBC AUTO: 83 FL (ref 78–100)
MONOCYTES # BLD AUTO: 0.4 10E3/UL (ref 0–1.3)
MONOCYTES NFR BLD AUTO: 11 %
NEUTROPHILS # BLD AUTO: 1.8 10E3/UL (ref 1.6–8.3)
NEUTROPHILS NFR BLD AUTO: 56 %
NRBC # BLD AUTO: 0 10E3/UL
NRBC BLD AUTO-RTO: 0 /100
PLATELET # BLD AUTO: 148 10E3/UL (ref 150–450)
POTASSIUM BLD-SCNC: 4.5 MMOL/L (ref 3.4–5.3)
PROT SERPL-MCNC: 8.3 G/DL (ref 6.8–8.8)
RBC # BLD AUTO: 5.15 10E6/UL (ref 4.4–5.9)
SODIUM SERPL-SCNC: 136 MMOL/L (ref 133–144)
WBC # BLD AUTO: 3.2 10E3/UL (ref 4–11)

## 2022-05-17 PROCEDURE — 84520 ASSAY OF UREA NITROGEN: CPT | Performed by: EMERGENCY MEDICINE

## 2022-05-17 PROCEDURE — 96361 HYDRATE IV INFUSION ADD-ON: CPT

## 2022-05-17 PROCEDURE — 250N000011 HC RX IP 250 OP 636: Performed by: EMERGENCY MEDICINE

## 2022-05-17 PROCEDURE — 82248 BILIRUBIN DIRECT: CPT | Performed by: EMERGENCY MEDICINE

## 2022-05-17 PROCEDURE — 250N000013 HC RX MED GY IP 250 OP 250 PS 637: Performed by: EMERGENCY MEDICINE

## 2022-05-17 PROCEDURE — 99285 EMERGENCY DEPT VISIT HI MDM: CPT | Mod: 25

## 2022-05-17 PROCEDURE — 36415 COLL VENOUS BLD VENIPUNCTURE: CPT | Performed by: EMERGENCY MEDICINE

## 2022-05-17 PROCEDURE — 83690 ASSAY OF LIPASE: CPT | Performed by: EMERGENCY MEDICINE

## 2022-05-17 PROCEDURE — 82077 ASSAY SPEC XCP UR&BREATH IA: CPT | Performed by: EMERGENCY MEDICINE

## 2022-05-17 PROCEDURE — 96375 TX/PRO/DX INJ NEW DRUG ADDON: CPT

## 2022-05-17 PROCEDURE — 96374 THER/PROPH/DIAG INJ IV PUSH: CPT | Mod: 59

## 2022-05-17 PROCEDURE — 258N000003 HC RX IP 258 OP 636: Performed by: EMERGENCY MEDICINE

## 2022-05-17 PROCEDURE — 85025 COMPLETE CBC W/AUTO DIFF WBC: CPT | Performed by: EMERGENCY MEDICINE

## 2022-05-17 PROCEDURE — 74177 CT ABD & PELVIS W/CONTRAST: CPT

## 2022-05-17 RX ORDER — MAGNESIUM HYDROXIDE/ALUMINUM HYDROXICE/SIMETHICONE 120; 1200; 1200 MG/30ML; MG/30ML; MG/30ML
30 SUSPENSION ORAL ONCE
Status: COMPLETED | OUTPATIENT
Start: 2022-05-17 | End: 2022-05-17

## 2022-05-17 RX ORDER — OXYCODONE HYDROCHLORIDE 5 MG/1
5 TABLET ORAL EVERY 6 HOURS PRN
Qty: 6 TABLET | Refills: 0 | Status: SHIPPED | OUTPATIENT
Start: 2022-05-17 | End: 2022-05-20

## 2022-05-17 RX ORDER — DIAZEPAM 5 MG
5 TABLET ORAL EVERY 6 HOURS PRN
Qty: 6 TABLET | Refills: 0 | Status: SHIPPED | OUTPATIENT
Start: 2022-05-17 | End: 2022-07-07

## 2022-05-17 RX ORDER — ONDANSETRON 2 MG/ML
4 INJECTION INTRAMUSCULAR; INTRAVENOUS EVERY 30 MIN PRN
Status: DISCONTINUED | OUTPATIENT
Start: 2022-05-17 | End: 2022-05-17 | Stop reason: HOSPADM

## 2022-05-17 RX ORDER — SODIUM CHLORIDE 9 MG/ML
INJECTION, SOLUTION INTRAVENOUS CONTINUOUS
Status: DISCONTINUED | OUTPATIENT
Start: 2022-05-17 | End: 2022-05-17 | Stop reason: HOSPADM

## 2022-05-17 RX ORDER — IOPAMIDOL 755 MG/ML
85 INJECTION, SOLUTION INTRAVASCULAR ONCE
Status: COMPLETED | OUTPATIENT
Start: 2022-05-17 | End: 2022-05-17

## 2022-05-17 RX ORDER — DIAZEPAM 10 MG/2ML
5 INJECTION, SOLUTION INTRAMUSCULAR; INTRAVENOUS ONCE
Status: COMPLETED | OUTPATIENT
Start: 2022-05-17 | End: 2022-05-17

## 2022-05-17 RX ORDER — HYDROMORPHONE HYDROCHLORIDE 1 MG/ML
0.5 INJECTION, SOLUTION INTRAMUSCULAR; INTRAVENOUS; SUBCUTANEOUS
Status: DISCONTINUED | OUTPATIENT
Start: 2022-05-17 | End: 2022-05-17 | Stop reason: HOSPADM

## 2022-05-17 RX ORDER — ONDANSETRON 4 MG/1
4 TABLET, ORALLY DISINTEGRATING ORAL EVERY 8 HOURS PRN
Qty: 10 TABLET | Refills: 0 | Status: SHIPPED | OUTPATIENT
Start: 2022-05-17 | End: 2022-05-20

## 2022-05-17 RX ADMIN — ONDANSETRON 4 MG: 2 INJECTION INTRAMUSCULAR; INTRAVENOUS at 11:45

## 2022-05-17 RX ADMIN — ALUMINUM HYDROXIDE, MAGNESIUM HYDROXIDE, AND SIMETHICONE 30 ML: 200; 200; 20 SUSPENSION ORAL at 14:07

## 2022-05-17 RX ADMIN — FAMOTIDINE 20 MG: 10 INJECTION, SOLUTION INTRAVENOUS at 14:06

## 2022-05-17 RX ADMIN — HYDROMORPHONE HYDROCHLORIDE 0.5 MG: 1 INJECTION, SOLUTION INTRAMUSCULAR; INTRAVENOUS; SUBCUTANEOUS at 12:15

## 2022-05-17 RX ADMIN — SODIUM CHLORIDE 1000 ML: 9 INJECTION, SOLUTION INTRAVENOUS at 11:42

## 2022-05-17 RX ADMIN — DIAZEPAM 5 MG: 5 INJECTION INTRAMUSCULAR; INTRAVENOUS at 14:41

## 2022-05-17 RX ADMIN — IOPAMIDOL 85 ML: 755 INJECTION, SOLUTION INTRAVENOUS at 12:27

## 2022-05-17 ASSESSMENT — ENCOUNTER SYMPTOMS
ABDOMINAL PAIN: 1
VOMITING: 1
HEMATURIA: 0
DYSURIA: 0
FEVER: 0
DIARRHEA: 0
CONSTIPATION: 0

## 2022-05-17 NOTE — ED PROVIDER NOTES
History   Chief Complaint:  Abdominal pain     HPI   Jaiden Lyons is a 47 year old male with history of alcohol induced acute pancreatitis, alcohol abuse, type II diabetes, hypertension and tobacco abuse who presents alone for evaluation of abdominal pain. The patient reports onset of 10/10 severity diffuse upper abdominal last night. He notes having associated vomiting. He states that this feels similar to previous pancreatitis from alcohol. He notes his last drink of alcohol was 1100 yesterday. He notes trying to take oxycodone for the pain but he vomited it up. He notes that he also feels like he is withdrawing from alcohol as he has tremors. The patient denies blood in his vomit, diarrhea, constipation, urinary symptoms or fever.      Review of Systems   Constitutional: Negative for fever.   Gastrointestinal: Positive for abdominal pain and vomiting (No blood in vomit). Negative for constipation and diarrhea.   Genitourinary: Negative for dysuria and hematuria.   All other systems reviewed and are negative.      Allergies:  Fentanyl  Insulin  Lisinopril  Morphine    Medications:  Percocet 325 mg  Albuterol   Jardiance 10 mg   Glucotrol 10 mg  Folvite 1 mg  Atarax   Cozaar  Protonix    Past Medical History:     Alcohol abuse  Alcohol induced acute pancreatitis   Asthma  Hypertension  Type II diabetes mellitus  Tobacco abuse   Seizure from alcohol withdrawal    Past Surgical History:    EGD  Hip surgery  Forearm surgery     Social History:  The patient presents alone.   The patient endorses drinking around 0.5 pint of gin a day.       Physical Exam     Patient Vitals for the past 24 hrs:   BP Temp Temp src Pulse Resp SpO2 Height Weight   05/17/22 1607 (!) 133/95 98.1  F (36.7  C) Oral 73 18 98 % -- --   05/17/22 1413 (!) 137/90 -- -- 81 16 93 % -- --   05/17/22 1336 (!) 151/94 -- -- 77 -- 90 % -- --   05/17/22 1330 (!) 137/94 -- -- 79 -- 91 % -- --   05/17/22 1315 (!) 145/94 -- -- 82 -- 92 % -- --   05/17/22  "1300 (!) 148/108 -- -- 89 -- 96 % -- --   05/17/22 1245 (!) 139/96 -- -- 80 -- 95 % -- --   05/17/22 1238 (!) 136/93 -- -- 89 16 95 % -- --   05/17/22 1230 (!) 136/93 -- -- -- -- -- -- --   05/17/22 1215 (!) 150/104 -- -- 86 -- 96 % -- --   05/17/22 1200 -- -- -- -- -- 97 % -- --   05/17/22 1128 (!) 140/104 98.9  F (37.2  C) Oral 96 20 96 % 1.778 m (5' 10\") 77.1 kg (170 lb)   05/17/22 0951 (!) 127/102 97.5  F (36.4  C) Temporal 113 18 99 % -- --       Physical Exam  Vitals and nursing note reviewed.   Constitutional:       General: He is not in acute distress.     Appearance: Normal appearance. He is not ill-appearing.   HENT:      Head: Normocephalic and atraumatic.      Right Ear: External ear normal.      Left Ear: External ear normal.      Nose: Nose normal.   Eyes:      Conjunctiva/sclera: Conjunctivae normal.   Cardiovascular:      Rate and Rhythm: Normal rate and regular rhythm.      Heart sounds: No murmur heard.  Pulmonary:      Effort: Pulmonary effort is normal. No respiratory distress.      Breath sounds: No wheezing, rhonchi or rales.   Abdominal:      General: Abdomen is flat. There is no distension.      Palpations: Abdomen is soft.      Tenderness: There is abdominal tenderness in the epigastric area. There is no guarding or rebound.   Musculoskeletal:         General: No swelling or deformity.      Cervical back: Normal range of motion and neck supple.   Skin:     General: Skin is warm and dry.      Findings: No rash.   Neurological:      Mental Status: He is alert and oriented to person, place, and time.      Motor: Tremor (mild) present.   Psychiatric:         Mood and Affect: Mood normal.         Behavior: Behavior normal.           Emergency Department Course   ECG  ECG results from 12/29/21   EKG 12-lead, tracing only     Value    Systolic Blood Pressure     Diastolic Blood Pressure     Ventricular Rate 92    Atrial Rate 92    ME Interval 148    QRS Duration 98        QTc 492    P Axis 56 "    R AXIS -24    T Axis 46    Interpretation ECG      Sinus rhythm  Prolonged QT  Abnormal ECG  When compared with ECG of 27-DEC-2021 23:27, (unconfirmed)  No significant change was found  Confirmed by - EMERGENCY ROOM, PHYSICIAN ([id number]),  OSBALDO TORRES ([id number]) on 12/29/2021 8:25:50 AM         Imaging:  CT Abdomen Pelvis w Contrast   Final Result   IMPRESSION:  No acute cause for pain demonstrated.      ANNABEL SALVADOR MD            SYSTEM ID:  [id number]        Report per radiology    Laboratory:  Labs Ordered and Resulted from Time of ED Arrival to Time of ED Departure   BASIC METABOLIC PANEL - Abnormal       Result Value    Sodium 136      Potassium 4.5      Chloride 99      Carbon Dioxide (CO2) 26      Anion Gap 11      Urea Nitrogen 12      Creatinine 0.50 (*)     Calcium 9.0      Glucose 114 (*)     GFR Estimate >90     HEPATIC FUNCTION PANEL - Abnormal    Bilirubin Total 1.4 (*)     Bilirubin Direct 0.2      Protein Total 8.3      Albumin 4.1      Alkaline Phosphatase 104       (*)      (*)    CBC WITH PLATELETS AND DIFFERENTIAL - Abnormal    WBC Count 3.2 (*)     RBC Count 5.15      Hemoglobin 14.3      Hematocrit 42.6      MCV 83      MCH 27.8      MCHC 33.6      RDW 15.2 (*)     Platelet Count 148 (*)     % Neutrophils 56      % Lymphocytes 31      % Monocytes 11      % Eosinophils 1      % Basophils 1      % Immature Granulocytes 0      NRBCs per 100 WBC 0      Absolute Neutrophils 1.8      Absolute Lymphocytes 1.0      Absolute Monocytes 0.4      Absolute Eosinophils 0.0      Absolute Basophils 0.0      Absolute Immature Granulocytes 0.0      Absolute NRBCs 0.0     ETHYL ALCOHOL LEVEL - Abnormal    Alcohol ethyl 0.03 (*)    LIPASE - Normal    Lipase 114     GLUCOSE BY METER - Normal    GLUCOSE BY METER POCT 95          Emergency Department Course:             Reviewed:  I reviewed nursing notes, vitals, past medical history and Care Everywhere    Assessments:  1158 I obtained history  and examined the patient as noted above.   1417 I rechecked the patient and explained findings.   1547    I rechecked the patient and discussed how he was feeling and discharge home.     Interventions:  1142 NS, 1 L, IV  1145 Zofran, 4 mg, IV   1215 Dilaudid, 0.5 mg, IV  1406 Pepcid, 20 mg, IV  1407 Maalox, 30 mL, PO    Disposition:  The patient was discharged to home.     Impression & Plan     Medical Decision Makin yo M with abd pain and vomiting.  Likely pancreatitis or gastritis given history of similar.  Less likely perforated ulcer, biliary colic, SBO, etc.  Will check labs and CT for further eval.  Pt given IVF, pain meds, and anti-emetics.  Pt also has mild withdrawal symptoms, last drink about 24 hours ago.  Will give dose of Valium.       1547 Patient's lipase is normal and CT unremarkable.  Has elevated LFTs likely from ETOH abuse.  Has had chronically elevated LFTs in the past.  He has minimal to no withdrawal symptoms at this time.  Pain and nausea are somewhat improved with meds.  Will plan to give a few oxycodone for pain, as well as zofran for nausea, and few valium for withdrawal symptoms.  Reviewed PDMP and he does have concerning frequent opiate prescriptions, so will only give 6 tabs of both.  Rec prompt PCP f/u and abstain from ETOH.  Discussed return precautions.        Diagnosis:    ICD-10-CM    1. Nonspecific abdominal pain  R10.9    2. Acute alcoholic gastritis without hemorrhage  K29.20    3. Alcoholic hepatitis without ascites  K70.10    4. Alcohol abuse  F10.10        Discharge Medications:  Discharge Medication List as of 2022  4:06 PM      START taking these medications    Details   diazepam (VALIUM) 5 MG tablet Take 1 tablet (5 mg) by mouth every 6 hours as needed for agitation, anxiety or withdrawal, Disp-6 tablet, R-0, E-Prescribe      ondansetron (ZOFRAN ODT) 4 MG ODT tab Take 1 tablet (4 mg) by mouth every 8 hours as needed for nausea, Disp-10 tablet, R-0, E-Prescribe       oxyCODONE (ROXICODONE) 5 MG tablet Take 1 tablet (5 mg) by mouth every 6 hours as needed for severe pain, Disp-6 tablet, R-0, E-Prescribe             Scribe Disclosure:  I, Pantera Beckford, am serving as a scribe at 11:26 AM on 5/17/2022 to document services personally performed by Tiago Teixeira MD based on my observations and the provider's statements to me.          Tiago Teixeira MD  05/17/22 5840

## 2022-05-18 ENCOUNTER — PATIENT OUTREACH (OUTPATIENT)
Dept: CARE COORDINATION | Facility: CLINIC | Age: 47
End: 2022-05-18
Payer: COMMERCIAL

## 2022-05-18 DIAGNOSIS — Z71.89 OTHER SPECIFIED COUNSELING: ICD-10-CM

## 2022-05-18 NOTE — PROGRESS NOTES
"Patient mentioned he was diagnosed with gastritis upon discharging from the ED on 5/17/2022. Patient mentioned he did not receive a prescription for gastritis. Patient has all other needed and prescribed medications at this time. Should patient need a prescription for gastritis, please fill the prescription at: [address] [address]. Please advise.    Clinic Care Coordination Contact  [facility]: Post-Discharge Note  SITUATION                                                      Admission:    Admission Date: 05/17/22   Reason for Admission: Abdominal pain  Discharge:   Discharge Date: 05/17/22  Discharge Diagnosis: Nonspecific abdominal pain, Acute alcoholic gastritis without hemorrhage, Alcoholic hepatitis without ascites, Alcohol abuse    BACKGROUND                                                      Per hospital discharge summary and inpatient provider notes:    Jaiden Lyons is a 47 year old male with history of alcohol induced acute pancreatitis, alcohol abuse, type II diabetes, hypertension and tobacco abuse who presents alone for evaluation of abdominal pain. The patient reports onset of 10/10 severity diffuse upper abdominal last night. He notes having associated vomiting. He states that this feels similar to previous pancreatitis from alcohol. He notes his last drink of alcohol was 1100 yesterday. He notes trying to take oxycodone for the pain but he vomited it up. He notes that he also feels like he is withdrawing from alcohol as he has tremors. The patient denies blood in his vomit, diarrhea, constipation, urinary symptoms or fever.    ASSESSMENT      Enrollment  Primary Care Care Coordination Status: Not a Candidate    Discharge Assessment  How are you doing now that you are home?: \"The only thing I'm having problems with is they said I have gastritis but didn't prescribe me anything for it.\"  How are your symptoms? (Red Flag symptoms escalate to triage " hotline per guidelines): Improved  Do you feel your condition is stable enough to be safe at home until your provider visit?: Yes  Does the patient have their discharge instructions? : Yes  Does the patient have questions regarding their discharge instructions? : No  Were you started on any new medications or were there changes to any of your previous medications? : Yes  Does the patient have all of their medications?: Yes  Do you have questions regarding any of your medications? : Yes (see comment) (Patient is requesting medication for gastritis)  Do you have all of your needed medical supplies or equipment (DME)?  (i.e. oxygen tank, CPAP, cane, etc.): Yes  Discharge follow-up appointment scheduled within 14 calendar days? : Yes  Discharge Follow Up Appointment Date: 05/23/22  Discharge Follow Up Appointment Scheduled with?: Primary Care Provider    Post-op ([facility] Only)  If the patient had a surgery or procedure, do they have any questions for a nurse?: No      PLAN                                                      Outpatient Plan:      Will plan to give a few oxycodone for pain, as well as zofran for nausea, and few valium for withdrawal symptoms.  Reviewed PDMP and he does have concerning frequent opiate prescriptions, so will only give 6 tabs of both.  Rec prompt PCP f/u and abstain from ETOH.  Discussed return precautions.         No future appointments.      For any urgent concerns, please contact our 24 hour nurse triage line: [phone] ([phone])         CAROL Rivera  [phone]  [facility]

## 2022-07-07 ENCOUNTER — HOSPITAL ENCOUNTER (INPATIENT)
Facility: CLINIC | Age: 47
LOS: 3 days | Discharge: HOME | End: 2022-07-10
Attending: EMERGENCY MEDICINE | Admitting: INTERNAL MEDICINE
Payer: COMMERCIAL

## 2022-07-07 DIAGNOSIS — F10.929 ALCOHOLIC INTOXICATION WITH COMPLICATION (H): ICD-10-CM

## 2022-07-07 DIAGNOSIS — R11.2 NON-INTRACTABLE VOMITING WITH NAUSEA, UNSPECIFIED VOMITING TYPE: ICD-10-CM

## 2022-07-07 DIAGNOSIS — F10.139 ALCOHOL ABUSE WITH WITHDRAWAL (H): Primary | ICD-10-CM

## 2022-07-07 DIAGNOSIS — R10.13 ABDOMINAL PAIN, EPIGASTRIC: ICD-10-CM

## 2022-07-07 DIAGNOSIS — K85.20 ALCOHOL-INDUCED ACUTE PANCREATITIS, UNSPECIFIED COMPLICATION STATUS: ICD-10-CM

## 2022-07-07 LAB
ALBUMIN SERPL-MCNC: 3.7 G/DL (ref 3.4–5)
ALP SERPL-CCNC: 87 U/L (ref 40–150)
ALT SERPL W P-5'-P-CCNC: 42 U/L (ref 0–70)
ANION GAP SERPL CALCULATED.3IONS-SCNC: 10 MMOL/L (ref 3–14)
AST SERPL W P-5'-P-CCNC: 19 U/L (ref 0–45)
BASOPHILS # BLD AUTO: 0.1 10E3/UL (ref 0–0.2)
BASOPHILS NFR BLD AUTO: 1 %
BILIRUB SERPL-MCNC: 0.7 MG/DL (ref 0.2–1.3)
BUN SERPL-MCNC: 4 MG/DL (ref 7–30)
CALCIUM SERPL-MCNC: 7.9 MG/DL (ref 8.5–10.1)
CHLORIDE BLD-SCNC: 105 MMOL/L (ref 94–109)
CO2 SERPL-SCNC: 23 MMOL/L (ref 20–32)
CREAT SERPL-MCNC: 0.47 MG/DL (ref 0.66–1.25)
CREAT SERPL-MCNC: 0.64 MG/DL (ref 0.66–1.25)
EOSINOPHIL # BLD AUTO: 0.2 10E3/UL (ref 0–0.7)
EOSINOPHIL NFR BLD AUTO: 3 %
ERYTHROCYTE [DISTWIDTH] IN BLOOD BY AUTOMATED COUNT: 14.7 % (ref 10–15)
ETHANOL SERPL-MCNC: 0.23 G/DL
GFR SERPL CREATININE-BSD FRML MDRD: >90 ML/MIN/1.73M2
GFR SERPL CREATININE-BSD FRML MDRD: >90 ML/MIN/1.73M2
GLUCOSE BLD-MCNC: 131 MG/DL (ref 70–99)
GLUCOSE BLDC GLUCOMTR-MCNC: 163 MG/DL (ref 70–99)
HBA1C MFR BLD: 8.2 % (ref 0–5.6)
HCT VFR BLD AUTO: 42.2 % (ref 40–53)
HGB BLD-MCNC: 14 G/DL (ref 13.3–17.7)
HOLD SPECIMEN: NORMAL
HOLD SPECIMEN: NORMAL
IMM GRANULOCYTES # BLD: 0 10E3/UL
IMM GRANULOCYTES NFR BLD: 0 %
LIPASE SERPL-CCNC: 929 U/L (ref 73–393)
LYMPHOCYTES # BLD AUTO: 2.9 10E3/UL (ref 0.8–5.3)
LYMPHOCYTES NFR BLD AUTO: 58 %
MAGNESIUM SERPL-MCNC: 2.3 MG/DL (ref 1.6–2.3)
MCH RBC QN AUTO: 28.1 PG (ref 26.5–33)
MCHC RBC AUTO-ENTMCNC: 33.2 G/DL (ref 31.5–36.5)
MCV RBC AUTO: 85 FL (ref 78–100)
MONOCYTES # BLD AUTO: 0.6 10E3/UL (ref 0–1.3)
MONOCYTES NFR BLD AUTO: 12 %
NEUTROPHILS # BLD AUTO: 1.3 10E3/UL (ref 1.6–8.3)
NEUTROPHILS NFR BLD AUTO: 26 %
NRBC # BLD AUTO: 0 10E3/UL
NRBC BLD AUTO-RTO: 0 /100
PHOSPHATE SERPL-MCNC: 3.5 MG/DL (ref 2.5–4.5)
PLATELET # BLD AUTO: 189 10E3/UL (ref 150–450)
POTASSIUM BLD-SCNC: 4.2 MMOL/L (ref 3.4–5.3)
PROT SERPL-MCNC: 9.1 G/DL (ref 6.8–8.8)
RBC # BLD AUTO: 4.98 10E6/UL (ref 4.4–5.9)
SARS-COV-2 RNA RESP QL NAA+PROBE: NEGATIVE
SODIUM SERPL-SCNC: 138 MMOL/L (ref 133–144)
WBC # BLD AUTO: 5 10E3/UL (ref 4–11)

## 2022-07-07 PROCEDURE — 250N000011 HC RX IP 250 OP 636: Performed by: EMERGENCY MEDICINE

## 2022-07-07 PROCEDURE — 250N000011 HC RX IP 250 OP 636: Performed by: INTERNAL MEDICINE

## 2022-07-07 PROCEDURE — 258N000003 HC RX IP 258 OP 636: Performed by: INTERNAL MEDICINE

## 2022-07-07 PROCEDURE — 250N000009 HC RX 250: Performed by: EMERGENCY MEDICINE

## 2022-07-07 PROCEDURE — 36415 COLL VENOUS BLD VENIPUNCTURE: CPT | Performed by: INTERNAL MEDICINE

## 2022-07-07 PROCEDURE — 258N000003 HC RX IP 258 OP 636: Performed by: EMERGENCY MEDICINE

## 2022-07-07 PROCEDURE — 99285 EMERGENCY DEPT VISIT HI MDM: CPT

## 2022-07-07 PROCEDURE — 96365 THER/PROPH/DIAG IV INF INIT: CPT

## 2022-07-07 PROCEDURE — 80053 COMPREHEN METABOLIC PANEL: CPT | Performed by: EMERGENCY MEDICINE

## 2022-07-07 PROCEDURE — 85025 COMPLETE CBC W/AUTO DIFF WBC: CPT | Performed by: EMERGENCY MEDICINE

## 2022-07-07 PROCEDURE — 99207 PR APP CREDIT; MD BILLING SHARED VISIT: CPT | Performed by: INTERNAL MEDICINE

## 2022-07-07 PROCEDURE — U0003 INFECTIOUS AGENT DETECTION BY NUCLEIC ACID (DNA OR RNA); SEVERE ACUTE RESPIRATORY SYNDROME CORONAVIRUS 2 (SARS-COV-2) (CORONAVIRUS DISEASE [COVID-19]), AMPLIFIED PROBE TECHNIQUE, MAKING USE OF HIGH THROUGHPUT TECHNOLOGIES AS DESCRIBED BY CMS-2020-01-R: HCPCS | Performed by: EMERGENCY MEDICINE

## 2022-07-07 PROCEDURE — 84100 ASSAY OF PHOSPHORUS: CPT | Performed by: INTERNAL MEDICINE

## 2022-07-07 PROCEDURE — 96361 HYDRATE IV INFUSION ADD-ON: CPT

## 2022-07-07 PROCEDURE — 83690 ASSAY OF LIPASE: CPT | Performed by: EMERGENCY MEDICINE

## 2022-07-07 PROCEDURE — 120N000001 HC R&B MED SURG/OB

## 2022-07-07 PROCEDURE — 99222 1ST HOSP IP/OBS MODERATE 55: CPT | Mod: AI | Performed by: INTERNAL MEDICINE

## 2022-07-07 PROCEDURE — 250N000013 HC RX MED GY IP 250 OP 250 PS 637: Performed by: INTERNAL MEDICINE

## 2022-07-07 PROCEDURE — 82077 ASSAY SPEC XCP UR&BREATH IA: CPT | Performed by: EMERGENCY MEDICINE

## 2022-07-07 PROCEDURE — 96376 TX/PRO/DX INJ SAME DRUG ADON: CPT

## 2022-07-07 PROCEDURE — 83036 HEMOGLOBIN GLYCOSYLATED A1C: CPT | Performed by: INTERNAL MEDICINE

## 2022-07-07 PROCEDURE — 83735 ASSAY OF MAGNESIUM: CPT | Performed by: INTERNAL MEDICINE

## 2022-07-07 PROCEDURE — 96375 TX/PRO/DX INJ NEW DRUG ADDON: CPT

## 2022-07-07 PROCEDURE — 36415 COLL VENOUS BLD VENIPUNCTURE: CPT | Performed by: EMERGENCY MEDICINE

## 2022-07-07 PROCEDURE — 82565 ASSAY OF CREATININE: CPT | Performed by: INTERNAL MEDICINE

## 2022-07-07 PROCEDURE — HZ2ZZZZ DETOXIFICATION SERVICES FOR SUBSTANCE ABUSE TREATMENT: ICD-10-PCS | Performed by: INTERNAL MEDICINE

## 2022-07-07 RX ORDER — GLIPIZIDE 10 MG/1
10 TABLET, FILM COATED, EXTENDED RELEASE ORAL DAILY
Status: ON HOLD | COMMUNITY
Start: 2022-01-17 | End: 2022-07-09

## 2022-07-07 RX ORDER — SODIUM CHLORIDE, SODIUM LACTATE, POTASSIUM CHLORIDE, CALCIUM CHLORIDE 600; 310; 30; 20 MG/100ML; MG/100ML; MG/100ML; MG/100ML
INJECTION, SOLUTION INTRAVENOUS CONTINUOUS
Status: DISCONTINUED | OUTPATIENT
Start: 2022-07-07 | End: 2022-07-09

## 2022-07-07 RX ORDER — DEXTROSE MONOHYDRATE 25 G/50ML
25-50 INJECTION, SOLUTION INTRAVENOUS
Status: DISCONTINUED | OUTPATIENT
Start: 2022-07-07 | End: 2022-07-10 | Stop reason: HOSPADM

## 2022-07-07 RX ORDER — ONDANSETRON 2 MG/ML
4 INJECTION INTRAMUSCULAR; INTRAVENOUS ONCE
Status: COMPLETED | OUTPATIENT
Start: 2022-07-07 | End: 2022-07-07

## 2022-07-07 RX ORDER — ONDANSETRON 4 MG/1
4 TABLET, ORALLY DISINTEGRATING ORAL EVERY 6 HOURS PRN
Status: DISCONTINUED | OUTPATIENT
Start: 2022-07-07 | End: 2022-07-10 | Stop reason: HOSPADM

## 2022-07-07 RX ORDER — LIDOCAINE 40 MG/G
CREAM TOPICAL
Status: DISCONTINUED | OUTPATIENT
Start: 2022-07-07 | End: 2022-07-10 | Stop reason: HOSPADM

## 2022-07-07 RX ORDER — HYDROMORPHONE HYDROCHLORIDE 2 MG/1
2 TABLET ORAL
Status: DISCONTINUED | OUTPATIENT
Start: 2022-07-07 | End: 2022-07-10 | Stop reason: HOSPADM

## 2022-07-07 RX ORDER — HYDROMORPHONE HYDROCHLORIDE 1 MG/ML
0.5 INJECTION, SOLUTION INTRAMUSCULAR; INTRAVENOUS; SUBCUTANEOUS ONCE
Status: COMPLETED | OUTPATIENT
Start: 2022-07-07 | End: 2022-07-07

## 2022-07-07 RX ORDER — HYDROMORPHONE HYDROCHLORIDE 1 MG/ML
0.5 INJECTION, SOLUTION INTRAMUSCULAR; INTRAVENOUS; SUBCUTANEOUS
Status: DISCONTINUED | OUTPATIENT
Start: 2022-07-07 | End: 2022-07-10

## 2022-07-07 RX ORDER — HYDROMORPHONE HYDROCHLORIDE 1 MG/ML
0.5 INJECTION, SOLUTION INTRAMUSCULAR; INTRAVENOUS; SUBCUTANEOUS
Status: DISCONTINUED | OUTPATIENT
Start: 2022-07-07 | End: 2022-07-07

## 2022-07-07 RX ORDER — ENOXAPARIN SODIUM 100 MG/ML
40 INJECTION SUBCUTANEOUS EVERY 24 HOURS
Status: DISCONTINUED | OUTPATIENT
Start: 2022-07-07 | End: 2022-07-10 | Stop reason: HOSPADM

## 2022-07-07 RX ORDER — HYDROMORPHONE HYDROCHLORIDE 1 MG/ML
.5-1 INJECTION, SOLUTION INTRAMUSCULAR; INTRAVENOUS; SUBCUTANEOUS
Status: DISCONTINUED | OUTPATIENT
Start: 2022-07-07 | End: 2022-07-07

## 2022-07-07 RX ORDER — GABAPENTIN 600 MG/1
1200 TABLET ORAL ONCE
Status: COMPLETED | OUTPATIENT
Start: 2022-07-07 | End: 2022-07-07

## 2022-07-07 RX ORDER — NALOXONE HYDROCHLORIDE 0.4 MG/ML
0.4 INJECTION, SOLUTION INTRAMUSCULAR; INTRAVENOUS; SUBCUTANEOUS
Status: DISCONTINUED | OUTPATIENT
Start: 2022-07-07 | End: 2022-07-10 | Stop reason: HOSPADM

## 2022-07-07 RX ORDER — GABAPENTIN 300 MG/1
300 CAPSULE ORAL EVERY 8 HOURS
Status: DISCONTINUED | OUTPATIENT
Start: 2022-07-12 | End: 2022-07-10 | Stop reason: HOSPADM

## 2022-07-07 RX ORDER — FOLIC ACID 5 MG/ML
1 INJECTION, SOLUTION INTRAMUSCULAR; INTRAVENOUS; SUBCUTANEOUS DAILY
Status: DISCONTINUED | OUTPATIENT
Start: 2022-07-07 | End: 2022-07-07

## 2022-07-07 RX ORDER — GABAPENTIN 100 MG/1
100 CAPSULE ORAL EVERY 8 HOURS
Status: DISCONTINUED | OUTPATIENT
Start: 2022-07-14 | End: 2022-07-10 | Stop reason: HOSPADM

## 2022-07-07 RX ORDER — CLONIDINE HYDROCHLORIDE 0.1 MG/1
0.1 TABLET ORAL EVERY 8 HOURS
Status: DISCONTINUED | OUTPATIENT
Start: 2022-07-07 | End: 2022-07-10 | Stop reason: HOSPADM

## 2022-07-07 RX ORDER — FLUMAZENIL 0.1 MG/ML
0.2 INJECTION, SOLUTION INTRAVENOUS
Status: DISCONTINUED | OUTPATIENT
Start: 2022-07-07 | End: 2022-07-10 | Stop reason: HOSPADM

## 2022-07-07 RX ORDER — NALOXONE HYDROCHLORIDE 0.4 MG/ML
0.2 INJECTION, SOLUTION INTRAMUSCULAR; INTRAVENOUS; SUBCUTANEOUS
Status: DISCONTINUED | OUTPATIENT
Start: 2022-07-07 | End: 2022-07-10 | Stop reason: HOSPADM

## 2022-07-07 RX ORDER — HALOPERIDOL 5 MG/ML
2.5-5 INJECTION INTRAMUSCULAR EVERY 6 HOURS PRN
Status: DISCONTINUED | OUTPATIENT
Start: 2022-07-07 | End: 2022-07-10 | Stop reason: HOSPADM

## 2022-07-07 RX ORDER — FOLIC ACID 1 MG/1
1 TABLET ORAL DAILY
Status: DISCONTINUED | OUTPATIENT
Start: 2022-07-08 | End: 2022-07-10 | Stop reason: HOSPADM

## 2022-07-07 RX ORDER — THIAMINE HYDROCHLORIDE 100 MG/ML
100 INJECTION, SOLUTION INTRAMUSCULAR; INTRAVENOUS DAILY
Status: DISCONTINUED | OUTPATIENT
Start: 2022-07-07 | End: 2022-07-07

## 2022-07-07 RX ORDER — MULTIPLE VITAMINS W/ MINERALS TAB 9MG-400MCG
1 TAB ORAL DAILY
Status: DISCONTINUED | OUTPATIENT
Start: 2022-07-07 | End: 2022-07-10 | Stop reason: HOSPADM

## 2022-07-07 RX ORDER — NICOTINE POLACRILEX 4 MG
15-30 LOZENGE BUCCAL
Status: DISCONTINUED | OUTPATIENT
Start: 2022-07-07 | End: 2022-07-10 | Stop reason: HOSPADM

## 2022-07-07 RX ORDER — LORAZEPAM 1 MG/1
1-2 TABLET ORAL EVERY 30 MIN PRN
Status: DISCONTINUED | OUTPATIENT
Start: 2022-07-07 | End: 2022-07-10 | Stop reason: HOSPADM

## 2022-07-07 RX ORDER — GABAPENTIN 300 MG/1
900 CAPSULE ORAL EVERY 8 HOURS
Status: DISCONTINUED | OUTPATIENT
Start: 2022-07-07 | End: 2022-07-10 | Stop reason: HOSPADM

## 2022-07-07 RX ORDER — ONDANSETRON 2 MG/ML
4 INJECTION INTRAMUSCULAR; INTRAVENOUS EVERY 6 HOURS PRN
Status: DISCONTINUED | OUTPATIENT
Start: 2022-07-07 | End: 2022-07-10 | Stop reason: HOSPADM

## 2022-07-07 RX ORDER — GABAPENTIN 300 MG/1
600 CAPSULE ORAL EVERY 8 HOURS
Status: DISCONTINUED | OUTPATIENT
Start: 2022-07-10 | End: 2022-07-10 | Stop reason: HOSPADM

## 2022-07-07 RX ORDER — OLANZAPINE 5 MG/1
5-10 TABLET, ORALLY DISINTEGRATING ORAL EVERY 6 HOURS PRN
Status: DISCONTINUED | OUTPATIENT
Start: 2022-07-07 | End: 2022-07-10 | Stop reason: HOSPADM

## 2022-07-07 RX ORDER — LORAZEPAM 2 MG/ML
1-2 INJECTION INTRAMUSCULAR EVERY 30 MIN PRN
Status: DISCONTINUED | OUTPATIENT
Start: 2022-07-07 | End: 2022-07-10 | Stop reason: HOSPADM

## 2022-07-07 RX ADMIN — HYDROMORPHONE HYDROCHLORIDE 1 MG: 1 INJECTION, SOLUTION INTRAMUSCULAR; INTRAVENOUS; SUBCUTANEOUS at 09:02

## 2022-07-07 RX ADMIN — SODIUM CHLORIDE, POTASSIUM CHLORIDE, SODIUM LACTATE AND CALCIUM CHLORIDE: 600; 310; 30; 20 INJECTION, SOLUTION INTRAVENOUS at 20:58

## 2022-07-07 RX ADMIN — THIAMINE HYDROCHLORIDE 100 MG: 100 INJECTION, SOLUTION INTRAMUSCULAR; INTRAVENOUS at 09:00

## 2022-07-07 RX ADMIN — FOLIC ACID: 5 INJECTION, SOLUTION INTRAMUSCULAR; INTRAVENOUS; SUBCUTANEOUS at 04:23

## 2022-07-07 RX ADMIN — CLONIDINE HYDROCHLORIDE 0.1 MG: 0.1 TABLET ORAL at 11:24

## 2022-07-07 RX ADMIN — GABAPENTIN 1200 MG: 600 TABLET, FILM COATED ORAL at 11:24

## 2022-07-07 RX ADMIN — HYDROMORPHONE HYDROCHLORIDE 2 MG: 2 TABLET ORAL at 13:07

## 2022-07-07 RX ADMIN — MULTIPLE VITAMINS W/ MINERALS TAB 1 TABLET: TAB at 11:24

## 2022-07-07 RX ADMIN — ONDANSETRON 4 MG: 2 INJECTION INTRAMUSCULAR; INTRAVENOUS at 02:56

## 2022-07-07 RX ADMIN — LORAZEPAM 1 MG: 1 TABLET ORAL at 13:07

## 2022-07-07 RX ADMIN — HYDROMORPHONE HYDROCHLORIDE 1 MG: 1 INJECTION, SOLUTION INTRAMUSCULAR; INTRAVENOUS; SUBCUTANEOUS at 06:35

## 2022-07-07 RX ADMIN — HYDROMORPHONE HYDROCHLORIDE 0.5 MG: 1 INJECTION, SOLUTION INTRAMUSCULAR; INTRAVENOUS; SUBCUTANEOUS at 04:12

## 2022-07-07 RX ADMIN — HYDROMORPHONE HYDROCHLORIDE 0.5 MG: 1 INJECTION, SOLUTION INTRAMUSCULAR; INTRAVENOUS; SUBCUTANEOUS at 03:34

## 2022-07-07 RX ADMIN — SODIUM CHLORIDE 1000 ML: 9 INJECTION, SOLUTION INTRAVENOUS at 02:56

## 2022-07-07 RX ADMIN — HYDROMORPHONE HYDROCHLORIDE 0.5 MG: 1 INJECTION, SOLUTION INTRAMUSCULAR; INTRAVENOUS; SUBCUTANEOUS at 14:29

## 2022-07-07 RX ADMIN — HYDROMORPHONE HYDROCHLORIDE 0.5 MG: 1 INJECTION, SOLUTION INTRAMUSCULAR; INTRAVENOUS; SUBCUTANEOUS at 20:03

## 2022-07-07 RX ADMIN — CLONIDINE HYDROCHLORIDE 0.1 MG: 0.1 TABLET ORAL at 20:02

## 2022-07-07 RX ADMIN — ENOXAPARIN SODIUM 40 MG: 40 INJECTION SUBCUTANEOUS at 09:05

## 2022-07-07 RX ADMIN — LORAZEPAM 1 MG: 1 TABLET ORAL at 14:30

## 2022-07-07 RX ADMIN — LORAZEPAM 1 MG: 1 TABLET ORAL at 11:24

## 2022-07-07 RX ADMIN — SODIUM CHLORIDE, POTASSIUM CHLORIDE, SODIUM LACTATE AND CALCIUM CHLORIDE: 600; 310; 30; 20 INJECTION, SOLUTION INTRAVENOUS at 11:24

## 2022-07-07 RX ADMIN — ONDANSETRON 4 MG: 4 TABLET, ORALLY DISINTEGRATING ORAL at 11:30

## 2022-07-07 RX ADMIN — FOLIC ACID 1 MG: 5 INJECTION, SOLUTION INTRAMUSCULAR; INTRAVENOUS; SUBCUTANEOUS at 09:05

## 2022-07-07 ASSESSMENT — ACTIVITIES OF DAILY LIVING (ADL)
ADLS_ACUITY_SCORE: 20
ADLS_ACUITY_SCORE: 35
ADLS_ACUITY_SCORE: 20
ADLS_ACUITY_SCORE: 20

## 2022-07-07 ASSESSMENT — ENCOUNTER SYMPTOMS
ABDOMINAL PAIN: 1
VOMITING: 1
NAUSEA: 1

## 2022-07-07 NOTE — ED NOTES
[facility]  ED Nurse Handoff Report    Jaiden Lyons is a 47 year old male   ED Chief complaint: Abdominal Pain  . ED Diagnosis:   Final diagnoses:   Alcohol-induced acute pancreatitis, unspecified complication status   Alcoholic intoxication with complication (H)   Non-intractable vomiting with nausea, unspecified vomiting type     Allergies:   Allergies   Allergen Reactions     Fentanyl      Insulin Swelling     Reaction Date: Around 4512-6636  Face swelling  Inpatient when reaction occurred, unsure which type of insulin  Required some Benadryl to help with the swelling    Tolerated aspart/glargine while inpt 12/2021     Lisinopril      Face swelling, cough     Morphine Itching       Code Status: Full Code  Activity level - Baseline/Home:  Independent. Activity Level - Current:   Stand by Assist. Lift room needed: No. Bariatric: No   Needed: No   Isolation: No. Infection: Not Applicable.     Vital Signs:   Vitals:    07/07/22 0251   BP: (!) 151/110   Pulse: 112   Resp: 18   Temp: 98.5  F (36.9  C)   TempSrc: Temporal   SpO2: 95%       Cardiac Rhythm:  ,      Pain level:    Patient confused: No. Patient Falls Risk: Yes.   Elimination Status: Has voided   Patient Report - Initial Complaint: Pt to ER with c/o abd pain. Focused Assessment: Pt with diffuse abd pain with hx of gastritis and pancreatitis   Tests Performed: Labs . Abnormal Results: Li[ase 929, ETOH 0.23.   Treatments provided: meds IVFs  Family Comments: Wife at bedside  OBS brochure/video discussed/provided to patient:  Yes  ED Medications:   Medications   0.9% sodium chloride BOLUS (1,000 mLs Intravenous New Bag 7/7/22 0256)   dextrose 5% and 0.9% NaCl 1,000 mL with Infuvite Adult 10 mL, thiamine 100 mg, folic acid 1 mg, potassium chloride 20 mEq, magnesium sulfate 2 g infusion (has no administration in time range)   HYDROmorphone (PF) (DILAUDID) injection 0.5 mg (has no administration in time range)   ondansetron (ZOFRAN)  injection 4 mg (4 mg Intravenous Given 7/7/22 0256)   HYDROmorphone (PF) (DILAUDID) injection 0.5 mg (0.5 mg Intravenous Given 7/7/22 5604)     Drips infusing:  No  For the majority of the shift, the patient's behavior Green. Interventions performed wereN/A.    Sepsis treatment initiated: No     Patient tested for COVID 19 prior to admission: YES    ED Nurse Name/Phone Number: Reva Salinas RN,   3:54 AM  RECEIVING UNIT ED HANDOFF REVIEW    Above ED Nurse Handoff Report was reviewed: Yes  Reviewed by: Alison Guzman RN on July 7, 2022 at 4:45 AM

## 2022-07-07 NOTE — PLAN OF CARE
"Goal Outcome Evaluation:    Vitals: BP (!) 155/96 (BP Location: Right arm, Patient Position: Sitting, Cuff Size: Adult Regular)   Pulse 66   Temp 97.7  F (36.5  C) (Oral)   Resp 18   Ht 1.791 m (5' 10.5\")   Wt 78.2 kg (172 lb 6.4 oz)   SpO2 93%   BMI 24.39 kg/m      Primary DX: Alcohol Induced Pancreatitis  Orientation: A&OX4. Speech clear. Uses call light appropriately.   Pertinent: LR was clarified. To be started once banana bag is completed, per Dr. De La Cruz.  Pain: 9/10. ABD and back pain. PRN Dilaudid 1 mg given IV. Effective for pain management.  CIWA: 1  Respiratory: LS clear but diminished. No cough or POSADAS.   Bowel Sounds: +X4Q. ABD soft, non-tender.   GI/: last bm 7/6/22. No bm or void since arrived on unit @5:15 am today.  Skin: Intact. Multiple tattoos. No edema.  LDA: L PIV infusing banana bag at 150/hr. Tolerating well.   Diet: NPO except ice chips and meds.  Activity: SBA for safety. Bed alarm on.   Plan: Continue current POC.                        "
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
## 2022-07-07 NOTE — PHARMACY-ADMISSION MEDICATION HISTORY
Admission medication history interview status for this patient is complete to the best of ability. See EPIC admission navigator for allergy information, prior to admission medications and immunization status.     Medication history interview done, indicate source(s): Patient  Medication history resources (including written lists, pill bottles, clinic record): Sure Scripts  Pharmacy: [facility]    Changes made to PTA medication list:  Added: none  Changed: glipizide 5 mg IR to 10 mg ER daily, pantoprazole bid to daily  Reported as Not Taking: -  Removed: folic acid, hydroxyzine    Actions taken by pharmacist (provider contacted, etc):None     Additional medication history information: of note, glipizide was last filled on 1/17/2022, #90; Jardiance was filled on 2/17/2022, #30.    Medication reconciliation/reorder completed by provider prior to medication history?  N   (Y/N)     Prior to Admission medications    Medication Sig Last Dose Taking? Auth Provider Long Term End Date   empagliflozin (JARDIANCE) 10 MG TABS tablet Take 10 mg by mouth daily Unknown Yes Unknown, Entered By History     glipiZIDE (GLUCOTROL XL) 10 MG 24 hr tablet Take 10 mg by mouth daily Unknown Yes Unknown, Entered By History No    losartan (COZAAR) 50 MG tablet Take 50 mg by mouth daily 7/6/2022 Yes Unknown, Entered By History Yes    pantoprazole (PROTONIX) 40 MG EC tablet Take 1 tablet (40 mg) by mouth 2 times daily (before meals)  Patient taking differently: Take 40 mg by mouth daily 7/6/2022 Yes Luis Sofia MD     calcium carbonate (TUMS) 500 MG chewable tablet Take 1 chew tab by mouth 3 times daily as needed for heartburn   Unknown, Entered By History

## 2022-07-07 NOTE — ED TRIAGE NOTES
Here for diffuse abdominal pain and n/v x4 days. History of pancreatitis and gastritis, but feels like gastritis per patient. Last drink was about 4 hours ago. ABCs intact.     Triage Assessment     Row Name 07/07/22 0249       Triage Assessment (Adult)    Airway WDL WDL       Respiratory WDL    Respiratory WDL WDL       Cardiac WDL    Cardiac WDL WDL

## 2022-07-07 NOTE — H&P
[facility]    History and Physical - Hospitalist Service       Date of Admission:  7/7/2022    Assessment & Plan      Jaiden Lyons is a 47 year old male admitted on 7/7/2022.     Acute Alcoholic Pancreatitis on chronic pancreatitis  This is probably alcohol related pancreatitis.   Patient continues to drink alcohol he says that he drank hard liquor about 3 to 4 days ago but since has cut down to drinking only beer with the onset of abdominal pain.     Alcohol abuse  Patient is intoxicated with alcohol and BAL was 0.23 at admission with his last drink of alcohol being a day prior to admission  I did tell the patient to quit drinking alcohol as this is probably what is causing the acute pancreatitis    History of GI bleed  At home on pantoprazole 40 mg daily which will be continued 2 times daily    Type 2 diabetes mellitus  At home patient is on empagliflozin -Jardiance 10 mg daily, glipizide 5 mg daily before breakfast    Asthma  Not in any acute exacerbation    Essential hypertension  On losartan 50 mg daily       Diet: NPO for Medical/Clinical Reasons Except for: No Exceptions    DVT Prophylaxis: Pneumatic Compression Devices  Arango Catheter: Not present  Central Lines: None  Cardiac Monitoring: None  Code Status:  Full code    Clinically Significant Risk Factors Present on Admission          # Hypocalcemia: Ca = 7.9 mg/dL (Ref range: 8.5 - 10.1 mg/dL) and/or iCa = N/A on admission, will replace as needed        # Hypertension: home medication list includes antihypertensive(s)          Disposition Plan           The patient's care was discussed with the Patient.    Héctor De La Cruz MD  Hospitalist Service  [facility]  Securely message with the [employer] Web Console (learn more here)  Text page via [employer] Paging/Directory         ______________________________________________________________________    Chief Complaint   Abdominal pain    History is obtained from the  patient, medical records and my discussion with emergency department physician      History of Present Illness   Jaiden Lyons is a 47 year old male with history of chronic pancreatitis, alcohol abuse, history of GI bleeding, type 2 diabetes mellitus, asthma, hypertension, marijuana and amphetamine abuse.    For the last 3 to 4 days has had abdominal pain in the epigastric area radiating into the back associated with nausea and vomiting.  Pain was getting worse on eating.  He describes it as a 10/10 pain intensity when he came to the emergency department.  There is no associated fevers.  When he came to the emergency department his temperature was 98.5  F pulse was 112 respirations 18 blood pressure 151/110 oxygen saturation 95% on room air.  Lipase was elevated at 929.       Review of Systems    Denies any headache, blurring of vision or double vision, neck pain jaw pain or he has left shoulder pain, without chest pain, shortness of breath, cough or increased sputum production, otherwise as mentioned above has nausea, vomiting, abdominal pain.  Denies any urinary symptoms of burning or increased frequency. Denies any weakness of upper or lower extremities or any seizure activity. Fever or chills. Bleeding from anywhere else.  No rashes or cellulitis.  Has right hip pain.       Past Medical History    I have reviewed this patient's medical history and updated it with pertinent information if needed.   Past Medical History:   Diagnosis Date     Alcohol abuse      Alcohol-induced acute pancreatitis      Asthma      Benign essential hypertension      Type 2 diabetes mellitus        Past Surgical History   I have reviewed this patient's surgical history and updated it with pertinent information if needed.  Past Surgical History:   Procedure Laterality Date     ESOPHAGOSCOPY, GASTROSCOPY, DUODENOSCOPY (EGD), COMBINED Left 02/22/2021    Procedure: ESOPHAGOGASTRODUODENOSCOPY (EGD);  Surgeon: Landry Rodriguez MD;   Location:  GI     Forearm surgery to repair injuries from [facility]       HIP SURGERY         Social History   I have reviewed this patient's social history and updated it with pertinent information if needed.  Social History     Tobacco Use     Smoking status: Current Every Day Smoker     Types: Cigarettes     Smokeless tobacco: Never Used   Substance Use Topics     Alcohol use: Yes     Comment: 1/2 to 1 pint a day     Drug use: Yes     Types: Marijuana     Comment: approximately once per month       Family History   Father - colon cancer   Mother - asthma    Prior to Admission Medications   Prior to Admission Medications   Prescriptions Last Dose Informant Patient Reported? Taking?   calcium carbonate (TUMS) 500 MG chewable tablet   Yes No   Sig: Take 1 chew tab by mouth 3 times daily as needed for heartburn   empagliflozin (JARDIANCE) 10 MG TABS tablet   Yes No   Sig: Take 10 mg by mouth daily   folic acid (FOLVITE) 1 MG tablet   No No   Sig: Take 1 tablet (1 mg) by mouth daily   glipiZIDE (GLUCOTROL) 5 MG tablet   Yes No   Sig: Take 5 mg by mouth daily before breakfast   hydrOXYzine (ATARAX) 25 MG tablet   No No   Sig: Take 1 tablet (25 mg) by mouth every 6 hours as needed for anxiety   losartan (COZAAR) 50 MG tablet   Yes No   Sig: Take 50 mg by mouth daily   pantoprazole (PROTONIX) 40 MG EC tablet   No No   Sig: Take 1 tablet (40 mg) by mouth 2 times daily (before meals)      Facility-Administered Medications: None     Allergies   Allergies   Allergen Reactions     Fentanyl      Insulin Swelling     Reaction Date: Around 0501-9241  Face swelling  Inpatient when reaction occurred, unsure which type of insulin  Required some Benadryl to help with the swelling    Tolerated aspart/glargine while inpt 12/2021     Lisinopril      Face swelling, cough     Morphine Itching       Physical Exam   Vital Signs: Temp: 98.5  F (36.9  C) Temp src: Temporal BP: (!) 151/110 Pulse: 112   Resp: 18 SpO2: 95 %      Weight: 0 lbs 0  oz      GENERAL: Patient does not look in any acute distress  HEENT: EOM+, Conjunctiva is clear   NECK:  no Jugular Venous distention  HEART: S1 S2 regular tachycardia is present without murmur,    LUNGS: Respirations are not laboured, Lungs are clear to auscultation without any crepitations or Wheezing   ABDOMEN: Soft, there is epigastric tenderness *, Bowel Sounds are decreased but Positive   LOWER LIMBS: no Pedal Edema  Bilaterally   CNS:  Alert,  Oriented x 3, Moving all the Four Limbs       Data   Data reviewed today: I reviewed all medications, new labs and imaging results over the last 24 hours. I personally reviewed      Most Recent 3 CBC's:Recent Labs   Lab Test 07/07/22  0256 05/17/22  1142 12/31/21  0802   WBC 5.0 3.2* 3.7*   HGB 14.0 14.3 12.2*   MCV 85 83 88    148* 123*     Most Recent 3 BMP's:Recent Labs   Lab Test 07/07/22 0256 05/17/22  1459 05/17/22  1142 12/31/21  0820 12/31/21  0802     --  136  --  134   POTASSIUM 4.2  --  4.5  --  3.7   CHLORIDE 105  --  99  --  103   CO2 23  --  26  --  25   BUN 4*  --  12  --  10   CR 0.64*  --  0.50*  --  0.43*   ANIONGAP 10  --  11  --  6   BARRY 7.9*  --  9.0  --  8.4*   * 95 114*   < > 269*    < > = values in this interval not displayed.     Most Recent 2 LFT's:Recent Labs   Lab Test 07/07/22 0256 05/17/22  1142   AST 19 819*   ALT 42 349*   ALKPHOS 87 104   BILITOTAL 0.7 1.4*     No results found for this or any previous visit (from the past 24 hour(s)).

## 2022-07-07 NOTE — PROGRESS NOTES
Patient seen and examined.  Chart reviewed.  Please see admission history and physical by Dr. De La Cruz from earlier this morning for details.    Pancreatitis symptoms improving.  Start clear liquid diet.    Has clearly developed acute alcohol withdrawals.  Start clonidine and gabapentin.  WA protocol.  Vitamins with multivitamin, folic acid, thiamine.  Continue IV fluid.

## 2022-07-07 NOTE — PROVIDER NOTIFICATION
Currently has banana bag infusing from the ED. Do you want the LR started after the banana bag is completed or d/c banana bag and start LR?    Received call back from Dr. De La Cruz. He said to start the LR once banana bag is completed.

## 2022-07-07 NOTE — PROGRESS NOTES
"End of Shift Summary Note     46 y/o male admitted overnight with acute pancreatitis secondary to alcohol abuse.     Pertinent Assessment     Alert and oriented x 4, denies chest pain or chest pressure. Vital signs stable, afebrile, on room air. LR infusing, PIV intact. Patient  reports abdominal pain 9-10/10. Oral Dilaudid given, patient is declining oral pain medication and is repeatedly requesting IV pain medication. He appears comfortable, lying in bed watching TV.     Plan: Cont on CIWA protocol, mostly scoring for nausea and tremors. Please see MAR for total doses of ativan given today. Clonidine and Gabapentin added today. Cont with banana bag, folic acid and thiamine. Patient started on clear liq diet. Reports nausea, PRN Zofran given. Plan of care reviewed with patient, all questions answered.     Vital signs:  Temp: 98  F (36.7  C) Temp src: Oral BP: (!) 150/96 Pulse: 76   Resp: 18 SpO2: 90 % O2 Device: None (Room air)   Height: 179.1 cm (5' 10.5\") Weight: 78.2 kg (172 lb 6.4 oz)  Estimated body mass index is 24.39 kg/m  as calculated from the following:    Height as of this encounter: 1.791 m (5' 10.5\").    Weight as of this encounter: 78.2 kg (172 lb 6.4 oz).    Lizzy Fortune RN on 7/7/2022 at 6:02 PM    "

## 2022-07-07 NOTE — ED PROVIDER NOTES
"  History     Chief Complaint:  Abdominal Pain    The history is provided by the patient.      Jaiden Lyons is a 47 year old male with history of alcohol abuse, alcoholic gastritis, and chronic pancreatitis who presents with diffuse abdominal pain since 4 days ago. Additionally, he has nausea and vomiting. He notes the pain feels similar to gastritis. He endorses drinking 6 to 7 beers yesterday and his last drink was 4 hours ago.     Review of Systems   Gastrointestinal: Positive for abdominal pain, nausea and vomiting.   All other systems reviewed and are negative.    Allergies:  Fentanyl  Insulin  Lisinopril  Morphine    Medications:  Jardiance   Glipizide   Atarax   Cozaar  Protonix   Albuterol     Past Medical History:     Alcohol abuse   Alcohol-induced acute pancreatitis   Asthma   Benign essential hypertension   Type 2 diabetes mellitus     Past Surgical History:    Forearm surgery   R hip surgery   Facial reconstruction     Family History:    Father - colon cancer   Mother - asthma     Social History:  The patient presents to the ED with his wife via private vehicle   Hx of alcohol abuse, tobacco use (current smoker), THC abuse, and amphetamine abuse     Physical Exam     Patient Vitals for the past 24 hrs:   BP Temp Temp src Pulse Resp SpO2 Height Weight   07/07/22 0507  155/96 97.7  F (36.5  C) Oral 66 18 93 % 1.791 m (5' 10.5\") 78.2 kg (172 lb 6.4 oz)   07/07/22 0251  151/110 98.5  F (36.9  C) Temporal 112 18 95 % -- --     Physical Exam  Nursing note and vitals reviewed.  Constitutional: Cooperative. Uncofmrtable appearing.   HENT:   Mouth/Throat: Mucous membranes are dry.   Cardiovascular: Tachycardic, regular rhythm and normal heart sounds.  No murmur.  Pulmonary/Chest: Effort normal and breath sounds normal. No respiratory distress. No wheezes. No rales.   Abdominal: Soft. Normal appearance and bowel sounds are normal. No distension. Epigastric tenderness. There is no rigidity and no guarding. "   Neurological: Alert. Oriented x4  Skin: Skin is warm and dry. No rash noted.   Psychiatric: Normal mood and affect.     Emergency Department Course     Laboratory:  Labs Ordered and Resulted from Time of ED Arrival to Time of ED Departure   LIPASE - Abnormal       Result Value    Lipase 929 (*)    COMPREHENSIVE METABOLIC PANEL - Abnormal    Sodium 138      Potassium 4.2      Chloride 105      Carbon Dioxide (CO2) 23      Anion Gap 10      Urea Nitrogen 4 (*)     Creatinine 0.64 (*)     Calcium 7.9 (*)     Glucose 131 (*)     Alkaline Phosphatase 87      AST 19      ALT 42      Protein Total 9.1 (*)     Albumin 3.7      Bilirubin Total 0.7      GFR Estimate >90     ETHYL ALCOHOL LEVEL - Abnormal    Alcohol ethyl 0.23 (*)    CBC WITH PLATELETS AND DIFFERENTIAL - Abnormal    WBC Count 5.0      RBC Count 4.98      Hemoglobin 14.0      Hematocrit 42.2      MCV 85      MCH 28.1      MCHC 33.2      RDW 14.7      Platelet Count 189      % Neutrophils 26      % Lymphocytes 58      % Monocytes 12      % Eosinophils 3      % Basophils 1      % Immature Granulocytes 0      NRBCs per 100 WBC 0      Absolute Neutrophils 1.3 (*)     Absolute Lymphocytes 2.9      Absolute Monocytes 0.6      Absolute Eosinophils 0.2      Absolute Basophils 0.1      Absolute Immature Granulocytes 0.0      Absolute NRBCs 0.0     COVID-19 VIRUS (CORONAVIRUS) BY PCR - Normal    SARS CoV2 PCR Negative        Reviewed:  I reviewed nursing notes, vitals and past medical history    Assessments:  0345 I obtained history and examined the patient as noted above. I discussed plan for admission to the hospital.    Consults:  7054 I spoke with Dr. De La Cruz from Hospitalist Services, who accepts the patient for admission.    Interventions:  0256 Zofran 4 mg IV   0256 NS 1 L IV   0334 Dilaudid 0.5 mg IV   0412 Dilaudid 0.5 mg IV     Disposition:  The patient was admitted to the hospital under the care of Dr. De La Cruz.     Impression & Plan     Medical Decision  Making:  Jaiden Lyons is a 47 year old male who presents with epigastric abdominal pain.  The differential diagnosis would include GERD, GIB, esophageal spasm, atypical cardiac sx's, pancreatitis, biliary colic or gallstone disease, AAA, gastroenteritis, gastritis, large vs small bowel disease, etc.  Based on history, PE and labs, the most likely explanation is alcohol related pancreatitis. He is currently intoxicated and this is likely the cause of pancreatitis. His symptoms are not consistent with cholecystitis. Abdominal exam is reassuring at this point.   Pain control in ED was difficult, he is improved but based on this, will admit for nausea and pain control as well as bowel rest.      Diagnosis:    ICD-10-CM    1. Alcohol-induced acute pancreatitis K85.20    2. Alcoholic intoxication with complication  F10.929    3. Non-intractable vomiting with nausea R11.2        Scribe Disclosure:  I, Morales Pablo, am serving as a scribe at 3:31 AM on 7/7/2022 to document services personally performed by Fantasma Ochoa MD based on my observations and the provider's statements to me.        Fantasma Ochoa MD  07/07/22 0628

## 2022-07-08 LAB
ALBUMIN SERPL-MCNC: 3 G/DL (ref 3.4–5)
ALP SERPL-CCNC: 70 U/L (ref 40–150)
ALT SERPL W P-5'-P-CCNC: 24 U/L (ref 0–70)
ANION GAP SERPL CALCULATED.3IONS-SCNC: 5 MMOL/L (ref 3–14)
AST SERPL W P-5'-P-CCNC: 30 U/L (ref 0–45)
BILIRUB SERPL-MCNC: 1.2 MG/DL (ref 0.2–1.3)
BUN SERPL-MCNC: 3 MG/DL (ref 7–30)
CALCIUM SERPL-MCNC: 8.1 MG/DL (ref 8.5–10.1)
CHLORIDE BLD-SCNC: 101 MMOL/L (ref 94–109)
CO2 SERPL-SCNC: 30 MMOL/L (ref 20–32)
CREAT SERPL-MCNC: 0.5 MG/DL (ref 0.66–1.25)
ERYTHROCYTE [DISTWIDTH] IN BLOOD BY AUTOMATED COUNT: 14.8 % (ref 10–15)
GFR SERPL CREATININE-BSD FRML MDRD: >90 ML/MIN/1.73M2
GLUCOSE BLD-MCNC: 124 MG/DL (ref 70–99)
GLUCOSE BLDC GLUCOMTR-MCNC: 141 MG/DL (ref 70–99)
GLUCOSE BLDC GLUCOMTR-MCNC: 143 MG/DL (ref 70–99)
GLUCOSE BLDC GLUCOMTR-MCNC: 205 MG/DL (ref 70–99)
GLUCOSE BLDC GLUCOMTR-MCNC: 225 MG/DL (ref 70–99)
HCT VFR BLD AUTO: 38.6 % (ref 40–53)
HGB BLD-MCNC: 12.5 G/DL (ref 13.3–17.7)
LIPASE SERPL-CCNC: 678 U/L (ref 73–393)
MAGNESIUM SERPL-MCNC: 1.8 MG/DL (ref 1.6–2.3)
MCH RBC QN AUTO: 28.2 PG (ref 26.5–33)
MCHC RBC AUTO-ENTMCNC: 32.4 G/DL (ref 31.5–36.5)
MCV RBC AUTO: 87 FL (ref 78–100)
PHOSPHATE SERPL-MCNC: 3.1 MG/DL (ref 2.5–4.5)
PLATELET # BLD AUTO: 150 10E3/UL (ref 150–450)
POTASSIUM BLD-SCNC: 4.4 MMOL/L (ref 3.4–5.3)
PROT SERPL-MCNC: 6.6 G/DL (ref 6.8–8.8)
RBC # BLD AUTO: 4.43 10E6/UL (ref 4.4–5.9)
SODIUM SERPL-SCNC: 136 MMOL/L (ref 133–144)
WBC # BLD AUTO: 3.9 10E3/UL (ref 4–11)

## 2022-07-08 PROCEDURE — 250N000011 HC RX IP 250 OP 636: Performed by: INTERNAL MEDICINE

## 2022-07-08 PROCEDURE — 83690 ASSAY OF LIPASE: CPT | Performed by: INTERNAL MEDICINE

## 2022-07-08 PROCEDURE — 83735 ASSAY OF MAGNESIUM: CPT | Performed by: INTERNAL MEDICINE

## 2022-07-08 PROCEDURE — 258N000003 HC RX IP 258 OP 636: Performed by: INTERNAL MEDICINE

## 2022-07-08 PROCEDURE — 120N000001 HC R&B MED SURG/OB

## 2022-07-08 PROCEDURE — 94640 AIRWAY INHALATION TREATMENT: CPT | Mod: 76

## 2022-07-08 PROCEDURE — 80053 COMPREHEN METABOLIC PANEL: CPT | Performed by: INTERNAL MEDICINE

## 2022-07-08 PROCEDURE — 36415 COLL VENOUS BLD VENIPUNCTURE: CPT | Performed by: INTERNAL MEDICINE

## 2022-07-08 PROCEDURE — 85027 COMPLETE CBC AUTOMATED: CPT | Performed by: INTERNAL MEDICINE

## 2022-07-08 PROCEDURE — 84100 ASSAY OF PHOSPHORUS: CPT | Performed by: INTERNAL MEDICINE

## 2022-07-08 PROCEDURE — 250N000013 HC RX MED GY IP 250 OP 250 PS 637: Performed by: INTERNAL MEDICINE

## 2022-07-08 PROCEDURE — 250N000013 HC RX MED GY IP 250 OP 250 PS 637: Performed by: HOSPITALIST

## 2022-07-08 PROCEDURE — 94640 AIRWAY INHALATION TREATMENT: CPT

## 2022-07-08 PROCEDURE — 99232 SBSQ HOSP IP/OBS MODERATE 35: CPT | Performed by: INTERNAL MEDICINE

## 2022-07-08 PROCEDURE — 999N000156 HC STATISTIC RCP CONSULT EA 30 MIN

## 2022-07-08 RX ORDER — POLYETHYLENE GLYCOL 3350 17 G/17G
17 POWDER, FOR SOLUTION ORAL 2 TIMES DAILY PRN
Status: DISCONTINUED | OUTPATIENT
Start: 2022-07-08 | End: 2022-07-10 | Stop reason: HOSPADM

## 2022-07-08 RX ORDER — ALBUTEROL SULFATE 90 UG/1
2 AEROSOL, METERED RESPIRATORY (INHALATION) 4 TIMES DAILY PRN
Status: DISCONTINUED | OUTPATIENT
Start: 2022-07-08 | End: 2022-07-08

## 2022-07-08 RX ORDER — ALBUTEROL SULFATE 90 UG/1
2 AEROSOL, METERED RESPIRATORY (INHALATION) 4 TIMES DAILY
Status: DISCONTINUED | OUTPATIENT
Start: 2022-07-08 | End: 2022-07-08

## 2022-07-08 RX ORDER — ALBUTEROL SULFATE 90 UG/1
2 AEROSOL, METERED RESPIRATORY (INHALATION) EVERY 6 HOURS PRN
Status: DISCONTINUED | OUTPATIENT
Start: 2022-07-08 | End: 2022-07-10 | Stop reason: HOSPADM

## 2022-07-08 RX ORDER — PANTOPRAZOLE SODIUM 40 MG/1
40 TABLET, DELAYED RELEASE ORAL DAILY
Status: DISCONTINUED | OUTPATIENT
Start: 2022-07-08 | End: 2022-07-10 | Stop reason: HOSPADM

## 2022-07-08 RX ORDER — BENZONATATE 100 MG/1
100 CAPSULE ORAL 3 TIMES DAILY PRN
Status: DISCONTINUED | OUTPATIENT
Start: 2022-07-08 | End: 2022-07-10 | Stop reason: HOSPADM

## 2022-07-08 RX ORDER — LOSARTAN POTASSIUM 50 MG/1
50 TABLET ORAL DAILY
Status: DISCONTINUED | OUTPATIENT
Start: 2022-07-08 | End: 2022-07-10 | Stop reason: HOSPADM

## 2022-07-08 RX ADMIN — ALBUTEROL SULFATE 2 PUFF: 90 INHALANT RESPIRATORY (INHALATION) at 15:56

## 2022-07-08 RX ADMIN — ENOXAPARIN SODIUM 40 MG: 40 INJECTION SUBCUTANEOUS at 08:29

## 2022-07-08 RX ADMIN — HYDROMORPHONE HYDROCHLORIDE 0.5 MG: 1 INJECTION, SOLUTION INTRAMUSCULAR; INTRAVENOUS; SUBCUTANEOUS at 03:38

## 2022-07-08 RX ADMIN — LOSARTAN POTASSIUM 50 MG: 50 TABLET, FILM COATED ORAL at 15:47

## 2022-07-08 RX ADMIN — LORAZEPAM 2 MG: 2 INJECTION INTRAMUSCULAR; INTRAVENOUS at 06:45

## 2022-07-08 RX ADMIN — CLONIDINE HYDROCHLORIDE 0.1 MG: 0.1 TABLET ORAL at 19:52

## 2022-07-08 RX ADMIN — GABAPENTIN 900 MG: 300 CAPSULE ORAL at 18:45

## 2022-07-08 RX ADMIN — CLONIDINE HYDROCHLORIDE 0.1 MG: 0.1 TABLET ORAL at 12:07

## 2022-07-08 RX ADMIN — FOLIC ACID 1 MG: 1 TABLET ORAL at 08:28

## 2022-07-08 RX ADMIN — SODIUM CHLORIDE, POTASSIUM CHLORIDE, SODIUM LACTATE AND CALCIUM CHLORIDE: 600; 310; 30; 20 INJECTION, SOLUTION INTRAVENOUS at 15:42

## 2022-07-08 RX ADMIN — HYDROMORPHONE HYDROCHLORIDE 0.5 MG: 1 INJECTION, SOLUTION INTRAMUSCULAR; INTRAVENOUS; SUBCUTANEOUS at 00:39

## 2022-07-08 RX ADMIN — ALBUTEROL SULFATE 2 PUFF: 90 INHALANT RESPIRATORY (INHALATION) at 11:38

## 2022-07-08 RX ADMIN — HYDROMORPHONE HYDROCHLORIDE 0.5 MG: 1 INJECTION, SOLUTION INTRAMUSCULAR; INTRAVENOUS; SUBCUTANEOUS at 12:07

## 2022-07-08 RX ADMIN — BENZONATATE 100 MG: 100 CAPSULE ORAL at 02:42

## 2022-07-08 RX ADMIN — GABAPENTIN 900 MG: 300 CAPSULE ORAL at 12:07

## 2022-07-08 RX ADMIN — HYDROMORPHONE HYDROCHLORIDE 0.5 MG: 1 INJECTION, SOLUTION INTRAMUSCULAR; INTRAVENOUS; SUBCUTANEOUS at 18:46

## 2022-07-08 RX ADMIN — HYDROMORPHONE HYDROCHLORIDE 0.5 MG: 1 INJECTION, SOLUTION INTRAMUSCULAR; INTRAVENOUS; SUBCUTANEOUS at 15:47

## 2022-07-08 RX ADMIN — ALBUTEROL SULFATE 2 PUFF: 90 AEROSOL, METERED RESPIRATORY (INHALATION) at 21:42

## 2022-07-08 RX ADMIN — SODIUM CHLORIDE, POTASSIUM CHLORIDE, SODIUM LACTATE AND CALCIUM CHLORIDE: 600; 310; 30; 20 INJECTION, SOLUTION INTRAVENOUS at 05:56

## 2022-07-08 RX ADMIN — PANTOPRAZOLE SODIUM 40 MG: 40 TABLET, DELAYED RELEASE ORAL at 15:47

## 2022-07-08 RX ADMIN — THIAMINE HCL TAB 100 MG 100 MG: 100 TAB at 08:28

## 2022-07-08 RX ADMIN — HYDROMORPHONE HYDROCHLORIDE 0.5 MG: 1 INJECTION, SOLUTION INTRAMUSCULAR; INTRAVENOUS; SUBCUTANEOUS at 21:41

## 2022-07-08 RX ADMIN — MULTIPLE VITAMINS W/ MINERALS TAB 1 TABLET: TAB at 08:27

## 2022-07-08 RX ADMIN — CLONIDINE HYDROCHLORIDE 0.1 MG: 0.1 TABLET ORAL at 03:37

## 2022-07-08 RX ADMIN — ALBUTEROL SULFATE 2 PUFF: 90 AEROSOL, METERED RESPIRATORY (INHALATION) at 02:42

## 2022-07-08 RX ADMIN — GABAPENTIN 900 MG: 300 CAPSULE ORAL at 02:41

## 2022-07-08 RX ADMIN — HYDROMORPHONE HYDROCHLORIDE 0.5 MG: 1 INJECTION, SOLUTION INTRAMUSCULAR; INTRAVENOUS; SUBCUTANEOUS at 08:47

## 2022-07-08 RX ADMIN — POLYETHYLENE GLYCOL 3350 17 G: 17 POWDER, FOR SOLUTION ORAL at 22:42

## 2022-07-08 RX ADMIN — Medication 5 MG: at 21:41

## 2022-07-08 ASSESSMENT — ACTIVITIES OF DAILY LIVING (ADL)
ADLS_ACUITY_SCORE: 22
ADLS_ACUITY_SCORE: 20
ADLS_ACUITY_SCORE: 20
ADLS_ACUITY_SCORE: 22
ADLS_ACUITY_SCORE: 22
ADLS_ACUITY_SCORE: 20
ADLS_ACUITY_SCORE: 22
ADLS_ACUITY_SCORE: 20

## 2022-07-08 NOTE — PROGRESS NOTES
[facility]    Medicine Progress Note - Hospitalist Service    Date of Admission:  7/7/2022    Assessment & Plan          Jaiden Lyons is a 47 year old male with a past medical history significant for alcohol dependence with ongoing abuse, GERD, asthma, hypertension, and diabetes mellitus type 2.  He presented to the emergency room due to abdominal pain.  Found to have acute pancreatitis.  Has now developed acute alcohol withdrawals.    1.  Acute pancreatitis.  Symptoms slowly improving.  -Continue IV fluids.  -Clear liquid diet.  -Pain medications as needed.    2.  Alcohol dependence with ongoing abuse.  -Chemical dependency consult.    3.  Acute alcohol withdrawals.  -Continue clonidine 0.1 mg every 8 hours.  -Continue gabapentin protocol.  -Continue vitamins with folic acid, thiamine, multivitamin.  -IV fluids.    4.  Diabetes mellitus type 2.  With stated previous allergies to insulin products.  Is able to tolerate a few types of insulin.  Blood glucose has not been significantly elevated to this point.  Only on clear liquid diet.  -Recheck glucose tomorrow morning with metabolic panel.  -Continue to hold oral diabetic medications for now.    5.  Hypertension.  -Restart losartan 50 mg a day.  -Continue clonidine as noted above.    6.  GERD.  -Restart pantoprazole 40 mg a day.         Diet: Clear Liquid Diet    DVT Prophylaxis: Enoxaparin (Lovenox) SQ  Arango Catheter: Not present  Central Lines: None  Cardiac Monitoring: None  Code Status: Full Code      Disposition Plan      Expected Discharge Date: 07/11/2022                    Rusty Randolph DO  Hospitalist Service  [facility]  Securely message with the [employer] Web Console (learn more here)  Text page via [email or web address] Paging/Directory         Clinically Significant Risk Factors Present on Admission               # Hypertension: home medication list includes antihypertensive(s)         ______________________________________________________________________    Interval History   Continues having some abdominal pain.  Mildly improved from previous.  Denies chest pain, shortness of breath, fevers, chills, nausea, vomiting.    Data reviewed today: I reviewed all medications, new labs and imaging results over the last 24 hours.     Physical Exam   Vital Signs: Temp: 97  F (36.1  C) Temp src: Oral BP: (!) 128/93 Pulse: 75   Resp: 16 SpO2: 96 % O2 Device: None (Room air)    Weight: 175 lbs 1.6 oz  Gen:  NAD, A&Ox3.  Eyes:  PERRL, sclera anicteric.  OP:  MMM, no lesions.  Neck:  Supple.  CV:  Regular, no murmurs.  Lung:  CTA b/l, normal effort.  Ab:  +BS, soft.  Skin:  Warm, dry to touch.  No rash.  Ext:  No pitting edema LE b/l.      Data   Recent Labs   Lab 07/08/22  1223 07/08/22  0603 07/08/22  0248 07/07/22  0820 07/07/22  0715 07/07/22  0256   WBC  --  3.9*  --   --   --  5.0   HGB  --  12.5*  --   --   --  14.0   MCV  --  87  --   --   --  85   PLT  --  150  --   --   --  189   NA  --  136  --   --   --  138   POTASSIUM  --  4.4  --   --   --  4.2   CHLORIDE  --  101  --   --   --  105   CO2  --  30  --   --   --  23   BUN  --  3*  --   --   --  4*   CR  --  0.50*  --   --  0.47* 0.64*   ANIONGAP  --  5  --   --   --  10   BARRY  --  8.1*  --   --   --  7.9*   * 124* 143*   < >  --  131*   ALBUMIN  --  3.0*  --   --   --  3.7   PROTTOTAL  --  6.6*  --   --   --  9.1*   BILITOTAL  --  1.2  --   --   --  0.7   ALKPHOS  --  70  --   --   --  87   ALT  --  24  --   --   --  42   AST  --  30  --   --   --  19   LIPASE  --  678*  --   --   --  929*    < > = values in this interval not displayed.

## 2022-07-08 NOTE — PROGRESS NOTES
"[facility] RCAT     Date:7/7/22  Admission Dx:Pancreatitis  Pulmonary History: Hx asthma  Home Nebulizer/MDI Use: Albuterol inhaler  Home Oxygen: Room air  Acuity Level (RCAT flow sheet): 4  Aerosol Therapy initiated: Alb MDI Q6 prn      Pulmonary Hygiene initiated:NA      Volume Expansion initiated: Incentive spirometer BID      Current Oxygen Requirements: RA  Current SpO2: 100    Re-evaluation date:7/11/22    Patient Education:Education was performed with the patient in regards to indications/benefits and possible side effects of bronchodilators. Will continue to do education with patient.           See \"RT Assessments\" flow sheet for patient assessment scoring and Acuity Level Details.         \    "

## 2022-07-08 NOTE — PLAN OF CARE
Goal Outcome Evaluation:        VSS. Alert and oriented x4. Scoring 0 on CIWA scale. Complains of 7/10 pain; IV dilaudid given with relief. Clear liquid diet; tolerating lemon ice ok. LR running iin PIV at 100\hr. SBA-Ind in room; refused bed alarms. On mag and phos protocol; recheck ordered for am. . Continue to follow POC.

## 2022-07-08 NOTE — PLAN OF CARE
"Goal Outcome Evaluation:                  Pt alert and oriented. CIWA's remained to be 4's until 0645 in which he scored 13 for increased anxiety, tremor and agitation. Frustrated with lack of pain control which has been his main issue all evening. Pain to abdomen and bilateral flank. C/o nausea but no emesis. LR running at 100ml/hr. Using urinal at bedside. Cough hx of asthma. Albuterol ordered and given. Bed alarm on, vss      /85   Pulse 68   Temp 97.8  F (36.6  C) (Oral)   Resp 16   Ht 1.791 m (5' 10.5\")   Wt 79.4 kg (175 lb 1.6 oz)   SpO2 94%   BMI 24.77 kg/m       "

## 2022-07-08 NOTE — PROVIDER NOTIFICATION
pt is requesting something for his cough and an albuterol inhaler as he takes this at home. thanks!

## 2022-07-09 LAB
ANION GAP SERPL CALCULATED.3IONS-SCNC: 7 MMOL/L (ref 3–14)
BUN SERPL-MCNC: 6 MG/DL (ref 7–30)
CALCIUM SERPL-MCNC: 8.7 MG/DL (ref 8.5–10.1)
CHLORIDE BLD-SCNC: 101 MMOL/L (ref 94–109)
CO2 SERPL-SCNC: 27 MMOL/L (ref 20–32)
CREAT SERPL-MCNC: 0.46 MG/DL (ref 0.66–1.25)
GFR SERPL CREATININE-BSD FRML MDRD: >90 ML/MIN/1.73M2
GLUCOSE BLD-MCNC: 219 MG/DL (ref 70–99)
GLUCOSE BLDC GLUCOMTR-MCNC: 153 MG/DL (ref 70–99)
GLUCOSE BLDC GLUCOMTR-MCNC: 169 MG/DL (ref 70–99)
GLUCOSE BLDC GLUCOMTR-MCNC: 205 MG/DL (ref 70–99)
GLUCOSE BLDC GLUCOMTR-MCNC: 226 MG/DL (ref 70–99)
GLUCOSE BLDC GLUCOMTR-MCNC: 256 MG/DL (ref 70–99)
MAGNESIUM SERPL-MCNC: 1.6 MG/DL (ref 1.6–2.3)
PHOSPHATE SERPL-MCNC: 4.2 MG/DL (ref 2.5–4.5)
POTASSIUM BLD-SCNC: 3.8 MMOL/L (ref 3.4–5.3)
SODIUM SERPL-SCNC: 135 MMOL/L (ref 133–144)

## 2022-07-09 PROCEDURE — 120N000001 HC R&B MED SURG/OB

## 2022-07-09 PROCEDURE — 84100 ASSAY OF PHOSPHORUS: CPT | Performed by: INTERNAL MEDICINE

## 2022-07-09 PROCEDURE — 258N000003 HC RX IP 258 OP 636: Performed by: INTERNAL MEDICINE

## 2022-07-09 PROCEDURE — 250N000013 HC RX MED GY IP 250 OP 250 PS 637: Performed by: INTERNAL MEDICINE

## 2022-07-09 PROCEDURE — 250N000011 HC RX IP 250 OP 636: Performed by: INTERNAL MEDICINE

## 2022-07-09 PROCEDURE — 80048 BASIC METABOLIC PNL TOTAL CA: CPT | Performed by: INTERNAL MEDICINE

## 2022-07-09 PROCEDURE — 36415 COLL VENOUS BLD VENIPUNCTURE: CPT | Performed by: INTERNAL MEDICINE

## 2022-07-09 PROCEDURE — 83735 ASSAY OF MAGNESIUM: CPT | Performed by: INTERNAL MEDICINE

## 2022-07-09 PROCEDURE — 99238 HOSP IP/OBS DSCHRG MGMT 30/<: CPT | Performed by: INTERNAL MEDICINE

## 2022-07-09 RX ORDER — GLIPIZIDE 10 MG/1
10 TABLET, FILM COATED, EXTENDED RELEASE ORAL DAILY
Qty: 30 TABLET | Refills: 0 | Status: ON HOLD | OUTPATIENT
Start: 2022-07-09 | End: 2024-02-06

## 2022-07-09 RX ORDER — GLIPIZIDE 5 MG/1
10 TABLET, FILM COATED, EXTENDED RELEASE ORAL DAILY
Status: DISCONTINUED | OUTPATIENT
Start: 2022-07-09 | End: 2022-07-10 | Stop reason: HOSPADM

## 2022-07-09 RX ORDER — LOSARTAN POTASSIUM 50 MG/1
50 TABLET ORAL DAILY
Qty: 30 TABLET | Refills: 0 | Status: SHIPPED | OUTPATIENT
Start: 2022-07-09

## 2022-07-09 RX ORDER — PANTOPRAZOLE SODIUM 40 MG/1
40 TABLET, DELAYED RELEASE ORAL DAILY
Qty: 30 TABLET | Refills: 0 | Status: SHIPPED | OUTPATIENT
Start: 2022-07-09 | End: 2022-12-19

## 2022-07-09 RX ADMIN — GABAPENTIN 900 MG: 300 CAPSULE ORAL at 19:05

## 2022-07-09 RX ADMIN — HYDROMORPHONE HYDROCHLORIDE 0.5 MG: 1 INJECTION, SOLUTION INTRAMUSCULAR; INTRAVENOUS; SUBCUTANEOUS at 00:53

## 2022-07-09 RX ADMIN — CLONIDINE HYDROCHLORIDE 0.1 MG: 0.1 TABLET ORAL at 03:28

## 2022-07-09 RX ADMIN — HYDROMORPHONE HYDROCHLORIDE 2 MG: 2 TABLET ORAL at 06:07

## 2022-07-09 RX ADMIN — ALBUTEROL SULFATE 2 PUFF: 90 AEROSOL, METERED RESPIRATORY (INHALATION) at 11:14

## 2022-07-09 RX ADMIN — LOSARTAN POTASSIUM 50 MG: 50 TABLET, FILM COATED ORAL at 09:15

## 2022-07-09 RX ADMIN — PANTOPRAZOLE SODIUM 40 MG: 40 TABLET, DELAYED RELEASE ORAL at 09:15

## 2022-07-09 RX ADMIN — EMPAGLIFLOZIN 10 MG: 10 TABLET, FILM COATED ORAL at 11:35

## 2022-07-09 RX ADMIN — CLONIDINE HYDROCHLORIDE 0.1 MG: 0.1 TABLET ORAL at 19:05

## 2022-07-09 RX ADMIN — SODIUM CHLORIDE, POTASSIUM CHLORIDE, SODIUM LACTATE AND CALCIUM CHLORIDE: 600; 310; 30; 20 INJECTION, SOLUTION INTRAVENOUS at 02:11

## 2022-07-09 RX ADMIN — GLIPIZIDE 10 MG: 5 TABLET, FILM COATED, EXTENDED RELEASE ORAL at 11:10

## 2022-07-09 RX ADMIN — GABAPENTIN 900 MG: 300 CAPSULE ORAL at 11:10

## 2022-07-09 RX ADMIN — HYDROMORPHONE HYDROCHLORIDE 2 MG: 2 TABLET ORAL at 16:17

## 2022-07-09 RX ADMIN — MULTIPLE VITAMINS W/ MINERALS TAB 1 TABLET: TAB at 09:15

## 2022-07-09 RX ADMIN — THIAMINE HCL TAB 100 MG 100 MG: 100 TAB at 09:15

## 2022-07-09 RX ADMIN — ENOXAPARIN SODIUM 40 MG: 40 INJECTION SUBCUTANEOUS at 09:16

## 2022-07-09 RX ADMIN — FOLIC ACID 1 MG: 1 TABLET ORAL at 09:15

## 2022-07-09 RX ADMIN — HYDROMORPHONE HYDROCHLORIDE 2 MG: 2 TABLET ORAL at 20:59

## 2022-07-09 RX ADMIN — HYDROMORPHONE HYDROCHLORIDE 0.5 MG: 1 INJECTION, SOLUTION INTRAMUSCULAR; INTRAVENOUS; SUBCUTANEOUS at 12:16

## 2022-07-09 RX ADMIN — CLONIDINE HYDROCHLORIDE 0.1 MG: 0.1 TABLET ORAL at 11:10

## 2022-07-09 RX ADMIN — HYDROMORPHONE HYDROCHLORIDE 2 MG: 2 TABLET ORAL at 09:15

## 2022-07-09 RX ADMIN — GABAPENTIN 900 MG: 300 CAPSULE ORAL at 02:20

## 2022-07-09 RX ADMIN — SODIUM CHLORIDE, POTASSIUM CHLORIDE, SODIUM LACTATE AND CALCIUM CHLORIDE: 600; 310; 30; 20 INJECTION, SOLUTION INTRAVENOUS at 11:38

## 2022-07-09 RX ADMIN — HYDROMORPHONE HYDROCHLORIDE 0.5 MG: 1 INJECTION, SOLUTION INTRAMUSCULAR; INTRAVENOUS; SUBCUTANEOUS at 03:28

## 2022-07-09 RX ADMIN — Medication 5 MG: at 20:59

## 2022-07-09 ASSESSMENT — ACTIVITIES OF DAILY LIVING (ADL)
ADLS_ACUITY_SCORE: 22
ADLS_ACUITY_SCORE: 23
ADLS_ACUITY_SCORE: 22
ADLS_ACUITY_SCORE: 22
ADLS_ACUITY_SCORE: 23
ADLS_ACUITY_SCORE: 22
ADLS_ACUITY_SCORE: 23
ADLS_ACUITY_SCORE: 22
ADLS_ACUITY_SCORE: 23
ADLS_ACUITY_SCORE: 22

## 2022-07-09 NOTE — PLAN OF CARE
"/82 (BP Location: Right arm)   Pulse 65   Temp (!) 96.2  F (35.7  C) (Axillary)   Resp 12   Ht 1.791 m (5' 10.5\")   Wt 80.8 kg (178 lb 1.6 oz)   SpO2 98%   BMI 25.19 kg/m      VSS, PT denies CP, SoB. Continues to c/o abd. Pain, received Prn dilaudid with relief. IVF discontinued, tolerating lo fat diet. Potential discharge this evening. Will continue to monitor.   "

## 2022-07-09 NOTE — PROVIDER NOTIFICATION
Karis Randolph: FYI pt is deciding to stay. i took his IV out since i thought he was leaving, let me know if you  want me to put it back in     Lucio Cleveland RN on 7/9/2022 at 4:20 PM

## 2022-07-09 NOTE — PROVIDER NOTIFICATION
2200 MD paged: Pt c/o not having a BM. Could we get some prn stool softeners please? Thanks!     2250 MD paged: FYI - bedtime . Pt does not want insulin at this time. Increased appetite, eating popsicles. Will recheck @ 0200. Thanks!

## 2022-07-09 NOTE — PROGRESS NOTES
[facility]    Medicine Progress Note - Hospitalist Service    Date of Admission:  7/7/2022    Assessment & Plan          Jaiden Lyons is a 47 year old male with a past medical history significant for alcohol dependence with ongoing abuse, GERD, asthma, hypertension, and diabetes mellitus type 2.  He presented to the emergency room due to abdominal pain.  Found to have acute pancreatitis.  Has now developed acute alcohol withdrawals.  Improving.     1.  Acute pancreatitis.  Symptoms improving.  -Stop IV fluids.  -Advance to low-fat diet.  -Pain medications as needed.  -Had felt that he was ready to discharge from the hospital earlier on 7/9.  Decided a few hours later that he needed 1 more night at the hospital for continued pain medications.     2.  Alcohol dependence with ongoing abuse.  -Chemical dependency consult.     3.  Acute alcohol withdrawals.  -Continue clonidine 0.1 mg every 8 hours.  -Continue gabapentin protocol.  -Continue vitamins with folic acid, thiamine, multivitamin.  -IV fluids.     4.  Diabetes mellitus type 2.  With stated previous allergies to insulin products.  Is able to tolerate a few types of insulin.    -Restart Jardiance 10 mg a day.  -Restart glipizide 10 mg a day.       5.  Hypertension.  -Continue losartan 50 mg a day.  -Continue clonidine as noted above.     6.  GERD.  -Continue pantoprazole 40 mg a day.          Diet: Low Fat Diet  Diet    DVT Prophylaxis: Enoxaparin (Lovenox) SQ  Arango Catheter: Not present  Central Lines: None  Cardiac Monitoring: None  Code Status: Full Code      Disposition Plan      Expected Discharge Date: 07/09/2022                  Rusty Randolph DO  Hospitalist Service  [facility]  Securely message with the Vocera Web Console (learn more here)  Text page via [employer] Paging/Directory         Clinically Significant Risk Factors Present on Admission               # Hypertension: home medication list  "includes antihypertensive(s)  # Overweight: Estimated body mass index is 25.19 kg/m  as calculated from the following:    Height as of this encounter: 1.791 m (5' 10.5\").    Weight as of this encounter: 80.8 kg (178 lb 1.6 oz).        ______________________________________________________________________    Interval History   Has been feeling better earlier today.  Developed some abdominal pain later in the day.  Denies chest pain, shortness of breath, fevers, chills, nausea, vomiting.    Data reviewed today: I reviewed all medications, new labs and imaging results over the last 24 hours.    Physical Exam   Vital Signs: Temp: 98  F (36.7  C) Temp src: Oral BP: 131/84 Pulse: 64   Resp: 18 SpO2: 98 % O2 Device: None (Room air)    Weight: 178 lbs 1.6 oz  Gen:  NAD, A&Ox3.  Eyes:  PERRL, sclera anicteric.  OP:  MMM, no lesions.  Neck:  Supple.  CV:  Regular, no murmurs.  Lung:  CTA b/l, normal effort.  Ab:  +BS, soft.  Skin:  Warm, dry to touch.  No rash.  Ext:  No pitting edema LE b/l.      Data   Recent Labs   Lab 07/09/22  1208 07/09/22  0756 07/09/22  0536 07/08/22  1223 07/08/22  0603 07/07/22  0820 07/07/22  0715 07/07/22  0256   WBC  --   --   --   --  3.9*  --   --  5.0   HGB  --   --   --   --  12.5*  --   --  14.0   MCV  --   --   --   --  87  --   --  85   PLT  --   --   --   --  150  --   --  189   NA  --   --  135  --  136  --   --  138   POTASSIUM  --   --  3.8  --  4.4  --   --  4.2   CHLORIDE  --   --  101  --  101  --   --  105   CO2  --   --  27  --  30  --   --  23   BUN  --   --  6*  --  3*  --   --  4*   CR  --   --  0.46*  --  0.50*  --  0.47* 0.64*   ANIONGAP  --   --  7  --  5  --   --  10   BARRY  --   --  8.7  --  8.1*  --   --  7.9*   * 205* 219*   < > 124*   < >  --  131*   ALBUMIN  --   --   --   --  3.0*  --   --  3.7   PROTTOTAL  --   --   --   --  6.6*  --   --  9.1*   BILITOTAL  --   --   --   --  1.2  --   --  0.7   ALKPHOS  --   --   --   --  70  --   --  87   ALT  --   --   --   " --  24  --   --  42   AST  --   --   --   --  30  --   --  19   LIPASE  --   --   --   --  678*  --   --  929*    < > = values in this interval not displayed.

## 2022-07-09 NOTE — PLAN OF CARE
Goal Outcome Evaluation:    Plan of Care Reviewed With: patient     Overall Patient Progress: no change    VSS on RA. A/O x 4. Up independently in room, refusing alarms. PRN IV dilaudid given x3, PO dilaudid x1 for abd pain. PRN inhaler x1. PRN miralax given x1, BM on shift. CIWA 1, 2, 1, slight tremor at times. , 169. Pt tolerating clear liquid diet, snacking on popsicles. LPIV, LR infusing @ 100 mL/hr. Possible discharge in 1-2 days. Will continue with plan of care.

## 2022-07-10 VITALS
RESPIRATION RATE: 20 BRPM | HEART RATE: 86 BPM | HEIGHT: 71 IN | WEIGHT: 171.3 LBS | TEMPERATURE: 97.8 F | DIASTOLIC BLOOD PRESSURE: 77 MMHG | BODY MASS INDEX: 23.98 KG/M2 | SYSTOLIC BLOOD PRESSURE: 110 MMHG | OXYGEN SATURATION: 98 %

## 2022-07-10 LAB
CREAT SERPL-MCNC: 0.55 MG/DL (ref 0.66–1.25)
GFR SERPL CREATININE-BSD FRML MDRD: >90 ML/MIN/1.73M2
GLUCOSE BLDC GLUCOMTR-MCNC: 153 MG/DL (ref 70–99)
GLUCOSE BLDC GLUCOMTR-MCNC: 163 MG/DL (ref 70–99)
GLUCOSE BLDC GLUCOMTR-MCNC: 283 MG/DL (ref 70–99)
PLATELET # BLD AUTO: 159 10E3/UL (ref 150–450)

## 2022-07-10 PROCEDURE — 85049 AUTOMATED PLATELET COUNT: CPT | Performed by: INTERNAL MEDICINE

## 2022-07-10 PROCEDURE — 250N000011 HC RX IP 250 OP 636: Performed by: INTERNAL MEDICINE

## 2022-07-10 PROCEDURE — 82565 ASSAY OF CREATININE: CPT | Performed by: INTERNAL MEDICINE

## 2022-07-10 PROCEDURE — 36415 COLL VENOUS BLD VENIPUNCTURE: CPT | Performed by: INTERNAL MEDICINE

## 2022-07-10 PROCEDURE — 99207 PR NO BILLABLE SERVICE THIS VISIT: CPT | Performed by: INTERNAL MEDICINE

## 2022-07-10 PROCEDURE — 250N000013 HC RX MED GY IP 250 OP 250 PS 637: Performed by: INTERNAL MEDICINE

## 2022-07-10 RX ORDER — IBUPROFEN 600 MG/1
600 TABLET, FILM COATED ORAL EVERY 8 HOURS PRN
Qty: 30 TABLET | Refills: 0 | Status: SHIPPED | OUTPATIENT
Start: 2022-07-10 | End: 2022-12-19

## 2022-07-10 RX ADMIN — GABAPENTIN 900 MG: 300 CAPSULE ORAL at 11:13

## 2022-07-10 RX ADMIN — LOSARTAN POTASSIUM 50 MG: 50 TABLET, FILM COATED ORAL at 09:11

## 2022-07-10 RX ADMIN — HYDROMORPHONE HYDROCHLORIDE 2 MG: 2 TABLET ORAL at 01:44

## 2022-07-10 RX ADMIN — THIAMINE HCL TAB 100 MG 100 MG: 100 TAB at 09:11

## 2022-07-10 RX ADMIN — HYDROMORPHONE HYDROCHLORIDE 2 MG: 2 TABLET ORAL at 09:16

## 2022-07-10 RX ADMIN — MULTIPLE VITAMINS W/ MINERALS TAB 1 TABLET: TAB at 09:11

## 2022-07-10 RX ADMIN — GABAPENTIN 900 MG: 300 CAPSULE ORAL at 01:43

## 2022-07-10 RX ADMIN — HYDROMORPHONE HYDROCHLORIDE 2 MG: 2 TABLET ORAL at 04:55

## 2022-07-10 RX ADMIN — EMPAGLIFLOZIN 10 MG: 10 TABLET, FILM COATED ORAL at 09:11

## 2022-07-10 RX ADMIN — PANTOPRAZOLE SODIUM 40 MG: 40 TABLET, DELAYED RELEASE ORAL at 09:11

## 2022-07-10 RX ADMIN — GLIPIZIDE 10 MG: 5 TABLET, FILM COATED, EXTENDED RELEASE ORAL at 09:11

## 2022-07-10 RX ADMIN — CLONIDINE HYDROCHLORIDE 0.1 MG: 0.1 TABLET ORAL at 11:13

## 2022-07-10 RX ADMIN — ENOXAPARIN SODIUM 40 MG: 40 INJECTION SUBCUTANEOUS at 09:10

## 2022-07-10 RX ADMIN — FOLIC ACID 1 MG: 1 TABLET ORAL at 09:11

## 2022-07-10 RX ADMIN — CLONIDINE HYDROCHLORIDE 0.1 MG: 0.1 TABLET ORAL at 04:55

## 2022-07-10 ASSESSMENT — ACTIVITIES OF DAILY LIVING (ADL)
ADLS_ACUITY_SCORE: 22

## 2022-07-10 NOTE — PLAN OF CARE
A&Ox4. VSS. Dilaudid given for abd pain x2. Low fat diet, good appetite. , 226. Up ad collins, walks in halls frequently. Makes needs known. No IV access, MD aware. CIWA 1,1,. Will continue POC       Lucio Cleveland, RN on 7/9/2022 at 10:38 PM

## 2022-07-10 NOTE — PROGRESS NOTES
I saw and examined this patient today.    His pain has improved.  He is tolerating a diet.  He would like to go home today.    He appears stable for discharge home    See discharge summary performed by Dr. Randolph yesterday

## 2022-07-10 NOTE — PLAN OF CARE
"/77 (BP Location: Right arm)   Pulse 86   Temp 97.8  F (36.6  C) (Oral)   Resp 20   Ht 1.791 m (5' 10.5\")   Wt 77.7 kg (171 lb 4.8 oz)   SpO2 98%   BMI 24.23 kg/m    VSS, PT cleared for discharge by provider. RN reviewed discharge paperwork and PT verbalized understanding. Belongings returned, and PT left via PC @1300.     "

## 2022-07-10 NOTE — PLAN OF CARE
Goal Outcome Evaluation:      VSS, patient endorses complaints of pain in abdominal area in which analgesics given.  Patient alert and oriented in room patient previous NOC inquired as to need of IIV fluids and on call paged, IV fluids deferred at thi time, no acute events recorded this NOC will cont to monitor.

## 2022-07-11 ENCOUNTER — PATIENT OUTREACH (OUTPATIENT)
Dept: CARE COORDINATION | Facility: CLINIC | Age: 47
End: 2022-07-11

## 2022-07-11 DIAGNOSIS — Z71.89 OTHER SPECIFIED COUNSELING: ICD-10-CM

## 2022-07-11 NOTE — PROGRESS NOTES
Connected Care Resource Center Contact  [facility]/Voicemail     Clinical Data: Care Coordinator Outreach - TCM      Outreach attempted x 1.  Left message on patient's voicemail, providing [facility]'s 24/7 scheduling and nurse triage phone number [phone] ([phone]) for questions/concerns.      Plan:  [facility] will try to reach patient again in 1-2 business days.       Jeannette Marquis, JORDON  [facility], [facility]  *Connected Care Resource Team does NOT follow patient ongoing. Referrals are identified based on internal discharge reports and the outreach is to ensure patient has an understanding of their discharge instructions.

## 2022-07-12 NOTE — PROGRESS NOTES
"Clinic Care Coordination Contact  [facility]: Post-Discharge Note  SITUATION                                                      Admission:    Admission Date: 07/07/22   Reason for Admission: 1. Alcohol-induced acute pancreatitis  2. Alcoholic intoxication with complication  3. Non-intractable vomiting with nausea  Discharge:   Discharge Date: 07/10/22  Discharge Diagnosis: 1.  Acute pancreatitis.  2.  Alcohol dependence with ongoing abuse.  3.  Acute alcohol withdrawals.  4.  Diabetes mellitus type 2.  5.  Hypertension.  6.  GERD.    BACKGROUND                                                      Per hospital discharge summary and inpatient provider notes:    Jaiden Lyons is a 47 year old male with history of alcohol abuse, alcoholic gastritis, and chronic pancreatitis who presents with diffuse abdominal pain since 4 days ago. Additionally, he has nausea and vomiting. He notes the pain feels similar to gastritis. He endorses drinking 6 to 7 beers yesterday and his last drink was 4 hours ago.     ASSESSMENT      Enrollment  Primary Care Care Coordination Status: Not a Candidate    Discharge Assessment  How are you doing now that you are home?: Patient states he is doing \"good\"and symptoms are improving.  How are your symptoms? (Red Flag symptoms escalate to triage hotline per guidelines): Improved  Do you feel your condition is stable enough to be safe at home until your provider visit?: Yes  Does the patient have their discharge instructions? : Yes  Does the patient have questions regarding their discharge instructions? : No  Were you started on any new medications or were there changes to any of your previous medications? : No  Does the patient have all of their medications?:  (No medication changes.)  Do you have questions regarding any of your medications? :  (No medication changes.)  Do you have all of your needed medical supplies or equipment (DME)?  (i.e. oxygen tank, CPAP, cane, etc.):  " (N/A)  Discharge follow-up appointment scheduled within 14 calendar days? : No  Is patient agreeable to assistance with scheduling? : No         Post-op (Clinicians Only)  Did the patient have surgery or a procedure: No    Patient declined assistance with scheduling follow-up appointment, but is aware appointment is recommended.  Patient had no questions for RN.     PLAN                                                      Outpatient Plan:   Follow up with primary care provider, Freddie Best, within 7 days   for hospital follow- up.       No future appointments.      For any urgent concerns, please contact our 24 hour nurse triage line: [phone] ([phone])         Jeannette Marquis RN

## 2022-12-06 ENCOUNTER — HOSPITAL ENCOUNTER (EMERGENCY)
Facility: CLINIC | Age: 47
Discharge: HOME | End: 2022-12-07
Attending: EMERGENCY MEDICINE | Admitting: EMERGENCY MEDICINE
Payer: COMMERCIAL

## 2022-12-06 VITALS
WEIGHT: 170 LBS | HEIGHT: 71 IN | OXYGEN SATURATION: 97 % | TEMPERATURE: 98.3 F | RESPIRATION RATE: 20 BRPM | SYSTOLIC BLOOD PRESSURE: 128 MMHG | DIASTOLIC BLOOD PRESSURE: 76 MMHG | HEART RATE: 85 BPM | BODY MASS INDEX: 23.8 KG/M2

## 2022-12-06 DIAGNOSIS — K04.7 DENTAL INFECTION: ICD-10-CM

## 2022-12-06 PROCEDURE — 99283 EMERGENCY DEPT VISIT LOW MDM: CPT

## 2022-12-07 PROCEDURE — 250N000013 HC RX MED GY IP 250 OP 250 PS 637: Performed by: EMERGENCY MEDICINE

## 2022-12-07 RX ORDER — IBUPROFEN 600 MG/1
600 TABLET, FILM COATED ORAL ONCE
Status: COMPLETED | OUTPATIENT
Start: 2022-12-07 | End: 2022-12-07

## 2022-12-07 RX ADMIN — IBUPROFEN 600 MG: 600 TABLET, FILM COATED ORAL at 00:22

## 2022-12-07 RX ADMIN — AMOXICILLIN AND CLAVULANATE POTASSIUM 1 TABLET: 875; 125 TABLET, FILM COATED ORAL at 00:22

## 2022-12-07 ASSESSMENT — ACTIVITIES OF DAILY LIVING (ADL): ADLS_ACUITY_SCORE: 33

## 2022-12-07 ASSESSMENT — ENCOUNTER SYMPTOMS
FACIAL SWELLING: 0
HEADACHES: 1

## 2022-12-07 NOTE — ED TRIAGE NOTES
"Pt presents for evaluation of left upper dental pain. Started earlier today. Has \"sort of\" had foul taste and smell in the mouth. Denies drainage. Has taken hydrocodone for pain, last around 2230. Also notes a headache. Has an appointment with a dentist, but it's \"far out\".       "

## 2022-12-07 NOTE — ED PROVIDER NOTES
"  History   Chief Complaint:  Dental Pain       The history is provided by the patient.      Jaiden Lyons is a 47 year old male who presents with dental pain. The patient reports pain in the upper left (tooth #14) beginning earlier today and worsening into the night. This pain is also causing a current headache. He notes history of tooth pain and was supposed to have it pulled. He tools a hydrocodone at 2230 tonight, but is still experiencing pain. The patient has an upcoming dentist appointment in January, but has been trying to call and check on cancellations as well.     Review of Systems   HENT: Positive for dental problem. Negative for facial swelling.    Neurological: Positive for headaches.   All other systems reviewed and are negative.    Allergies:  Fentanyl  Insulin  Lisinopril  Morphine    Medications:  Jardiance  Glipizide  Losartan  Protonix    Past Medical History:     Pancreatitis  Lactic acidosis  Alcohol dependence and intoxication with complication  Alcohol abuse with withdrawal  Epiglottitis  Alcoholic gastritis with hemorrhage     Past Surgical History:    Combined esophagoscopy, gastroscopy, and duodenoscopy  Forearm surgery to repair injuries from [facility]  Hip surgery     Social History:  The patient presents to the ED with his fiance   PCP: Freddie Donis     Physical Exam     Patient Vitals for the past 24 hrs:   BP Temp Temp src Pulse Resp SpO2 Height Weight   12/06/22 2256 128/76 98.3  F (36.8  C) Oral 85 20 97 % 1.791 m (5' 10.5\") 77.1 kg (170 lb)       Physical Exam  General:              Well-nourished              Speaking in full sentences  Eyes:              Conjunctiva without injection or scleral icterus  ENT:              Carious dentition throughout              Tenderness to percussion about tooth #14              Fluctuance about buccal / lingual gingiva absent              No discharge              No tongue swelling              No tongue elevation              No oral " lesions              No trismus              Tolerating secretions without difficulty              Posterior oropharynx is symmetric without asymmetry or uvular deviation              No facial swelling, asymmetry or erythema  Neck:              No lymphadenopathy  Resp:              Even, non-labored respirations  CV:                    Regular rate and rhythm  Skin:              Warm, dry, well perfused              No rashes or open wounds on exposed skin  MSK:              Moves all extremities              No focal deformities or swelling  Neuro:              Alert              Answers questions appropriately              Moves all extremities equally              Gait stable  Psych:              Normal affect, normal mood      Emergency Department Course     Emergency Department Course:     Reviewed:  I reviewed nursing notes, vitals, past medical history and Care Everywhere    Assessments:  2351 I obtained history and examined the patient as noted above. I explained findings and at this point I feel that the patient is safe for discharge, and the patient agrees.     Interventions:  0022 ibuprofen 600 mg PO  0022 Augmentin 1 tablet PO    Disposition:  The patient was discharged to home.     Impression & Plan     Medical Decision Making:  Jaiden Lyons is a 47-year-old male presenting to the ED for evaluation of dental pain.  VS on presentation are unremarkable.  Examination as noted above, demonstrating carious dentition, with focal tenderness about tooth #14.  This is concerning for apical infection.  There is no fluctuance amenable to percutaneous drainage at this time.  No signs of surrounding facial cellulitis.  No evidence of deeper space neck infection such as PTA or retropharyngeal abscess.  Patient does have a history of underlying diabetes.  He was provided ibuprofen and Augmentin.  We will plan discharge home with 7-day course of oral antibiotics.  Recommended close follow-up with dentistry for  definitive management.  He reports he has previously been told this tooth needs to be removed.  He is to return to the ED should he develop increasing pain, fevers, facial swelling, difficulty swallowing, or any other concerns.  Patient and significant other feel comfortable with outlined plan of care.  Questions answered prior to discharge.    Diagnosis:    ICD-10-CM    1. Dental infection  K04.7         Discharge Medications:  New Prescriptions    AMOXICILLIN-CLAVULANATE (AUGMENTIN) 875-125 MG TABLET    Take 1 tablet by mouth 2 times daily for 7 days       Scribe Disclosure:  I, Shivani Sharma, am serving as a scribe at 11:57 PM on 12/6/2022 to document services personally performed by Bret Yousif MD based on my observations and the provider's statements to me.        Bret Yousif MD  12/07/22 4644

## 2022-12-18 ENCOUNTER — HOSPITAL ENCOUNTER (EMERGENCY)
Facility: CLINIC | Age: 47
Discharge: HOME | End: 2022-12-18
Attending: EMERGENCY MEDICINE | Admitting: EMERGENCY MEDICINE
Payer: COMMERCIAL

## 2022-12-18 VITALS
HEART RATE: 91 BPM | OXYGEN SATURATION: 97 % | TEMPERATURE: 97.6 F | RESPIRATION RATE: 18 BRPM | DIASTOLIC BLOOD PRESSURE: 98 MMHG | SYSTOLIC BLOOD PRESSURE: 148 MMHG

## 2022-12-18 DIAGNOSIS — S91.012A LACERATION OF LEFT ANKLE, INITIAL ENCOUNTER: ICD-10-CM

## 2022-12-18 PROCEDURE — 12001 RPR S/N/AX/GEN/TRNK 2.5CM/<: CPT

## 2022-12-18 PROCEDURE — 99283 EMERGENCY DEPT VISIT LOW MDM: CPT | Mod: 25

## 2022-12-18 PROCEDURE — 90715 TDAP VACCINE 7 YRS/> IM: CPT | Performed by: EMERGENCY MEDICINE

## 2022-12-18 PROCEDURE — 250N000011 HC RX IP 250 OP 636: Performed by: EMERGENCY MEDICINE

## 2022-12-18 PROCEDURE — 90471 IMMUNIZATION ADMIN: CPT | Performed by: EMERGENCY MEDICINE

## 2022-12-18 PROCEDURE — 250N000009 HC RX 250: Performed by: EMERGENCY MEDICINE

## 2022-12-18 RX ORDER — LIDOCAINE HYDROCHLORIDE AND EPINEPHRINE 10; 10 MG/ML; UG/ML
5 INJECTION, SOLUTION INFILTRATION; PERINEURAL ONCE
Status: COMPLETED | OUTPATIENT
Start: 2022-12-18 | End: 2022-12-18

## 2022-12-18 RX ADMIN — LIDOCAINE HYDROCHLORIDE,EPINEPHRINE BITARTRATE 5 ML: 10; .01 INJECTION, SOLUTION INFILTRATION; PERINEURAL at 10:05

## 2022-12-18 RX ADMIN — CLOSTRIDIUM TETANI TOXOID ANTIGEN (FORMALDEHYDE INACTIVATED), CORYNEBACTERIUM DIPHTHERIAE TOXOID ANTIGEN (FORMALDEHYDE INACTIVATED), BORDETELLA PERTUSSIS TOXOID ANTIGEN (GLUTARALDEHYDE INACTIVATED), BORDETELLA PERTUSSIS FILAMENTOUS HEMAGGLUTININ ANTIGEN (FORMALDEHYDE INACTIVATED), BORDETELLA PERTUSSIS PERTACTIN ANTIGEN, AND BORDETELLA PERTUSSIS FIMBRIAE 2/3 ANTIGEN 0.5 ML: 5; 2; 2.5; 5; 3; 5 INJECTION, SUSPENSION INTRAMUSCULAR at 10:04

## 2022-12-18 ASSESSMENT — ENCOUNTER SYMPTOMS
NUMBNESS: 0
WOUND: 1

## 2022-12-18 NOTE — ED TRIAGE NOTES
A&O x4.  ABC's intact.      Pt arrives with c/o cut left ankle when climbing into the car today.  Last tetanus per [facility] 2004. Bleeding controlled.

## 2022-12-18 NOTE — ED PROVIDER NOTES
History   Chief Complaint:  Laceration       The history is provided by the patient.      Jaiden Lyons is a 47 year old male with history of hypertension and type 2 diabetes mellitus who presents with laceration. Patient reports that he was getting in his car when he cut his left ankle. Denies any numbness. Last tetanus shot was in 2004.     Review of Systems   Skin: Positive for wound.   Neurological: Negative for numbness.   All other systems reviewed and are negative.    Allergies:  Fentanyl  Insulin  Lisinopril  Morphine    Medications:  Beclomethasone dipropionate  Naltrexone  Folic acid  Pantoprazole  Fluticasone  Diazepam  Guaifenesin  Albuterol   Celecoxib  empagliflozin  Hydrocodone-acetaminophen    Past Medical History:     Pancreatitis  Lactic acidosis  Epiglottitis  Acute alcoholic gastritis  COVID-19  Tobacco use  Adrenal nodule   Asthma  Type 2 diabetes mellitus  Hypertension     Past Surgical History:    Forearm surgery  Hip surgery      Social History:  Presents alone.  Presents via private vehicle.  PCP: Freddie Donis     Physical Exam     Patient Vitals for the past 24 hrs:   BP Temp Temp src Pulse Resp SpO2   12/18/22 0828 (!) 153/106 97.6  F (36.4  C) Temporal 100 18 96 %       Physical Exam  Vitals and nursing note reviewed.   Constitutional:       General: He is not in acute distress.     Appearance: Normal appearance. He is not ill-appearing.   HENT:      Head: Normocephalic and atraumatic.      Right Ear: External ear normal.      Left Ear: External ear normal.      Nose: Nose normal.      Mouth/Throat:      Mouth: Mucous membranes are moist.   Eyes:      Extraocular Movements: Extraocular movements intact.      Conjunctiva/sclera: Conjunctivae normal.   Pulmonary:      Effort: Pulmonary effort is normal. No respiratory distress.   Musculoskeletal:         General: No deformity or signs of injury.      Cervical back: Normal range of motion and neck supple.      Left ankle:  Laceration (1.5 cm superficial laceration to the left medial ankle) present. Normal pulse.   Skin:     General: Skin is warm and dry.      Findings: No rash.   Neurological:      Mental Status: He is alert and oriented to person, place, and time.   Psychiatric:         Behavior: Behavior normal.           Emergency Department Course   ECG  ECG results from 12/29/21   EKG 12-lead, tracing only     Value    Systolic Blood Pressure     Diastolic Blood Pressure     Ventricular Rate 92    Atrial Rate 92    KS Interval 148    QRS Duration 98        QTc 492    P Axis 56    R AXIS -24    T Axis 46    Interpretation ECG      Sinus rhythm  Prolonged QT  Abnormal ECG  When compared with ECG of 27-DEC-2021 23:27, (unconfirmed)  No significant change was found  Confirmed by - EMERGENCY ROOM, PHYSICIAN ([id number]),  OSBALDO TORRES ([id number]) on 12/29/2021 8:25:50 AM         Procedures    Laceration Repair      Procedure: Laceration Repair    Indication: Laceration    Consent: Verbal    Location: Left Ankle    Length: 1.5 cm    Preparation: Irrigation with Sterile Saline.    Anesthesia/Sedation: Lidocaine with Epinephrine - 1%      Treatment/Exploration: Wound explored, no foreign bodies found     Closure: The wound was closed with one layer. Skin/superficial layer was closed with 3 x 4-0 Nylon using Interrupted sutures.     Patient Status: The patient tolerated the procedure well: Yes. There were no complications.    Emergency Department Course:     Reviewed:  I reviewed nursing notes, vitals, past medical history and Care Everywhere    Assessments:  0949 I obtained history and examined the patient as noted above.   1015 I rechecked the patient and explained findings. I performed laceration repair.       Interventions:  Medications   lidocaine 1% with EPINEPHrine 1:100,000 injection 5 mL (5 mLs Intradermal Given by Other 12/18/22 1005)   Tdap (tetanus-diphtheria-acell pertussis) (ADACEL) injection 0.5 mL (0.5 mLs  Intramuscular Given 22 1004)       Disposition:  The patient was discharged to home.     Impression & Plan   Medical Decision Makin-year-old male presenting with laceration to the left ankle.  No signs of any significant vascular, tendon, nerve injury.  Tdap was updated.  Laceration was cleaned and repaired as noted above.  Recommend follow-up in 10 days with primary care physician for suture removal.  Discussed wound care and return precautions.    Diagnosis:    ICD-10-CM    1. Laceration of left ankle, initial encounter  S91.012A           Discharge Medications:  New Prescriptions    No medications on file       Scribe Disclosure:  Robin LORA, am serving as a scribe at 9:44 AM on 2022 to document services personally performed by Tiago Teixeira MD based on my observations and the provider's statements to me.            Tiago Teixeira MD  22 0250

## 2022-12-19 ENCOUNTER — APPOINTMENT (OUTPATIENT)
Dept: GENERAL RADIOLOGY | Facility: CLINIC | Age: 47
End: 2022-12-19
Attending: EMERGENCY MEDICINE
Payer: COMMERCIAL

## 2022-12-19 ENCOUNTER — HOSPITAL ENCOUNTER (OUTPATIENT)
Facility: CLINIC | Age: 47
Setting detail: OBSERVATION
Discharge: HOME | End: 2022-12-20
Attending: EMERGENCY MEDICINE | Admitting: INTERNAL MEDICINE
Payer: COMMERCIAL

## 2022-12-19 DIAGNOSIS — E13.69 OTHER SPECIFIED DIABETES MELLITUS WITH OTHER SPECIFIED COMPLICATION, UNSPECIFIED WHETHER LONG TERM INSULIN USE (H): ICD-10-CM

## 2022-12-19 DIAGNOSIS — S90.02XA CONTUSION OF LEFT ANKLE, INITIAL ENCOUNTER: ICD-10-CM

## 2022-12-19 DIAGNOSIS — K59.00 CONSTIPATION, UNSPECIFIED CONSTIPATION TYPE: ICD-10-CM

## 2022-12-19 DIAGNOSIS — S91.012D LACERATION OF LEFT ANKLE, SUBSEQUENT ENCOUNTER: ICD-10-CM

## 2022-12-19 DIAGNOSIS — R10.13 ABDOMINAL PAIN, EPIGASTRIC: Primary | ICD-10-CM

## 2022-12-19 DIAGNOSIS — R11.2 NAUSEA AND VOMITING, UNSPECIFIED VOMITING TYPE: ICD-10-CM

## 2022-12-19 DIAGNOSIS — K85.20 ALCOHOL-INDUCED ACUTE PANCREATITIS, UNSPECIFIED COMPLICATION STATUS: ICD-10-CM

## 2022-12-19 LAB
ALBUMIN SERPL BCG-MCNC: 4 G/DL (ref 3.5–5.2)
ALP SERPL-CCNC: 76 U/L (ref 40–129)
ALT SERPL W P-5'-P-CCNC: 16 U/L (ref 10–50)
ANION GAP SERPL CALCULATED.3IONS-SCNC: 13 MMOL/L (ref 7–15)
AST SERPL W P-5'-P-CCNC: 23 U/L (ref 10–50)
BASOPHILS # BLD AUTO: 0.1 10E3/UL (ref 0–0.2)
BASOPHILS NFR BLD AUTO: 1 %
BILIRUB SERPL-MCNC: 0.5 MG/DL
BUN SERPL-MCNC: 12.5 MG/DL (ref 6–20)
CALCIUM SERPL-MCNC: 9.1 MG/DL (ref 8.6–10)
CHLORIDE SERPL-SCNC: 104 MMOL/L (ref 98–107)
CREAT SERPL-MCNC: 0.57 MG/DL (ref 0.67–1.17)
DEPRECATED HCO3 PLAS-SCNC: 22 MMOL/L (ref 22–29)
EOSINOPHIL # BLD AUTO: 0.1 10E3/UL (ref 0–0.7)
EOSINOPHIL NFR BLD AUTO: 2 %
ERYTHROCYTE [DISTWIDTH] IN BLOOD BY AUTOMATED COUNT: 15.1 % (ref 10–15)
ETHANOL SERPL-MCNC: <0.01 G/DL
GFR SERPL CREATININE-BSD FRML MDRD: >90 ML/MIN/1.73M2
GLUCOSE BLDC GLUCOMTR-MCNC: 124 MG/DL (ref 70–99)
GLUCOSE BLDC GLUCOMTR-MCNC: 149 MG/DL (ref 70–99)
GLUCOSE BLDC GLUCOMTR-MCNC: 189 MG/DL (ref 70–99)
GLUCOSE BLDC GLUCOMTR-MCNC: 212 MG/DL (ref 70–99)
GLUCOSE BLDC GLUCOMTR-MCNC: 219 MG/DL (ref 70–99)
GLUCOSE SERPL-MCNC: 135 MG/DL (ref 70–99)
HCT VFR BLD AUTO: 42.9 % (ref 40–53)
HGB BLD-MCNC: 13.7 G/DL (ref 13.3–17.7)
HOLD SPECIMEN: NORMAL
HOLD SPECIMEN: NORMAL
IMM GRANULOCYTES # BLD: 0 10E3/UL
IMM GRANULOCYTES NFR BLD: 0 %
LIPASE SERPL-CCNC: 169 U/L (ref 13–60)
LYMPHOCYTES # BLD AUTO: 1.9 10E3/UL (ref 0.8–5.3)
LYMPHOCYTES NFR BLD AUTO: 30 %
MCH RBC QN AUTO: 26.9 PG (ref 26.5–33)
MCHC RBC AUTO-ENTMCNC: 31.9 G/DL (ref 31.5–36.5)
MCV RBC AUTO: 84 FL (ref 78–100)
MONOCYTES # BLD AUTO: 0.7 10E3/UL (ref 0–1.3)
MONOCYTES NFR BLD AUTO: 10 %
NEUTROPHILS # BLD AUTO: 3.5 10E3/UL (ref 1.6–8.3)
NEUTROPHILS NFR BLD AUTO: 57 %
NRBC # BLD AUTO: 0 10E3/UL
NRBC BLD AUTO-RTO: 0 /100
PLATELET # BLD AUTO: 236 10E3/UL (ref 150–450)
POTASSIUM SERPL-SCNC: 4.2 MMOL/L (ref 3.4–5.3)
PROT SERPL-MCNC: 7.1 G/DL (ref 6.4–8.3)
RBC # BLD AUTO: 5.1 10E6/UL (ref 4.4–5.9)
SODIUM SERPL-SCNC: 139 MMOL/L (ref 136–145)
WBC # BLD AUTO: 6.2 10E3/UL (ref 4–11)

## 2022-12-19 PROCEDURE — 96374 THER/PROPH/DIAG INJ IV PUSH: CPT

## 2022-12-19 PROCEDURE — 99285 EMERGENCY DEPT VISIT HI MDM: CPT | Mod: 25

## 2022-12-19 PROCEDURE — 250N000013 HC RX MED GY IP 250 OP 250 PS 637: Performed by: PHYSICIAN ASSISTANT

## 2022-12-19 PROCEDURE — 250N000011 HC RX IP 250 OP 636: Performed by: EMERGENCY MEDICINE

## 2022-12-19 PROCEDURE — 36591 DRAW BLOOD OFF VENOUS DEVICE: CPT | Performed by: EMERGENCY MEDICINE

## 2022-12-19 PROCEDURE — 258N000003 HC RX IP 258 OP 636: Performed by: PHYSICIAN ASSISTANT

## 2022-12-19 PROCEDURE — 96375 TX/PRO/DX INJ NEW DRUG ADDON: CPT

## 2022-12-19 PROCEDURE — 80053 COMPREHEN METABOLIC PANEL: CPT | Performed by: EMERGENCY MEDICINE

## 2022-12-19 PROCEDURE — 96372 THER/PROPH/DIAG INJ SC/IM: CPT | Performed by: PHYSICIAN ASSISTANT

## 2022-12-19 PROCEDURE — 82962 GLUCOSE BLOOD TEST: CPT

## 2022-12-19 PROCEDURE — 96372 THER/PROPH/DIAG INJ SC/IM: CPT | Performed by: INTERNAL MEDICINE

## 2022-12-19 PROCEDURE — 96376 TX/PRO/DX INJ SAME DRUG ADON: CPT

## 2022-12-19 PROCEDURE — 250N000012 HC RX MED GY IP 250 OP 636 PS 637: Performed by: PHYSICIAN ASSISTANT

## 2022-12-19 PROCEDURE — 96361 HYDRATE IV INFUSION ADD-ON: CPT

## 2022-12-19 PROCEDURE — 250N000011 HC RX IP 250 OP 636: Performed by: PHYSICIAN ASSISTANT

## 2022-12-19 PROCEDURE — 85014 HEMATOCRIT: CPT | Performed by: EMERGENCY MEDICINE

## 2022-12-19 PROCEDURE — C9113 INJ PANTOPRAZOLE SODIUM, VIA: HCPCS | Performed by: PHYSICIAN ASSISTANT

## 2022-12-19 PROCEDURE — 258N000003 HC RX IP 258 OP 636: Performed by: EMERGENCY MEDICINE

## 2022-12-19 PROCEDURE — 250N000012 HC RX MED GY IP 250 OP 636 PS 637: Performed by: INTERNAL MEDICINE

## 2022-12-19 PROCEDURE — 83690 ASSAY OF LIPASE: CPT | Performed by: EMERGENCY MEDICINE

## 2022-12-19 PROCEDURE — 73610 X-RAY EXAM OF ANKLE: CPT | Mod: LT

## 2022-12-19 PROCEDURE — G0378 HOSPITAL OBSERVATION PER HR: HCPCS

## 2022-12-19 PROCEDURE — 99220 PR INITIAL OBSERVATION CARE,LEVEL III: CPT | Performed by: PHYSICIAN ASSISTANT

## 2022-12-19 PROCEDURE — 82077 ASSAY SPEC XCP UR&BREATH IA: CPT | Performed by: EMERGENCY MEDICINE

## 2022-12-19 RX ORDER — PROCHLORPERAZINE MALEATE 5 MG
10 TABLET ORAL EVERY 6 HOURS PRN
Status: DISCONTINUED | OUTPATIENT
Start: 2022-12-19 | End: 2022-12-20 | Stop reason: HOSPADM

## 2022-12-19 RX ORDER — ENOXAPARIN SODIUM 100 MG/ML
40 INJECTION SUBCUTANEOUS EVERY 24 HOURS
Status: DISCONTINUED | OUTPATIENT
Start: 2022-12-19 | End: 2022-12-20 | Stop reason: HOSPADM

## 2022-12-19 RX ORDER — ALBUTEROL SULFATE 0.83 MG/ML
2.5 SOLUTION RESPIRATORY (INHALATION) EVERY 6 HOURS PRN
Status: DISCONTINUED | OUTPATIENT
Start: 2022-12-19 | End: 2022-12-20 | Stop reason: HOSPADM

## 2022-12-19 RX ORDER — NICOTINE POLACRILEX 4 MG
15-30 LOZENGE BUCCAL
Status: DISCONTINUED | OUTPATIENT
Start: 2022-12-19 | End: 2022-12-20 | Stop reason: HOSPADM

## 2022-12-19 RX ORDER — ACETAMINOPHEN 650 MG/1
650 SUPPOSITORY RECTAL EVERY 6 HOURS PRN
Status: DISCONTINUED | OUTPATIENT
Start: 2022-12-19 | End: 2022-12-20 | Stop reason: HOSPADM

## 2022-12-19 RX ORDER — HYDROMORPHONE HYDROCHLORIDE 1 MG/ML
0.5 INJECTION, SOLUTION INTRAMUSCULAR; INTRAVENOUS; SUBCUTANEOUS ONCE
Status: COMPLETED | OUTPATIENT
Start: 2022-12-19 | End: 2022-12-19

## 2022-12-19 RX ORDER — FOLIC ACID 1 MG/1
1 TABLET ORAL DAILY
Status: DISCONTINUED | OUTPATIENT
Start: 2022-12-19 | End: 2022-12-20 | Stop reason: HOSPADM

## 2022-12-19 RX ORDER — LOSARTAN POTASSIUM 50 MG/1
50 TABLET ORAL DAILY
Status: DISCONTINUED | OUTPATIENT
Start: 2022-12-19 | End: 2022-12-20 | Stop reason: HOSPADM

## 2022-12-19 RX ORDER — OXYCODONE HYDROCHLORIDE 5 MG/1
5 TABLET ORAL EVERY 4 HOURS PRN
Status: DISCONTINUED | OUTPATIENT
Start: 2022-12-19 | End: 2022-12-20 | Stop reason: HOSPADM

## 2022-12-19 RX ORDER — SODIUM CHLORIDE, SODIUM LACTATE, POTASSIUM CHLORIDE, CALCIUM CHLORIDE 600; 310; 30; 20 MG/100ML; MG/100ML; MG/100ML; MG/100ML
INJECTION, SOLUTION INTRAVENOUS CONTINUOUS
Status: DISCONTINUED | OUTPATIENT
Start: 2022-12-19 | End: 2022-12-20 | Stop reason: HOSPADM

## 2022-12-19 RX ORDER — HYDROMORPHONE HYDROCHLORIDE 1 MG/ML
0.3 INJECTION, SOLUTION INTRAMUSCULAR; INTRAVENOUS; SUBCUTANEOUS
Status: DISCONTINUED | OUTPATIENT
Start: 2022-12-19 | End: 2022-12-20 | Stop reason: HOSPADM

## 2022-12-19 RX ORDER — AMOXICILLIN 250 MG
1 CAPSULE ORAL 2 TIMES DAILY PRN
Status: DISCONTINUED | OUTPATIENT
Start: 2022-12-19 | End: 2022-12-20 | Stop reason: HOSPADM

## 2022-12-19 RX ORDER — ONDANSETRON 4 MG/1
4 TABLET, ORALLY DISINTEGRATING ORAL ONCE
Status: COMPLETED | OUTPATIENT
Start: 2022-12-19 | End: 2022-12-19

## 2022-12-19 RX ORDER — ACETAMINOPHEN 325 MG/1
650 TABLET ORAL EVERY 6 HOURS PRN
Status: DISCONTINUED | OUTPATIENT
Start: 2022-12-19 | End: 2022-12-20 | Stop reason: HOSPADM

## 2022-12-19 RX ORDER — ALBUTEROL SULFATE 90 UG/1
1-2 AEROSOL, METERED RESPIRATORY (INHALATION) EVERY 6 HOURS PRN
COMMUNITY
End: 2023-06-16

## 2022-12-19 RX ORDER — ONDANSETRON 4 MG/1
4 TABLET, ORALLY DISINTEGRATING ORAL EVERY 6 HOURS PRN
Status: DISCONTINUED | OUTPATIENT
Start: 2022-12-19 | End: 2022-12-20 | Stop reason: HOSPADM

## 2022-12-19 RX ORDER — ONDANSETRON 2 MG/ML
4 INJECTION INTRAMUSCULAR; INTRAVENOUS EVERY 6 HOURS PRN
Status: DISCONTINUED | OUTPATIENT
Start: 2022-12-19 | End: 2022-12-20 | Stop reason: HOSPADM

## 2022-12-19 RX ORDER — AMOXICILLIN 250 MG
2 CAPSULE ORAL 2 TIMES DAILY PRN
Status: DISCONTINUED | OUTPATIENT
Start: 2022-12-19 | End: 2022-12-20 | Stop reason: HOSPADM

## 2022-12-19 RX ORDER — HYDROMORPHONE HYDROCHLORIDE 1 MG/ML
0.5 INJECTION, SOLUTION INTRAMUSCULAR; INTRAVENOUS; SUBCUTANEOUS
Status: COMPLETED | OUTPATIENT
Start: 2022-12-19 | End: 2022-12-19

## 2022-12-19 RX ORDER — DEXTROSE MONOHYDRATE 25 G/50ML
25-50 INJECTION, SOLUTION INTRAVENOUS
Status: DISCONTINUED | OUTPATIENT
Start: 2022-12-19 | End: 2022-12-20 | Stop reason: HOSPADM

## 2022-12-19 RX ORDER — PROCHLORPERAZINE 25 MG
25 SUPPOSITORY, RECTAL RECTAL EVERY 12 HOURS PRN
Status: DISCONTINUED | OUTPATIENT
Start: 2022-12-19 | End: 2022-12-20 | Stop reason: HOSPADM

## 2022-12-19 RX ORDER — ONDANSETRON 2 MG/ML
4 INJECTION INTRAMUSCULAR; INTRAVENOUS
Status: COMPLETED | OUTPATIENT
Start: 2022-12-19 | End: 2022-12-19

## 2022-12-19 RX ORDER — MULTIVITAMIN,THERAPEUTIC
1 TABLET ORAL DAILY
Status: DISCONTINUED | OUTPATIENT
Start: 2022-12-19 | End: 2022-12-20 | Stop reason: HOSPADM

## 2022-12-19 RX ORDER — LIDOCAINE 40 MG/G
CREAM TOPICAL
Status: DISCONTINUED | OUTPATIENT
Start: 2022-12-19 | End: 2022-12-20 | Stop reason: HOSPADM

## 2022-12-19 RX ADMIN — ENOXAPARIN SODIUM 40 MG: 40 INJECTION SUBCUTANEOUS at 09:55

## 2022-12-19 RX ADMIN — INSULIN ASPART 2 UNITS: 100 INJECTION, SOLUTION INTRAVENOUS; SUBCUTANEOUS at 21:39

## 2022-12-19 RX ADMIN — INSULIN DETEMIR 10 UNITS: 100 INJECTION, SOLUTION SUBCUTANEOUS at 21:39

## 2022-12-19 RX ADMIN — LOSARTAN POTASSIUM 50 MG: 50 TABLET, FILM COATED ORAL at 10:24

## 2022-12-19 RX ADMIN — SODIUM CHLORIDE, POTASSIUM CHLORIDE, SODIUM LACTATE AND CALCIUM CHLORIDE: 600; 310; 30; 20 INJECTION, SOLUTION INTRAVENOUS at 09:54

## 2022-12-19 RX ADMIN — ONDANSETRON 4 MG: 4 TABLET, ORALLY DISINTEGRATING ORAL at 03:38

## 2022-12-19 RX ADMIN — INSULIN ASPART 2 UNITS: 100 INJECTION, SOLUTION INTRAVENOUS; SUBCUTANEOUS at 18:20

## 2022-12-19 RX ADMIN — HYDROMORPHONE HYDROCHLORIDE 0.5 MG: 1 INJECTION, SOLUTION INTRAMUSCULAR; INTRAVENOUS; SUBCUTANEOUS at 05:38

## 2022-12-19 RX ADMIN — HYDROMORPHONE HYDROCHLORIDE 0.3 MG: 1 INJECTION, SOLUTION INTRAMUSCULAR; INTRAVENOUS; SUBCUTANEOUS at 15:50

## 2022-12-19 RX ADMIN — Medication 1 MG: at 23:43

## 2022-12-19 RX ADMIN — SODIUM CHLORIDE 1000 ML: 9 INJECTION, SOLUTION INTRAVENOUS at 05:31

## 2022-12-19 RX ADMIN — ONDANSETRON 4 MG: 2 INJECTION INTRAMUSCULAR; INTRAVENOUS at 05:38

## 2022-12-19 RX ADMIN — HYDROMORPHONE HYDROCHLORIDE 0.5 MG: 1 INJECTION, SOLUTION INTRAMUSCULAR; INTRAVENOUS; SUBCUTANEOUS at 18:43

## 2022-12-19 RX ADMIN — HYDROMORPHONE HYDROCHLORIDE 0.3 MG: 1 INJECTION, SOLUTION INTRAMUSCULAR; INTRAVENOUS; SUBCUTANEOUS at 12:39

## 2022-12-19 RX ADMIN — SODIUM CHLORIDE, POTASSIUM CHLORIDE, SODIUM LACTATE AND CALCIUM CHLORIDE: 600; 310; 30; 20 INJECTION, SOLUTION INTRAVENOUS at 17:19

## 2022-12-19 RX ADMIN — INSULIN ASPART 1 UNITS: 100 INJECTION, SOLUTION INTRAVENOUS; SUBCUTANEOUS at 10:23

## 2022-12-19 RX ADMIN — HYDROMORPHONE HYDROCHLORIDE 0.5 MG: 1 INJECTION, SOLUTION INTRAMUSCULAR; INTRAVENOUS; SUBCUTANEOUS at 07:50

## 2022-12-19 RX ADMIN — OXYCODONE HYDROCHLORIDE 5 MG: 5 TABLET ORAL at 09:55

## 2022-12-19 RX ADMIN — HYDROMORPHONE HYDROCHLORIDE 0.3 MG: 1 INJECTION, SOLUTION INTRAMUSCULAR; INTRAVENOUS; SUBCUTANEOUS at 23:43

## 2022-12-19 RX ADMIN — HYDROMORPHONE HYDROCHLORIDE 0.5 MG: 1 INJECTION, SOLUTION INTRAMUSCULAR; INTRAVENOUS; SUBCUTANEOUS at 06:14

## 2022-12-19 RX ADMIN — PANTOPRAZOLE SODIUM 40 MG: 40 INJECTION, POWDER, FOR SOLUTION INTRAVENOUS at 09:52

## 2022-12-19 RX ADMIN — SODIUM CHLORIDE, POTASSIUM CHLORIDE, SODIUM LACTATE AND CALCIUM CHLORIDE: 600; 310; 30; 20 INJECTION, SOLUTION INTRAVENOUS at 23:48

## 2022-12-19 ASSESSMENT — ACTIVITIES OF DAILY LIVING (ADL)
ADLS_ACUITY_SCORE: 26
ADLS_ACUITY_SCORE: 35
ADLS_ACUITY_SCORE: 26
ADLS_ACUITY_SCORE: 33
ADLS_ACUITY_SCORE: 26
ADLS_ACUITY_SCORE: 35
ADLS_ACUITY_SCORE: 35
ADLS_ACUITY_SCORE: 26
ADLS_ACUITY_SCORE: 26
ADLS_ACUITY_SCORE: 35

## 2022-12-19 ASSESSMENT — ENCOUNTER SYMPTOMS
MYALGIAS: 1
ARTHRALGIAS: 1
ABDOMINAL PAIN: 1
VOMITING: 1

## 2022-12-19 NOTE — PHARMACY-ADMISSION MEDICATION HISTORY
Admission medication history interview status for this patient is complete. See [facility] admission navigator for allergy information, prior to admission medications and immunization status.     Medication history interview source(s):Patient  Medication history resources (including written lists, pill bottles, clinic record):None  Primary pharmacy:[facility]    Changes made to PTA medication list:  Added: detemir, albuterol  Deleted: tums, pantoprazole  Changed: none    Actions taken by pharmacist (provider contacted, etc):None     Additional medication history information:None    Medication reconciliation/reorder completed by provider prior to medication history? No    For patients on insulin therapy: yes  levemir: 40 units in PM   Sliding scale Novolog N     Prior to Admission medications    Medication Sig Last Dose Taking? Auth Provider Long Term End Date   albuterol (PROAIR HFA/PROVENTIL HFA/VENTOLIN HFA) 108 (90 Base) MCG/ACT inhaler Inhale 1-2 puffs into the lungs every 6 hours as needed for shortness of breath, wheezing or cough Unknown Yes Unknown, Entered By History No    empagliflozin (JARDIANCE) 10 MG TABS tablet Take 1 tablet (10 mg) by mouth daily Unknown Yes Rusty Randolph D, DO     glipiZIDE (GLUCOTROL XL) 10 MG 24 hr tablet Take 1 tablet (10 mg) by mouth daily Unknown Yes Rusty Randolph, DO Yes    insulin detemir (LEVEMIR PEN) 100 UNIT/ML pen Inject 40 Units Subcutaneous At Bedtime Unknown Yes Unknown, Entered By History Yes    losartan (COZAAR) 50 MG tablet Take 1 tablet (50 mg) by mouth daily Unknown Yes Rusty Randolph, DO Yes

## 2022-12-19 NOTE — ED TRIAGE NOTES
Pt aox4, ABCs intact. Pt c/o worsening left ankle pain after he hit the ankle with a piece of furniture. Patient was seen earlier today for a laceration to the same ankle. Patient states that since he hit the ankle with the piece of furniture he has been having increased pain and swelling and the laceration has been bleeding around the sutures. Pt states that the pain is so back now that he can barely walk. Patient is also having nausea and vomiting.

## 2022-12-19 NOTE — PLAN OF CARE
Patient vital signs are at baseline: Yes  Patient able to ambulate as they were prior to admission or with assist devices provided by therapies during their stay:  Yes  Patient MUST void prior to discharge:  Yes  Patient able to tolerate oral intake:  No,  Reason:  NPO   Pain has adequate pain control using Oral analgesics:  No,  Reason:  Needing iv dilaudid  Does patient have an identified :  Yes  Has goal D/C date and time been discussed with patient:  Yes     Pt arrived from ED, stating in pain, oxycodone didn't help, gave prn dilaudid. VSS. Pt up independently with cane. LR infusing at 100mL/hr. Unable to eat/drink due to Nausea and vomitting. Refused oral meds as he has N/V.

## 2022-12-19 NOTE — ED NOTES
[facility]  ED Nurse Handoff Report    Jaiden Lyons is a 47 year old male   ED Chief complaint: OTHER  . ED Diagnosis:   Final diagnoses:   Alcohol-induced acute pancreatitis, unspecified complication status   Nausea and vomiting, unspecified vomiting type   Laceration of left ankle, subsequent encounter   Contusion of left ankle, initial encounter     Allergies:   Allergies   Allergen Reactions     Fentanyl      Insulin Swelling     Reaction Date: Around 7493-7687  Face swelling  Inpatient when reaction occurred, unsure which type of insulin  Required some Benadryl to help with the swelling    Tolerated aspart/glargine while inpt 12/2021     Lisinopril      Face swelling, cough     Morphine Itching       Code Status: Full Code  Activity level - Baseline/Home:  Independent. Activity Level - Current:   Independent. Lift room needed: No. Bariatric: No   Needed: No   Isolation: No. Infection: Not Applicable.     Vital Signs:   Vitals:    12/19/22 0334 12/19/22 0335 12/19/22 0700 12/19/22 0715   BP:  (!) 132/97     Pulse: 105      Resp: 20      Temp: 98.1  F (36.7  C)      TempSrc: Temporal      SpO2: 100%  93% 97%       Cardiac Rhythm:  ,      Pain level:    Patient confused: No. Patient Falls Risk: No.   Elimination Status: Has voided   Patient Report - Initial Complaint: nausea, vomiting. Focused Assessment: Jaiden Lyons is a 47 year old male with history of type II diabetes, hypertension, and adrenal node who presents with concern for pancreatitis and left foot pain. Patient states that he cut his foot yesterday and got stitches. Patient states that he then was moving furniture and hit the site where he got sutures and now he has pain in his ankle that radiates upwards. Patient also complains of probable pancreatitis due to a recent relapse of alcohol. He has been vomiting and states his mouth is very dry. No fevers.    Tests Performed: labs, imaging. Abnormal Results:   Labs Ordered  and Resulted from Time of ED Arrival to Time of ED Departure   GLUCOSE BY METER - Abnormal       Result Value    GLUCOSE BY METER POCT 189 (*)    COMPREHENSIVE METABOLIC PANEL - Abnormal    Sodium 139      Potassium 4.2      Chloride 104      Carbon Dioxide (CO2) 22      Anion Gap 13      Urea Nitrogen 12.5      Creatinine 0.57 (*)     Calcium 9.1      Glucose 135 (*)     Alkaline Phosphatase 76      AST 23      ALT 16      Protein Total 7.1      Albumin 4.0      Bilirubin Total 0.5      GFR Estimate >90     LIPASE - Abnormal    Lipase 169 (*)    CBC WITH PLATELETS AND DIFFERENTIAL - Abnormal    WBC Count 6.2      RBC Count 5.10      Hemoglobin 13.7      Hematocrit 42.9      MCV 84      MCH 26.9      MCHC 31.9      RDW 15.1 (*)     Platelet Count 236      % Neutrophils 57      % Lymphocytes 30      % Monocytes 10      % Eosinophils 2      % Basophils 1      % Immature Granulocytes 0      NRBCs per 100 WBC 0      Absolute Neutrophils 3.5      Absolute Lymphocytes 1.9      Absolute Monocytes 0.7      Absolute Eosinophils 0.1      Absolute Basophils 0.1      Absolute Immature Granulocytes 0.0      Absolute NRBCs 0.0     ETHYL ALCOHOL LEVEL - Normal    Alcohol ethyl <0.01       XR Ankle Left G/E 3 Views   Final Result   IMPRESSION: No visualized acute fracture or malalignment of the left ankle.       .   Treatments provided: IV fluid, zofran, dilaudid  Family Comments: in room  OBS brochure/video discussed/provided to patient:  N/A  ED Medications:   Medications   HYDROmorphone (PF) (DILAUDID) injection 0.5 mg (0.5 mg Intravenous Given 12/19/22 0614)   ondansetron (ZOFRAN ODT) ODT tab 4 mg (4 mg Oral Given 12/19/22 0653)   0.9% sodium chloride BOLUS (1,000 mLs Intravenous New Bag 12/19/22 3744)   ondansetron (ZOFRAN) injection 4 mg (4 mg Intravenous Given 12/19/22 4753)     Drips infusing:  No  For the majority of the shift, the patient's behavior Green. Interventions performed were NA.    Sepsis treatment initiated: No      Patient tested for COVID 19 prior to admission: NO    ED Nurse Name/Phone Number: Lynne cShroeder RN,   7:43 AM

## 2022-12-19 NOTE — PROGRESS NOTES
Pt boarding in ED. Up with SBA, using cane. Admitted with pancreatitis, abd soft/diffuse tenderness. Denies N/V. NPO. LR running at 100.  Oxycodone given x1 but not very effective per patient. Pt complaining more about pain in foot, sutures intact, offered ice but declined at that time. Pt is transferring to room 208-2 now.

## 2022-12-19 NOTE — PROGRESS NOTES
Care Management Note    Discharge Disposition:  Home    Handoff Referral Completed: Yes    Additional Information:  Pt identified as a Service Bundle #3 with [facility] PCP. No needs or assessment needed at this time. Please consult CM/SW  if discharge needs should arise.    CHERRY Lugo, [facility]   Inpatient Care Coordination  [facility]   [phone]

## 2022-12-19 NOTE — H&P
[facility]    Hospitalist History and Physical    Name: Jaiden Lyons    MRN: [id number]  YOB: 1975    Age: 47 year old  Date of Admission:  12/19/2022  Date of Service (when I saw the patient): 12/19/22    Assessment & Plan   Jaiden Lyons is a 47 year old male with PMH significant for h/o alcohol dependence with recent relapse, alcoholic pancreatitis, GERD, asthma, hypertension, and DM2 who presents to the ED for the second time within 24 hours on 12/19/2022 for evaluation of left ankle wound and nausea, vomiting, and abdominal pain.     ED workup reveals: initially tachycardic and mildly hypertensive otherwise VSS, CBC WNL, glucose of 135, LFTs WNL, lipase of 169, etoh level negative, and x-ray of left ankle shows no acute fracture or malalignment.     #Acute pancreatitis  #H/o alcohol dependence with recent relapse: previous alcohol abuse and had been sober for 5 months after hospitalization in 07/2022 for pancreatitis. Due to recent social stressors the patient started intermittently drinking beer and reports having a couple of shots one evening within the last 2 weeks. Onset of epigastric abdominal pain with band across upper abdomen into back with associated nausea and 3-4 episodes of nonbloody emesis prior to coming into the ED. Pain similar to previous pancreatitis episode. Lipase only mildly elevated at 169 and no imaging of abdomen performed in ED. LFTs WNL. Will plan for conservative management of suspected acute exacerbation of likely chronic pancreatitis from recent alcohol use.   - NPO  - IVF hydration with LR at 125 ml/hour  - pain control   - no current evidence of withdrawal, monitor and start CIWA if needed  - MVI, thiamine, and folate   - recommend alcohol cessation which patient is agreeable to    #Acute pain due to left ankle laceration: hit his left ankle while getting into his car on 12/18 and sustained a 1.5 cm laceration near his left medial  ankle. Laceration was sutured in ED after event. No current evidence of associated infection. Increased pain with swelling and tender to palpation. X-ray of left ankle negative for fracture.   - elevate left ankle  - PRN ice  - pain control   - WBAT  - remove sutures in 7-10 days (placed on 12/18)     #Uncontrolled DM2: most recent HgbA1c of >14 on 10/25/2022, initial blood glucose of 135. Left supply of insulin levemir at friend's house and not been taking the last couple days. Reports being instructed to take 40 units at bedtime but has decreased to 30 units due to hypoglycemia in the 50s in the morning.   - continue PTA insulin levemir at 20 units due to being NPO  - blood glucose checks every 4 hours while NPO  - hold Jardiance and Glipizide until tolerating diet    #GERD: ran out of Protonix and has not been taking recently  - IV Protonix daily while NPO      #HTN: stable, continue PTA Losartan with parameters     #Asthma: no current exacerbation, uses albuterol PRN, will have available     Clinically Significant Risk Factors Present on Admission                  # Hypertension: home medication list includes antihypertensive(s)            DVT Prophylaxis: Enoxaparin (Lovenox) SQ  Code Status: Full Code, discussed with patient   Disposition: Expected stay >2 midnights, will admit to inpatient    Primary Care Physician   Freddie Best    Chief Complaint   Left ankle pain; nausea, vomiting, and abdominal pain    History obtained from discussion colleague who received sign out from ED provider, Dr. Ochoa, chart review, and interview with patient.      History of Present Illness   Jaiden Lyons is a 47 year old male who presents for the second time within 24 hours for evaluation of left ankle pain and now concerns for nausea, vomiting, and abdominal pain.  The patient states that on 12/18 he hit his left ankle when trying to get out of his car.  Due to a significant laceration with associated pain the  patient was seen in the ED where this was repaired and he ultimately discharged home.  Since being home the patient has had increased pain, serosanguineous drainage, increased swelling, and difficulty bearing weight on his left ankle.  Denies purulent drainage, fever, or chills.  The patient usually uses a cane at baseline for mobility.  The patient states that he had stopped drinking for approximately 5 months after his hospitalization for pancreatitis in 07/2022.  He started drinking again a couple weeks ago.  He notes having a beer here or there and one evening where he had a couple of shots.  His last drink was at 11:30 on 12/18.  Since 3:30/4 AM he has been having nausea with 3-4 episodes of nonbloody emesis.  He has been having epigastric abdominal pain with radiation across his upper abdomen into his back for the last 2-3 days.  Denies associated diarrhea, chest pain, shortness of breath, lightheadedness, dizziness, melena, or hematochezia.  Denies dyspepsia.  He reports having mild tremors a couple days ago that has since resolved.  Denies feeling anxious, SI, or HI.  Patient reports recent social stressors causing him to start drinking again and wants to abstain from alcohol after this hospital stay. He notes his pain starting to increase towards the end of the interview.     Past Medical History    Past Medical History:   Diagnosis Date     Alcohol abuse      Alcohol-induced acute pancreatitis      Asthma      Benign essential hypertension      Type 2 diabetes mellitus      Past Surgical History   Past Surgical History:   Procedure Laterality Date     ESOPHAGOSCOPY, GASTROSCOPY, DUODENOSCOPY (EGD), COMBINED Left 02/22/2021    Procedure: ESOPHAGOGASTRODUODENOSCOPY (EGD);  Surgeon: Landry Rodriguez MD;  Location:  GI     [facility]arm surgery [facility] from [facility]       HIP SURGERY       Prior to Admission Medications   Prior to Admission Medications   Prescriptions Last Dose Informant Patient Reported?  Taking?   calcium carbonate (TUMS) 500 MG chewable tablet   Yes No   Sig: Take 1 chew tab by mouth 3 times daily as needed for heartburn   empagliflozin (JARDIANCE) 10 MG TABS tablet   No No   Sig: Take 1 tablet (10 mg) by mouth daily   glipiZIDE (GLUCOTROL XL) 10 MG 24 hr tablet   No No   Sig: Take 1 tablet (10 mg) by mouth daily   losartan (COZAAR) 50 MG tablet   No No   Sig: Take 1 tablet (50 mg) by mouth daily   pantoprazole (PROTONIX) 40 MG EC tablet   No No   Sig: Take 1 tablet (40 mg) by mouth daily      Facility-Administered Medications: None     Allergies   Allergies   Allergen Reactions     Fentanyl      Insulin Swelling     Reaction Date: Around 7703-9230  Face swelling  Inpatient when reaction occurred, unsure which type of insulin  Required some Benadryl to help with the swelling    Tolerated aspart/glargine while inpt 12/2021     Lisinopril      Face swelling, cough     Morphine Itching     Social History   Social History     Tobacco Use     Smoking status: Every Day     Types: Cigarettes     Smokeless tobacco: Never   Substance Use Topics     Alcohol use: Yes     Comment: 1/2 to 1 pint a day     Social History     Social History Narrative     Not on file     Family History   Family history reviewed with patient and is noncontributory.    Review of Systems   A Comprehensive greater than 10 system review of systems was carried out.  Pertinent positives and negatives are noted above.  Otherwise negative for contributory information.    Physical Exam   Temp: 98.1  F (36.7  C) Temp src: Temporal BP: 116/81 Pulse: 86   Resp: 20 SpO2: 97 %      Vital Signs with Ranges  Temp:  [97.6  F (36.4  C)-98.1  F (36.7  C)] 98.1  F (36.7  C)  Pulse:  [] 86  Resp:  [18-20] 20  BP: (116-153)/() 116/81  SpO2:  [93 %-100 %] 97 %  0 lbs 0 oz    GEN:  Alert, oriented x 3, appears comfortable laying on gurney, no overt distress  HEENT:  Normocephalic/atraumatic, no scleral icterus, no nasal discharge, mouth  moist.  CV:  Regular rate and rhythm, no murmur or JVD.  S1 + S2 noted, no S3 or S4.  LUNGS:  Clear to auscultation bilaterally without rales/rhonchi/wheezing/retractions.  Symmetric chest rise on inhalation noted.  ABD:  Active bowel sounds, soft, epigastric tenderness, non-distended.  No rebound/guarding/rigidity.  EXT: left ankle: sutured laceration near left medial ankle with mild swelling but no erythema or drainage, tender to palpation without fluctuance. No cyanosis.  No acute joint synovitis noted.  SKIN:  Dry to touch, no exanthems noted in the visualized areas.  NEURO:  Symmetric muscle strength, sensation to touch grossly intact.  Coordination symmetric on general exam.  No new focal deficits appreciated.    Data   Data reviewed today:  I personally reviewed all labs and imaging from this visit.     Results for orders placed or performed during the hospital encounter of 12/19/22   XR Ankle Left G/E 3 Views     Status: None    Narrative    EXAM: LEFT ANKLE 2 VIEWS  LOCATION: [facility]  DATE/TIME: 12/19/2022 6:07 AM    INDICATION: Trauma to the medial ankle. Pain.  COMPARISON: None.      Impression    IMPRESSION: No visualized acute fracture or malalignment of the left ankle.    Glucose by meter     Status: Abnormal   Result Value Ref Range    GLUCOSE BY METER POCT 189 (H) 70 - 99 mg/dL   Comprehensive metabolic panel     Status: Abnormal   Result Value Ref Range    Sodium 139 136 - 145 mmol/L    Potassium 4.2 3.4 - 5.3 mmol/L    Chloride 104 98 - 107 mmol/L    Carbon Dioxide (CO2) 22 22 - 29 mmol/L    Anion Gap 13 7 - 15 mmol/L    Urea Nitrogen 12.5 6.0 - 20.0 mg/dL    Creatinine 0.57 (L) 0.67 - 1.17 mg/dL    Calcium 9.1 8.6 - 10.0 mg/dL    Glucose 135 (H) 70 - 99 mg/dL    Alkaline Phosphatase 76 40 - 129 U/L    AST 23 10 - 50 U/L    ALT 16 10 - 50 U/L    Protein Total 7.1 6.4 - 8.3 g/dL    Albumin 4.0 3.5 - 5.2 g/dL    Bilirubin Total 0.5 <=1.2 mg/dL    GFR Estimate >90 >60  mL/min/1.73m2   Lipase     Status: Abnormal   Result Value Ref Range    Lipase 169 (H) 13 - 60 U/L   Alcohol level blood     Status: Normal   Result Value Ref Range    Alcohol ethyl <0.01 <=0.01 g/dL   [place] Draw     Status: None    Narrative    The following orders were created for panel order [place] Draw.  Procedure                               Abnormality         Status                     ---------                               -----------         ------                     Extra Blue Top Tube[[id number]]                              Final result               Extra Red Top Tube[[id number]]                               Final result                 Please view results for these tests on the individual orders.   CBC with platelets and differential     Status: Abnormal   Result Value Ref Range    WBC Count 6.2 4.0 - 11.0 10e3/uL    RBC Count 5.10 4.40 - 5.90 10e6/uL    Hemoglobin 13.7 13.3 - 17.7 g/dL    Hematocrit 42.9 40.0 - 53.0 %    MCV 84 78 - 100 fL    MCH 26.9 26.5 - 33.0 pg    MCHC 31.9 31.5 - 36.5 g/dL    RDW 15.1 (H) 10.0 - 15.0 %    Platelet Count 236 150 - 450 10e3/uL    % Neutrophils 57 %    % Lymphocytes 30 %    % Monocytes 10 %    % Eosinophils 2 %    % Basophils 1 %    % Immature Granulocytes 0 %    NRBCs per 100 WBC 0 <1 /100    Absolute Neutrophils 3.5 1.6 - 8.3 10e3/uL    Absolute Lymphocytes 1.9 0.8 - 5.3 10e3/uL    Absolute Monocytes 0.7 0.0 - 1.3 10e3/uL    Absolute Eosinophils 0.1 0.0 - 0.7 10e3/uL    Absolute Basophils 0.1 0.0 - 0.2 10e3/uL    Absolute Immature Granulocytes 0.0 <=0.4 10e3/uL    Absolute NRBCs 0.0 10e3/uL   Extra Blue Top Tube     Status: None   Result Value Ref Range    Hold Specimen JIC    Extra Red Top Tube     Status: None   Result Value Ref Range    Hold Specimen JIC    CBC with platelets differential     Status: Abnormal    Narrative    The following orders were created for panel order CBC with platelets differential.  Procedure                                Abnormality         Status                     ---------                               -----------         ------                     CBC with platelets and d...[[id number]]  Abnormal            Final result                 Please view results for these tests on the individual orders.     Svetlana Richards PA-C  [facility]  Securely message with the [employer] Web Console (learn more here)  Text page via [employer] Paging/Directory  I discussed the patient with Dr. Garza and he agrees with the above plan.

## 2022-12-19 NOTE — PLAN OF CARE
PRIMARY DIAGNOSIS: ACUTE PAIN  OBSERVATION GOALS TO BE MET BEFORE DISCHARGE:  1. Pain Status: Improved but still requiring IV narcotics.    2. Return to near baseline physical activity: No    3. Cleared for discharge by consultants (if involved): No    Discharge Planner Nurse   Safe discharge environment identified: No  Barriers to discharge: Yes       Entered by: Adina Hirsch RN 12/19/2022   Pt alert and oriented x4, lung sounds CTA, BS, hypoactive. Pt c/p 7/10 to left foot wound, radiating to upper leg and to the abd. Prn Dilaudid IV was administered. Patient reported relieve and rated pain at 2/10. Patient reports LBM as 12/18. Pt now playing with his phone. LR running at 125 ml/hr. Will continue to monitor.  Please review provider order for any additional goals.   Nurse to notify provider when observation goals have been met and patient is ready for discharge.

## 2022-12-19 NOTE — UTILIZATION REVIEW
"Admission Status; Secondary Review Determination         Under the authority of the Utilization Management Committee, the utilization review process indicated a secondary review on the above patient.  The review outcome is based on review of the medical records, discussions with staff, and applying clinical experience noted on the date of the review.          (x) Observation Status Appropriate - This patient does not meet hospital inpatient criteria and is placed in observation status. If this patient's primary payer is Medicare and was admitted as an inpatient, Condition Code 44 should be used and patient status changed to \"observation\".     RATIONALE FOR DETERMINATION     Jaiden Lyons is a 47 year old male with PMH significant for h/o alcohol dependence with recent relapse, alcoholic pancreatitis, GERD, asthma, hypertension, and DM2 who presented to the ED for the second time within 24 hours on 12/19/2022 for evaluation of left ankle wound and nausea, vomiting, and abdominal pain. ED workup revealed: initially tachycardic and mildly hypertensive otherwise VSS, CBC WNL, glucose of 135, LFTs WNL, lipase of 169, etoh level negative, and x-ray of left ankle showed no acute fracture or malalignment. He was admitted for treatment of pancreatitis- possibly acute on chronic. He did receive  3 doses of IV dilaudid in the ED but now seems to be taking oral oxycodone. Observation admission is appropriate at this time.    The severity of illness, intensity of service provided, expected LOS and risk for adverse outcome make the care appropriate for further observation; however, doesn't meet criteria for hospital inpatient admission. Margaux HENRY was notified of this determination.    This document was produced using voice recognition software.      The information on this document is developed by the utilization review team in order for the business office to ensure compliance.  This only denotes the appropriateness of " proper admission status and does not reflect the quality of care rendered.         The definitions of Inpatient Status and Observation Status used in making the determination above are those provided in the CMS Coverage Manual, Chapter 1 and Chapter 6, section 70.4.      Sincerely,    Samy Ding MD    Utilization Review  Physician Advisor  [facility].

## 2022-12-19 NOTE — ED PROVIDER NOTES
History     Chief Complaint:  Wound check  Epigastric pain     The history is provided by the patient and the spouse.      Jaiden Lyons is a 47 year old male with history of type II diabetes, hypertension, and adrenal node who presents with concern for pancreatitis and left foot pain. Patient states that he cut his foot yesterday and got stitches. Patient states that he then was moving furniture and hit the site where he got sutures and now he has pain in his ankle that radiates upwards. Patient also complains of probable pancreatitis due to a recent relapse of alcohol. He has been vomiting and states his mouth is very dry. No fevers.     Review of Systems   Gastrointestinal: Positive for abdominal pain and vomiting.   Musculoskeletal: Positive for arthralgias and myalgias.   All other systems reviewed and are negative.    Allergies:  Fentanyl  Insulin  Lisinopril  Morphine    Medications:  Beclomethasone dipropionate  Naltrexone  Folic acid  Pantoprazole  Fluticasone  Diazepam  Guaifenesin  Albuterol   Celecoxib  empagliflozin  Hydrocodone-acetaminophen    Past Medical History:     Diagnosis   Alcohol abuse   Alcohol-induced acute pancreatitis   Asthma   Benign essential hypertension   Type 2 diabetes mellitus     Past Surgical History:    Forearm surgery  Hip surgery   EGD    Family History:    The patient denies any prior family history.    Social History:  PCP: Freddie Donis   Patient came from home.  Patient is accompanied in the ED with spouse.  Patient confirms alcohol use.    Physical Exam     Patient Vitals for the past 24 hrs:   BP Temp Temp src Pulse Resp SpO2   12/19/22 0715 -- -- -- -- -- 97 %   12/19/22 0700 -- -- -- -- -- 93 %   12/19/22 0335 132/97 -- -- -- -- --   12/19/22 0334 -- 98.1  F (36.7  C) Temporal 105 20 100 %     Physical Exam  Nursing note and vitals reviewed.  Constitutional: Cooperative.   HENT:   Mouth/Throat: Dry Mucous membranes.   Cardiovascular: Normal rate, regular  rhythm and normal heart sounds.  No murmur.  Pulmonary/Chest: Effort normal and breath sounds normal. No respiratory distress. No wheezes. No rales.   Abdominal: Soft. Normal appearance and bowel sounds are normal. No distension. There is no rigidity and no guarding. Epigastric tenderness.  Musculoskeletal: Normal range of motion of LLE.   Neurological: Alert. Oriented x4.   Skin: Skin is warm and dry. No rash noted. Sutures in place on left medial ankle with underlying soft tissue swelling, no erythema   Psychiatric: Normal mood and affect.     Emergency Department Course     Imaging:  XR Ankle Left G/E 3 Views   Final Result   IMPRESSION: No visualized acute fracture or malalignment of the left ankle.       Report per radiology    Laboratory:  Labs Ordered and Resulted from Time of ED Arrival to Time of ED Departure   GLUCOSE BY METER - Abnormal       Result Value    GLUCOSE BY METER POCT 189 (*)    COMPREHENSIVE METABOLIC PANEL - Abnormal    Sodium 139      Potassium 4.2      Chloride 104      Carbon Dioxide (CO2) 22      Anion Gap 13      Urea Nitrogen 12.5      Creatinine 0.57 (*)     Calcium 9.1      Glucose 135 (*)     Alkaline Phosphatase 76      AST 23      ALT 16      Protein Total 7.1      Albumin 4.0      Bilirubin Total 0.5      GFR Estimate >90     LIPASE - Abnormal    Lipase 169 (*)    CBC WITH PLATELETS AND DIFFERENTIAL - Abnormal    WBC Count 6.2      RBC Count 5.10      Hemoglobin 13.7      Hematocrit 42.9      MCV 84      MCH 26.9      MCHC 31.9      RDW 15.1 (*)     Platelet Count 236      % Neutrophils 57      % Lymphocytes 30      % Monocytes 10      % Eosinophils 2      % Basophils 1      % Immature Granulocytes 0      NRBCs per 100 WBC 0      Absolute Neutrophils 3.5      Absolute Lymphocytes 1.9      Absolute Monocytes 0.7      Absolute Eosinophils 0.1      Absolute Basophils 0.1      Absolute Immature Granulocytes 0.0      Absolute NRBCs 0.0     ETHYL ALCOHOL LEVEL - Normal    Alcohol ethyl  <0.01         Reviewed:  I reviewed nursing notes, vitals, past medical history and Care Everywhere    Consults:  0715 I consulted with Dr. Garza of the hospitalist service and discussed patient admission. They accepted care of the patient.    Assessments:  0443 I obtained history and examined the patient as noted above.   0630 I rechecked the patient and he feels nauseated and he feels better but needs to be admitted and explained findings.     Interventions:  0338 Zofran 4 mg  PO  0531 NS 1000 mL   IV  0538 Dilaudid 0.5 mg IV  0538 Zofran 4 mg  IV    Disposition:  The patient was admitted to the hospital under the care of Dr. Garza.     Impression & Plan     Medical Decision Making:  Jaiden Lyons is a 47 year old male who presents with epigastric abdominal pain.  The differential diagnosis would include GERD, GIB, esophageal spasm, atypical cardiac sx's, pancreatitis, biliary colic or gallstone disease, AAA, gastroenteritis, gastritis, large vs small bowel disease, etc.  Based on history, PE and labs, the most likely explanation is alcohol associated pancreatitis. Abdominal exam is benign at this point without need for advanced imaging. Pain control in ED was unsuccessful.  Based on this, will be admitted to the hospitalist. No fracture to his ankle. His incision clean, dry, and intact no further workup is needed in this regard.     Diagnosis:    ICD-10-CM    1. Alcohol-induced acute pancreatitis, unspecified complication status  K85.20       2. Nausea and vomiting, unspecified vomiting type  R11.2       3. Laceration of left ankle, subsequent encounter  S91.012D       4. Contusion of left ankle, initial encounter  S90.02XA           Scribe Disclosure:  Jose LORA, am serving as a scribe at 4:41 AM on 12/19/2022 to document services personally performed by Fantasma Ochoa MD based on my observations and the provider's statements to me.            Fantasma Ochoa MD  12/19/22 6901

## 2022-12-20 VITALS
DIASTOLIC BLOOD PRESSURE: 76 MMHG | TEMPERATURE: 98.5 F | SYSTOLIC BLOOD PRESSURE: 120 MMHG | RESPIRATION RATE: 18 BRPM | HEART RATE: 77 BPM | OXYGEN SATURATION: 98 %

## 2022-12-20 LAB
ALBUMIN SERPL BCG-MCNC: 3.3 G/DL (ref 3.5–5.2)
ALP SERPL-CCNC: 71 U/L (ref 40–129)
ALT SERPL W P-5'-P-CCNC: 15 U/L (ref 10–50)
ANION GAP SERPL CALCULATED.3IONS-SCNC: 9 MMOL/L (ref 7–15)
AST SERPL W P-5'-P-CCNC: 18 U/L (ref 10–50)
BASOPHILS # BLD AUTO: 0 10E3/UL (ref 0–0.2)
BASOPHILS NFR BLD AUTO: 1 %
BILIRUB SERPL-MCNC: 0.6 MG/DL
BUN SERPL-MCNC: 9.8 MG/DL (ref 6–20)
CALCIUM SERPL-MCNC: 8.7 MG/DL (ref 8.6–10)
CHLORIDE SERPL-SCNC: 102 MMOL/L (ref 98–107)
CREAT SERPL-MCNC: 0.51 MG/DL (ref 0.67–1.17)
DEPRECATED HCO3 PLAS-SCNC: 25 MMOL/L (ref 22–29)
EOSINOPHIL # BLD AUTO: 0.2 10E3/UL (ref 0–0.7)
EOSINOPHIL NFR BLD AUTO: 4 %
ERYTHROCYTE [DISTWIDTH] IN BLOOD BY AUTOMATED COUNT: 14.6 % (ref 10–15)
GFR SERPL CREATININE-BSD FRML MDRD: >90 ML/MIN/1.73M2
GLUCOSE BLDC GLUCOMTR-MCNC: 142 MG/DL (ref 70–99)
GLUCOSE BLDC GLUCOMTR-MCNC: 179 MG/DL (ref 70–99)
GLUCOSE BLDC GLUCOMTR-MCNC: 265 MG/DL (ref 70–99)
GLUCOSE SERPL-MCNC: 172 MG/DL (ref 70–99)
HCT VFR BLD AUTO: 37.3 % (ref 40–53)
HGB BLD-MCNC: 11.8 G/DL (ref 13.3–17.7)
IMM GRANULOCYTES # BLD: 0 10E3/UL
IMM GRANULOCYTES NFR BLD: 0 %
LYMPHOCYTES # BLD AUTO: 1.9 10E3/UL (ref 0.8–5.3)
LYMPHOCYTES NFR BLD AUTO: 39 %
MCH RBC QN AUTO: 26.6 PG (ref 26.5–33)
MCHC RBC AUTO-ENTMCNC: 31.6 G/DL (ref 31.5–36.5)
MCV RBC AUTO: 84 FL (ref 78–100)
MONOCYTES # BLD AUTO: 0.6 10E3/UL (ref 0–1.3)
MONOCYTES NFR BLD AUTO: 12 %
NEUTROPHILS # BLD AUTO: 2.2 10E3/UL (ref 1.6–8.3)
NEUTROPHILS NFR BLD AUTO: 44 %
NRBC # BLD AUTO: 0 10E3/UL
NRBC BLD AUTO-RTO: 0 /100
PLATELET # BLD AUTO: 206 10E3/UL (ref 150–450)
POTASSIUM SERPL-SCNC: 4.2 MMOL/L (ref 3.4–5.3)
PROT SERPL-MCNC: 5.7 G/DL (ref 6.4–8.3)
RBC # BLD AUTO: 4.43 10E6/UL (ref 4.4–5.9)
SODIUM SERPL-SCNC: 136 MMOL/L (ref 136–145)
WBC # BLD AUTO: 4.9 10E3/UL (ref 4–11)

## 2022-12-20 PROCEDURE — 85025 COMPLETE CBC W/AUTO DIFF WBC: CPT | Performed by: PHYSICIAN ASSISTANT

## 2022-12-20 PROCEDURE — 36415 COLL VENOUS BLD VENIPUNCTURE: CPT | Performed by: PHYSICIAN ASSISTANT

## 2022-12-20 PROCEDURE — 99217 PR OBSERVATION CARE DISCHARGE: CPT | Performed by: HOSPITALIST

## 2022-12-20 PROCEDURE — C9113 INJ PANTOPRAZOLE SODIUM, VIA: HCPCS | Performed by: PHYSICIAN ASSISTANT

## 2022-12-20 PROCEDURE — 250N000013 HC RX MED GY IP 250 OP 250 PS 637: Performed by: PHYSICIAN ASSISTANT

## 2022-12-20 PROCEDURE — 250N000011 HC RX IP 250 OP 636: Performed by: PHYSICIAN ASSISTANT

## 2022-12-20 PROCEDURE — 82962 GLUCOSE BLOOD TEST: CPT

## 2022-12-20 PROCEDURE — 96376 TX/PRO/DX INJ SAME DRUG ADON: CPT

## 2022-12-20 PROCEDURE — G0378 HOSPITAL OBSERVATION PER HR: HCPCS

## 2022-12-20 PROCEDURE — 82310 ASSAY OF CALCIUM: CPT | Performed by: PHYSICIAN ASSISTANT

## 2022-12-20 PROCEDURE — 258N000003 HC RX IP 258 OP 636: Performed by: PHYSICIAN ASSISTANT

## 2022-12-20 PROCEDURE — 96372 THER/PROPH/DIAG INJ SC/IM: CPT | Performed by: PHYSICIAN ASSISTANT

## 2022-12-20 RX ORDER — AMOXICILLIN 250 MG
1 CAPSULE ORAL 2 TIMES DAILY PRN
Qty: 20 TABLET | Refills: 0 | Status: SHIPPED | OUTPATIENT
Start: 2022-12-20

## 2022-12-20 RX ORDER — NALOXONE HYDROCHLORIDE 0.4 MG/ML
0.4 INJECTION, SOLUTION INTRAMUSCULAR; INTRAVENOUS; SUBCUTANEOUS
Status: DISCONTINUED | OUTPATIENT
Start: 2022-12-20 | End: 2022-12-20 | Stop reason: HOSPADM

## 2022-12-20 RX ORDER — ACETAMINOPHEN 325 MG/1
650 TABLET ORAL EVERY 6 HOURS PRN
COMMUNITY
Start: 2022-12-20

## 2022-12-20 RX ORDER — NALOXONE HYDROCHLORIDE 0.4 MG/ML
0.2 INJECTION, SOLUTION INTRAMUSCULAR; INTRAVENOUS; SUBCUTANEOUS
Status: DISCONTINUED | OUTPATIENT
Start: 2022-12-20 | End: 2022-12-20 | Stop reason: HOSPADM

## 2022-12-20 RX ORDER — OXYCODONE HYDROCHLORIDE 5 MG/1
5 TABLET ORAL EVERY 4 HOURS PRN
Qty: 10 TABLET | Refills: 0 | Status: ON HOLD | OUTPATIENT
Start: 2022-12-20 | End: 2024-02-06

## 2022-12-20 RX ADMIN — OXYCODONE HYDROCHLORIDE 5 MG: 5 TABLET ORAL at 08:08

## 2022-12-20 RX ADMIN — OXYCODONE HYDROCHLORIDE 5 MG: 5 TABLET ORAL at 01:02

## 2022-12-20 RX ADMIN — HYDROMORPHONE HYDROCHLORIDE 0.3 MG: 1 INJECTION, SOLUTION INTRAMUSCULAR; INTRAVENOUS; SUBCUTANEOUS at 07:56

## 2022-12-20 RX ADMIN — PANTOPRAZOLE SODIUM 40 MG: 40 INJECTION, POWDER, FOR SOLUTION INTRAVENOUS at 07:56

## 2022-12-20 RX ADMIN — ENOXAPARIN SODIUM 40 MG: 40 INJECTION SUBCUTANEOUS at 10:15

## 2022-12-20 RX ADMIN — INSULIN ASPART 3 UNITS: 100 INJECTION, SOLUTION INTRAVENOUS; SUBCUTANEOUS at 10:49

## 2022-12-20 RX ADMIN — SODIUM CHLORIDE, POTASSIUM CHLORIDE, SODIUM LACTATE AND CALCIUM CHLORIDE: 600; 310; 30; 20 INJECTION, SOLUTION INTRAVENOUS at 07:16

## 2022-12-20 RX ADMIN — FOLIC ACID 1 MG: 1 TABLET ORAL at 07:56

## 2022-12-20 RX ADMIN — THERA TABS 1 TABLET: TAB at 07:57

## 2022-12-20 RX ADMIN — THIAMINE HCL TAB 100 MG 100 MG: 100 TAB at 07:57

## 2022-12-20 RX ADMIN — OXYCODONE HYDROCHLORIDE 5 MG: 5 TABLET ORAL at 12:17

## 2022-12-20 RX ADMIN — HYDROMORPHONE HYDROCHLORIDE 0.3 MG: 1 INJECTION, SOLUTION INTRAMUSCULAR; INTRAVENOUS; SUBCUTANEOUS at 02:52

## 2022-12-20 ASSESSMENT — ACTIVITIES OF DAILY LIVING (ADL)
ADLS_ACUITY_SCORE: 28

## 2022-12-20 NOTE — DISCHARGE SUMMARY
"Patient's After Visit Summary was reviewed with patient and/or significant other.   Patient verbalized understanding of After Visit Summary, recommended follow up and was given an opportunity to ask questions.   Discharge medications sent home with patient/family: YES   Discharged with significant other    Vital signs:  Temp: 98.5  F (36.9  C) Temp src: Oral BP: 120/76 Pulse: 77   Resp: 18 SpO2: 98 % O2 Device: None (Room air)        Estimated body mass index is 24.05 kg/m  as calculated from the following:    Height as of 12/6/22: 1.791 m (5' 10.5\").    Weight as of 12/6/22: 77.1 kg (170 lb).            "

## 2022-12-20 NOTE — PLAN OF CARE
"PRIMARY DIAGNOSIS: ACUTE ABDOMINAL PAIN  OUTPATIENT/OBSERVATION GOALS TO BE MET BEFORE DISCHARGE:  1. Pain Status: Improved but still requiring IV narcotics.    2. Return to near baseline physical activity: Yes    3. Cleared for discharge by consultants (if involved): Yes    Discharge Planner Nurse   Safe discharge environment identified: Yes  Barriers to discharge: No       Entered by: Bertha Porter RN 12/20/2022 10:36 AM   Pt A/O x4 and makes needs known. Pt c/o pain 9/10 in the LUE and RUE of the abdomen. PRN oxycodone and IV dilaudid given to the pt and per pt is effective. NPO status, was upgraded to clears and was able to tolerate it. Denies N/V but endorses abdominal pain. D5 1/2 NS + Kcl infusing at 100/hr. Pt will be discharging home, awaiting orders. VSS.  Vital signs:  Temp: 97.6  F (36.4  C) Temp src: Oral BP: 117/72 Pulse: 78   Resp: 18 SpO2: 98 % O2 Device: None (Room air)        Estimated body mass index is 24.05 kg/m  as calculated from the following:    Height as of 12/6/22: 1.791 m (5' 10.5\").    Weight as of 12/6/22: 77.1 kg (170 lb).    Please review provider order for any additional goals.   Nurse to notify provider when observation goals have been met and patient is ready for discharge.  "

## 2022-12-20 NOTE — DISCHARGE SUMMARY
[facility]  Hospitalist Discharge Summary      Date of Admission:  12/19/2022  Date of Discharge:  12/20/2022  Discharging Provider: Santy Oliva MD  Discharge Service: Hospitalist Service    Discharge Diagnoses   Abdominal pain, nausea, vomiting. Possible acute pancreatitis. Ankle laceration.    Follow-ups Needed After Discharge   Follow-up Appointments     Follow-up and recommended labs and tests       Follow up with primary care provider, Freddie Best, within 7 days   for hospital follow- up.  No follow up labs or test are needed.           Unresulted Labs Ordered in the Past 30 Days of this Admission     No orders found for last 31 day(s).      These results will be followed up by NA    Discharge Disposition   Discharged to home  Condition at discharge: Stable    Hospital Course   Jaiden Lyons is a 47 year old male who presents for the second time within 24 hours for evaluation of left ankle pain and now concerns for nausea, vomiting, and abdominal pain.  The patient states that on 12/18 he hit his left ankle when trying to get out of his car.  Due to a significant laceration with associated pain the patient was seen in the ED where this was repaired and he ultimately discharged home.  Since being home the patient has had increased pain, serosanguineous drainage, increased swelling, and difficulty bearing weight on his left ankle.  Denies purulent drainage, fever, or chills.  The patient usually uses a cane at baseline for mobility.  The patient states that he had stopped drinking for approximately 5 months after his hospitalization for pancreatitis in 07/2022.  He started drinking again a couple weeks ago.  He notes having a beer here or there and one evening where he had a couple of shots.  His last drink was at 11:30 on 12/18.  Since 3:30/4 AM he has been having nausea with 3-4 episodes of nonbloody emesis.  He has been having epigastric abdominal pain with radiation  across his upper abdomen into his back for the last 2-3 days.  Denies associated diarrhea, chest pain, shortness of breath, lightheadedness, dizziness, melena, or hematochezia.  Denies dyspepsia.  He reports having mild tremors a couple days ago that has since resolved.  Denies feeling anxious, SI, or HI.  Patient reports recent social stressors causing him to start drinking again and wants to abstain from alcohol after this hospital stay. He notes his pain starting to increase towards the end of the interview.     I assumed care of patient today.  I was told by the nurse that he feels ready to go.  States his abdominal pain is controlled with pain medications.  He denies any more nausea.  He has tolerated clears and some food that his significant other has brought in.  No new complaints.  He would like to discharge home.  He states that his insulin pens are at a friend's house who is out of the state.  He would like his insulin filled at our pharmacy.  I will send him home with a few more tablets of oxycodone.  He will discharge home.    Consultations This Hospital Stay   None    Code Status   Full Code    Time Spent on this Encounter   I, Santy Oliva MD, personally saw the patient today and spent greater than 30 minutes discharging this patient.       Santy Oliva MD  [facility] OBSERVATION DEPT  [facility]– [place]note 2[place] [address] [place] [address]  Phone: [phone]  ______________________________________________________________________    Physical Exam   Vital Signs: Temp: 98.5  F (36.9  C) Temp src: Oral BP: 120/76 Pulse: 77   Resp: 18 SpO2: 98 % O2 Device: None (Room air)    Weight: 0 lbs 0 oz  Constitutional: alert, awake, comfortable  Eyes: Lids and lashes normal, pupils equal, round and reactive to light, extra ocular muscles intact, sclera clear, conjunctiva normal  ENT: Normocephalic, without obvious abnormality, atraumatic, sinuses nontender on palpation, external ears without lesions,  oral pharynx with moist mucous membranes, tonsils without erythema or exudates, gums normal and good dentition.  Respiratory: No increased work of breathing, good air exchange, clear to auscultation bilaterally, no crackles or wheezing  Cardiovascular: Normal apical impulse, regular rate and rhythm, normal S1 and S2, no S3 or S4, and no murmur noted  GI: No scars, normal bowel sounds, soft, non-distended, non-tender, no masses palpated, no hepatosplenomegally       Primary Care Physician   Freddie Best    Discharge Orders      Reason for your hospital stay    Abdominal pain, nausea, vomiting. Possible flare of pancreatitis. Ankle laceration.     Follow-up and recommended labs and tests     Follow up with primary care provider, Freddie Best, within 7 days for hospital follow- up.  No follow up labs or test are needed.     Activity    Your activity upon discharge: activity as tolerated     Diet    Follow this diet upon discharge: Advance to a regular diet as tolerated       Significant Results and Procedures   Most Recent 3 CBC's:Recent Labs   Lab Test 12/20/22  0545 12/19/22  0530 07/10/22  0647 07/08/22  0603   WBC 4.9 6.2  --  3.9*   HGB 11.8* 13.7  --  12.5*   MCV 84 84  --  87    236 159 150     Most Recent 3 BMP's:Recent Labs   Lab Test 12/20/22  1020 12/20/22  0615 12/20/22  0545 12/19/22  0950 12/19/22  0530 07/10/22  0824 07/10/22  0647 07/09/22  0756 07/09/22  0536   NA  --   --  136  --  139  --   --   --  135   POTASSIUM  --   --  4.2  --  4.2  --   --   --  3.8   CHLORIDE  --   --  102  --  104  --   --   --  101   CO2  --   --  25  --  22  --   --   --  27   BUN  --   --  9.8  --  12.5  --   --   --  6*   CR  --   --  0.51*  --  0.57*  --  0.55*  --  0.46*   ANIONGAP  --   --  9  --  13  --   --   --  7   BARRY  --   --  8.7  --  9.1  --   --   --  8.7   * 179* 172*   < > 135*   < >  --    < > 219*    < > = values in this interval not displayed.     Most Recent 2 LFT's:Recent  Labs   Lab Test 12/20/22  0545 12/19/22  0530   AST 18 23   ALT 15 16   ALKPHOS 71 76   BILITOTAL 0.6 0.5   ,   Results for orders placed or performed during the hospital encounter of 12/19/22   XR Ankle Left G/E 3 Views    Narrative    EXAM: LEFT ANKLE 2 VIEWS  LOCATION: [facility]  DATE/TIME: 12/19/2022 6:07 AM    INDICATION: Trauma to the medial ankle. Pain.  COMPARISON: None.      Impression    IMPRESSION: No visualized acute fracture or malalignment of the left ankle.        Discharge Medications   Current Discharge Medication List      START taking these medications    Details   acetaminophen (TYLENOL) 325 MG tablet Take 2 tablets (650 mg) by mouth every 6 hours as needed for mild pain or other (and adjunct with moderate or severe pain or per patient request)    Associated Diagnoses: Abdominal pain, epigastric      oxyCODONE (ROXICODONE) 5 MG tablet Take 1 tablet (5 mg) by mouth every 4 hours as needed for moderate pain (4-6) (IF pain not managed with non-pharmacological and non-opioid interventions)  Qty: 10 tablet, Refills: 0    Associated Diagnoses: Abdominal pain, epigastric      senna-docusate (SENOKOT-S/PERICOLACE) 8.6-50 MG tablet Take 1 tablet by mouth 2 times daily as needed for constipation  Qty: 20 tablet, Refills: 0    Associated Diagnoses: Constipation, unspecified constipation type         CONTINUE these medications which have CHANGED    Details   insulin detemir (LEVEMIR PEN) 100 UNIT/ML pen Inject 20 Units Subcutaneous At Bedtime  Qty: 3 mL, Refills: 1    Associated Diagnoses: Other specified diabetes mellitus with other specified complication, unspecified whether long term insulin use (H)         CONTINUE these medications which have NOT CHANGED    Details   albuterol (PROAIR HFA/PROVENTIL HFA/VENTOLIN HFA) 108 (90 Base) MCG/ACT inhaler Inhale 1-2 puffs into the lungs every 6 hours as needed for shortness of breath, wheezing or cough      empagliflozin (JARDIANCE) 10 MG  TABS tablet Take 1 tablet (10 mg) by mouth daily  Qty: 30 tablet, Refills: 0    Associated Diagnoses: Alcohol abuse with withdrawal (H)      glipiZIDE (GLUCOTROL XL) 10 MG 24 hr tablet Take 1 tablet (10 mg) by mouth daily  Qty: 30 tablet, Refills: 0    Associated Diagnoses: Alcohol abuse with withdrawal (H)      losartan (COZAAR) 50 MG tablet Take 1 tablet (50 mg) by mouth daily  Qty: 30 tablet, Refills: 0    Associated Diagnoses: Alcohol abuse with withdrawal (H)           Allergies   Allergies   Allergen Reactions     Fentanyl      Insulin Swelling     Reaction Date: Around 8019-0062  Face swelling  Inpatient when reaction occurred, unsure which type of insulin  Required some Benadryl to help with the swelling    Tolerated aspart/glargine while inpt 12/2021     Lisinopril      Face swelling, cough     Morphine Itching

## 2022-12-20 NOTE — PLAN OF CARE
PRIMARY DIAGNOSIS: ACUTE PANCREATITIS/ LEFT ANKLE LACERATION  OUTPATIENT/OBSERVATION GOALS TO BE MET BEFORE DISCHARGE:  1. ADLs back to baseline: Yes    2. Activity and level of assistance: Ambulating independently.    3. Pain status: Improved but still requiring IV narcotics.    4. Return to near baseline physical activity: Yes     Discharge Planner Nurse   Safe discharge environment identified: Yes  Barriers to discharge: No       Entered by: Byron Cleveland RN 12/20/2022 3:32 AM   A & O x 4,up  independently with canet, LR @ 125 ml/hr, NPO, BS check q 2 hrs, IV Dilaudid, Oxycodone for abdominal pain. .  Please review provider order for any additional goals.   Nurse to notify provider when observation goals have been met and patient is ready for discharge.Goal Outcome Evaluation:

## 2022-12-20 NOTE — PLAN OF CARE
PRIMARY DIAGNOSIS: ACUTE PAIN to Left ankle/ Acute Pancreatitis   OBSERVATION GOALS TO BE MET BEFORE DISCHARGE:  1. Pain Status: Improved but still requiring IV narcotics.    2. Return to near baseline physical activity: No    3. Cleared for discharge by consultants (if involved): No    Discharge Planner Nurse   Safe discharge environment identified: No  Barriers to discharge: Yes       Entered by: Adina Hirsch RN 12/19/2022   Pt alert and oriented x4, lung sounds CTA, BS, hypoactive. Pt c/o 7/10 to left foot wound, radiating to upper leg and to the abd. Refuses to take oral pain meds, prefers IV. Prn Dilaudid IV was administered. Patient reported relieve and rated pain at 2/10 at 1700. Patient reports LBM as 12/18. Pt still playing with his phone, advised to try to sleep, pt in agreement. Says no much pain, still rates it at 2. Concern when his next pain medication is due but does not want it now. Pt says will call for pain med and melatonin later. X-ray to L ankle from this morning shows no acute Fr to (L) ankle. LR running at 125 ml/hr. Will continue to monitor.  /78 (BP Location: Left arm)   Pulse 74   Temp 98.8  F (37.1  C) (Oral)   Resp 16   SpO2 96%   Please review provider order for any additional goals.   Nurse to notify provider when observation goals have been met and patient is ready for discharge.

## 2022-12-20 NOTE — PLAN OF CARE
PRIMARY DIAGNOSIS: ACUTE PANCREATITIS/ LEFT ANKLE LACERATION  OUTPATIENT/OBSERVATION GOALS TO BE MET BEFORE DISCHARGE:  ADLs back to baseline: Yes    Activity and level of assistance: Ambulating independently.    Pain status: Improved but still requiring IV narcotics.    Return to near baseline physical activity: Yes     Discharge Planner Nurse   Safe discharge environment identified: Yes  Barriers to discharge: No       Entered by: Byron Cleveland RN 12/20/2022 12:49 AM     Please review provider order for any additional goals.   Nurse to notify provider when observation goals have been met and patient is ready for discharge.Goal Outcome Evaluation:

## 2022-12-22 ENCOUNTER — PATIENT OUTREACH (OUTPATIENT)
Dept: CARE COORDINATION | Facility: CLINIC | Age: 47
End: 2022-12-22

## 2022-12-22 NOTE — PROGRESS NOTES
Clinic Care Coordination Contact  [facility]: Post-Discharge Note  SITUATION                                                      Admission:    Admission Date: 12/19/22   Reason for Admission: Abdominal pain, nausea, vomiting. Possible acute pancreatitis. Ankle laceration.  Discharge:   Discharge Date: 12/20/22  Discharge Diagnosis: Abdominal pain, nausea, vomiting. Possible acute pancreatitis. Ankle laceration.    BACKGROUND                                                      Per hospital discharge summary and inpatient provider notes:    Jaiden Lyons is a 47 year old male who presents for the second time within 24 hours for evaluation of left ankle pain and now concerns for nausea, vomiting, and abdominal pain.  The patient states that on 12/18 he hit his left ankle when trying to get out of his car.  Due to a significant laceration with associated pain the patient was seen in the ED where this was repaired and he ultimately discharged home.     ASSESSMENT           Discharge Assessment  How are you doing now that you are home?: doing well  How are your symptoms? (Red Flag symptoms escalate to triage hotline per guidelines): Improved  Do you feel your condition is stable enough to be safe at home until your provider visit?: Yes  Does the patient have their discharge instructions? : Yes  Does the patient have questions regarding their discharge instructions? : No  Were you started on any new medications or were there changes to any of your previous medications? : Yes  Does the patient have all of their medications?: Yes  Do you have questions regarding any of your medications? : No  Do you have all of your needed medical supplies or equipment (DME)?  (i.e. oxygen tank, CPAP, cane, etc.): No - What equipment or supplies are needed?  Discharge follow-up appointment scheduled within 14 calendar days? : Yes    Post-op ([facility] CTA Only)  If the patient had a surgery or procedure, do they have any questions for  a nurse?: No         PLAN                                                      Outpatient Plan:    No future appointments.    Follow-up Appointments     Follow-up and recommended labs and tests       Follow up with primary care provider, Freddie Best, within 7 days   for hospital follow- up.  No follow up labs or test are needed.       For any urgent concerns, please contact our 24 hour nurse triage line: [phone] ([phone])       CAROL Mercer  [phone]  [facility]

## 2023-05-07 ENCOUNTER — HOSPITAL ENCOUNTER (EMERGENCY)
Facility: CLINIC | Age: 48
End: 2023-05-07
Payer: COMMERCIAL

## 2023-05-14 PROCEDURE — 96361 HYDRATE IV INFUSION ADD-ON: CPT

## 2023-05-14 PROCEDURE — 96375 TX/PRO/DX INJ NEW DRUG ADDON: CPT

## 2023-05-14 PROCEDURE — 96374 THER/PROPH/DIAG INJ IV PUSH: CPT

## 2023-05-14 PROCEDURE — 99284 EMERGENCY DEPT VISIT MOD MDM: CPT | Mod: 25

## 2023-05-14 PROCEDURE — 96376 TX/PRO/DX INJ SAME DRUG ADON: CPT

## 2023-05-15 ENCOUNTER — APPOINTMENT (OUTPATIENT)
Dept: GENERAL RADIOLOGY | Facility: CLINIC | Age: 48
End: 2023-05-15
Attending: EMERGENCY MEDICINE
Payer: COMMERCIAL

## 2023-05-15 ENCOUNTER — HOSPITAL ENCOUNTER (EMERGENCY)
Facility: CLINIC | Age: 48
Discharge: HOME | End: 2023-05-15
Attending: EMERGENCY MEDICINE | Admitting: EMERGENCY MEDICINE
Payer: COMMERCIAL

## 2023-05-15 VITALS
HEART RATE: 85 BPM | SYSTOLIC BLOOD PRESSURE: 125 MMHG | DIASTOLIC BLOOD PRESSURE: 74 MMHG | RESPIRATION RATE: 16 BRPM | OXYGEN SATURATION: 100 % | TEMPERATURE: 98.7 F

## 2023-05-15 DIAGNOSIS — K85.90 ACUTE ON CHRONIC PANCREATITIS (H): ICD-10-CM

## 2023-05-15 DIAGNOSIS — K86.1 ACUTE ON CHRONIC PANCREATITIS (H): ICD-10-CM

## 2023-05-15 DIAGNOSIS — M25.551 HIP PAIN, RIGHT: ICD-10-CM

## 2023-05-15 LAB
ALBUMIN SERPL BCG-MCNC: 4.4 G/DL (ref 3.5–5.2)
ALP SERPL-CCNC: 88 U/L (ref 40–129)
ALT SERPL W P-5'-P-CCNC: 15 U/L (ref 10–50)
ANION GAP SERPL CALCULATED.3IONS-SCNC: 15 MMOL/L (ref 7–15)
AST SERPL W P-5'-P-CCNC: 21 U/L (ref 10–50)
BASOPHILS # BLD AUTO: 0 10E3/UL (ref 0–0.2)
BASOPHILS NFR BLD AUTO: 1 %
BILIRUB SERPL-MCNC: 0.5 MG/DL
BUN SERPL-MCNC: 9.2 MG/DL (ref 6–20)
CALCIUM SERPL-MCNC: 8.9 MG/DL (ref 8.6–10)
CHLORIDE SERPL-SCNC: 95 MMOL/L (ref 98–107)
CREAT SERPL-MCNC: 0.56 MG/DL (ref 0.67–1.17)
DEPRECATED HCO3 PLAS-SCNC: 22 MMOL/L (ref 22–29)
EOSINOPHIL # BLD AUTO: 0.1 10E3/UL (ref 0–0.7)
EOSINOPHIL NFR BLD AUTO: 1 %
ERYTHROCYTE [DISTWIDTH] IN BLOOD BY AUTOMATED COUNT: 14.3 % (ref 10–15)
GFR SERPL CREATININE-BSD FRML MDRD: >90 ML/MIN/1.73M2
GLUCOSE SERPL-MCNC: 179 MG/DL (ref 70–99)
HCT VFR BLD AUTO: 40.8 % (ref 40–53)
HGB BLD-MCNC: 13.6 G/DL (ref 13.3–17.7)
IMM GRANULOCYTES # BLD: 0 10E3/UL
IMM GRANULOCYTES NFR BLD: 0 %
LIPASE SERPL-CCNC: 50 U/L (ref 13–60)
LYMPHOCYTES # BLD AUTO: 2.5 10E3/UL (ref 0.8–5.3)
LYMPHOCYTES NFR BLD AUTO: 32 %
MCH RBC QN AUTO: 27 PG (ref 26.5–33)
MCHC RBC AUTO-ENTMCNC: 33.3 G/DL (ref 31.5–36.5)
MCV RBC AUTO: 81 FL (ref 78–100)
MONOCYTES # BLD AUTO: 0.6 10E3/UL (ref 0–1.3)
MONOCYTES NFR BLD AUTO: 8 %
NEUTROPHILS # BLD AUTO: 4.6 10E3/UL (ref 1.6–8.3)
NEUTROPHILS NFR BLD AUTO: 58 %
NRBC # BLD AUTO: 0 10E3/UL
NRBC BLD AUTO-RTO: 0 /100
PLATELET # BLD AUTO: 292 10E3/UL (ref 150–450)
POTASSIUM SERPL-SCNC: 4.1 MMOL/L (ref 3.4–5.3)
PROT SERPL-MCNC: 7.3 G/DL (ref 6.4–8.3)
RBC # BLD AUTO: 5.04 10E6/UL (ref 4.4–5.9)
SODIUM SERPL-SCNC: 132 MMOL/L (ref 136–145)
WBC # BLD AUTO: 7.8 10E3/UL (ref 4–11)

## 2023-05-15 PROCEDURE — 250N000011 HC RX IP 250 OP 636: Performed by: EMERGENCY MEDICINE

## 2023-05-15 PROCEDURE — 250N000013 HC RX MED GY IP 250 OP 250 PS 637: Performed by: EMERGENCY MEDICINE

## 2023-05-15 PROCEDURE — 80053 COMPREHEN METABOLIC PANEL: CPT | Performed by: EMERGENCY MEDICINE

## 2023-05-15 PROCEDURE — 85025 COMPLETE CBC W/AUTO DIFF WBC: CPT | Performed by: EMERGENCY MEDICINE

## 2023-05-15 PROCEDURE — 36415 COLL VENOUS BLD VENIPUNCTURE: CPT | Performed by: EMERGENCY MEDICINE

## 2023-05-15 PROCEDURE — 73502 X-RAY EXAM HIP UNI 2-3 VIEWS: CPT

## 2023-05-15 PROCEDURE — 258N000003 HC RX IP 258 OP 636: Performed by: EMERGENCY MEDICINE

## 2023-05-15 PROCEDURE — 83690 ASSAY OF LIPASE: CPT | Performed by: EMERGENCY MEDICINE

## 2023-05-15 RX ORDER — ONDANSETRON 2 MG/ML
4 INJECTION INTRAMUSCULAR; INTRAVENOUS
Status: DISCONTINUED | OUTPATIENT
Start: 2023-05-15 | End: 2023-05-15 | Stop reason: HOSPADM

## 2023-05-15 RX ORDER — HYDROMORPHONE HYDROCHLORIDE 1 MG/ML
0.5 INJECTION, SOLUTION INTRAMUSCULAR; INTRAVENOUS; SUBCUTANEOUS
Status: DISCONTINUED | OUTPATIENT
Start: 2023-05-15 | End: 2023-05-15 | Stop reason: HOSPADM

## 2023-05-15 RX ORDER — METOCLOPRAMIDE HYDROCHLORIDE 5 MG/ML
10 INJECTION INTRAMUSCULAR; INTRAVENOUS ONCE
Status: COMPLETED | OUTPATIENT
Start: 2023-05-15 | End: 2023-05-15

## 2023-05-15 RX ORDER — OXYCODONE HYDROCHLORIDE 5 MG/1
5 TABLET ORAL ONCE
Status: COMPLETED | OUTPATIENT
Start: 2023-05-15 | End: 2023-05-15

## 2023-05-15 RX ADMIN — ONDANSETRON 4 MG: 2 INJECTION INTRAMUSCULAR; INTRAVENOUS at 03:26

## 2023-05-15 RX ADMIN — SODIUM CHLORIDE 1000 ML: 9 INJECTION, SOLUTION INTRAVENOUS at 02:48

## 2023-05-15 RX ADMIN — ONDANSETRON 4 MG: 2 INJECTION INTRAMUSCULAR; INTRAVENOUS at 02:47

## 2023-05-15 RX ADMIN — OXYCODONE HYDROCHLORIDE 5 MG: 5 TABLET ORAL at 02:48

## 2023-05-15 RX ADMIN — HYDROMORPHONE HYDROCHLORIDE 0.5 MG: 1 INJECTION, SOLUTION INTRAMUSCULAR; INTRAVENOUS; SUBCUTANEOUS at 03:21

## 2023-05-15 RX ADMIN — METOCLOPRAMIDE HYDROCHLORIDE 10 MG: 5 INJECTION INTRAMUSCULAR; INTRAVENOUS at 04:09

## 2023-05-15 RX ADMIN — HYDROMORPHONE HYDROCHLORIDE 0.5 MG: 1 INJECTION, SOLUTION INTRAMUSCULAR; INTRAVENOUS; SUBCUTANEOUS at 04:09

## 2023-05-15 ASSESSMENT — ACTIVITIES OF DAILY LIVING (ADL)
ADLS_ACUITY_SCORE: 37
ADLS_ACUITY_SCORE: 33
ADLS_ACUITY_SCORE: 37

## 2023-05-15 ASSESSMENT — ENCOUNTER SYMPTOMS
ABDOMINAL PAIN: 1
ARTHRALGIAS: 1
VOMITING: 0
BLOOD IN STOOL: 0
CONSTIPATION: 0
FEVER: 0
DIARRHEA: 0

## 2023-05-15 NOTE — ED TRIAGE NOTES
Pt to ER with c/o right upper leg pain , pt states chronic pain. Needs hip replacement now pain increasing

## 2023-05-15 NOTE — ED PROVIDER NOTES
History     Chief Complaint:  Leg Pain       HPI   Jaiden Lyons is a 48 year old male with a history of pancreatitis, diabetes, HTN, and alcohol abuse who presents with right leg pain. The patient states that his right upper thigh is hurting him a lot and is also having abdominal pain. The patient states that the hip needs a replacement but his appointment isn't for over a month. The leg became much more painful today after getting out of his car. He has some tingling in his right foot and leg now as well. He has had surgery on his right leg before. His abdomen has been hurting for a few days. He denies any leg swelling, chest pain, diarrhea, constipation, bloody stools, or fever. The patient is not on blood thinners and denies any recent falls. The patient normally uses a cane to get around. The patients last drink was 6 hours ago and it was a beer.      Independent Historian:   None - Patient Only    Review of External Notes: None    ROS:  Review of Systems   Constitutional: Negative for fever.   Cardiovascular: Negative for chest pain and leg swelling.   Gastrointestinal: Positive for abdominal pain. Negative for blood in stool, constipation, diarrhea and vomiting.   Musculoskeletal: Positive for arthralgias.   All other systems reviewed and are negative.      Allergies:  Fentanyl  Insulin  Lisinopril  Morphine     Medications:    albuterol   empagliflozin   glipiZIDE   insulin detemir   losartan   senna-docusate     Past Medical History:  Alcohol abuse  Pancreatitis  Asthma  HTN  Diabetes    Lactic acidosis  Alcohol withdrawal  Adrenal nodule    Past Surgical History:    EGD  Forearm surgery  Hip surgery     Social History:  The patient presents to the ED alone.    Physical Exam     Patient Vitals for the past 24 hrs:   BP Temp Temp src Pulse Resp SpO2   05/15/23 0200 129/87 -- -- 91 18 99 %   05/14/23 2358 126/88 98.7  F (37.1  C) Temporal 86 20 100 %        Physical Exam  General: Alert, no acute  distress; smells of ETOH  HEENT:  Moist mucous membranes.  Conjunctiva normal.   CV:  RRR, no m/r/g, skin warm and well perfused  Pulm:  CTAB, no wheezes/ronchi/rales.  No acute distress, breathing comfortably  GI:  Soft, mild epigastric tenderness, nondistended.  No rebound or guarding.   MSK:  Moving all extremities.  No focal areas of edema, erythema; tender with palpation over right greater trochanter and with logroll of RLE.  Able to flex the right knee but pain to the right hip with SLR.  SILT throughout the RLE.  DP/PT pulse 2+.   Skin:  WWP, no rashes, no lower extremity edema, skin color normal, no diaphoresis    Emergency Department Course     Imaging:  XR Pelvis w Hip Right 1 View   Final Result   IMPRESSION: 3 retrograde surgical pins again seen traversing the right femoral head/neck. No acute fracture identified. Moderate right hip joint space narrowing redemonstrated, unchanged. Left hip joint is preserved. Bilateral sacroiliac joints are    symmetrically intact. No pelvic fracture.         Report per radiology    Laboratory:  Labs Ordered and Resulted from Time of ED Arrival to Time of ED Departure   COMPREHENSIVE METABOLIC PANEL - Abnormal       Result Value    Sodium 132 (*)     Potassium 4.1      Chloride 95 (*)     Carbon Dioxide (CO2) 22      Anion Gap 15      Urea Nitrogen 9.2      Creatinine 0.56 (*)     Calcium 8.9      Glucose 179 (*)     Alkaline Phosphatase 88      AST 21      ALT 15      Protein Total 7.3      Albumin 4.4      Bilirubin Total 0.5      GFR Estimate >90     LIPASE - Normal    Lipase 50     CBC WITH PLATELETS AND DIFFERENTIAL    WBC Count 7.8      RBC Count 5.04      Hemoglobin 13.6      Hematocrit 40.8      MCV 81      MCH 27.0      MCHC 33.3      RDW 14.3      Platelet Count 292      % Neutrophils 58      % Lymphocytes 32      % Monocytes 8      % Eosinophils 1      % Basophils 1      % Immature Granulocytes 0      NRBCs per 100 WBC 0      Absolute Neutrophils 4.6       Absolute Lymphocytes 2.5      Absolute Monocytes 0.6      Absolute Eosinophils 0.1      Absolute Basophils 0.0      Absolute Immature Granulocytes 0.0      Absolute NRBCs 0.0          Emergency Department Course & Assessments:    Interventions:  Medications   ondansetron (ZOFRAN) injection 4 mg (4 mg Intravenous $Given 5/15/23 0326)   HYDROmorphone (PF) (DILAUDID) injection 0.5 mg (0.5 mg Intravenous $Given 5/15/23 0409)   oxyCODONE (ROXICODONE) tablet 5 mg (5 mg Oral $Given 5/15/23 0248)   0.9% sodium chloride BOLUS (1,000 mLs Intravenous $New Bag 5/15/23 024)   metoclopramide (REGLAN) injection 10 mg (10 mg Intravenous $Given 5/15/23 0409)        Assessments:  215 Obtained the patients history and performed initial exam  0421 Rechecked the patient and updated him on findings    Independent Interpretation (X-rays, CTs, rhythm strip):  None    Social Determinants of Health affecting care:   None    Disposition:  The patient was discharged to home.     Impression & Plan      Medical Decision Makin-year-old male with history of pancreatitis, alcohol abuse, diabetes presenting to the ER for evaluation of epigastric abdominal pain, nausea/vomiting, nontraumatic right hip pain.  Please see above for details of HPI and exam.  Patient is afebrile vitally stable.  For his abdominal pain, differential considered included gastritis, GERD, gallbladder/hepatobiliary pathology, PUD, pancreatitis amongst other things.  He has no lower abdominal tenderness to suggest appendicitis and no findings concerning for intra-abdominal catastrophe requiring CT imaging.  In regards to his right hip, he is CMS intact of the lower extremity.  Patient reportedly is seeing orthopedics as outpatient for right hip replacement.  Radiograph of the right hip shows no acute fracture or bony malalignment but does show moderate right hip joint narrowing which appears unchanged from previous radiographs.  No concern at this time for crystal  arthropathy or septic joint.  Suspect degenerative joint changes as cause for patient's pain.  The rest of his lab studies above are unremarkable.  Suspect acute on chronic pancreatitis.  No concern at this time for hemorrhaging ulcer or UGIB.  He had significant improvement of his symptoms with the above interventions and was able to tolerate oral challenge here.  He is also able to ambulate in the ER with use of his cane; no indication for more advanced imaging at this time.  Recommend alcohol cessation and close follow-up with his orthopedist regarding his ER visit.  Supportive cares were also discussed for his right hip pain.  Return precautions were discussed and all questions were answered prior to discharge.      Diagnosis:    ICD-10-CM    1. Acute on chronic pancreatitis (H)  K85.90     K86.1       2. Hip pain, right  M25.551            Scribe Disclosure:  João LORA, am serving as a scribe at 1:34 AM on 5/15/2023 to document services personally performed by Chai Guerra MD based on my observations and the provider's statements to me.     5/15/2023   Chai Guerra MD [place], Edgar Ronald, MD  05/15/23 5193

## 2023-06-16 ENCOUNTER — APPOINTMENT (OUTPATIENT)
Dept: GENERAL RADIOLOGY | Facility: CLINIC | Age: 48
End: 2023-06-16
Attending: EMERGENCY MEDICINE
Payer: COMMERCIAL

## 2023-06-16 ENCOUNTER — HOSPITAL ENCOUNTER (EMERGENCY)
Facility: CLINIC | Age: 48
Discharge: HOME | End: 2023-06-16
Attending: EMERGENCY MEDICINE | Admitting: EMERGENCY MEDICINE
Payer: COMMERCIAL

## 2023-06-16 ENCOUNTER — APPOINTMENT (OUTPATIENT)
Dept: CT IMAGING | Facility: CLINIC | Age: 48
End: 2023-06-16
Attending: EMERGENCY MEDICINE
Payer: COMMERCIAL

## 2023-06-16 VITALS
DIASTOLIC BLOOD PRESSURE: 82 MMHG | HEART RATE: 94 BPM | RESPIRATION RATE: 20 BRPM | OXYGEN SATURATION: 98 % | TEMPERATURE: 98.6 F | SYSTOLIC BLOOD PRESSURE: 114 MMHG

## 2023-06-16 DIAGNOSIS — R10.13 ABDOMINAL PAIN, EPIGASTRIC: ICD-10-CM

## 2023-06-16 DIAGNOSIS — J98.01 ACUTE BRONCHOSPASM: ICD-10-CM

## 2023-06-16 DIAGNOSIS — R06.00 DYSPNEA, UNSPECIFIED TYPE: ICD-10-CM

## 2023-06-16 LAB
ALBUMIN SERPL BCG-MCNC: 4.6 G/DL (ref 3.5–5.2)
ALP SERPL-CCNC: 92 U/L (ref 40–129)
ALT SERPL W P-5'-P-CCNC: 28 U/L (ref 0–70)
ANION GAP SERPL CALCULATED.3IONS-SCNC: 19 MMOL/L (ref 7–15)
AST SERPL W P-5'-P-CCNC: 29 U/L (ref 0–45)
ATRIAL RATE - MUSE: 103 BPM
BASOPHILS # BLD AUTO: 0.1 10E3/UL (ref 0–0.2)
BASOPHILS NFR BLD AUTO: 1 %
BILIRUB SERPL-MCNC: 0.5 MG/DL
BUN SERPL-MCNC: 11.2 MG/DL (ref 6–20)
CALCIUM SERPL-MCNC: 9.3 MG/DL (ref 8.6–10)
CHLORIDE SERPL-SCNC: 98 MMOL/L (ref 98–107)
CREAT SERPL-MCNC: 0.54 MG/DL (ref 0.67–1.17)
D DIMER PPP FEU-MCNC: 0.78 UG/ML FEU (ref 0–0.5)
DEPRECATED HCO3 PLAS-SCNC: 19 MMOL/L (ref 22–29)
DIASTOLIC BLOOD PRESSURE - MUSE: NORMAL MMHG
EOSINOPHIL # BLD AUTO: 0.3 10E3/UL (ref 0–0.7)
EOSINOPHIL NFR BLD AUTO: 5 %
ERYTHROCYTE [DISTWIDTH] IN BLOOD BY AUTOMATED COUNT: 16.7 % (ref 10–15)
ETHANOL SERPL-MCNC: 0.02 G/DL
GFR SERPL CREATININE-BSD FRML MDRD: >90 ML/MIN/1.73M2
GLUCOSE SERPL-MCNC: 90 MG/DL (ref 70–99)
HCT VFR BLD AUTO: 43.5 % (ref 40–53)
HGB BLD-MCNC: 14.6 G/DL (ref 13.3–17.7)
HOLD SPECIMEN: NORMAL
HOLD SPECIMEN: NORMAL
IMM GRANULOCYTES # BLD: 0 10E3/UL
IMM GRANULOCYTES NFR BLD: 0 %
INTERPRETATION ECG - MUSE: NORMAL
LIPASE SERPL-CCNC: 42 U/L (ref 13–60)
LYMPHOCYTES # BLD AUTO: 2.4 10E3/UL (ref 0.8–5.3)
LYMPHOCYTES NFR BLD AUTO: 33 %
MCH RBC QN AUTO: 27.8 PG (ref 26.5–33)
MCHC RBC AUTO-ENTMCNC: 33.6 G/DL (ref 31.5–36.5)
MCV RBC AUTO: 83 FL (ref 78–100)
MONOCYTES # BLD AUTO: 0.9 10E3/UL (ref 0–1.3)
MONOCYTES NFR BLD AUTO: 13 %
NEUTROPHILS # BLD AUTO: 3.6 10E3/UL (ref 1.6–8.3)
NEUTROPHILS NFR BLD AUTO: 48 %
NRBC # BLD AUTO: 0 10E3/UL
NRBC BLD AUTO-RTO: 0 /100
P AXIS - MUSE: 65 DEGREES
PLATELET # BLD AUTO: 226 10E3/UL (ref 150–450)
POTASSIUM SERPL-SCNC: 4.2 MMOL/L (ref 3.4–5.3)
PR INTERVAL - MUSE: 152 MS
PROT SERPL-MCNC: 7.9 G/DL (ref 6.4–8.3)
QRS DURATION - MUSE: 94 MS
QT - MUSE: 366 MS
QTC - MUSE: 479 MS
R AXIS - MUSE: -38 DEGREES
RBC # BLD AUTO: 5.25 10E6/UL (ref 4.4–5.9)
SODIUM SERPL-SCNC: 136 MMOL/L (ref 136–145)
SYSTOLIC BLOOD PRESSURE - MUSE: NORMAL MMHG
T AXIS - MUSE: 70 DEGREES
TROPONIN T SERPL HS-MCNC: 10 NG/L
VENTRICULAR RATE- MUSE: 103 BPM
WBC # BLD AUTO: 7.3 10E3/UL (ref 4–11)

## 2023-06-16 PROCEDURE — 250N000011 HC RX IP 250 OP 636: Performed by: EMERGENCY MEDICINE

## 2023-06-16 PROCEDURE — 85025 COMPLETE CBC W/AUTO DIFF WBC: CPT | Performed by: EMERGENCY MEDICINE

## 2023-06-16 PROCEDURE — 93005 ELECTROCARDIOGRAM TRACING: CPT | Mod: RTG

## 2023-06-16 PROCEDURE — 85379 FIBRIN DEGRADATION QUANT: CPT | Performed by: EMERGENCY MEDICINE

## 2023-06-16 PROCEDURE — 258N000003 HC RX IP 258 OP 636: Performed by: EMERGENCY MEDICINE

## 2023-06-16 PROCEDURE — 71046 X-RAY EXAM CHEST 2 VIEWS: CPT

## 2023-06-16 PROCEDURE — 250N000009 HC RX 250: Performed by: EMERGENCY MEDICINE

## 2023-06-16 PROCEDURE — 36415 COLL VENOUS BLD VENIPUNCTURE: CPT | Performed by: EMERGENCY MEDICINE

## 2023-06-16 PROCEDURE — 80053 COMPREHEN METABOLIC PANEL: CPT | Performed by: EMERGENCY MEDICINE

## 2023-06-16 PROCEDURE — 96376 TX/PRO/DX INJ SAME DRUG ADON: CPT

## 2023-06-16 PROCEDURE — 94640 AIRWAY INHALATION TREATMENT: CPT

## 2023-06-16 PROCEDURE — 96374 THER/PROPH/DIAG INJ IV PUSH: CPT | Mod: 59

## 2023-06-16 PROCEDURE — 84484 ASSAY OF TROPONIN QUANT: CPT | Performed by: EMERGENCY MEDICINE

## 2023-06-16 PROCEDURE — 82077 ASSAY SPEC XCP UR&BREATH IA: CPT | Performed by: EMERGENCY MEDICINE

## 2023-06-16 PROCEDURE — 96375 TX/PRO/DX INJ NEW DRUG ADDON: CPT

## 2023-06-16 PROCEDURE — 83690 ASSAY OF LIPASE: CPT | Performed by: EMERGENCY MEDICINE

## 2023-06-16 PROCEDURE — 74177 CT ABD & PELVIS W/CONTRAST: CPT

## 2023-06-16 PROCEDURE — 96361 HYDRATE IV INFUSION ADD-ON: CPT

## 2023-06-16 PROCEDURE — 250N000013 HC RX MED GY IP 250 OP 250 PS 637: Performed by: EMERGENCY MEDICINE

## 2023-06-16 PROCEDURE — 99285 EMERGENCY DEPT VISIT HI MDM: CPT | Mod: 25

## 2023-06-16 RX ORDER — GUAIFENESIN/DEXTROMETHORPHAN 100-10MG/5
5 SYRUP ORAL 4 TIMES DAILY PRN
Qty: 118 ML | Refills: 0 | Status: SHIPPED | OUTPATIENT
Start: 2023-06-16 | End: 2023-06-16

## 2023-06-16 RX ORDER — KETOROLAC TROMETHAMINE 15 MG/ML
15 INJECTION, SOLUTION INTRAMUSCULAR; INTRAVENOUS ONCE
Status: COMPLETED | OUTPATIENT
Start: 2023-06-16 | End: 2023-06-16

## 2023-06-16 RX ORDER — HYDROCODONE BITARTRATE AND ACETAMINOPHEN 5; 325 MG/1; MG/1
2 TABLET ORAL ONCE
Status: COMPLETED | OUTPATIENT
Start: 2023-06-16 | End: 2023-06-16

## 2023-06-16 RX ORDER — ONDANSETRON 2 MG/ML
4 INJECTION INTRAMUSCULAR; INTRAVENOUS ONCE
Status: COMPLETED | OUTPATIENT
Start: 2023-06-16 | End: 2023-06-16

## 2023-06-16 RX ORDER — BENZONATATE 200 MG/1
200 CAPSULE ORAL 3 TIMES DAILY PRN
Qty: 21 CAPSULE | Refills: 0 | Status: SHIPPED | OUTPATIENT
Start: 2023-06-16 | End: 2023-06-16

## 2023-06-16 RX ORDER — GUAIFENESIN/DEXTROMETHORPHAN 100-10MG/5
5 SYRUP ORAL 4 TIMES DAILY PRN
Qty: 118 ML | Refills: 0 | Status: ON HOLD | OUTPATIENT
Start: 2023-06-16 | End: 2024-02-06

## 2023-06-16 RX ORDER — HYDROMORPHONE HYDROCHLORIDE 1 MG/ML
0.5 INJECTION, SOLUTION INTRAMUSCULAR; INTRAVENOUS; SUBCUTANEOUS ONCE
Status: COMPLETED | OUTPATIENT
Start: 2023-06-16 | End: 2023-06-16

## 2023-06-16 RX ORDER — ONDANSETRON 4 MG/1
4 TABLET, ORALLY DISINTEGRATING ORAL EVERY 8 HOURS PRN
Qty: 10 TABLET | Refills: 0 | Status: SHIPPED | OUTPATIENT
Start: 2023-06-16 | End: 2023-06-19

## 2023-06-16 RX ORDER — HYDROMORPHONE HYDROCHLORIDE 1 MG/ML
0.5 INJECTION, SOLUTION INTRAMUSCULAR; INTRAVENOUS; SUBCUTANEOUS EVERY 30 MIN PRN
Status: DISCONTINUED | OUTPATIENT
Start: 2023-06-16 | End: 2023-06-16 | Stop reason: HOSPADM

## 2023-06-16 RX ORDER — IOPAMIDOL 755 MG/ML
500 INJECTION, SOLUTION INTRAVASCULAR ONCE
Status: COMPLETED | OUTPATIENT
Start: 2023-06-16 | End: 2023-06-16

## 2023-06-16 RX ORDER — IPRATROPIUM BROMIDE AND ALBUTEROL SULFATE 2.5; .5 MG/3ML; MG/3ML
6 SOLUTION RESPIRATORY (INHALATION) ONCE
Status: COMPLETED | OUTPATIENT
Start: 2023-06-16 | End: 2023-06-16

## 2023-06-16 RX ORDER — BENZONATATE 200 MG/1
200 CAPSULE ORAL 3 TIMES DAILY PRN
Qty: 21 CAPSULE | Refills: 0 | Status: SHIPPED | OUTPATIENT
Start: 2023-06-16

## 2023-06-16 RX ORDER — ALBUTEROL SULFATE 90 UG/1
2 AEROSOL, METERED RESPIRATORY (INHALATION) EVERY 4 HOURS PRN
Qty: 18 G | Refills: 1 | Status: SHIPPED | OUTPATIENT
Start: 2023-06-16

## 2023-06-16 RX ADMIN — KETOROLAC TROMETHAMINE 15 MG: 15 INJECTION, SOLUTION INTRAMUSCULAR; INTRAVENOUS at 01:50

## 2023-06-16 RX ADMIN — IOPAMIDOL 70 ML: 755 INJECTION, SOLUTION INTRAVENOUS at 03:08

## 2023-06-16 RX ADMIN — HYDROMORPHONE HYDROCHLORIDE 0.5 MG: 1 INJECTION, SOLUTION INTRAMUSCULAR; INTRAVENOUS; SUBCUTANEOUS at 01:52

## 2023-06-16 RX ADMIN — HYDROMORPHONE HYDROCHLORIDE 0.5 MG: 1 INJECTION, SOLUTION INTRAMUSCULAR; INTRAVENOUS; SUBCUTANEOUS at 03:17

## 2023-06-16 RX ADMIN — SODIUM CHLORIDE 1000 ML: 9 INJECTION, SOLUTION INTRAVENOUS at 01:43

## 2023-06-16 RX ADMIN — ONDANSETRON 4 MG: 2 INJECTION INTRAMUSCULAR; INTRAVENOUS at 01:42

## 2023-06-16 RX ADMIN — HYDROCODONE BITARTRATE AND ACETAMINOPHEN 2 TABLET: 5; 325 TABLET ORAL at 04:47

## 2023-06-16 RX ADMIN — IPRATROPIUM BROMIDE AND ALBUTEROL SULFATE 6 ML: .5; 3 SOLUTION RESPIRATORY (INHALATION) at 01:52

## 2023-06-16 ASSESSMENT — ACTIVITIES OF DAILY LIVING (ADL): ADLS_ACUITY_SCORE: 35

## 2023-06-16 NOTE — ED PROVIDER NOTES
"  History     Chief Complaint:  Chest Pain       HPI   Jaiden Lyons is a 48 year old male with a history of Asthma on Albuterol who presents with chest pain after spending time outdoors on Wednesday. He attributes the onset of his chest pain to the air quality on Wednesday. He has a cough and feels like he is \"getting punched in chest\". He had to use his inhaler yesterday. He notes that he \"almost passed out\" while walking to the gas station.    Independent Historian:   None - Patient Only    Review of External Notes:   Reviewed last Hospital admit for acute on chronic pancreatitis      Medications:    Albuterol   Empagliflozin   Glipizide  insulin detemir   Losartan   Oxycodone   Hydrocodone  Senna-docusate   Lantus solostar  Pantoprazole    Past Medical History:    Pancreatitis  Chronic pain  Gastritis and gastroduodenitis  Lactic acidosis  Epiglottitis  Alcohol abuse   Asthma   Hypertension   Type 2 diabetes mellitus     Past Surgical History:    Hip surgery   Forearm surgery.     Physical Exam     Patient Vitals for the past 24 hrs:   BP Temp Temp src Pulse Resp SpO2   06/16/23 0220 -- -- -- 94 -- 98 %   06/16/23 0204 114/82 -- -- 96 -- 99 %   06/16/23 0200 114/82 -- -- 93 -- 99 %   06/16/23 0145 (!) 127/91 -- -- 100 -- 98 %   06/16/23 0119 (!) 132/91 98.6  F (37  C) Temporal 107 20 96 %        Physical Exam    HENT:  mmm, no rhinorrhea  Eyes: periorbital tissues and sclera normal   Neck: supple, no abnormal swelling  Lungs:  Tachypnea, scattered wheeze and prolonged exp phase  CV: rrr, no m/r/g, ppi  Abd: soft, mild epigastric ttp, nondistended, no rebound/masses/guarding/hsm  Ext: no peripheral edema  Skin: warm, dry, well perfused, no rashes/bruising/lesions on exposed skin  Neuro: alert, MAEE, no gross motor or sensory deficits, gait stable  Psych: Normal mood, normal affect      Emergency Department Course     Imaging:  CT Chest (PE) Abdomen Pelvis w Contrast   Final Result   IMPRESSION:   1.  No " pulmonary embolus or other acute process in the chest.   2.  Chronic pancreatitis. Calcified stones in the pancreatic duct result in obstruction and dilatation of the pancreatic duct in the tail which has worsened from 05/07/2022. No active inflammation of the pancreas.   3.  Stable small bilateral adrenal nodules probably benign adenomata.   4.  Mild enlargement of the prostate gland.      XR Chest 2 Views   Final Result   IMPRESSION: Chronic blunting of the right costophrenic angle. No consolidation, edema, pleural effusion, or pneumothorax. Normal cardiomediastinal silhouette.          Report per radiology    Laboratory:  Labs Ordered and Resulted from Time of ED Arrival to Time of ED Departure   COMPREHENSIVE METABOLIC PANEL - Abnormal       Result Value    Sodium 136      Potassium 4.2      Chloride 98      Carbon Dioxide (CO2) 19 (*)     Anion Gap 19 (*)     Urea Nitrogen 11.2      Creatinine 0.54 (*)     Calcium 9.3      Glucose 90      Alkaline Phosphatase 92      AST 29      ALT 28      Protein Total 7.9      Albumin 4.6      Bilirubin Total 0.5      GFR Estimate >90     CBC WITH PLATELETS AND DIFFERENTIAL - Abnormal    WBC Count 7.3      RBC Count 5.25      Hemoglobin 14.6      Hematocrit 43.5      MCV 83      MCH 27.8      MCHC 33.6      RDW 16.7 (*)     Platelet Count 226      % Neutrophils 48      % Lymphocytes 33      % Monocytes 13      % Eosinophils 5      % Basophils 1      % Immature Granulocytes 0      NRBCs per 100 WBC 0      Absolute Neutrophils 3.6      Absolute Lymphocytes 2.4      Absolute Monocytes 0.9      Absolute Eosinophils 0.3      Absolute Basophils 0.1      Absolute Immature Granulocytes 0.0      Absolute NRBCs 0.0     ETHYL ALCOHOL LEVEL - Abnormal    Alcohol ethyl 0.02 (*)    D DIMER QUANTITATIVE - Abnormal    D-Dimer Quantitative 0.78 (*)    LIPASE - Normal    Lipase 42     TROPONIN T, HIGH SENSITIVITY - Normal    Troponin T, High Sensitivity 10          Procedures       Emergency  Department Course & Assessments:  Interventions:  Medications   HYDROmorphone (PF) (DILAUDID) injection 0.5 mg (0.5 mg Intravenous $Given 6/16/23 0317)   HYDROcodone-acetaminophen (NORCO) 5-325 MG per tablet 2 tablet (has no administration in time range)   0.9% sodium chloride BOLUS (0 mLs Intravenous Stopped 6/16/23 0255)   ondansetron (ZOFRAN) injection 4 mg (4 mg Intravenous $Given 6/16/23 0142)   ipratropium - albuterol 0.5 mg/2.5 mg/3 mL (DUONEB) neb solution 6 mL (6 mLs Nebulization $Given 6/16/23 0152)   ketorolac (TORADOL) injection 15 mg (15 mg Intravenous $Given 6/16/23 0150)   HYDROmorphone (PF) (DILAUDID) injection 0.5 mg (0.5 mg Intravenous $Given 6/16/23 0152)   iopamidol (ISOVUE-370) solution 500 mL (70 mLs Intravenous $Given 6/16/23 0308)   sodium chloride (PF) 0.9% PF flush 100 mL (90 mLs Intravenous $Given 6/16/23 0308)        Assessments:  0210 I obtained history and examined the patient     Independent Interpretation (X-rays, CTs, rhythm strip):    Consultations/Discussion of Management or Tests:          Social Determinants of Health affecting care:   None    Disposition:  The patient was discharged to home.     Impression & Plan    Medical Decision Making:  Dyspnea/cough along with epigastric pain and assoc n/v. In setting of etoh abuse and chronic pancreatitis.  Dyspnea likely bronchospasm, stable for DC.  Epigastric pain seems like gastritis or mild exac chronic pancreatitis - no strong e/o acute pancreatitis by lipase or CT.  Sx improved in ED able to tolerate PO.  DC home with supportive care.        Critical Care time:  was 0 minutes for this patient excluding procedures.    Diagnosis:    ICD-10-CM    1. Abdominal pain, epigastric  R10.13       2. Dyspnea, unspecified type  R06.00       3. Acute bronchospasm  J98.01            Discharge Medications:  New Prescriptions    ALBUTEROL (PROAIR HFA) 108 (90 BASE) MCG/ACT INHALER    Inhale 2 puffs into the lungs every 4 hours as needed for  shortness of breath, wheezing or cough    BENZONATATE (TESSALON) 200 MG CAPSULE    Take 1 capsule (200 mg) by mouth 3 times daily as needed for cough    GUAIFENESIN-DEXTROMETHORPHAN (ROBITUSSIN DM) 100-10 MG/5ML SYRUP    Take 5 mLs by mouth 4 times daily as needed for cough    ONDANSETRON (ZOFRAN ODT) 4 MG ODT TAB    Take 1 tablet (4 mg) by mouth every 8 hours as needed for nausea      Scribe Disclosure:  IJosie, am serving as a scribe at 4:41 AM on 6/16/2023 to document services personally performed by Rd Kaur MD, based on my observations and the provider's statements to me.   6/16/2023   Rd Kaur, *      Rd Kaur MD  06/16/23 0947

## 2023-06-16 NOTE — ED TRIAGE NOTES
Pt to ER with c/o mid sternal CP with SOB pt with hx of asthma, now vomiting as well pt also states blanca the has a hx of pancreatitis

## 2023-09-10 ENCOUNTER — HOSPITAL ENCOUNTER (EMERGENCY)
Facility: CLINIC | Age: 48
Discharge: HOME | End: 2023-09-10
Admitting: EMERGENCY MEDICINE
Payer: COMMERCIAL

## 2023-09-10 VITALS
RESPIRATION RATE: 18 BRPM | SYSTOLIC BLOOD PRESSURE: 139 MMHG | DIASTOLIC BLOOD PRESSURE: 101 MMHG | WEIGHT: 168.43 LBS | TEMPERATURE: 98.1 F | HEART RATE: 107 BPM | OXYGEN SATURATION: 94 % | BODY MASS INDEX: 23.83 KG/M2

## 2023-09-10 LAB
ANION GAP SERPL CALCULATED.3IONS-SCNC: 19 MMOL/L (ref 7–15)
BASOPHILS # BLD AUTO: 0.1 10E3/UL (ref 0–0.2)
BASOPHILS NFR BLD AUTO: 1 %
BUN SERPL-MCNC: 8.4 MG/DL (ref 6–20)
CALCIUM SERPL-MCNC: 10 MG/DL (ref 8.6–10)
CHLORIDE SERPL-SCNC: 96 MMOL/L (ref 98–107)
CREAT SERPL-MCNC: 0.51 MG/DL (ref 0.67–1.17)
DEPRECATED HCO3 PLAS-SCNC: 21 MMOL/L (ref 22–29)
EGFRCR SERPLBLD CKD-EPI 2021: >90 ML/MIN/1.73M2
EOSINOPHIL # BLD AUTO: 0.1 10E3/UL (ref 0–0.7)
EOSINOPHIL NFR BLD AUTO: 1 %
ERYTHROCYTE [DISTWIDTH] IN BLOOD BY AUTOMATED COUNT: 15 % (ref 10–15)
GLUCOSE SERPL-MCNC: 83 MG/DL (ref 70–99)
HCT VFR BLD AUTO: 46.2 % (ref 40–53)
HGB BLD-MCNC: 15.7 G/DL (ref 13.3–17.7)
HOLD SPECIMEN: NORMAL
HOLD SPECIMEN: NORMAL
IMM GRANULOCYTES # BLD: 0 10E3/UL
IMM GRANULOCYTES NFR BLD: 0 %
LYMPHOCYTES # BLD AUTO: 1.9 10E3/UL (ref 0.8–5.3)
LYMPHOCYTES NFR BLD AUTO: 34 %
MCH RBC QN AUTO: 28 PG (ref 26.5–33)
MCHC RBC AUTO-ENTMCNC: 34 G/DL (ref 31.5–36.5)
MCV RBC AUTO: 83 FL (ref 78–100)
MONOCYTES # BLD AUTO: 0.4 10E3/UL (ref 0–1.3)
MONOCYTES NFR BLD AUTO: 7 %
NEUTROPHILS # BLD AUTO: 3.2 10E3/UL (ref 1.6–8.3)
NEUTROPHILS NFR BLD AUTO: 57 %
NRBC # BLD AUTO: 0 10E3/UL
NRBC BLD AUTO-RTO: 0 /100
PLATELET # BLD AUTO: 227 10E3/UL (ref 150–450)
POTASSIUM SERPL-SCNC: 4.2 MMOL/L (ref 3.4–5.3)
RBC # BLD AUTO: 5.6 10E6/UL (ref 4.4–5.9)
SODIUM SERPL-SCNC: 136 MMOL/L (ref 136–145)
WBC # BLD AUTO: 5.7 10E3/UL (ref 4–11)

## 2023-09-10 PROCEDURE — 85025 COMPLETE CBC W/AUTO DIFF WBC: CPT | Performed by: EMERGENCY MEDICINE

## 2023-09-10 PROCEDURE — 99281 EMR DPT VST MAYX REQ PHY/QHP: CPT

## 2023-09-10 PROCEDURE — 80048 BASIC METABOLIC PNL TOTAL CA: CPT | Performed by: EMERGENCY MEDICINE

## 2023-09-10 PROCEDURE — 36415 COLL VENOUS BLD VENIPUNCTURE: CPT | Performed by: EMERGENCY MEDICINE

## 2023-09-10 NOTE — ED TRIAGE NOTES
Pt arrives with generalized abdominal pain that started today, states hx of pancreatitis last flare up was several months ago. ABCs intact and AOX4.       Triage Assessment       Row Name 09/10/23 1533       Respiratory WDL    Respiratory WDL WDL       Cardiac WDL    Cardiac WDL X;all       Chest Pain Assessment    Associated Signs/Symptoms anxiety;hypertension      Row Name 09/10/23 1531       Triage Assessment (Adult)    Airway WDL WDL

## 2024-01-23 ENCOUNTER — HOSPITAL ENCOUNTER (EMERGENCY)
Facility: CLINIC | Age: 49
Discharge: LEFT BEFORE DISCHARGE | End: 2024-01-23
Payer: COMMERCIAL

## 2024-02-05 ENCOUNTER — APPOINTMENT (OUTPATIENT)
Dept: CT IMAGING | Facility: CLINIC | Age: 49
End: 2024-02-05
Attending: EMERGENCY MEDICINE
Payer: COMMERCIAL

## 2024-02-05 ENCOUNTER — HOSPITAL ENCOUNTER (OUTPATIENT)
Facility: CLINIC | Age: 49
Setting detail: OBSERVATION
Discharge: HOME | End: 2024-02-07
Attending: EMERGENCY MEDICINE | Admitting: HOSPITALIST
Payer: COMMERCIAL

## 2024-02-05 DIAGNOSIS — R79.89 KETONEMIA: ICD-10-CM

## 2024-02-05 DIAGNOSIS — R73.9 HYPERGLYCEMIA: ICD-10-CM

## 2024-02-05 DIAGNOSIS — F10.10 ALCOHOL ABUSE: ICD-10-CM

## 2024-02-05 DIAGNOSIS — R11.2 NAUSEA AND VOMITING, UNSPECIFIED VOMITING TYPE: ICD-10-CM

## 2024-02-05 DIAGNOSIS — R10.13 EPIGASTRIC PAIN: ICD-10-CM

## 2024-02-05 LAB
ALBUMIN SERPL BCG-MCNC: 4.5 G/DL (ref 3.5–5.2)
ALP SERPL-CCNC: 96 U/L (ref 40–150)
ALT SERPL W P-5'-P-CCNC: 45 U/L (ref 0–70)
ANION GAP SERPL CALCULATED.3IONS-SCNC: 18 MMOL/L (ref 7–15)
AST SERPL W P-5'-P-CCNC: 54 U/L (ref 0–45)
B-OH-BUTYR SERPL-SCNC: 1.3 MMOL/L
BASE EXCESS BLDV CALC-SCNC: -2.5 MMOL/L (ref -3–3)
BASOPHILS # BLD AUTO: 0.1 10E3/UL (ref 0–0.2)
BASOPHILS NFR BLD AUTO: 1 %
BILIRUB SERPL-MCNC: 1 MG/DL
BUN SERPL-MCNC: 15.9 MG/DL (ref 6–20)
CALCIUM SERPL-MCNC: 8.8 MG/DL (ref 8.6–10)
CHLORIDE SERPL-SCNC: 96 MMOL/L (ref 98–107)
CREAT SERPL-MCNC: 0.59 MG/DL (ref 0.67–1.17)
DEPRECATED HCO3 PLAS-SCNC: 18 MMOL/L (ref 22–29)
EGFRCR SERPLBLD CKD-EPI 2021: >90 ML/MIN/1.73M2
EOSINOPHIL # BLD AUTO: 0 10E3/UL (ref 0–0.7)
EOSINOPHIL NFR BLD AUTO: 1 %
ERYTHROCYTE [DISTWIDTH] IN BLOOD BY AUTOMATED COUNT: 14 % (ref 10–15)
ETHANOL SERPL-MCNC: <0.01 G/DL
GLUCOSE BLDC GLUCOMTR-MCNC: 237 MG/DL (ref 70–99)
GLUCOSE SERPL-MCNC: 223 MG/DL (ref 70–99)
HCO3 BLDV-SCNC: 24 MMOL/L (ref 21–28)
HCT VFR BLD AUTO: 41.4 % (ref 40–53)
HGB BLD-MCNC: 14.4 G/DL (ref 13.3–17.7)
IMM GRANULOCYTES # BLD: 0 10E3/UL
IMM GRANULOCYTES NFR BLD: 0 %
LIPASE SERPL-CCNC: 45 U/L (ref 13–60)
LYMPHOCYTES # BLD AUTO: 2 10E3/UL (ref 0.8–5.3)
LYMPHOCYTES NFR BLD AUTO: 33 %
MCH RBC QN AUTO: 28.6 PG (ref 26.5–33)
MCHC RBC AUTO-ENTMCNC: 34.8 G/DL (ref 31.5–36.5)
MCV RBC AUTO: 82 FL (ref 78–100)
MONOCYTES # BLD AUTO: 0.6 10E3/UL (ref 0–1.3)
MONOCYTES NFR BLD AUTO: 9 %
NEUTROPHILS # BLD AUTO: 3.4 10E3/UL (ref 1.6–8.3)
NEUTROPHILS NFR BLD AUTO: 56 %
NRBC # BLD AUTO: 0 10E3/UL
NRBC BLD AUTO-RTO: 0 /100
O2/TOTAL GAS SETTING VFR VENT: 0 %
OXYHGB MFR BLDV: 63 % (ref 70–75)
PCO2 BLDV: 45 MM HG (ref 40–50)
PH BLDV: 7.33 [PH] (ref 7.32–7.43)
PLATELET # BLD AUTO: 222 10E3/UL (ref 150–450)
PO2 BLDV: 36 MM HG (ref 25–47)
POTASSIUM SERPL-SCNC: 3.8 MMOL/L (ref 3.4–5.3)
PROT SERPL-MCNC: 8.2 G/DL (ref 6.4–8.3)
RBC # BLD AUTO: 5.04 10E6/UL (ref 4.4–5.9)
SAO2 % BLDV: 66.8 % (ref 70–75)
SODIUM SERPL-SCNC: 132 MMOL/L (ref 135–145)
WBC # BLD AUTO: 6.1 10E3/UL (ref 4–11)

## 2024-02-05 PROCEDURE — 96375 TX/PRO/DX INJ NEW DRUG ADDON: CPT

## 2024-02-05 PROCEDURE — 83735 ASSAY OF MAGNESIUM: CPT | Performed by: INTERNAL MEDICINE

## 2024-02-05 PROCEDURE — 83690 ASSAY OF LIPASE: CPT | Performed by: EMERGENCY MEDICINE

## 2024-02-05 PROCEDURE — 85004 AUTOMATED DIFF WBC COUNT: CPT | Performed by: EMERGENCY MEDICINE

## 2024-02-05 PROCEDURE — 99285 EMERGENCY DEPT VISIT HI MDM: CPT | Mod: 25

## 2024-02-05 PROCEDURE — 250N000011 HC RX IP 250 OP 636: Performed by: EMERGENCY MEDICINE

## 2024-02-05 PROCEDURE — C9113 INJ PANTOPRAZOLE SODIUM, VIA: HCPCS | Performed by: EMERGENCY MEDICINE

## 2024-02-05 PROCEDURE — 83036 HEMOGLOBIN GLYCOSYLATED A1C: CPT | Performed by: INTERNAL MEDICINE

## 2024-02-05 PROCEDURE — 82077 ASSAY SPEC XCP UR&BREATH IA: CPT | Performed by: EMERGENCY MEDICINE

## 2024-02-05 PROCEDURE — 250N000013 HC RX MED GY IP 250 OP 250 PS 637: Performed by: EMERGENCY MEDICINE

## 2024-02-05 PROCEDURE — 82040 ASSAY OF SERUM ALBUMIN: CPT | Performed by: EMERGENCY MEDICINE

## 2024-02-05 PROCEDURE — 82010 KETONE BODYS QUAN: CPT | Performed by: EMERGENCY MEDICINE

## 2024-02-05 PROCEDURE — 82805 BLOOD GASES W/O2 SATURATION: CPT | Performed by: EMERGENCY MEDICINE

## 2024-02-05 PROCEDURE — 74177 CT ABD & PELVIS W/CONTRAST: CPT

## 2024-02-05 PROCEDURE — 36415 COLL VENOUS BLD VENIPUNCTURE: CPT | Performed by: EMERGENCY MEDICINE

## 2024-02-05 PROCEDURE — 84100 ASSAY OF PHOSPHORUS: CPT | Performed by: INTERNAL MEDICINE

## 2024-02-05 PROCEDURE — 96361 HYDRATE IV INFUSION ADD-ON: CPT

## 2024-02-05 PROCEDURE — 258N000003 HC RX IP 258 OP 636: Performed by: EMERGENCY MEDICINE

## 2024-02-05 PROCEDURE — 82962 GLUCOSE BLOOD TEST: CPT

## 2024-02-05 RX ORDER — DIPHENHYDRAMINE HYDROCHLORIDE 50 MG/ML
25 INJECTION INTRAMUSCULAR; INTRAVENOUS ONCE
Status: COMPLETED | OUTPATIENT
Start: 2024-02-05 | End: 2024-02-05

## 2024-02-05 RX ORDER — IOPAMIDOL 755 MG/ML
500 INJECTION, SOLUTION INTRAVASCULAR ONCE
Status: COMPLETED | OUTPATIENT
Start: 2024-02-05 | End: 2024-02-05

## 2024-02-05 RX ORDER — OXYCODONE HYDROCHLORIDE 5 MG/1
5 TABLET ORAL ONCE
Status: COMPLETED | OUTPATIENT
Start: 2024-02-05 | End: 2024-02-05

## 2024-02-05 RX ORDER — OXYCODONE HYDROCHLORIDE 5 MG/1
10 TABLET ORAL ONCE
Status: COMPLETED | OUTPATIENT
Start: 2024-02-05 | End: 2024-02-05

## 2024-02-05 RX ORDER — KETOROLAC TROMETHAMINE 15 MG/ML
15 INJECTION, SOLUTION INTRAMUSCULAR; INTRAVENOUS ONCE
Status: COMPLETED | OUTPATIENT
Start: 2024-02-05 | End: 2024-02-05

## 2024-02-05 RX ORDER — ONDANSETRON 2 MG/ML
8 INJECTION INTRAMUSCULAR; INTRAVENOUS ONCE
Status: COMPLETED | OUTPATIENT
Start: 2024-02-05 | End: 2024-02-05

## 2024-02-05 RX ADMIN — PANTOPRAZOLE SODIUM 40 MG: 40 INJECTION, POWDER, FOR SOLUTION INTRAVENOUS at 23:02

## 2024-02-05 RX ADMIN — OXYCODONE HYDROCHLORIDE 5 MG: 5 TABLET ORAL at 23:58

## 2024-02-05 RX ADMIN — SODIUM CHLORIDE 1000 ML: 9 INJECTION, SOLUTION INTRAVENOUS at 19:40

## 2024-02-05 RX ADMIN — IOPAMIDOL 69 ML: 755 INJECTION, SOLUTION INTRAVENOUS at 20:21

## 2024-02-05 RX ADMIN — KETOROLAC TROMETHAMINE 15 MG: 15 INJECTION, SOLUTION INTRAMUSCULAR; INTRAVENOUS at 19:38

## 2024-02-05 RX ADMIN — PROCHLORPERAZINE EDISYLATE 10 MG: 5 INJECTION INTRAMUSCULAR; INTRAVENOUS at 23:02

## 2024-02-05 RX ADMIN — Medication 5 MG: at 23:16

## 2024-02-05 RX ADMIN — SODIUM CHLORIDE, POTASSIUM CHLORIDE, SODIUM LACTATE AND CALCIUM CHLORIDE 1000 ML: 600; 310; 30; 20 INJECTION, SOLUTION INTRAVENOUS at 23:19

## 2024-02-05 RX ADMIN — ONDANSETRON 8 MG: 2 INJECTION INTRAMUSCULAR; INTRAVENOUS at 19:39

## 2024-02-05 RX ADMIN — OXYCODONE HYDROCHLORIDE 10 MG: 5 TABLET ORAL at 19:38

## 2024-02-05 RX ADMIN — DIPHENHYDRAMINE HYDROCHLORIDE 25 MG: 50 INJECTION INTRAMUSCULAR; INTRAVENOUS at 23:02

## 2024-02-05 ASSESSMENT — ACTIVITIES OF DAILY LIVING (ADL)
ADLS_ACUITY_SCORE: 37
ADLS_ACUITY_SCORE: 37

## 2024-02-05 NOTE — ED TRIAGE NOTES
Patient states that he has chronic pancreatitis, in extreme abdominal pain, states that he feels as if its getting bad again. Has had pain for the past few days and now having nausea and vomiting, denies diarrhea. Sweating and chills from the pain. ABCs intact, VSS.

## 2024-02-06 PROBLEM — F10.10 ALCOHOL ABUSE: Status: ACTIVE | Noted: 2024-02-06

## 2024-02-06 PROBLEM — R10.13 EPIGASTRIC PAIN: Status: ACTIVE | Noted: 2024-02-06

## 2024-02-06 PROBLEM — R79.89 KETONEMIA: Status: ACTIVE | Noted: 2024-02-06

## 2024-02-06 PROBLEM — R11.2 NAUSEA AND VOMITING, UNSPECIFIED VOMITING TYPE: Status: ACTIVE | Noted: 2022-12-19

## 2024-02-06 PROBLEM — R73.9 HYPERGLYCEMIA: Status: ACTIVE | Noted: 2024-02-06

## 2024-02-06 LAB
ANION GAP SERPL CALCULATED.3IONS-SCNC: 11 MMOL/L (ref 7–15)
B-OH-BUTYR SERPL-SCNC: <0.18 MMOL/L
BUN SERPL-MCNC: 14.8 MG/DL (ref 6–20)
CALCIUM SERPL-MCNC: 7.8 MG/DL (ref 8.6–10)
CHLORIDE SERPL-SCNC: 102 MMOL/L (ref 98–107)
CREAT SERPL-MCNC: 0.59 MG/DL (ref 0.67–1.17)
DEPRECATED HCO3 PLAS-SCNC: 21 MMOL/L (ref 22–29)
EGFRCR SERPLBLD CKD-EPI 2021: >90 ML/MIN/1.73M2
ERYTHROCYTE [DISTWIDTH] IN BLOOD BY AUTOMATED COUNT: 14 % (ref 10–15)
GLUCOSE BLDC GLUCOMTR-MCNC: 180 MG/DL (ref 70–99)
GLUCOSE BLDC GLUCOMTR-MCNC: 205 MG/DL (ref 70–99)
GLUCOSE BLDC GLUCOMTR-MCNC: 233 MG/DL (ref 70–99)
GLUCOSE BLDC GLUCOMTR-MCNC: 247 MG/DL (ref 70–99)
GLUCOSE BLDC GLUCOMTR-MCNC: 268 MG/DL (ref 70–99)
GLUCOSE SERPL-MCNC: 162 MG/DL (ref 70–99)
HBA1C MFR BLD: 9.9 %
HCT VFR BLD AUTO: 33.8 % (ref 40–53)
HGB BLD-MCNC: 11.7 G/DL (ref 13.3–17.7)
MAGNESIUM SERPL-MCNC: 2.2 MG/DL (ref 1.7–2.3)
MCH RBC QN AUTO: 28.7 PG (ref 26.5–33)
MCHC RBC AUTO-ENTMCNC: 34.6 G/DL (ref 31.5–36.5)
MCV RBC AUTO: 83 FL (ref 78–100)
PHOSPHATE SERPL-MCNC: 1.9 MG/DL (ref 2.5–4.5)
PHOSPHATE SERPL-MCNC: 1.9 MG/DL (ref 2.5–4.5)
PLATELET # BLD AUTO: 171 10E3/UL (ref 150–450)
POTASSIUM SERPL-SCNC: 3.7 MMOL/L (ref 3.4–5.3)
RBC # BLD AUTO: 4.08 10E6/UL (ref 4.4–5.9)
SODIUM SERPL-SCNC: 134 MMOL/L (ref 135–145)
WBC # BLD AUTO: 4.3 10E3/UL (ref 4–11)

## 2024-02-06 PROCEDURE — 36415 COLL VENOUS BLD VENIPUNCTURE: CPT | Performed by: INTERNAL MEDICINE

## 2024-02-06 PROCEDURE — 250N000013 HC RX MED GY IP 250 OP 250 PS 637: Performed by: HOSPITALIST

## 2024-02-06 PROCEDURE — 96365 THER/PROPH/DIAG IV INF INIT: CPT

## 2024-02-06 PROCEDURE — 99223 1ST HOSP IP/OBS HIGH 75: CPT | Mod: AI | Performed by: INTERNAL MEDICINE

## 2024-02-06 PROCEDURE — 80048 BASIC METABOLIC PNL TOTAL CA: CPT | Performed by: INTERNAL MEDICINE

## 2024-02-06 PROCEDURE — 258N000003 HC RX IP 258 OP 636: Performed by: INTERNAL MEDICINE

## 2024-02-06 PROCEDURE — 250N000009 HC RX 250: Performed by: INTERNAL MEDICINE

## 2024-02-06 PROCEDURE — 250N000012 HC RX MED GY IP 250 OP 636 PS 637: Performed by: INTERNAL MEDICINE

## 2024-02-06 PROCEDURE — 96376 TX/PRO/DX INJ SAME DRUG ADON: CPT

## 2024-02-06 PROCEDURE — 82962 GLUCOSE BLOOD TEST: CPT

## 2024-02-06 PROCEDURE — 96366 THER/PROPH/DIAG IV INF ADDON: CPT

## 2024-02-06 PROCEDURE — G0378 HOSPITAL OBSERVATION PER HR: HCPCS

## 2024-02-06 PROCEDURE — 84100 ASSAY OF PHOSPHORUS: CPT | Performed by: INTERNAL MEDICINE

## 2024-02-06 PROCEDURE — 96361 HYDRATE IV INFUSION ADD-ON: CPT

## 2024-02-06 PROCEDURE — 250N000011 HC RX IP 250 OP 636: Performed by: EMERGENCY MEDICINE

## 2024-02-06 PROCEDURE — 250N000013 HC RX MED GY IP 250 OP 250 PS 637: Performed by: INTERNAL MEDICINE

## 2024-02-06 PROCEDURE — 96375 TX/PRO/DX INJ NEW DRUG ADDON: CPT

## 2024-02-06 PROCEDURE — 250N000011 HC RX IP 250 OP 636: Performed by: INTERNAL MEDICINE

## 2024-02-06 PROCEDURE — 82010 KETONE BODYS QUAN: CPT | Performed by: INTERNAL MEDICINE

## 2024-02-06 PROCEDURE — 85027 COMPLETE CBC AUTOMATED: CPT | Performed by: INTERNAL MEDICINE

## 2024-02-06 RX ORDER — FLUMAZENIL 0.1 MG/ML
0.2 INJECTION, SOLUTION INTRAVENOUS
Status: DISCONTINUED | OUTPATIENT
Start: 2024-02-06 | End: 2024-02-07 | Stop reason: HOSPADM

## 2024-02-06 RX ORDER — DIPHENHYDRAMINE HYDROCHLORIDE 50 MG/ML
25 INJECTION INTRAMUSCULAR; INTRAVENOUS ONCE
Status: COMPLETED | OUTPATIENT
Start: 2024-02-06 | End: 2024-02-06

## 2024-02-06 RX ORDER — NICOTINE 21 MG/24HR
1 PATCH, TRANSDERMAL 24 HOURS TRANSDERMAL DAILY
Status: DISCONTINUED | OUTPATIENT
Start: 2024-02-06 | End: 2024-02-07 | Stop reason: HOSPADM

## 2024-02-06 RX ORDER — DEXTROSE MONOHYDRATE 25 G/50ML
25-50 INJECTION, SOLUTION INTRAVENOUS
Status: DISCONTINUED | OUTPATIENT
Start: 2024-02-06 | End: 2024-02-07 | Stop reason: HOSPADM

## 2024-02-06 RX ORDER — SODIUM CHLORIDE 9 MG/ML
INJECTION, SOLUTION INTRAVENOUS CONTINUOUS
Status: DISCONTINUED | OUTPATIENT
Start: 2024-02-06 | End: 2024-02-06

## 2024-02-06 RX ORDER — HALOPERIDOL 5 MG/ML
2.5-5 INJECTION INTRAMUSCULAR EVERY 6 HOURS PRN
Status: DISCONTINUED | OUTPATIENT
Start: 2024-02-06 | End: 2024-02-07 | Stop reason: HOSPADM

## 2024-02-06 RX ORDER — CLONIDINE HYDROCHLORIDE 0.1 MG/1
0.1 TABLET ORAL EVERY 6 HOURS PRN
Status: DISCONTINUED | OUTPATIENT
Start: 2024-02-06 | End: 2024-02-07 | Stop reason: HOSPADM

## 2024-02-06 RX ORDER — ONDANSETRON 2 MG/ML
4 INJECTION INTRAMUSCULAR; INTRAVENOUS EVERY 6 HOURS PRN
Status: DISCONTINUED | OUTPATIENT
Start: 2024-02-06 | End: 2024-02-07 | Stop reason: HOSPADM

## 2024-02-06 RX ORDER — KETOCONAZOLE 20 MG/G
CREAM TOPICAL 2 TIMES DAILY PRN
COMMUNITY

## 2024-02-06 RX ORDER — FOLIC ACID 1 MG/1
1 TABLET ORAL DAILY
Status: DISCONTINUED | OUTPATIENT
Start: 2024-02-07 | End: 2024-02-07 | Stop reason: HOSPADM

## 2024-02-06 RX ORDER — ACAMPROSATE CALCIUM 333 MG/1
1-2 TABLET, DELAYED RELEASE ORAL DAILY
COMMUNITY

## 2024-02-06 RX ORDER — ALBUTEROL SULFATE 90 UG/1
2 AEROSOL, METERED RESPIRATORY (INHALATION) EVERY 4 HOURS PRN
Status: DISCONTINUED | OUTPATIENT
Start: 2024-02-06 | End: 2024-02-07 | Stop reason: HOSPADM

## 2024-02-06 RX ORDER — DIPHENHYDRAMINE HCL 25 MG
25 CAPSULE ORAL EVERY 6 HOURS PRN
Status: DISCONTINUED | OUTPATIENT
Start: 2024-02-06 | End: 2024-02-07

## 2024-02-06 RX ORDER — OXYCODONE HYDROCHLORIDE 5 MG/1
5 TABLET ORAL EVERY 4 HOURS PRN
Status: DISCONTINUED | OUTPATIENT
Start: 2024-02-06 | End: 2024-02-07

## 2024-02-06 RX ORDER — GLIPIZIDE 10 MG/1
10 TABLET, FILM COATED, EXTENDED RELEASE ORAL 2 TIMES DAILY
COMMUNITY

## 2024-02-06 RX ORDER — DIPHENHYDRAMINE HYDROCHLORIDE 50 MG/ML
25 INJECTION INTRAMUSCULAR; INTRAVENOUS EVERY 6 HOURS PRN
Status: DISCONTINUED | OUTPATIENT
Start: 2024-02-06 | End: 2024-02-07

## 2024-02-06 RX ORDER — NICOTINE POLACRILEX 4 MG
15-30 LOZENGE BUCCAL
Status: DISCONTINUED | OUTPATIENT
Start: 2024-02-06 | End: 2024-02-07 | Stop reason: HOSPADM

## 2024-02-06 RX ORDER — LORAZEPAM 1 MG/1
1-2 TABLET ORAL EVERY 30 MIN PRN
Status: DISCONTINUED | OUTPATIENT
Start: 2024-02-06 | End: 2024-02-07

## 2024-02-06 RX ORDER — METOCLOPRAMIDE HYDROCHLORIDE 5 MG/ML
10 INJECTION INTRAMUSCULAR; INTRAVENOUS ONCE
Status: COMPLETED | OUTPATIENT
Start: 2024-02-06 | End: 2024-02-06

## 2024-02-06 RX ORDER — HYDROCORTISONE 2.5 %
CREAM (GRAM) TOPICAL 2 TIMES DAILY PRN
COMMUNITY

## 2024-02-06 RX ORDER — CETIRIZINE HYDROCHLORIDE 10 MG/1
10 TABLET ORAL DAILY PRN
COMMUNITY

## 2024-02-06 RX ORDER — OXYCODONE AND ACETAMINOPHEN 5; 325 MG/1; MG/1
1 TABLET ORAL EVERY 8 HOURS PRN
COMMUNITY

## 2024-02-06 RX ORDER — ONDANSETRON 4 MG/1
4 TABLET, ORALLY DISINTEGRATING ORAL EVERY 6 HOURS PRN
Status: DISCONTINUED | OUTPATIENT
Start: 2024-02-06 | End: 2024-02-07 | Stop reason: HOSPADM

## 2024-02-06 RX ORDER — LORAZEPAM 2 MG/ML
1-2 INJECTION INTRAMUSCULAR EVERY 30 MIN PRN
Status: DISCONTINUED | OUTPATIENT
Start: 2024-02-06 | End: 2024-02-07

## 2024-02-06 RX ORDER — AMOXICILLIN 250 MG
1 CAPSULE ORAL 2 TIMES DAILY PRN
Status: DISCONTINUED | OUTPATIENT
Start: 2024-02-06 | End: 2024-02-07 | Stop reason: HOSPADM

## 2024-02-06 RX ORDER — MULTIPLE VITAMINS W/ MINERALS TAB 9MG-400MCG
1 TAB ORAL DAILY
Status: DISCONTINUED | OUTPATIENT
Start: 2024-02-07 | End: 2024-02-07 | Stop reason: HOSPADM

## 2024-02-06 RX ORDER — AMOXICILLIN 250 MG
2 CAPSULE ORAL 2 TIMES DAILY PRN
Status: DISCONTINUED | OUTPATIENT
Start: 2024-02-06 | End: 2024-02-07 | Stop reason: HOSPADM

## 2024-02-06 RX ORDER — LOSARTAN POTASSIUM 50 MG/1
50 TABLET ORAL DAILY
Status: DISCONTINUED | OUTPATIENT
Start: 2024-02-06 | End: 2024-02-07 | Stop reason: HOSPADM

## 2024-02-06 RX ORDER — PANTOPRAZOLE SODIUM 40 MG/1
40 TABLET, DELAYED RELEASE ORAL 2 TIMES DAILY PRN
COMMUNITY

## 2024-02-06 RX ORDER — OLANZAPINE 5 MG/1
5-10 TABLET, ORALLY DISINTEGRATING ORAL EVERY 6 HOURS PRN
Status: DISCONTINUED | OUTPATIENT
Start: 2024-02-06 | End: 2024-02-07 | Stop reason: HOSPADM

## 2024-02-06 RX ADMIN — Medication 5 MG: at 21:57

## 2024-02-06 RX ADMIN — POTASSIUM & SODIUM PHOSPHATES POWDER PACK 280-160-250 MG 2 PACKET: 280-160-250 PACK at 17:41

## 2024-02-06 RX ADMIN — SODIUM CHLORIDE: 9 INJECTION, SOLUTION INTRAVENOUS at 03:05

## 2024-02-06 RX ADMIN — INSULIN ASPART 2 UNITS: 100 INJECTION, SOLUTION INTRAVENOUS; SUBCUTANEOUS at 21:55

## 2024-02-06 RX ADMIN — ONDANSETRON 4 MG: 4 TABLET, ORALLY DISINTEGRATING ORAL at 03:44

## 2024-02-06 RX ADMIN — DIPHENHYDRAMINE HYDROCHLORIDE 25 MG: 25 CAPSULE ORAL at 21:55

## 2024-02-06 RX ADMIN — OXYCODONE HYDROCHLORIDE 5 MG: 5 TABLET ORAL at 03:44

## 2024-02-06 RX ADMIN — NICOTINE 1 PATCH: 14 PATCH, EXTENDED RELEASE TRANSDERMAL at 08:01

## 2024-02-06 RX ADMIN — SODIUM PHOSPHATE, MONOBASIC, MONOHYDRATE AND SODIUM PHOSPHATE, DIBASIC, ANHYDROUS 15 MMOL: 142; 276 INJECTION, SOLUTION INTRAVENOUS at 04:01

## 2024-02-06 RX ADMIN — OXYCODONE HYDROCHLORIDE 5 MG: 5 TABLET ORAL at 12:52

## 2024-02-06 RX ADMIN — POTASSIUM & SODIUM PHOSPHATES POWDER PACK 280-160-250 MG 2 PACKET: 280-160-250 PACK at 12:53

## 2024-02-06 RX ADMIN — LORAZEPAM 1 MG: 1 TABLET ORAL at 12:53

## 2024-02-06 RX ADMIN — METOCLOPRAMIDE HYDROCHLORIDE 10 MG: 5 INJECTION INTRAMUSCULAR; INTRAVENOUS at 01:55

## 2024-02-06 RX ADMIN — LOSARTAN POTASSIUM 50 MG: 50 TABLET, FILM COATED ORAL at 08:01

## 2024-02-06 RX ADMIN — POTASSIUM & SODIUM PHOSPHATES POWDER PACK 280-160-250 MG 2 PACKET: 280-160-250 PACK at 21:57

## 2024-02-06 RX ADMIN — OXYCODONE HYDROCHLORIDE 5 MG: 5 TABLET ORAL at 21:57

## 2024-02-06 RX ADMIN — OXYCODONE HYDROCHLORIDE 5 MG: 5 TABLET ORAL at 08:01

## 2024-02-06 RX ADMIN — OXYCODONE HYDROCHLORIDE 5 MG: 5 TABLET ORAL at 17:41

## 2024-02-06 RX ADMIN — DIPHENHYDRAMINE HYDROCHLORIDE 25 MG: 50 INJECTION INTRAMUSCULAR; INTRAVENOUS at 01:55

## 2024-02-06 RX ADMIN — FOLIC ACID: 5 INJECTION, SOLUTION INTRAMUSCULAR; INTRAVENOUS; SUBCUTANEOUS at 03:41

## 2024-02-06 RX ADMIN — INSULIN ASPART 1 UNITS: 100 INJECTION, SOLUTION INTRAVENOUS; SUBCUTANEOUS at 03:30

## 2024-02-06 ASSESSMENT — ACTIVITIES OF DAILY LIVING (ADL)
ADLS_ACUITY_SCORE: 34
ADLS_ACUITY_SCORE: 34
ADLS_ACUITY_SCORE: 33
ADLS_ACUITY_SCORE: 33
ADLS_ACUITY_SCORE: 37
ADLS_ACUITY_SCORE: 33
ADLS_ACUITY_SCORE: 34
ADLS_ACUITY_SCORE: 37
ADLS_ACUITY_SCORE: 34

## 2024-02-06 NOTE — PROGRESS NOTES
"PRIMARY DIAGNOSIS: \"GENERIC\" NURSING  OUTPATIENT/OBSERVATION GOALS TO BE MET BEFORE DISCHARGE:  ADLs back to baseline:  yes    Activity and level of assistance: Up with standby assistance.    Pain status: Improved-controlled with oral pain medications.    Return to near baseline physical activity: Yes     Discharge Planner Nurse   Safe discharge environment identified: Yes  Barriers to discharge: Yes       Entered by: Chandler Howard RN 02/06/2024 4:16 AM     Please review provider order for any additional goals.   Nurse to notify provider when observation goals have been met and patient is ready for discharge.  "

## 2024-02-06 NOTE — PROGRESS NOTES
Care assumed    Agree with H&P by Dr Khan  Patient still in pain and requesting IV hydromorphone, also feels shaking like he is withdrawing and requesting diazepam over lorazepam.     CT without acute pancreatitis, looks more chronic and tolerating regular diet so do not feel that pancreatitis is that campos    Does have some tremors, and certainly at risk for worsening withdrawal.  He  is interested in being sober and has plans for outpatient treatment that he has already been investigating    Agree with ciwa driven w/d protocol  No indication for IV hydromorphone at this time.     Hopefully to discharge in 1-2 days

## 2024-02-06 NOTE — ED PROVIDER NOTES
History     Chief Complaint:  No chief complaint on file.    The history is provided by the patient.      Jaiden Lyons is a 48 year old male with history of alcohol use disorder, acute on chronic alcohol-induced pancreatitis, type 2 diabetes mellitus, and hypertension who presents to the ED for evaluation of abdominal pain. Jaiden reports he developed diffuse abdominal pain yesterday that radiates to his back. He endorses nausea and vomiting and is unable to tolerate PO. He denies fevers, diarrhea, or urine symptoms. Patient mentions a history of pancreatitis and his current presentation is similar to previous episodes. His last episode was a couple weeks ago. He states he drinks a couple of beers daily and last had 1 beer this morning that did not relieve his pain. Denies history of abdominal surgeries.    Independent Historian:   None - Patient Only    Review of External Notes:   I reviewed discharge summary from 1/26/2024 which patient was admitted for acute on chronic pancreatitis    Medications:    Jardiance  Glucotrol XL  Insulin  Cozaar  Campral  Celebrex  Zyrtec  Prilosec  Protonix    Past Medical History:    Type 2 diabetes mellitus  Alcohol use disorder  Alcoholic hepatitis without ascites  Acute on chronic pancreatitis  Tobacco use disorder  Mild intermittent asthma  Hypertension  Environmental allergies    Past Surgical History:    Hip surgery  Forearm surgery  EGD    Physical Exam   Patient Vitals for the past 24 hrs:   BP Temp Temp src Pulse Resp SpO2 Weight   02/05/24 1915 (!) 154/107 -- -- 95 -- 95 % --   02/05/24 1614 (!) 168/106 98.1  F (36.7  C) Oral 98 18 100 % 74 kg (163 lb 2.3 oz)        Physical Exam      HEENT:    Oropharynx is moist  Eyes:    Conjunctiva normal  Neck:     Supple, no meningismus.     CV:     Regular rate and rhythm.      No murmurs, rubs or gallops.     No lower extremity edema.  PULM:    Clear to auscultation bilateral.       No respiratory distress.      Good air  exchange.     No rales or wheezing.     No stridor.  ABD:    Soft, non-distended.       Moderate focal tenderness in the epigastric area.     Bowel sounds normal.     No pulsatile masses.       No rebound, guarding or rigidity.     No CVA tenderness.   MSK:     No gross deformity to all four extremities.   LYMPH:   No cervical lymphadenopathy.  NEURO:   Alert.  Good muscular tone, no atrophy.   Skin:    Warm, dry and intact.    Psych:    Mood is good and affect is appropriate.      Emergency Department Course     Imaging:  CT Abdomen Pelvis w Contrast   Final Result   IMPRESSION:    1.  No acute findings in the abdomen and pelvis.   2.  Findings compatible with chronic pancreatitis with stable dilation of the pancreatic duct measuring up to 1.1 cm. Several of the calcifications appear to be within the pancreatic duct. No acute inflammatory changes around the pancreas.   3.  Colonic diverticulosis with no evidence of diverticulitis.                Laboratory:  Labs Ordered and Resulted from Time of ED Arrival to Time of ED Departure   COMPREHENSIVE METABOLIC PANEL - Abnormal       Result Value    Sodium 132 (*)     Potassium 3.8      Carbon Dioxide (CO2) 18 (*)     Anion Gap 18 (*)     Urea Nitrogen 15.9      Creatinine 0.59 (*)     GFR Estimate >90      Calcium 8.8      Chloride 96 (*)     Glucose 223 (*)     Alkaline Phosphatase 96      AST 54 (*)     ALT 45      Protein Total 8.2      Albumin 4.5      Bilirubin Total 1.0     GLUCOSE BY METER - Abnormal    GLUCOSE BY METER POCT 237 (*)    KETONE BETA-HYDROXYBUTYRATE QUANTITATIVE, RAPID - Abnormal    Ketone (Beta-Hydroxybutyrate) Quantitative 1.30 (*)    BLOOD GAS VENOUS - Abnormal    pH Venous 7.33      pCO2 Venous 45      pO2 Venous 36      Bicarbonate Venous 24      Base Excess/Deficit Venous -2.5      FIO2 0      Oxyhemoglobin Venous 63 (*)     O2 Sat, Venous 66.8 (*)    LIPASE - Normal    Lipase 45     ETHYL ALCOHOL LEVEL - Normal    Alcohol ethyl <0.01     CBC  WITH PLATELETS AND DIFFERENTIAL    WBC Count 6.1      RBC Count 5.04      Hemoglobin 14.4      Hematocrit 41.4      MCV 82      MCH 28.6      MCHC 34.8      RDW 14.0      Platelet Count 222      % Neutrophils 56      % Lymphocytes 33      % Monocytes 9      % Eosinophils 1      % Basophils 1      % Immature Granulocytes 0      NRBCs per 100 WBC 0      Absolute Neutrophils 3.4      Absolute Lymphocytes 2.0      Absolute Monocytes 0.6      Absolute Eosinophils 0.0      Absolute Basophils 0.1      Absolute Immature Granulocytes 0.0      Absolute NRBCs 0.0     GLUCOSE MONITOR NURSING POCT        Emergency Department Course & Assessments:        Interventions:  Medications   metoclopramide (REGLAN) injection 10 mg (has no administration in time range)   diphenhydrAMINE (BENADRYL) injection 25 mg (has no administration in time range)   sodium chloride 0.9% BOLUS 1,000 mL (0 mLs Intravenous Stopped 2/5/24 2301)   ketorolac (TORADOL) injection 15 mg (15 mg Intravenous $Given 2/5/24 1938)   ondansetron (ZOFRAN) injection 8 mg (8 mg Intravenous $Given 2/5/24 1939)   oxyCODONE (ROXICODONE) tablet 10 mg (10 mg Oral $Given 2/5/24 1938)   iopamidol (ISOVUE-370) solution 500 mL (69 mLs Intravenous $Given 2/5/24 2021)   sodium chloride (PF) 0.9% PF flush 100 mL (61 mLs Intravenous $Given 2/5/24 2021)   prochlorperazine (COMPAZINE) injection 10 mg (10 mg Intravenous $Given 2/5/24 2302)   diphenhydrAMINE (BENADRYL) injection 25 mg (25 mg Intravenous $Given 2/5/24 2302)   pantoprazole (PROTONIX) IV push injection 40 mg (40 mg Intravenous $Given 2/5/24 2302)   melatonin tablet 5 mg (5 mg Oral $Given 2/5/24 2316)   lactated ringers BOLUS 1,000 mL (1,000 mLs Intravenous $New Bag 2/5/24 2319)   oxyCODONE (ROXICODONE) tablet 5 mg (5 mg Oral $Given 2/5/24 2358)          Independent Interpretation (X-rays, CTs, rhythm strip):  None    Consultations/Discussion of Management or Tests:     ED Course as of 02/06/24 0153   Mon Feb 05, 2024   1915  I obtained history and examined the patient as noted above.      0152 Consult Dr. Khan [facility] medicine service    Social Determinants of Health affecting care:   None    Disposition:  The patient was admitted to the hospital under the care of Dr. Khan.     Impression & Plan        Medical Decision Makin-year-old male with a history of recurrent pancreatitis and alcohol abuse presents with recurrent nausea, vomiting and epigastric pain.  Patient was primarily concerned about recurrent pancreatitis.  Lipase is within normal limits.  CT scan reveals no radiographic signs of pancreatitis or alternative pathology.  Labs reveal ketonemia, hyperglycemia and dehydration without DKA.  He was given IV fluids and antiemetics.  I am most suspicious for alcohol induced gastritis/esophagitis as a source of symptoms.  Unfortunately despite multiple rounds of medications, he has still been actively vomiting thus unfit to discharge home.  He was transferred to an observation bed.      Diagnosis:    ICD-10-CM    1. Epigastric pain  R10.13       2. Nausea and vomiting, unspecified vomiting type  R11.2       3. Alcohol abuse  F10.10       4. Hyperglycemia  R73.9       5. Ketonemia  R79.89            Discharge Medications:  New Prescriptions    No medications on file        2024   Dominic Ordoñez MD Matthews, Jeremiah R, MD  24 0155

## 2024-02-06 NOTE — PLAN OF CARE
Goal Outcome Evaluation:      Plan of Care Reviewed With: patient    Overall Patient Progress: no changeOverall Patient Progress: no change       A/Ox4. SBA. Urinal bedside. Drowsy from pain meds. Complaint of abd pain and dry heave, prn oxy and zofran given. CIWA 4. Phos protocol, replacing phos, recheck at 11am. Bs achs 233. Paused 125mL/hr continuous IV, is running 100mL/hr IV vitamin fluid.

## 2024-02-06 NOTE — ED NOTES
[facility]  ED Nurse Handoff Report    ED Chief complaint: abdominal pain  . ED Diagnosis:   Final diagnoses:   Epigastric pain   Nausea and vomiting, unspecified vomiting type   Alcohol abuse   Hyperglycemia   Ketonemia       Allergies:   Allergies   Allergen Reactions    Fentanyl     Insulin Swelling     Reaction Date: Around 6867-7140  Face swelling  Inpatient when reaction occurred, unsure which type of insulin  Required some Benadryl to help with the swelling    Tolerated aspart/glargine while inpt 12/2021    Lisinopril      Face swelling, cough    Morphine Itching       Code Status: Full Code    Activity level - Baseline/Home:  independent.  Activity Level - Current:   independent.   Lift room needed: No.   Bariatric: No   Needed: No   Isolation: No.   Infection: Not Applicable.     Respiratory status: Room air    Vital Signs (within 30 minutes):   Vitals:    02/05/24 1614 02/05/24 1915   BP: (!) 168/106 (!) 154/107   Pulse: 98 95   Resp: 18    Temp: 98.1  F (36.7  C)    TempSrc: Oral    SpO2: 100% 95%   Weight: 74 kg (163 lb 2.3 oz)        Cardiac Rhythm:  ,      Pain level:    Patient confused: No.   Patient Falls Risk: patient and family education.   Elimination Status: Has voided     Patient Report - Initial Complaint: abdominal pain, n/v.   Focused Assessment: epigastric pain, etoh abuse, ketonemia, n/v, hyperglycemia    Abnormal Results:   Labs Ordered and Resulted from Time of ED Arrival to Time of ED Departure   COMPREHENSIVE METABOLIC PANEL - Abnormal       Result Value    Sodium 132 (*)     Potassium 3.8      Carbon Dioxide (CO2) 18 (*)     Anion Gap 18 (*)     Urea Nitrogen 15.9      Creatinine 0.59 (*)     GFR Estimate >90      Calcium 8.8      Chloride 96 (*)     Glucose 223 (*)     Alkaline Phosphatase 96      AST 54 (*)     ALT 45      Protein Total 8.2      Albumin 4.5      Bilirubin Total 1.0     GLUCOSE BY METER - Abnormal    GLUCOSE BY METER POCT 237 (*)     KETONE BETA-HYDROXYBUTYRATE QUANTITATIVE, RAPID - Abnormal    Ketone (Beta-Hydroxybutyrate) Quantitative 1.30 (*)    BLOOD GAS VENOUS - Abnormal    pH Venous 7.33      pCO2 Venous 45      pO2 Venous 36      Bicarbonate Venous 24      Base Excess/Deficit Venous -2.5      FIO2 0      Oxyhemoglobin Venous 63 (*)     O2 Sat, Venous 66.8 (*)    LIPASE - Normal    Lipase 45     ETHYL ALCOHOL LEVEL - Normal    Alcohol ethyl <0.01     CBC WITH PLATELETS AND DIFFERENTIAL    WBC Count 6.1      RBC Count 5.04      Hemoglobin 14.4      Hematocrit 41.4      MCV 82      MCH 28.6      MCHC 34.8      RDW 14.0      Platelet Count 222      % Neutrophils 56      % Lymphocytes 33      % Monocytes 9      % Eosinophils 1      % Basophils 1      % Immature Granulocytes 0      NRBCs per 100 WBC 0      Absolute Neutrophils 3.4      Absolute Lymphocytes 2.0      Absolute Monocytes 0.6      Absolute Eosinophils 0.0      Absolute Basophils 0.1      Absolute Immature Granulocytes 0.0      Absolute NRBCs 0.0     GLUCOSE MONITOR NURSING POCT        CT Abdomen Pelvis w Contrast   Final Result   IMPRESSION:    1.  No acute findings in the abdomen and pelvis.   2.  Findings compatible with chronic pancreatitis with stable dilation of the pancreatic duct measuring up to 1.1 cm. Several of the calcifications appear to be within the pancreatic duct. No acute inflammatory changes around the pancreas.   3.  Colonic diverticulosis with no evidence of diverticulitis.             Treatments provided: meds, fluids  Family Comments:   OBS brochure/video discussed/provided to patient:  Yes  ED Medications:   Medications   sodium chloride 0.9% BOLUS 1,000 mL (0 mLs Intravenous Stopped 2/5/24 2301)   ketorolac (TORADOL) injection 15 mg (15 mg Intravenous $Given 2/5/24 1938)   ondansetron (ZOFRAN) injection 8 mg (8 mg Intravenous $Given 2/5/24 1939)   oxyCODONE (ROXICODONE) tablet 10 mg (10 mg Oral $Given 2/5/24 1938)   iopamidol (ISOVUE-370) solution 500 mL  (69 mLs Intravenous $Given 2/5/24 2021)   sodium chloride (PF) 0.9% PF flush 100 mL (61 mLs Intravenous $Given 2/5/24 2021)   prochlorperazine (COMPAZINE) injection 10 mg (10 mg Intravenous $Given 2/5/24 2302)   diphenhydrAMINE (BENADRYL) injection 25 mg (25 mg Intravenous $Given 2/5/24 2302)   pantoprazole (PROTONIX) IV push injection 40 mg (40 mg Intravenous $Given 2/5/24 2302)   melatonin tablet 5 mg (5 mg Oral $Given 2/5/24 2316)   lactated ringers BOLUS 1,000 mL (0 mLs Intravenous Stopped 2/6/24 0154)   oxyCODONE (ROXICODONE) tablet 5 mg (5 mg Oral $Given 2/5/24 2358)   metoclopramide (REGLAN) injection 10 mg (10 mg Intravenous $Given 2/6/24 0155)   diphenhydrAMINE (BENADRYL) injection 25 mg (25 mg Intravenous $Given 2/6/24 0155)       Drips infusing:  No  For the majority of the shift this patient was Green.   Interventions performed were .    Sepsis treatment initiated: No    Cares/treatment/interventions/medications to be completed following ED care:     ED Nurse Name: Zofia Jimenez RN  2:02 AM    RECEIVING UNIT ED HANDOFF REVIEW    Above ED Nurse Handoff Report was reviewed: Yes  Reviewed by: Chandler Howard RN on February 6, 2024 at 2:25 AM

## 2024-02-06 NOTE — PLAN OF CARE
Goal Outcome Evaluation:      Plan of Care Reviewed With: patient    Overall Patient Progress: no changeOverall Patient Progress: no change     Temp: 97.3  F (36.3  C) Temp src: Oral BP: 121/76 Pulse: 70   Resp: 16 SpO2: 99 % O2 Device: None (Room air)      A&Ox4, mild pain meds given-see MAR. CIWA mostly wdl with the exception of an 8- ativan and recheck done per guidlelines. Phos replaced on shift, another dose this afternoon and recheck after that. Mod carb diet with excellent appetite. . OBS status.   Discharge TBD  PRIMARY   DIAGNOSIS: ACUTE PAIN  OUTPATIENT/OBSERVATION GOALS TO BE MET BEFORE DISCHARGE:  1. Pain Status: Improved-controlled with oral pain medications.    2. Return to near baseline physical activity: Yes    3. Cleared for discharge by consultants (if involved): No    Discharge Planner Nurse   Safe discharge environment identified: Yes  Barriers to discharge: No       Entered by: Jonathan Hinson RN 02/06/2024 3:10 PM     Please review provider order for any additional goals.   Nurse to notify provider when observation goals have been met and patient is ready for discharge.rge TBD at this time, possibly tomorrow.

## 2024-02-06 NOTE — PHARMACY-ADMISSION MEDICATION HISTORY
Pharmacist Admission Medication History    Admission medication history is complete. The information provided in this note is only as accurate as the sources available at the time of the update.    Information Source(s): Patient and CareEverywhere/SureScripts via in-person    Pertinent Information: Med rec done to best of ability.  Patient does not report taking all meds consistently or as prescribed.  Patient had a hard time staying awake during interview.    Changes made to PTA medication list:  Added: protonix, Creon, Campral, Percocet, Zyrtec, Hydrocortisone,Ketoconazole  Deleted: Robitussin dm, Oxycodone,  Changed: Levemir, Glipizide, Jardiance         Allergies reviewed with patient and updates made in EHR: no    Medication History Completed By: Reva Thornton [facility] 2/6/2024 9:42 AM    Prior to Admission medications    Medication Sig Last Dose Taking? Auth Provider Long Term End Date   acamprosate (CAMPRAL) 333 MG EC tablet Take 1-2 tablets by mouth daily Prescribed as  2 tabs three times daily Past Month Yes Unknown, Entered By History     acetaminophen (TYLENOL) 325 MG tablet Take 2 tablets (650 mg) by mouth every 6 hours as needed for mild pain or other (and adjunct with moderate or severe pain or per patient request) ? Yes Santy Oliva MD     albuterol (PROAIR HFA) 108 (90 Base) MCG/ACT inhaler Inhale 2 puffs into the lungs every 4 hours as needed for shortness of breath, wheezing or cough 2/5/2024 Yes Rd Kaur MD Yes    benzonatate (TESSALON) 200 MG capsule Take 1 capsule (200 mg) by mouth 3 times daily as needed for cough Past Month Yes Rd Kaur MD     cetirizine (ZYRTEC) 10 MG tablet Take 10 mg by mouth daily as needed for allergies Past Week Yes Unknown, Entered By History     empagliflozin (JARDIANCE) 25 MG TABS tablet Take 25 mg by mouth daily 2/5/2024 Yes Unknown, Entered By History     glipiZIDE (GLUCOTROL XL) 10 MG 24 hr tablet Take 10 mg by mouth 2 times daily  2/5/2024 Yes Unknown, Entered By History Yes    hydrocortisone 2.5 % cream Apply topically 2 times daily as needed  Yes Unknown, Entered By History     insulin detemir (LEVEMIR PEN) 100 UNIT/ML pen Inject 20-40 Units Subcutaneous at bedtime Past Week Yes Unknown, Entered By History Yes    ketoconazole (NIZORAL) 2 % external cream Apply topically 2 times daily as needed for itching  Yes Unknown, Entered By History     lipase-protease-amylase (CREON 36) 35219-875712-496560 units CPEP Take 2 capsules by mouth 3 times daily (with meals) Past Week Yes Unknown, Entered By History     lipase-protease-amylase (CREON 36) 25237-222243-556913 units CPEP Take 1 capsule by mouth Take with snacks or supplements Past Week Yes Unknown, Entered By History     oxyCODONE-acetaminophen (PERCOCET) 5-325 MG tablet Take 1 tablet by mouth every 8 hours as needed for severe pain 2/5/2024 Yes Unknown, Entered By History     pantoprazole (PROTONIX) 40 MG EC tablet Take 40 mg by mouth 2 times daily as needed for heartburn Past Week Yes Unknown, Entered By History     senna-docusate (SENOKOT-S/PERICOLACE) 8.6-50 MG tablet Take 1 tablet by mouth 2 times daily as needed for constipation ? Yes Santy Oliva MD     losartan (COZAAR) 50 MG tablet Take 1 tablet (50 mg) by mouth daily   Rusty Randolph, DO Yes

## 2024-02-06 NOTE — PROGRESS NOTES
PRIMARY DIAGNOSIS: ACUTE PAIN  OUTPATIENT/OBSERVATION GOALS TO BE MET BEFORE DISCHARGE:  1. Pain Status: Improved-controlled with oral pain medications.    2. Return to near baseline physical activity: Yes    3. Cleared for discharge by consultants (if involved): No    Discharge Planner Nurse   Safe discharge environment identified: Yes  Barriers to discharge: No       Entered by: Jonathan Hinson RN 02/06/2024 12:30 PM     Please review provider order for any additional goals.   Nurse to notify provider when observation goals have been met and patient is ready for discharge.  Temp: 97.3  F (36.3  C) Temp src: Oral BP: 121/76 Pulse: 70   Resp: 16 SpO2: 99 % O2 Device: None (Room air)      A&Ox4, mild to moderate pain in abdomin, meds given-see MAR. ZORAIDA wdl but ativan given at MD request. Banana bag running at 100 ml/hr. SBA- alarms off-pt will call before getting up. ACHS with sliding scale. Mod carb diet maintained, ensure added to diet. Discharge TBD.

## 2024-02-06 NOTE — H&P
[facility]    Hospitalist History and Physical    Name: Jaiden Lyons    MRN: [id number]  YOB: 1975    Age: 48 year old  Date of Admission:  2/5/2024  Date of Service (when I saw the patient): 02/06/24    Assessment & Plan   Jaiden Lyons is a 48 year old male with past medical history significant for alcohol dependence, alcoholic gastritis, recurrent pancreatitis /chronic pancreatitis, type 2 diabetes mellitus, HTN, questionable drug-seeking behavior presented to the emergency room with nausea vomiting abdominal pain.  Admitted for observation due to intractable nausea vomiting and dehydration.      Alcohol dependence  Intractable nausea and vomiting secondary to alcoholic Gastritis  At risk for alcohol Withdrawal (with history of alcohol withdrawal related seizure x 1 in past)  Has multiple hospitalization with similar symptoms at [facility] most recent 1 on 1/24/2024  -CT abdomen pelvis shows with chronic pancolitis no acute change  -IV fluid  -Advance diet as tolerated  -As needed pain meds but only p.o. no IV meds for pain  -As needed antiemetics  -Will place him on CIWA protocol.  No evidence of withdrawal at this time  -Thiamine folate and multivitamin    Dehydration  -From poor oral intake  -IV fluids    DM type II  -Sliding scale insulin for now  -Resume prior to admission meds once reconciled and once taking p.o. adequately  -Insulin is noted as patient's allergies.  However patient has tolerated in the past and may have required Benadryl.      Hypertension  -Resume prior to admission losartan  -As needed metoprolol    Tobacco use disorder  -Counseled on cessation  -Will order nicotine patch      DVT Prophylaxis: Pneumatic Compression Devices  Code Status: Full Code    Disposition: Admitted for observation    Primary Care Physician   Freddie Best    Chief Complaint   Nausea vomiting abdominal pain 1 day    History is obtained from the  patient    History of Present Illness   Jaiden Lyons is a 48 year old male with past medical history significant for alcohol dependence, alcoholic gastritis, recurrent pancreatitis /chronic pancreatitis, type 2 diabetes mellitus, HTN, questionable drug-seeking behavior presented to the emergency room with nausea vomiting abdominal pain.    Patient notes that he drinks about 10-12 beers per day last drink was 8 AM yesterday came to the emergency room is unable to keep anything down significant nausea and vomiting and abdominal pain.  Pain 10 out of 10 in upper abdomen.  Received IV Dilaudid for pain control in the emergency room initially.  Was treated with fluids and antiemetics.  Unable to tolerate p.o. and had another episode of large emesis in the emergency room.  Patient is being admitted for intractable nausea and vomiting.    Denies any chest pain shortness of breath lightheadedness dizziness.  Denies any other drug abuse.  He states he occasionally uses weed.  More than 10 point review of systems was carried out was otherwise negative.    In the emergency room CT abdomen pelvis showed chronic pancreatitis.  Patient was treated with pain meds, antiemetics fluids.  He is being admitted for further evaluation and treatment.    Past Medical History    Past Medical History:   Diagnosis Date    Alcohol abuse     Alcohol-induced acute pancreatitis     Asthma     Benign essential hypertension     Type 2 diabetes mellitus          Past Surgical History   Past Surgical History:   Procedure Laterality Date    ESOPHAGOSCOPY, GASTROSCOPY, DUODENOSCOPY (EGD), COMBINED Left 02/22/2021    Procedure: ESOPHAGOGASTRODUODENOSCOPY (EGD);  Surgeon: Landry Rodriguez MD;  Location:  GI    Forearm surgery to repair [facility] from [facility]      HIP SURGERY         Prior to Admission Medications   Prior to Admission Medications   Prescriptions Last Dose Informant Patient Reported? Taking?   acetaminophen (TYLENOL) 325 MG tablet   No  No   Sig: Take 2 tablets (650 mg) by mouth every 6 hours as needed for mild pain or other (and adjunct with moderate or severe pain or per patient request)   albuterol (PROAIR HFA) 108 (90 Base) MCG/ACT inhaler   No No   Sig: Inhale 2 puffs into the lungs every 4 hours as needed for shortness of breath, wheezing or cough   benzonatate (TESSALON) 200 MG capsule   No No   Sig: Take 1 capsule (200 mg) by mouth 3 times daily as needed for cough   empagliflozin (JARDIANCE) 10 MG TABS tablet   No No   Sig: Take 1 tablet (10 mg) by mouth daily   glipiZIDE (GLUCOTROL XL) 10 MG 24 hr tablet   No No   Sig: Take 1 tablet (10 mg) by mouth daily   guaiFENesin-dextromethorphan (ROBITUSSIN DM) 100-10 MG/5ML syrup   No No   Sig: Take 5 mLs by mouth 4 times daily as needed for cough   insulin detemir (LEVEMIR PEN) 100 UNIT/ML pen   No No   Sig: Inject 20 Units Subcutaneous At Bedtime   losartan (COZAAR) 50 MG tablet   No No   Sig: Take 1 tablet (50 mg) by mouth daily   oxyCODONE (ROXICODONE) 5 MG tablet   No No   Sig: Take 1 tablet (5 mg) by mouth every 4 hours as needed for moderate pain (4-6) (IF pain not managed with non-pharmacological and non-opioid interventions)   senna-docusate (SENOKOT-S/PERICOLACE) 8.6-50 MG tablet   No No   Sig: Take 1 tablet by mouth 2 times daily as needed for constipation      Facility-Administered Medications: None     Allergies   Allergies   Allergen Reactions    Fentanyl     Insulin Swelling     Reaction Date: Around 6427-5603  Face swelling  Inpatient when reaction occurred, unsure which type of insulin  Required some Benadryl to help with the swelling    Tolerated aspart/glargine while inpt 12/2021    Lisinopril      Face swelling, cough    Morphine Itching       Social History   Social History     Tobacco Use    Smoking status: Every Day     Types: Cigarettes    Smokeless tobacco: Never   Substance Use Topics    Alcohol use: Yes     Comment: 1/2 to 1 pint a day     Social History     Social  History Narrative    Not on file     Smokes about a pack a day.  Drinks about 10-12 beers per day    Family History   I have reviewed this patient's family history and updated it with pertinent information if needed.   No family history on file.  Grandmother had diabetes mellitus.    Review of Systems   A Comprehensive greater than 10 system review of systems was carried out.  Pertinent positives and negatives are noted above.  Otherwise negative for contributory information.    Physical Exam   Temp: 98.1  F (36.7  C) Temp src: Oral BP: (!) 154/107 Pulse: 95   Resp: 18 SpO2: 95 % O2 Device: None (Room air)    Vital Signs with Ranges  Temp:  [98.1  F (36.7  C)] 98.1  F (36.7  C)  Pulse:  [95-98] 95  Resp:  [18] 18  BP: (154-168)/(106-107) 154/107  SpO2:  [95 %-100 %] 95 %  163 lbs 2.25 oz    GEN:  Alert, oriented x 3, sleepy, no overt distress  HEENT:  Normocephalic/atraumatic, no scleral icterus, no nasal discharge, mouth dry  CV:  Regular rate and rhythm, no murmur or JVD.  S1 + S2 noted, no S3 or S4.  LUNGS:  Clear to auscultation bilaterally without rales/rhonchi/wheezing/retractions.  Symmetric chest rise on inhalation noted.  ABD:  Active bowel sounds, soft, non-tender/non-distended.  No rebound/guarding/rigidity.  EXT:  No edema.  No cyanosis.  No joint synovitis noted.  SKIN:  Dry to touch, no exanthems noted in the visualized areas.  NEURO: Awake alert oriented x 3 cranial nerves intact moving all extremities no new focal deficits appreciated.    Data   Data reviewed today:  I personally reviewed the abdominal CT image(s) showing chronic pancreatic changes. .    Recent Labs   Lab 02/05/24 1930   WBC 6.1   HGB 14.4   HCT 41.4   MCV 82        Recent Labs   Lab 02/05/24 1935 02/05/24 1930   NA  --  132*   POTASSIUM  --  3.8   CHLORIDE  --  96*   CO2  --  18*   ANIONGAP  --  18*   * 223*   BUN  --  15.9   CR  --  0.59*   GFRESTIMATED  --  >90   BARRY  --  8.8     7-Day Micro Results       No  "results found for the last 168 hours.          No results for input(s): \"NTBNPI\", \"NTBNP\" in the last 168 hours.  No results for input(s): \"SED\", \"CRP\" in the last 168 hours.  GFR Estimate   Date Value Ref Range Status   02/05/2024 >90 >60 mL/min/1.73m2 Final   09/10/2023 >90 >60 mL/min/1.73m2 Final   06/16/2023 >90 >60 mL/min/1.73m2 Final     Comment:     eGFR calculated using 2021 CKD-EPI equation.   07/02/2021 >90 >60 mL/min/[1.73_m2] Final     Comment:     Non  GFR Calc  Starting 12/18/2018, serum creatinine based estimated GFR (eGFR) will be   calculated using the Chronic Kidney Disease Epidemiology Collaboration   (CKD-EPI) equation.     06/01/2021 >90 >60 mL/min/[1.73_m2] Final     Comment:     Non  GFR Calc  Starting 12/18/2018, serum creatinine based estimated GFR (eGFR) will be   calculated using the Chronic Kidney Disease Epidemiology Collaboration   (CKD-EPI) equation.     02/24/2021 >90 >60 mL/min/[1.73_m2] Final     Comment:     Non  GFR Calc  Starting 12/18/2018, serum creatinine based estimated GFR (eGFR) will be   calculated using the Chronic Kidney Disease Epidemiology Collaboration   (CKD-EPI) equation.       GFR Estimate If Black   Date Value Ref Range Status   07/02/2021 >90 >60 mL/min/[1.73_m2] Final     Comment:      GFR Calc  Starting 12/18/2018, serum creatinine based estimated GFR (eGFR) will be   calculated using the Chronic Kidney Disease Epidemiology Collaboration   (CKD-EPI) equation.     06/01/2021 >90 >60 mL/min/[1.73_m2] Final     Comment:      GFR Calc  Starting 12/18/2018, serum creatinine based estimated GFR (eGFR) will be   calculated using the Chronic Kidney Disease Epidemiology Collaboration   (CKD-EPI) equation.     02/24/2021 >90 >60 mL/min/[1.73_m2] Final     Comment:      GFR Calc  Starting 12/18/2018, serum creatinine based estimated GFR (eGFR) will be   calculated using the " "Chronic Kidney Disease Epidemiology Collaboration   (CKD-EPI) equation.       Recent Labs   Lab 02/05/24 1935 02/05/24 1930   * 223*     Recent Labs   Lab 02/05/24 1930   HGB 14.4     Recent Labs   Lab 02/05/24 1930   AST 54*   ALT 45   ALKPHOS 96   BILITOTAL 1.0     No results for input(s): \"INR\" in the last 168 hours.  No results for input(s): \"LACT\" in the last 168 hours.  Recent Labs   Lab 02/05/24 1930   LIPASE 45     No results for input(s): \"TSH\" in the last 168 hours.  No results for input(s): \"TROPONIN\", \"TROPI\", \"TROPR\", \"TROPONINIS\" in the last 168 hours.    Invalid input(s): \"TROPT\", \"TROP\", \"TROPONINIES\", \"TNIH\"  No results for input(s): \"COLOR\", \"APPEARANCE\", \"URINEGLC\", \"URINEBILI\", \"URINEKETONE\", \"SG\", \"UBLD\", \"URINEPH\", \"PROTEIN\", \"UROBILINOGEN\", \"NITRITE\", \"LEUKEST\", \"RBCU\", \"WBCU\" in the last 168 hours.    Recent Results (from the past 24 hour(s))   CT Abdomen Pelvis w Contrast    Narrative    EXAM: CT ABDOMEN PELVIS W CONTRAST  LOCATION: [facility]  DATE: 2/5/2024    INDICATION: mid abdominal pain  COMPARISON: 1/24/2024  TECHNIQUE: CT scan of the abdomen and pelvis was performed following injection of IV contrast. Multiplanar reformats were obtained. Dose reduction techniques were used.  CONTRAST: 69ml Isovue 370    FINDINGS:   LOWER CHEST: Normal.    HEPATOBILIARY: Liver and gallbladder within normal limits.  Stable peripherally calcified area within the sarath hepatis adjacent to the portal vein.    PANCREAS: Limited calcifications are noted throughout the pancreas compatible with chronic pancreatitis. There is stable dilation of the pancreatic duct measuring up to 1.1 cm. Several of the calcifications appear to be within the pancreatic duct. No   acute inflammatory changes around the pancreas.    SPLEEN: Normal.    ADRENAL GLANDS: Right adrenal gland within normal limits. Stable nodule within the left adrenal gland.    KIDNEYS/BLADDER: Too small to " characterize tiny lesions within both kidneys, likely represent cysts.      BOWEL: Diverticulosis of the colon. No acute inflammatory change. No obstruction.     LYMPH NODES: Normal.    VASCULATURE: Mild atherosclerotic disease of the abdominal aorta and with no aneurysmal dilation.    PELVIC ORGANS: Bladder appears within normal limits. Prostatomegaly.    MUSCULOSKELETAL: Fixation of the right femur. Degenerative changes of spine and hips.      Impression    IMPRESSION:   1.  No acute findings in the abdomen and pelvis.  2.  Findings compatible with chronic pancreatitis with stable dilation of the pancreatic duct measuring up to 1.1 cm. Several of the calcifications appear to be within the pancreatic duct. No acute inflammatory changes around the pancreas.  3.  Colonic diverticulosis with no evidence of diverticulitis.

## 2024-02-06 NOTE — PROGRESS NOTES
Paged MD for patient's phos 1.9, ask for protocol. Got phos protocol, and replaced phos. Verify IV fluid with MD for pausing maintenance IV fluid when IV vitamin fluid is running to prevent fluid overload. MD ok with pausing maintenance fluid for 10 hours as vitamin fluid is running.

## 2024-02-07 VITALS
BODY MASS INDEX: 23.45 KG/M2 | HEART RATE: 77 BPM | DIASTOLIC BLOOD PRESSURE: 70 MMHG | OXYGEN SATURATION: 94 % | TEMPERATURE: 98.3 F | SYSTOLIC BLOOD PRESSURE: 117 MMHG | HEIGHT: 70 IN | RESPIRATION RATE: 18 BRPM | WEIGHT: 163.8 LBS

## 2024-02-07 LAB
GLUCOSE BLDC GLUCOMTR-MCNC: 210 MG/DL (ref 70–99)
GLUCOSE BLDC GLUCOMTR-MCNC: 238 MG/DL (ref 70–99)
PHOSPHATE SERPL-MCNC: 3.2 MG/DL (ref 2.5–4.5)

## 2024-02-07 PROCEDURE — 84100 ASSAY OF PHOSPHORUS: CPT | Performed by: INTERNAL MEDICINE

## 2024-02-07 PROCEDURE — 99239 HOSP IP/OBS DSCHRG MGMT >30: CPT | Performed by: HOSPITALIST

## 2024-02-07 PROCEDURE — 36415 COLL VENOUS BLD VENIPUNCTURE: CPT | Performed by: INTERNAL MEDICINE

## 2024-02-07 PROCEDURE — 250N000013 HC RX MED GY IP 250 OP 250 PS 637: Performed by: HOSPITALIST

## 2024-02-07 PROCEDURE — 250N000013 HC RX MED GY IP 250 OP 250 PS 637: Performed by: INTERNAL MEDICINE

## 2024-02-07 PROCEDURE — 82962 GLUCOSE BLOOD TEST: CPT

## 2024-02-07 PROCEDURE — G0378 HOSPITAL OBSERVATION PER HR: HCPCS

## 2024-02-07 RX ORDER — POLYETHYLENE GLYCOL 3350 17 G/17G
17 POWDER, FOR SOLUTION ORAL DAILY
Status: DISCONTINUED | OUTPATIENT
Start: 2024-02-07 | End: 2024-02-07 | Stop reason: HOSPADM

## 2024-02-07 RX ORDER — NALOXONE HYDROCHLORIDE 0.4 MG/ML
0.4 INJECTION, SOLUTION INTRAMUSCULAR; INTRAVENOUS; SUBCUTANEOUS
Status: DISCONTINUED | OUTPATIENT
Start: 2024-02-07 | End: 2024-02-07 | Stop reason: HOSPADM

## 2024-02-07 RX ORDER — NALOXONE HYDROCHLORIDE 0.4 MG/ML
0.2 INJECTION, SOLUTION INTRAMUSCULAR; INTRAVENOUS; SUBCUTANEOUS
Status: DISCONTINUED | OUTPATIENT
Start: 2024-02-07 | End: 2024-02-07 | Stop reason: HOSPADM

## 2024-02-07 RX ORDER — GLIPIZIDE 5 MG/1
10 TABLET, FILM COATED, EXTENDED RELEASE ORAL 2 TIMES DAILY
Status: DISCONTINUED | OUTPATIENT
Start: 2024-02-07 | End: 2024-02-07 | Stop reason: HOSPADM

## 2024-02-07 RX ORDER — PANTOPRAZOLE SODIUM 40 MG/1
40 TABLET, DELAYED RELEASE ORAL 2 TIMES DAILY PRN
Status: DISCONTINUED | OUTPATIENT
Start: 2024-02-07 | End: 2024-02-07 | Stop reason: HOSPADM

## 2024-02-07 RX ORDER — OXYCODONE HYDROCHLORIDE 5 MG/1
5 TABLET ORAL EVERY 6 HOURS PRN
Status: DISCONTINUED | OUTPATIENT
Start: 2024-02-07 | End: 2024-02-07

## 2024-02-07 RX ADMIN — PANCRELIPASE 2 CAPSULE: 36000; 180000; 114000 CAPSULE, DELAYED RELEASE PELLETS ORAL at 13:12

## 2024-02-07 RX ADMIN — OXYCODONE HYDROCHLORIDE 5 MG: 5 TABLET ORAL at 06:46

## 2024-02-07 RX ADMIN — NICOTINE 1 PATCH: 14 PATCH, EXTENDED RELEASE TRANSDERMAL at 08:04

## 2024-02-07 RX ADMIN — PANCRELIPASE 2 CAPSULE: 36000; 180000; 114000 CAPSULE, DELAYED RELEASE PELLETS ORAL at 09:26

## 2024-02-07 RX ADMIN — THIAMINE HCL TAB 100 MG 100 MG: 100 TAB at 08:05

## 2024-02-07 RX ADMIN — OXYCODONE HYDROCHLORIDE 5 MG: 5 TABLET ORAL at 02:38

## 2024-02-07 RX ADMIN — OXYCODONE HYDROCHLORIDE 5 MG: 5 TABLET ORAL at 12:17

## 2024-02-07 RX ADMIN — SENNOSIDES AND DOCUSATE SODIUM 2 TABLET: 8.6; 5 TABLET ORAL at 08:05

## 2024-02-07 RX ADMIN — Medication 1 TABLET: at 08:05

## 2024-02-07 RX ADMIN — GLIPIZIDE 10 MG: 5 TABLET, FILM COATED, EXTENDED RELEASE ORAL at 09:26

## 2024-02-07 RX ADMIN — EMPAGLIFLOZIN 25 MG: 25 TABLET, FILM COATED ORAL at 09:26

## 2024-02-07 RX ADMIN — LOSARTAN POTASSIUM 50 MG: 50 TABLET, FILM COATED ORAL at 08:05

## 2024-02-07 RX ADMIN — FOLIC ACID 1 MG: 1 TABLET ORAL at 08:05

## 2024-02-07 RX ADMIN — POLYETHYLENE GLYCOL 3350 17 G: 17 POWDER, FOR SOLUTION ORAL at 13:12

## 2024-02-07 ASSESSMENT — ACTIVITIES OF DAILY LIVING (ADL)
ADLS_ACUITY_SCORE: 34
ADLS_ACUITY_SCORE: 33
ADLS_ACUITY_SCORE: 34
ADLS_ACUITY_SCORE: 33
ADLS_ACUITY_SCORE: 33

## 2024-02-07 NOTE — PLAN OF CARE
Cared for 1900-0730    Activity: independent, bed alarm off, call light within reach.  Neuro:  A&Ox4, intact. Denies N/V. CIWA scores 2, tremors present.  Cardiac: denies chest pain or SOB  Resp: on RA, with O2 stats in the 90s  GI/: WNL  Diet: mod carb diet   LDAs: R PIV, SL  Pain: 7/10, abdominal pain. Q4 oxy PRN  PRNs: oxy x3, melatonin and benadryl   Labs: Phos replacement,  (on ISS),     Discharge Plan: pending discharge    Major Shift Events: 3/3 phos replacement given, phos 3.2, recheck in the AM    Plan: Continue with plan of care  For vital signs and complete assessments, please see documentation under flowsheets.    Nazanin Granado, RN      2300:    PRIMARY DIAGNOSIS: ACUTE PAIN  OUTPATIENT/OBSERVATION GOALS TO BE MET BEFORE DISCHARGE:  1. Pain Status: Improved-controlled with oral pain medications.    2. Return to near baseline physical activity: Yes    3. Cleared for discharge by consultants (if involved): No    Discharge Planner Nurse   Safe discharge environment identified: Yes  Barriers to discharge: No     Please review provider order for any additional goals.   Nurse to notify provider when observation goals have been met and patient is ready for discharge.    0300:    PRIMARY DIAGNOSIS: ACUTE PAIN  OUTPATIENT/OBSERVATION GOALS TO BE MET BEFORE DISCHARGE:  1. Pain Status: Improved-controlled with oral pain medications.    2. Return to near baseline physical activity: Yes    3. Cleared for discharge by consultants (if involved): No    Discharge Planner Nurse   Safe discharge environment identified: Yes  Barriers to discharge: No       Please review provider order for any additional goals.   Nurse to notify provider when observation goals have been met and patient is ready for discharge.    Goal Outcome Evaluation: unchanged

## 2024-02-07 NOTE — PLAN OF CARE
"PRIMARY DIAGNOSIS: ACUTE PAIN  OUTPATIENT/OBSERVATION GOALS TO BE MET BEFORE DISCHARGE:  1. Pain Status: Improved-controlled with oral pain medications.    2. Return to near baseline physical activity: Yes    3. Cleared for discharge by consultants (if involved): N/A    Discharge Planner Nurse   Safe discharge environment identified: Yes  Barriers to discharge: Yes       Entered by: Yvette Gallagher RN 02/07/2024 08:24   Pt. A&O, independent for nobility, has pain, PRN oxycodone was given, on low fat diet, CMS intact, plan to go home today.  /70 (BP Location: Right arm)   Pulse 77   Temp 98.3  F (36.8  C) (Oral)   Resp 18   Ht 1.778 m (5' 10\")   Wt 74.3 kg (163 lb 12.8 oz)   SpO2 94%   BMI 23.50 kg/m     Please review provider order for any additional goals.   Nurse to notify provider when observation goals have been met and patient is ready for discharge.Goal Outcome Evaluation:      Plan of Care Reviewed With: patient                 "

## 2024-02-07 NOTE — DISCHARGE SUMMARY
[facility]  Hospitalist Discharge Summary      Date of Admission:  2/5/2024  Date of Discharge:  2/7/2024  2:01 PM  Discharging Provider: Sebastian Ventura DO  Discharge Service: Hospitalist Service    Discharge Diagnoses   Alcohol dependence/abuse  Alcoholic gastritis w/NV  Chronic pancreatitis  hyponatremia  Possible medication seeking behavior  Hx DMT2, HTN, nicotine dependence    Follow-ups Needed After Discharge   Follow-up Appointments     Follow-up and recommended labs and tests       Follow up with primary care provider, Freddie Best, within 7 days   for hospital follow- up.  No follow up labs or test are needed.          Discharge Disposition   Discharged to home  Condition at discharge: Stable    Hospital Course    Jaiden Lyons is a 48 year old male with past medical history significant for alcohol dependence, alcoholic gastritis, recurrent pancreatitis /chronic pancreatitis, type 2 diabetes mellitus, HTN, questionable drug-seeking behavior presented to the emergency room with nausea vomiting abdominal pain.  Admitted for observation due to intractable nausea vomiting and dehydration.       Alcohol dependence/abuse  Chronic pancreatitis  Alcoholic gastritis  hyponatremia  -Has multiple hospitalization with similar symptoms at [facility] most recent 1 on 1/24/2024  -CT abdomen pelvis shows with chronic pancolitis no acute change  -diet advanced and tolerated well, did have some NV prior to discharge but left once narcotics were stopped due to concern for contributing to his NV  -recommend complete alcohol cessation, small frequent non fat meals, take creon with meals    Possible medication seeking behavior  -noted to be requiring and specifically requesting opiates even when suspected to be contributing to his symptoms  -PDMP reviewed and noted to have had multiple scripts filled in recent past, often given after his hospitalizations despite having 30 day supplies being  09-Jul-2020 15:56 filled alongside these  -no additional narcotics on discharge     DM type II  -Sliding scale insulin for now  -Resume prior to admission meds once reconciled and once taking p.o. adequately  -Insulin is noted as patient's allergies.  However patient has tolerated in the past and may have required Benadryl.       Hypertension  -Resume prior to admission losartan     Tobacco use disorder  -Counseled on cessation  -Will order nicotine patch    Consultations This Hospital Stay   [facility] ADULT IP CONSULT    Code Status   Full Code    Time Spent on this Encounter   I, Sebastian Ventura DO, personally saw the patient today and spent greater than 30 minutes discharging this patient.       Sebastian Ventura DO  [place] [address] [facility]  [address] [place] [address]  Phone: [phone]  Fax: [phone]  ______________________________________________________________________    Physical Exam   Vital Signs: Temp: 98.3  F (36.8  C) Temp src: Oral BP: 117/70 Pulse: 77   Resp: 18 SpO2: 94 % O2 Device: None (Room air)    Weight: 163 lbs 12.8 oz  Face to face completed day of discharge       Primary Care Physician   Freddie Best    Discharge Orders      Reason for your hospital stay    Admitted for acute on chronic pancreatitis, alcohol abuse. Recommend complete alcohol cessation and low fat diet, frequent small meals with creon     Follow-up and recommended labs and tests     Follow up with primary care provider, Freddie Best, within 7 days for hospital follow- up.  No follow up labs or test are needed.     Activity    Your activity upon discharge: activity as tolerated     Diet    Follow this diet upon discharge: Orders Placed This Encounter      Snacks/Supplements Adult: Ensure Enlive; With Meals      Low Fat Diet (up to 50g)       Significant Results and Procedures   Most Recent 3 CBC's:  Recent Labs   Lab Test 02/06/24  0555 02/05/24  1930 09/10/23  1541   WBC 4.3  6.1 5.7   HGB 11.7* 14.4 15.7   MCV 83 82 83    222 227     Most Recent 3 BMP's:  Recent Labs   Lab Test 02/07/24  0648 02/07/24  0133 02/06/24 2124 02/06/24  0707 02/06/24  0555 02/05/24  1935 02/05/24  1930 09/10/23  1541   NA  --   --   --   --  134*  --  132* 136   POTASSIUM  --   --   --   --  3.7  --  3.8 4.2   CHLORIDE  --   --   --   --  102  --  96* 96*   CO2  --   --   --   --  21*  --  18* 21*   BUN  --   --   --   --  14.8  --  15.9 8.4   CR  --   --   --   --  0.59*  --  0.59* 0.51*   ANIONGAP  --   --   --   --  11  --  18* 19*   BARRY  --   --   --   --  7.8*  --  8.8 10.0   * 238* 268*   < > 162*   < > 223* 83    < > = values in this interval not displayed.     Most Recent 2 LFT's:  Recent Labs   Lab Test 02/05/24 1930 06/16/23  0128   AST 54* 29   ALT 45 28   ALKPHOS 96 92   BILITOTAL 1.0 0.5     Most Recent 3 INR's:  Recent Labs   Lab Test 12/29/21  1356 12/28/21  0621 10/19/21  1227   INR 1.06 1.01 1.00     Most Recent 3 Hemoglobins:  Recent Labs   Lab Test 02/06/24  0555 02/05/24  1930 09/10/23  1541   HGB 11.7* 14.4 15.7     Most Recent 3 Troponin's:  Recent Labs   Lab Test 11/21/21  0110 10/19/21  1227 02/24/21  1617 02/20/21  2118 01/19/20  0600   TROPI  --   --  <0.015 <0.015 <0.015   TROPONIN <0.015 <0.015  --   --   --      Most Recent 3 BNP's:No lab results found.  Most Recent D-dimer:  Recent Labs   Lab Test 06/16/23  0128   DD 0.78*   ,   Results for orders placed or performed during the hospital encounter of 02/05/24   CT Abdomen Pelvis w Contrast    Narrative    EXAM: CT ABDOMEN PELVIS W CONTRAST  LOCATION: [facility]  DATE: 2/5/2024    INDICATION: mid abdominal pain  COMPARISON: 1/24/2024  TECHNIQUE: CT scan of the abdomen and pelvis was performed following injection of IV contrast. Multiplanar reformats were obtained. Dose reduction techniques were used.  CONTRAST: 69ml Isovue 370    FINDINGS:   LOWER CHEST: Normal.    HEPATOBILIARY: Liver and  gallbladder within normal limits.  Stable peripherally calcified area within the sarath hepatis adjacent to the portal vein.    PANCREAS: Limited calcifications are noted throughout the pancreas compatible with chronic pancreatitis. There is stable dilation of the pancreatic duct measuring up to 1.1 cm. Several of the calcifications appear to be within the pancreatic duct. No   acute inflammatory changes around the pancreas.    SPLEEN: Normal.    ADRENAL GLANDS: Right adrenal gland within normal limits. Stable nodule within the left adrenal gland.    KIDNEYS/BLADDER: Too small to characterize tiny lesions within both kidneys, likely represent cysts.      BOWEL: Diverticulosis of the colon. No acute inflammatory change. No obstruction.     LYMPH NODES: Normal.    VASCULATURE: Mild atherosclerotic disease of the abdominal aorta and with no aneurysmal dilation.    PELVIC ORGANS: Bladder appears within normal limits. Prostatomegaly.    MUSCULOSKELETAL: Fixation of the right femur. Degenerative changes of spine and hips.      Impression    IMPRESSION:   1.  No acute findings in the abdomen and pelvis.  2.  Findings compatible with chronic pancreatitis with stable dilation of the pancreatic duct measuring up to 1.1 cm. Several of the calcifications appear to be within the pancreatic duct. No acute inflammatory changes around the pancreas.  3.  Colonic diverticulosis with no evidence of diverticulitis.         Discharge Medications   Discharge Medication List as of 2/7/2024  1:33 PM        CONTINUE these medications which have NOT CHANGED    Details   acamprosate (CAMPRAL) 333 MG EC tablet Take 1-2 tablets by mouth daily Prescribed as  2 tabs three times daily, Historical      acetaminophen (TYLENOL) 325 MG tablet Take 2 tablets (650 mg) by mouth every 6 hours as needed for mild pain or other (and adjunct with moderate or severe pain or per patient request), OTC      albuterol (PROAIR HFA) 108 (90 Base) MCG/ACT inhaler  Inhale 2 puffs into the lungs every 4 hours as needed for shortness of breath, wheezing or cough, Disp-18 g, R-1, E-PrescribePharmacy may dispense brand covered by insurance (Proair, or proventil or ventolin or generic albuterol inhaler)      benzonatate (TESSALON) 200 MG capsule Take 1 capsule (200 mg) by mouth 3 times daily as needed for cough, Disp-21 capsule, R-0, E-Prescribe      cetirizine (ZYRTEC) 10 MG tablet Take 10 mg by mouth daily as needed for allergies, Historical      empagliflozin (JARDIANCE) 25 MG TABS tablet Take 25 mg by mouth daily, Historical      glipiZIDE (GLUCOTROL XL) 10 MG 24 hr tablet Take 10 mg by mouth 2 times daily, Historical      hydrocortisone 2.5 % cream Apply topically 2 times daily as neededHistorical      insulin detemir (LEVEMIR PEN) 100 UNIT/ML pen Inject 20-40 Units Subcutaneous at bedtime, Historical      ketoconazole (NIZORAL) 2 % external cream Apply topically 2 times daily as needed for itchingHistorical      !! lipase-protease-amylase (CREON 36) 28840-930509-922996 units CPEP Take 2 capsules by mouth 3 times daily (with meals), Historical      !! lipase-protease-amylase (CREON 36) 46474-922113-368890 units CPEP Take 1 capsule by mouth Take with snacks or supplements, Historical      oxyCODONE-acetaminophen (PERCOCET) 5-325 MG tablet Take 1 tablet by mouth every 8 hours as needed for severe pain, Historical      pantoprazole (PROTONIX) 40 MG EC tablet Take 40 mg by mouth 2 times daily as needed for heartburn, Historical      senna-docusate (SENOKOT-S/PERICOLACE) 8.6-50 MG tablet Take 1 tablet by mouth 2 times daily as needed for constipation, Disp-20 tablet, R-0, E-Prescribe      losartan (COZAAR) 50 MG tablet Take 1 tablet (50 mg) by mouth daily, Disp-30 tablet, R-0, E-Prescribe       !! - Potential duplicate medications found. Please discuss with provider.        Allergies   Allergies   Allergen Reactions    Fentanyl     Insulin Swelling     Reaction Date: Around  9578-0811  Face swelling  Inpatient when reaction occurred, unsure which type of insulin  Required some Benadryl to help with the swelling    Tolerated aspart/glargine while inpt 12/2021    Lisinopril      Face swelling, cough    Morphine Itching

## 2024-02-07 NOTE — UTILIZATION REVIEW
"Admission Status; Secondary Review Determination     Under the authority of the Utilization Management Committee, the utilization review process indicated a secondary review on the above patient.  The review outcome is based on review of the medical records, discussions with staff, and applying clinical experience noted on the date of the review.       (x) Observation Status Appropriate - This patient does not meet hospital inpatient criteria and is placed in observation status. If this patient's primary payer is Medicare and was admitted as an inpatient, Condition Code 44 should be used and patient status changed to \"observation\".     RATIONALE FOR DETERMINATION:  48-year-old male with significant history of alcohol dependence, alcoholic gastritis, recurrent pancreatitis/chronic pancreatitis, type 2 diabetes, hypertension, questionable drug-seeking behavior presented to the hospital with nausea, vomiting and abdominal pain.  CT of the abdomen shows chronic pancreatitis changes without any acute inflammation, lipase, transaminases with a borderline elevated AST and mild hyponatremia, hypophosphatemia.  Observation care appropriate for initial supportive measures for patient's nausea and vomiting and abdominal discomfort.  If patient has protracted symptoms requiring IV fluids and IV narcotics as ongoing treatment for greater than 2 nights in the hospital then at that time patient would be appropriate for inpatient care.        The severity of illness, intensity of service provided, expected LOS and risk for adverse outcome make the care appropriate for further observation; however, doesn't meet criteria for hospital inpatient admission. This was discussed with attending physician who concurred with this determination.    The information on this document is developed by the utilization review team in order for the business office to ensure compliance.  This only denotes the appropriateness of proper admission status " and does not reflect the quality of care rendered.         The definitions of Inpatient Status and Observation Status used in making the determination above are those provided in the CMS Coverage Manual, Chapter 1 and Chapter 6, section 70.4.      Sincerely,     Merlin Boswell MD    Physician Advisor  Utilization Review/ Case Management  [facility].

## 2024-02-07 NOTE — PLAN OF CARE
"PRIMARY DIAGNOSIS: ACUTE PAIN  OUTPATIENT/OBSERVATION GOALS TO BE MET BEFORE DISCHARGE:  1. Pain Status: Improved-controlled with oral pain medications.    2. Return to near baseline physical activity: Yes    3. Cleared for discharge by consultants (if involved): Yes    Discharge Planner Nurse   Safe discharge environment identified: Yes  Barriers to discharge: Yes       Entered by: Yvette Gallagher RN 02/07/2024 1:33 PM   Pt. A&O, independent for mobility, has pain, PRN Oxycodone was given, Murelax and senna for constipation were given.  /70 (BP Location: Right arm)   Pulse 77   Temp 98.3  F (36.8  C) (Oral)   Resp 18   Ht 1.778 m (5' 10\")   Wt 74.3 kg (163 lb 12.8 oz)   SpO2 94%   BMI 23.50 kg/m     Please review provider order for any additional goals.   Nurse to notify provider when observation goals have been met and patient is ready for discharge.Goal Outcome Evaluation:      Plan of Care Reviewed With: patient                 "

## 2024-02-09 ENCOUNTER — PATIENT OUTREACH (OUTPATIENT)
Dept: CARE COORDINATION | Facility: CLINIC | Age: 49
End: 2024-02-09
Payer: COMMERCIAL

## 2024-02-09 NOTE — PROGRESS NOTES
[facility]:   [facility] Contact  [facility]/Voicemail     Clinical Data: Post-Discharge Outreach     Outreach attempted x 2.  Left message on patient's voicemail, providing [facility]'s central phone number of [phone] ([phone]) for questions/concerns and/or to schedule an appt with an [facility] provider, if they do not have a PCP.      Plan:  [facility] will do no further outreaches at this time.       Heidy Larsen  Community Health Worker  [facility], [facility]  [phone]      *Connected Care Resource Team does NOT follow patient ongoing. Referrals are identified based on internal discharge reports and the outreach is to ensure patient has an understanding of their discharge instructions.

## 2024-02-29 NOTE — PLAN OF CARE
-- DO NOT REPLY / DO NOT REPLY ALL --  -- Message is from Engagement Center Operations (ECO) --    General Patient Message: patient returning call for results     Please  call patient  [phone]    Caller Information         Type Contact Phone/Fax    02/27/2024 10:53 AM CST Phone (Incoming) Angeline Arango (Self) [phone] (H)    02/29/2024 01:47 PM CST Phone (Incoming) Angeline Arango (Self) [phone] (H)          Alternative phone number: no     Can a detailed message be left? Yes    Message Turnaround:     Is it Working Hours? Yes - Working Hours                      A&Ox4, VSS: stable. CIWA score 0.Up independently in the room. Sleeping well between cares. Pain managed with PRN Oxycodone and IV Dilaudid. Voiding good amounts. Family at bedside. Nursing continue to monitor.

## (undated) DEVICE — ENDO SNARE HEXAGONAL ISNARE 2.5X4.0CM W/25GA NDL

## (undated) DEVICE — KIT ENDO TURNOVER/PROCEDURE W/CLEAN A SCOPE LINERS 103888

## (undated) RX ORDER — FENTANYL CITRATE 50 UG/ML
INJECTION, SOLUTION INTRAMUSCULAR; INTRAVENOUS
Status: DISPENSED
Start: 2021-02-22

## (undated) RX ORDER — DIPHENHYDRAMINE HYDROCHLORIDE 50 MG/ML
INJECTION INTRAMUSCULAR; INTRAVENOUS
Status: DISPENSED
Start: 2021-02-22

## (undated) RX ORDER — MEPERIDINE HYDROCHLORIDE 50 MG/ML
INJECTION INTRAMUSCULAR; INTRAVENOUS; SUBCUTANEOUS
Status: DISPENSED
Start: 2021-02-22